# Patient Record
Sex: FEMALE | Race: WHITE | NOT HISPANIC OR LATINO | Employment: OTHER | URBAN - METROPOLITAN AREA
[De-identification: names, ages, dates, MRNs, and addresses within clinical notes are randomized per-mention and may not be internally consistent; named-entity substitution may affect disease eponyms.]

---

## 2017-03-22 ENCOUNTER — HOSPITAL ENCOUNTER (OUTPATIENT)
Dept: RADIOLOGY | Facility: MEDICAL CENTER | Age: 67
Discharge: HOME | End: 2017-03-22
Payer: COMMERCIAL

## 2017-03-22 ENCOUNTER — ALLSCRIPTS OFFICE VISIT (OUTPATIENT)
Dept: OTHER | Facility: OTHER | Age: 67
End: 2017-03-22

## 2017-03-22 DIAGNOSIS — R07.81 PLEURODYNIA: ICD-10-CM

## 2017-03-22 PROCEDURE — 71101 X-RAY EXAM UNILAT RIBS/CHEST: CPT

## 2017-07-18 ENCOUNTER — GENERIC CONVERSION - ENCOUNTER (OUTPATIENT)
Dept: OTHER | Facility: OTHER | Age: 67
End: 2017-07-18

## 2017-08-17 ENCOUNTER — ALLSCRIPTS OFFICE VISIT (OUTPATIENT)
Dept: OTHER | Facility: OTHER | Age: 67
End: 2017-08-17

## 2018-01-13 VITALS
SYSTOLIC BLOOD PRESSURE: 112 MMHG | BODY MASS INDEX: 25.91 KG/M2 | WEIGHT: 160.5 LBS | DIASTOLIC BLOOD PRESSURE: 76 MMHG | HEART RATE: 76 BPM | RESPIRATION RATE: 18 BRPM

## 2018-01-14 VITALS
DIASTOLIC BLOOD PRESSURE: 62 MMHG | BODY MASS INDEX: 25.08 KG/M2 | RESPIRATION RATE: 16 BRPM | HEART RATE: 78 BPM | WEIGHT: 155.38 LBS | SYSTOLIC BLOOD PRESSURE: 112 MMHG

## 2018-01-14 NOTE — PROGRESS NOTES
Assessment   1  Breast cancer screening (V76 10) (Z12 39)  2  History of Osteoporosis screening (V82 81) (Z13 820)  3  History of Need for Zostavax administration (V04 89) (Z23)  4  DMII (diabetes mellitus, type 2) (250 00) (E11 9)  5  History of Encounter for long-term current use of medication (V58 69) (Z79 899)  6  History of Thyroid disorder screen (V77 0) (Z13 29)  7  History of Influenza vaccination administered at current visit (V04 81) (Z23)    Plan  Breast cancer screening    · * MAMMO SCREENING BILATERAL W CAD; Status:Active - Retrospective By Protocol  Authorization; Requested for:20Oct2016;   Colon cancer screening    · 1 - Ashutosh YOUNG, Russel Aviles (Gastroenterology) Physician Referral  Consult  Status: Active -  Retrospective By Protocol Authorization  Requested for: 68QGK0494  () Care Summary provided  : Yes   · COLONOSCOPY; Status:Active - Retrospective By Protocol Authorization; Requested  for:20Oct2016;   DMII (diabetes mellitus, type 2)    · (1) CBC/PLT/DIFF; Status:Active - Retrospective Authorization; Requested  for:20Oct2016;    · (1) COMPREHENSIVE METABOLIC PANEL; Status:Active - Retrospective Authorization; Requested for:20Oct2016;    · (1) LIPID PANEL, FASTING; Status:Active - Retrospective Authorization; Requested  for:20Oct2016;    · *VB - Foot Exam; Status:Complete;   Done: 71MYH0622 11:22PM  DMII (diabetes mellitus, type 2), PMH: Encounter for long-term current use of medication    · (1) VITAMIN D 25-HYDROXY; Status:Active - Retrospective Authorization; Requested  for:20Oct2016;   DMII (diabetes mellitus, type 2), PMH: Thyroid disorder screen    · (1) TSH; Status:Active - Retrospective Authorization; Requested for:20Oct2016;    Health Maintenance    · *VB-Depression Screening; Status:Complete;   Done: 86HIM0285 11:22PM  PMH: Influenza vaccination administered at current visit    · Administered: Fluzone Quadrivalent Intramuscular Suspension  PMH: Need for Zostavax administration    · Start: Zostavax 96656 UNT/0 65ML Subcutaneous Solution Reconstituted (Zostavax  12866 UNT/0 65ML Subcutaneous Solution Reconstituted); adm  one dose as  directed  PMH: Osteoporosis screening    · * DXA BONE DENSITY SPINE HIP AND PELVIS; Status:Active - Retrospective By  Protocol Authorization; Requested for:20Oct2016;   SocHx: Cigarette smoker    · We recommend you quit smoking  Time spent counseling today was greater than 3  minutes ; Status:Complete;   Done: 96NIJ4896 11:23PM    Flu shot  Zostavax  Check BW, mammogram, Dexa scan  Colonoscopy referral   Needs Gyn exam  RV for review of above  Discussion/Summary    1  Diabetes-per endo  Foot exam today  2  Cigarette smoker-discussed cessation  3  HM-discussed diet, exercise  4  HM-needs mammogram, Dexa scan colonoscopy  Updated immunizations  History of Present Illness  HM, Adult Female: The patient is being seen for a health maintenance evaluation  General Health: The patient's health since the last visit is described as good  She does not have regular dental visits  (Only has 4 lower teeth ; has an upper denture)   She complains of vision problems  Vision care includes an eye examination within the last year and Dr Moon Weiss  She denies hearing loss  Lifestyle:  She consumes a diverse and healthy diet (Not much fruits and vegetables  Eats whole grains  Can't do much dairy  Drinks a lot tea  )  She is on a low salt diet  She does not have any weight concerns  She does not exercise regularly  She uses tobacco  (Smoking less since she retired  , Smoking 1 ppd  Has quit for a year and a half  )   She denies alcohol use  Screening: Additional History:  No recent mammogram or Dexa scan  Colonoscopy about 10 years ago Dr Olvin Torres  HPI: Ana Smith has been doing well  She had her cataract surgery and is happy with the results  Review of Systems    Over the past 2 weeks, how often have you been bothered by the following problems?    1 ) Little interest or pleasure in doing things? Not at all    2 ) Feeling down, depressed or hopeless? Not at all  Active Problems   1  Breast cancer screening (V76 10) (Z12 39)  2  Cataract (366 9) (H26 9)  3  Cigarette smoker (305 1) (F17 210)  4  Colon cancer screening (V76 51) (Z12 11)  5  DMII (diabetes mellitus, type 2) (250 00) (E11 9)  6  Fx olecran proc ulna-closed (813 01) (S52 023A)  7  Fx radius neck-closed (813 06) (S52 133A)  8  Impairment of balance (781 2) (R26 89)  9  Multiple falls (V15 88) (R29 6)  10  Screening for malignant neoplasm of cervix (V76 2) (Z12 4)    Past Medical History    · History of High cholesterol (272 0) (E78 00)   · History of diabetes mellitus (V12 29) (Z86 39)   · History of migraine (V12 49) (Z86 69)    Surgical History    · History of Gallbladder Surgery    Family History  Family History    · Family history of Arthritis (V17 7)   · Family history of Pancreatic Cancer Susceptibility   · Family history of Scoliosis    Social History    · Denied: History of Being A Social Drinker   · Cigarette smoker (305 1) (F17 210)   · Current Every Day Smoker (305 1)   · High school graduate   ·    · Two children    Current Meds  1  Wilver Bradley [employer]; Therapy: 50HRB7010 to (Evaluate:27Qjm9517) Recorded  2  Crestor 20 MG Oral Tablet; Therapy: (Recorded:07Apr2014) to Recorded  3  GlipiZIDE 5 MG Oral Tablet Recorded  4  MetFORMIN HCl - 1000 MG Oral Tablet; Therapy: (Recorded:07Apr2014) to Recorded    Allergies   1  Codeine Sulfate TABS    Vitals   Recorded: 99PHI9790 34:36BM   Systolic 829   Diastolic 80   Heart Rate 80   Respiration 18   Weight 160 lb 4 00 oz     Physical Exam    Socks and shoes removed, the Right Foot: the foot was normal, no swelling, no erythema  The toes on the right were normal    The sensory exam showed normal vibratory sensation at the level of the toes and normal position sense at the level of the toes    normal vibratory sensation at the level of the toes on the right  Normal tactile sensation with monofilament testing throughout the right foot  Socks and shoes removed, Left Foot: the foot was normal, no swelling, no erythema  The toes on the left were normal    The sensory exam showed normal vibratory sensation at the level of the toes and normal position sense at the level of the toes   normal vibratory sensation at the level of the toes on the left  Normal tactile sensation with monofilament testing throughout the left foot  Constitutional   General appearance: No acute distress, well appearing and well nourished  Head and Face   Head and face: Normal     Eyes   Conjunctiva and lids: No swelling, erythema or discharge  Pupils and irises: Equal, round, reactive to light  Ears, Nose, Mouth, and Throat   Otoscopic examination: Tympanic membranes translucent with normal light reflex  Canals patent without erythema  Lips, teeth, and gums: Abnormal   4 l;ower incisors present  Oropharynx: Normal with no erythema, edema, exudate or lesions  Neck   Neck: Supple, symmetric, trachea midline, no masses  Thyroid: Normal, no thyromegaly  Pulmonary   Respiratory effort: No increased work of breathing or signs of respiratory distress  Auscultation of lungs: Clear to auscultation  Cardiovascular   Auscultation of heart: Normal rate and rhythm, normal S1 and S2, no murmurs  Carotid pulses: 2+ bilaterally  Abdominal aorta: Normal     Femoral pulses: 2+ bilaterally  Pedal pulses: 2+ bilaterally  Peripheral vascular exam: Normal     Examination of extremities for edema and/or varicosities: Normal     Abdomen   Abdomen: Non-tender, no masses  Liver and spleen: No hepatomegaly or splenomegaly  Lymphatic   Palpation of lymph nodes in neck: No lymphadenopathy  Palpation of lymph nodes in groin: No lymphadenopathy      Musculoskeletal   Gait and station: Normal     Psychiatric   Judgment and insight: Normal  Mood and affect: Normal        Future Appointments    Date/Time Provider Specialty Site   04/20/2017 01:00 PM CARMELA Patterson   [address]     Signatures   Electronically signed by : CARMELA Kearns ; Oct 20 2016 11:49PM EST                       (Author)

## 2018-01-15 NOTE — RESULT NOTES
Verified Results  (1) TISSUE EXAM [id number] 09:14AM Rajinder Olivia     Test Name Result Flag Reference   LAB AP CASE REPORT (Report)     Surgical Pathology Report             Case: [id number]                   Authorizing Provider: Coleta Gaucher, MD  Collected:      12/14/2016 0914        Ordering Location:   [address] [place] [address] Received:      12/14/2016 150                    Operating Room                                 Pathologist:      Siobhan Gonzalez MD                              Specimen:  Polyp, Colorectal, Cold snare cecal polyp   LAB AP FINAL DIAGNOSIS (Report)     A  Cecal polyp:  - Tubular adenoma (adenomatous polyp) x 1   - Serrated polyp x 1, favor sessile serrated adenoma  - No high grade dysplasia and no evidence of malignancy  Interpretation performed at [employer] [facility], [facility]  Electronically signed by Siobhan Gonzalez MD on 12/19/2016 at 5:03 PM   LAB AP SURGICAL ADDITIONAL INFORMATION (Report)     These tests were developed and their performance characteristics   determined by [facility] or [address] [place] [address]  They may not be cleared or approved by the [place]  Food and   Drug Administration  The FDA has determined that such clearance or   approval is not necessary  These tests are used for clinical purposes  They should not be regarded as investigational or for research  This   laboratory has been approved by [place] [id number], designated as a high-complexity   laboratory and is qualified to perform these tests  LAB AP GROSS DESCRIPTION (Report)     A  The specimen is received in formalin, labeled with the patient's name   and hospital number, and is designated cecal polyp  The specimen   consists of several, rubbery, tan-brown tissue fragments measuring 1 0 x   0 8 x 0 3 cm in aggregate dimension  Entirely submitted  One cassette    Note: The estimated total formalin fixation time based upon information   provided by the submitting clinician and the standard processing schedule   is 28 75 hours  SRS

## 2018-02-21 ENCOUNTER — OFFICE VISIT (OUTPATIENT)
Dept: FAMILY MEDICINE CLINIC | Facility: MEDICAL CENTER | Age: 68
End: 2018-02-21
Payer: COMMERCIAL

## 2018-02-21 VITALS
DIASTOLIC BLOOD PRESSURE: 70 MMHG | BODY MASS INDEX: 25.41 KG/M2 | HEART RATE: 56 BPM | WEIGHT: 157.4 LBS | SYSTOLIC BLOOD PRESSURE: 116 MMHG | RESPIRATION RATE: 16 BRPM

## 2018-02-21 DIAGNOSIS — Z79.4 TYPE 2 DIABETES MELLITUS WITHOUT COMPLICATION, WITH LONG-TERM CURRENT USE OF INSULIN (HCC): ICD-10-CM

## 2018-02-21 DIAGNOSIS — F17.210 CIGARETTE SMOKER: Primary | ICD-10-CM

## 2018-02-21 DIAGNOSIS — Z23 FLU VACCINE NEED: ICD-10-CM

## 2018-02-21 DIAGNOSIS — E11.9 TYPE 2 DIABETES MELLITUS WITHOUT COMPLICATION, WITH LONG-TERM CURRENT USE OF INSULIN (HCC): ICD-10-CM

## 2018-02-21 DIAGNOSIS — E78.5 DYSLIPIDEMIA: ICD-10-CM

## 2018-02-21 PROCEDURE — 90686 IIV4 VACC NO PRSV 0.5 ML IM: CPT

## 2018-02-21 PROCEDURE — 99213 OFFICE O/P EST LOW 20 MIN: CPT | Performed by: FAMILY MEDICINE

## 2018-02-21 PROCEDURE — 90471 IMMUNIZATION ADMIN: CPT

## 2018-02-21 RX ORDER — OMEGA-3-ACID ETHYL ESTERS 1 G/1
2 CAPSULE, LIQUID FILLED ORAL 2 TIMES DAILY
COMMUNITY
End: 2021-06-03 | Stop reason: ALTCHOICE

## 2018-02-21 RX ORDER — VARENICLINE TARTRATE 25 MG
KIT ORAL
Qty: 53 TABLET | Refills: 0 | Status: SHIPPED | OUTPATIENT
Start: 2018-02-21 | End: 2018-08-10 | Stop reason: ALTCHOICE

## 2018-02-21 NOTE — PROGRESS NOTES
Michele Jensen has been seeing Endocrinology for follow-up of her dyslipidemia and her insulin-dependent diabetes  She has been seeing Blanca Oabndo at [facility]  Using Toujeo 24 units daily  Has been well controlled with last A1c 6 4  In addition her hypertriglyceridemia is much improved with gemfibrozil and atorvastatin  She also attributes this to better control of her diabetes  However she needs to find a new endocrinologist as her insurance does not cover her  She is ready to quit smoking  She used a nicotine patch in the past   Requests prescription for Chantix  O: /70   Pulse 56   Resp 16   Wt 71 4 kg (157 lb 6 4 oz)   BMI 25 41 kg/m²   Neck no adenopathy thyromegaly bruits  Chest clear but with reduced breath sounds  Cardiac regular rate without murmur    Assessment  1  Hypertension-controlled with losartan  2  Insulin-dependent diabetes-will give referral to new Endocrinologist  3  Cigarette smoker-discussed cessation  Request for prescription for Chantix  Side effects and used reviewed  4   Health maintenance-flu shot today  Will get Pneumovax in 6 months  Plan :  As above  Recheck 6 months

## 2018-04-24 ENCOUNTER — TELEPHONE (OUTPATIENT)
Dept: FAMILY MEDICINE CLINIC | Facility: MEDICAL CENTER | Age: 68
End: 2018-04-24

## 2018-04-24 NOTE — TELEPHONE ENCOUNTER
Triaged- ever since quitting smoking sugars are uncontrolled  Night reading at 300    Fasting at 177 in the morning   appt with KAMRON Estes not until July  Please advise  Is in [address] , up'd it to 30 untits  Didn't seem to help

## 2018-04-24 NOTE — TELEPHONE ENCOUNTER
Pt was taking chantix to quit smoking  For past 2 weeks bs has been over 300 and her feet hurt so bad at night she cannot sleep  She was seeing [facility] for her sugar, but they no longer accept her insurance  She called another endo and they cannot see her until July  Pt said she cannot wait this long

## 2018-04-27 ENCOUNTER — OFFICE VISIT (OUTPATIENT)
Dept: ENDOCRINOLOGY | Facility: CLINIC | Age: 68
End: 2018-04-27
Payer: COMMERCIAL

## 2018-04-27 VITALS
DIASTOLIC BLOOD PRESSURE: 60 MMHG | BODY MASS INDEX: 26.6 KG/M2 | SYSTOLIC BLOOD PRESSURE: 128 MMHG | HEART RATE: 62 BPM | WEIGHT: 164.8 LBS

## 2018-04-27 DIAGNOSIS — IMO0002 UNCONTROLLED TYPE 2 DIABETES WITH NEUROPATHY: ICD-10-CM

## 2018-04-27 DIAGNOSIS — E11.65 TYPE 2 DIABETES MELLITUS WITH HYPERGLYCEMIA, WITHOUT LONG-TERM CURRENT USE OF INSULIN (HCC): Primary | ICD-10-CM

## 2018-04-27 DIAGNOSIS — I10 ESSENTIAL HYPERTENSION: ICD-10-CM

## 2018-04-27 DIAGNOSIS — E78.5 HYPERLIPIDEMIA, UNSPECIFIED HYPERLIPIDEMIA TYPE: ICD-10-CM

## 2018-04-27 DIAGNOSIS — E78.1 HYPERTRIGLYCERIDEMIA: ICD-10-CM

## 2018-04-27 PROCEDURE — 99205 OFFICE O/P NEW HI 60 MIN: CPT | Performed by: INTERNAL MEDICINE

## 2018-04-27 RX ORDER — GEMFIBROZIL 600 MG/1
600 TABLET, FILM COATED ORAL
Qty: 60 TABLET | Refills: 3 | Status: SHIPPED | OUTPATIENT
Start: 2018-04-27 | End: 2018-07-26 | Stop reason: SDUPTHER

## 2018-04-27 RX ORDER — DULOXETIN HYDROCHLORIDE 20 MG/1
20 CAPSULE, DELAYED RELEASE ORAL DAILY
Qty: 30 CAPSULE | Refills: 5 | Status: SHIPPED | OUTPATIENT
Start: 2018-04-27 | End: 2018-08-10 | Stop reason: ALTCHOICE

## 2018-04-27 RX ORDER — LOSARTAN POTASSIUM 50 MG/1
1 TABLET ORAL DAILY
COMMUNITY
End: 2018-08-10 | Stop reason: SDUPTHER

## 2018-04-27 RX ORDER — NATEGLINIDE 60 MG/1
60 TABLET ORAL
Qty: 30 TABLET | Refills: 4 | Status: SHIPPED | OUTPATIENT
Start: 2018-04-27 | End: 2018-08-10 | Stop reason: CLARIF

## 2018-04-27 RX ORDER — ASPIRIN 81 MG/1
81 TABLET ORAL DAILY
COMMUNITY

## 2018-04-27 RX ORDER — ATORVASTATIN CALCIUM 40 MG/1
40 TABLET, FILM COATED ORAL DAILY
Qty: 30 TABLET | Refills: 3 | Status: SHIPPED | OUTPATIENT
Start: 2018-04-27 | End: 2018-07-13 | Stop reason: SDUPTHER

## 2018-04-27 NOTE — PATIENT INSTRUCTIONS
Hypoglycemia instructions   [facility]  9/51/1668  [id number]    Low Blood Sugar    Steps to treat low blood sugar  1  Test blood sugar if you have symptoms of low blood sugar:   Low Blood Sugar Symptoms:  o Sweaty  o Dizzy  o Rapid heartbeat  o Shaky    o Bad mood  o Hungry      2  Treat blood sugar less than 70 with 15 grams of fast-acting carbohydrate:   Examples of 15 grams Fast-Acting Carbohydrate:  o 4 oz juice  o 4 oz regular soda  o 3-4 glucose tablets (chew)  o 3-4 hard candies (chew)              3    Wait 15 minutes and test your blood sugar again           4   If blood sugar is less than 100, repeat steps 2-3       5  When your blood sugar is 100 or more, eat a snack if it will be longer than one hour until your next meal  The snack should be 15 grams of carbohydrate and a protein:   Examples of snacks:  o ½ sandwich  o 6 crackers with cheese  o Piece of fruit with cheese or peanut butter  o 6 crackers with peanut butter

## 2018-04-27 NOTE — PROGRESS NOTES
New Patient Progress Note      No chief complaint on file  Referring Provider  Al Pacheco Md  [facility] 74  [address], [address]     History of Present Illness:   Previous medical records reviewed from [place] [id number] , history was obtained from patient as well as from medical records    Oliver Pickard is a 74 y o  female with a history of type 2  diabetes with long term use of insulin since 14 yrs   Diabetes course has been stable  Reports complications of Neuropathy  Denies recent illness or hospitalizations  Denies recent severe hypoglycemic or severe hyperglycemic episodes  Denies any issues with her current regimen  home glucose monitoring: are performed regularly,  Twice a day    Home blood glucose readings:   Before breakfast:  152-264  Mg per dL  Bedtime:  220 -350 mg/dl , usually elevated after dinner  Patient stated she quit smoking 2 weeks ago and which has increased her blood sugars    Current regimen: Toujeo 30 units in AM , Metformin  mg , 2 tablets twice a day   compliant most of the timedenies any side effects from medications  Injects in: abdomen  Rotates sites: Yes  Hypoglycemic episodes: No rare  H/o of hypoglycemia causing hospitalization or Intervention such as glucagon injection  or ambulance call :  No  Hypoglycemia symptoms: hunger, jitteriness and sweating  Treatment of hypoglycemia:      Medic alert tag: recommended: Yes    Diet: 2-3  meals per day, 1-2  snacks per day  Timing of meals is predictable   Yes  diabetic diet compliance:  compliant most of the time  Activity: Daily activity is predictable Yes The patient maintains a regular, healthy exercise program       Opthamology: sees Ophthalmology; no retinopathy, no macular edema  Podiatry:  Does not see  Jacques vaccine: yes     Has hypertension:  Losartan 50 mg/hydrochlorothiazide 12 5 mg daily compliant most of the time  Has hyperlipidemia: followed by PCP; she is currently taking Lopid 600 mg twice a day Lipitor 80 mg daily, fish oil 2 g twice a day- tolerating well, no myalgias  compliant most of the time   She has history of hypertriglyceridemia, was noncompliant for 1 year, triglyceride went up to 3000, however improved again from the last blood work which was done in December 2017, it was 180  She is religiously taking her medication currently  denies any side effects from medications  Thyroid disorders:  No history of thyroid problem  History of pancreatitis:  Positive history of pancreatitis,  15 years ago secondary to severe hypertriglyceridemia,  Up to 9000  Lab Results   Component Value Date    HGBA1C 9 2 (H) 09/05/2014    HGBA1C 9 2 (H) 09/05/2014    HGBA1C 9 2 (H) 09/05/2014    HGBA1C 9 2 (H) 09/05/2014    HGBA1C 9 2 (H) 09/05/2014    HGBA1C 9 2 (H) 09/05/2014    Reviewed blood work results from December 2017  BUN 16 glucose 54 creatinine 0 65 sodium 139 potassium 4 3 calcium 9 2 albumin 4 2 AST 12 ALT 20 anion gap 9 GFR 93 hemoglobin 14 7 hematocrit 43 0 hemoglobin A1c 6 5%, lipid profile cholesterol 123 mg/dL, triglyceride 180 mg/dL, HDL 28 mg/dL, non HDL 95 mg/dL microalbumin creatinine ratio 43, magnesium 2 1 TSH 4 1 vitamin-D 33 free T4 1 06      Component      Latest Ref Rng & Units 9/5/2014             Cholesterol      125 - 200 mg/dL    HDL      > OR = 46 mg/dL    Triglycerides      <150 mg/dL    LDL CHOLESTEROL      <130 mg/dL (calc)    Chol/HDL Ratio      < OR = 5 0 (calc)    NON-HDL-CHOL (CHOL-HDL)      mg/dL (calc)    POC Glucose      65 - 140 mg/dl    Vit D, 25-Hydroxy      30 - 100 ng/mL    Hemoglobin A1C      <5 7 % of total Hgb 9 2 (H)   TSH 3RD GENERATON      0 40 - 4 50 mIU/L      Component      Latest Ref Rng & Units 10/25/2016             Cholesterol      125 - 200 mg/dL 492 (H)   HDL      > OR = 46 mg/dL 25 (L)   Triglycerides      <150 mg/dL 3177 (H)   LDL CHOLESTEROL      <130 mg/dL (calc) LDL cholesterol not calculated   Triglyceride levelsgreater than 400 mg/dL invalidate calculated LDL results  Desirable range <100 mg/dL for patients with CHD ordiabetes and <70 mg/dL for diabetic patients withknown heart disease  Chol/HDL Ratio      < OR = 5 0 (calc) 19 7 (H)   NON-HDL-CHOL (CHOL-HDL)      mg/dL (calc) 467 (H)   POC Glucose      65 - 140 mg/dl    Vit D, 25-Hydroxy      30 - 100 ng/mL 7 (L)   Hemoglobin A1C      <5 7 % of total Hgb    TSH 3RD GENERATON      0 40 - 4 50 mIU/L 4 47       Patient Active Problem List   Diagnosis    DMII (diabetes mellitus, type 2) (HCC)    Osteoporosis    Vitamin D deficiency    Stress incontinence of urine      Past Medical History:   Diagnosis Date    Cataract     Diabetes mellitus ([address] )     Hyperlipemia     Migraines     Multiple falls       Past Surgical History:   Procedure Laterality Date    CHOLECYSTECTOMY      GALLBLADDER SURGERY      [place] COLONOSCOPY FLX DX W/COLLJ SPEC WHEN PFRMD N/A 12/14/2016    Procedure: COLONOSCOPY;  Surgeon: Brenda Fernandez MD;  Location: [facility];   Service: Gastroenterology      Family History   Problem Relation Age of Onset    Arthritis Family     Pancreatic cancer Family     Scoliosis Family     Diabetes type II Mother     Osteoporosis Mother     Thyroid disease unspecified Mother     Hyperlipidemia Sister     Hypertension Sister     Diabetes type II Maternal Aunt     Diabetes type II Maternal Uncle     Diabetes type II Maternal Grandfather      Social History   Substance Use Topics    Smoking status: Former Smoker     Types: Cigarettes    Smokeless tobacco: Never Used    Alcohol use No     Allergies   Allergen Reactions    Codeine GI Intolerance, Nausea Only and Vomiting         Current Outpatient Prescriptions:     aspirin (ECOTRIN LOW STRENGTH) 81 mg EC tablet, Take 81 mg by mouth daily, Disp: , Rfl:     atorvastatin (LIPITOR) 80 mg tablet, Take 80 mg by mouth daily, Disp: , Rfl:     Cholecalciferol (D3-1000) 1000 units capsule, Take 1,000 Units by mouth daily, Disp: , Rfl:     gemfibrozil (LOPID) 600 mg tablet, Take 600 mg by mouth 2 (two) times a day before meals, Disp: , Rfl:     glucose blood (ONE TOUCH ULTRA TEST) test strip, 1 each by Other route 2 (two) times a day Use as instructed, Disp: , Rfl:     Insulin Glargine (TOUJEO SOLOSTAR) injection pen 300 units/mL, Inject 25 units in AM, Disp: 3 pen, Rfl: 4    losartan (COZAAR) 50 mg tablet, Take 1 tablet by mouth daily, Disp: , Rfl:     metFORMIN (GLUCOPHAGE) 1000 MG tablet, Take 1,000 mg by mouth 2 (two) times a day with meals, Disp: , Rfl:     omega-3-acid ethyl esters (LOVAZA) 1 g capsule, Take 2 g by mouth 2 (two) times a day, Disp: , Rfl:     Zoster Vaccine Live (ZOSTAVAX) 06140 UNT/0 65ML SUSR, Inject under the skin, Disp: , Rfl:     DULoxetine (CYMBALTA) 20 mg capsule, Take 1 capsule (20 mg total) by mouth daily, Disp: 30 capsule, Rfl: 5    glucose blood (CLEVER CHEK AUTO-CODE TEST) test strip, by In Vitro route, Disp: , Rfl:     insulin aspart (NovoLOG) 100 Units/mL SOPN, Inject 10 units at dinner, Disp: 5 pen, Rfl: 4    losartan 50 mg TABS 50 mg, hydrochlorothiazide 12 5 mg TABS 12 5 mg, Take by mouth daily, Disp: , Rfl:     nateglinide (STARLIX) 60 mg tablet, Take 1 tablet (60 mg total) by mouth daily before breakfast, Disp: 30 tablet, Rfl: 4    varenicline (CHANTIX STARTING MONTH WILLIAM) 0 5 MG X 11 & 1 MG X 42 tablet, Take one 0 5mg tablet by mouth once daily for 3 days, then increase to one 0 5mg tablet twice daily for 3 days, then increase to one 1mg tablet twice daily  , Disp: 53 tablet, Rfl: 0  Review of Systems   Constitutional: Positive for fatigue  Negative for activity change, appetite change, fever and unexpected weight change  HENT: Negative  Eyes: Negative for photophobia and visual disturbance  Respiratory: Negative for cough, chest tightness and shortness of breath  Cardiovascular: Negative for chest pain, palpitations and leg swelling     Gastrointestinal: Negative for abdominal distention, abdominal pain, diarrhea and nausea  Endocrine: Negative for polydipsia, polyphagia and polyuria  Genitourinary: Negative  Musculoskeletal: Negative  Neurological: Positive for numbness  Negative for dizziness, tremors and weakness  Hematological: Negative  Psychiatric/Behavioral: Negative  Physical Exam:  Body mass index is 26 6 kg/m²  /60   Pulse 62   Wt 74 8 kg (164 lb 12 8 oz)   BMI 26 60 kg/m²    Wt Readings from Last 3 Encounters:   04/27/18 74 8 kg (164 lb 12 8 oz)   02/21/18 71 4 kg (157 lb 6 4 oz)   08/17/17 70 5 kg (155 lb 6 1 oz)       Physical Exam   Constitutional: She is oriented to person, place, and time  She appears well-developed and well-nourished  No distress  HENT:   Head: Normocephalic and atraumatic  Eyes: Conjunctivae and EOM are normal  Right eye exhibits no discharge  Left eye exhibits no discharge  Neck: Normal range of motion  Neck supple  No thyromegaly present  Cardiovascular: Normal rate, regular rhythm and normal heart sounds  No murmur heard  Pulmonary/Chest: Effort normal and breath sounds normal  No respiratory distress  She has no wheezes  Abdominal: Soft  Bowel sounds are normal  She exhibits no distension  There is no tenderness  Musculoskeletal: Normal range of motion  She exhibits no edema, tenderness or deformity  Neurological: She is alert and oriented to person, place, and time  She has normal reflexes  No cranial nerve deficit  Skin: Skin is warm and dry  She is not diaphoretic  Psychiatric: She has a normal mood and affect  Her behavior is normal    Vitals reviewed      Diabetic Foot Exam    Labs:   No components found for: HA1C  No results found for: GLU    Lab Results   Component Value Date    CREATININE 0 70 10/25/2016    BUN 10 10/25/2016     (L) 10/25/2016    K 4 0 10/25/2016     10/25/2016    CO2 20 10/25/2016     No results found for: EGFR  No components found for: [facility]    Lab Results Component Value Date    CHOL 492 (H) 10/25/2016    HDL 25 (L) 10/25/2016    TRIG 3177 (H) 10/25/2016       Lab Results   Component Value Date    ALT 20 10/25/2016    AST 17 10/25/2016    ALKPHOS 100 10/25/2016    BILITOT 0 4 10/25/2016       No results found for: TSH, FREET4, TSI    Impression:  1  Type 2 diabetes mellitus with hyperglycemia, without long-term current use of insulin ([place] [address] )    2  Hyperlipidemia, unspecified hyperlipidemia type    3  Hypertriglyceridemia    4  Essential hypertension    5  Uncontrolled type 2 diabetes with neuropathy ([place] [address] )           Plan:    Diagnoses and all orders for this visit:    Type 2 diabetes mellitus with hyperglycemia, without long-term current use of insulin ([facility])  A1c 6 5% from December 2017  Blood sugars elevated after dinner in 300 range  Will discontinue glipizide 10 mg  Start NovoLog 10 units with dinner, to improve blood sugars after dinner  Start Starlix 60 mg with breakfast  Will reduce glargine 25 units in the morning to prevent hypoglycemia   Check blood sugars 3 times daily send log in 1 week for further adjustment  Discussed that as she had pancreatitis, will repeat C-peptide to determine her pancreatic reserve  Discussed importance of controlling diabetes to prevent risk of recurrent pancreatitis due to hypertriglyceridemia importance of glucose control and low carb, low sugar low-fat diet emphasized       -     insulin aspart (NovoLOG) 100 Units/mL SOPN; Inject 10 units at dinner  -     Insulin Glargine (TOUJEO SOLOSTAR) injection pen 300 units/mL; Inject 25 units in AM  -     nateglinide (STARLIX) 60 mg tablet; Take 1 tablet (60 mg total) by mouth daily before breakfast  -     HEMOGLOBIN A1C W/ EAG ESTIMATION; Future  -     Comprehensive metabolic panel; Future  -     Microalbumin / creatinine urine ratio; Future  -     C-peptide; Future  -     Ambulatory referral to medical nutrition therapy for diabetes;  Future    Hyperlipidemia, unspecified hyperlipidemia type  Most recent LDL and triglyceride almost at goal  Continue Lipitor 40 mg daily at bedtime  Continue gemfibrozil 600 mg twice a day  Continue fish oil 2 g twice a day  Consult about low-fat diet  -     Lipid panel; Future    Hypertriglyceridemia  Continue gemfibrozil  -     Lipid panel; Future    Essential hypertension  BP Readings from Last 3 Encounters:   04/27/18 128/60   02/21/18 116/70   08/17/17 112/62     BP at goal  Continue current management      Discussed with the patient and all questioned fully answered  She will call me if any problems arise  Patient and her family was counseled regarding her diagnostic results, risk and benefit of treatment options, instruction for managing their conditions  Importance of compliance with their treatment, reducing risk factors for the conditions and my impression that on the conditions    Total time of encounter was 60  min and more than 50 min was spent counseling      Rcio Dill MD

## 2018-04-27 NOTE — LETTER
April 27, 2018     Jenny Schroeder MD  [address] [facility] [address] [facility],[address]    Patient: Juanita Tubbs   YOB: 1950   Date of Visit: 4/27/2018       Dear Dr Hector Jewell: Thank you for referring David Horton to me for evaluation  Below are the relevant portions of my assessment and plan of care  If you have questions, please do not hesitate to call me  I look forward to following [facility] along with you           Sincerely,        Rico Dill MD        CC: No Recipients

## 2018-07-13 DIAGNOSIS — E78.5 HYPERLIPIDEMIA, UNSPECIFIED HYPERLIPIDEMIA TYPE: ICD-10-CM

## 2018-07-13 RX ORDER — ATORVASTATIN CALCIUM 40 MG/1
40 TABLET, FILM COATED ORAL DAILY
Qty: 90 TABLET | Refills: 1 | Status: SHIPPED | OUTPATIENT
Start: 2018-07-13 | End: 2018-08-10 | Stop reason: SDUPTHER

## 2018-07-26 DIAGNOSIS — E78.1 HYPERTRIGLYCERIDEMIA: ICD-10-CM

## 2018-07-26 DIAGNOSIS — E78.5 HYPERLIPIDEMIA, UNSPECIFIED HYPERLIPIDEMIA TYPE: ICD-10-CM

## 2018-07-26 RX ORDER — ATORVASTATIN CALCIUM 40 MG/1
40 TABLET, FILM COATED ORAL DAILY
Qty: 30 TABLET | Refills: 3 | Status: SHIPPED | OUTPATIENT
Start: 2018-07-26 | End: 2018-09-20 | Stop reason: SDUPTHER

## 2018-07-26 RX ORDER — GEMFIBROZIL 600 MG/1
600 TABLET, FILM COATED ORAL
Qty: 60 TABLET | Refills: 3 | Status: SHIPPED | OUTPATIENT
Start: 2018-07-26 | End: 2019-01-13 | Stop reason: SDUPTHER

## 2018-08-10 ENCOUNTER — ANESTHESIA EVENT (INPATIENT)
Dept: PERIOP | Facility: HOSPITAL | Age: 68
DRG: 481 | End: 2018-08-10
Payer: COMMERCIAL

## 2018-08-10 ENCOUNTER — APPOINTMENT (INPATIENT)
Dept: RADIOLOGY | Facility: HOSPITAL | Age: 68
DRG: 481 | End: 2018-08-10
Payer: COMMERCIAL

## 2018-08-10 ENCOUNTER — ANESTHESIA (INPATIENT)
Dept: PERIOP | Facility: HOSPITAL | Age: 68
DRG: 481 | End: 2018-08-10
Payer: COMMERCIAL

## 2018-08-10 ENCOUNTER — HOSPITAL ENCOUNTER (INPATIENT)
Facility: HOSPITAL | Age: 68
LOS: 4 days | Discharge: NURSING, REHAB OR LONG-TERM CARE | DRG: 481 | End: 2018-08-14
Attending: EMERGENCY MEDICINE | Admitting: INTERNAL MEDICINE
Payer: COMMERCIAL

## 2018-08-10 ENCOUNTER — APPOINTMENT (EMERGENCY)
Dept: RADIOLOGY | Facility: HOSPITAL | Age: 68
DRG: 481 | End: 2018-08-10
Payer: COMMERCIAL

## 2018-08-10 DIAGNOSIS — N39.0 UTI (URINARY TRACT INFECTION): ICD-10-CM

## 2018-08-10 DIAGNOSIS — S72.002A CLOSED FRACTURE OF LEFT HIP, INITIAL ENCOUNTER (HCC): Primary | ICD-10-CM

## 2018-08-10 DIAGNOSIS — E11.9 DIABETES MELLITUS (HCC): ICD-10-CM

## 2018-08-10 PROBLEM — N30.00 ACUTE CYSTITIS WITHOUT HEMATURIA: Status: ACTIVE | Noted: 2018-08-10

## 2018-08-10 LAB
ABO GROUP BLD: NORMAL
ALBUMIN SERPL BCP-MCNC: 4.3 G/DL (ref 3.5–5)
ALP SERPL-CCNC: 71 U/L (ref 46–116)
ALT SERPL W P-5'-P-CCNC: 63 U/L (ref 12–78)
ANION GAP SERPL CALCULATED.3IONS-SCNC: 16 MMOL/L (ref 4–13)
ANION GAP SERPL CALCULATED.3IONS-SCNC: 17 MMOL/L (ref 4–13)
APTT PPP: 26 SECONDS (ref 24–36)
AST SERPL W P-5'-P-CCNC: 45 U/L (ref 5–45)
ATRIAL RATE: 75 BPM
BACTERIA UR QL AUTO: ABNORMAL /HPF
BASOPHILS # BLD AUTO: 0.1 THOUSANDS/ΜL (ref 0–0.1)
BASOPHILS NFR BLD AUTO: 1 % (ref 0–1)
BILIRUB SERPL-MCNC: 0.5 MG/DL (ref 0.2–1)
BILIRUB UR QL STRIP: NEGATIVE
BLD GP AB SCN SERPL QL: NEGATIVE
BUN SERPL-MCNC: 18 MG/DL (ref 5–25)
BUN SERPL-MCNC: 18 MG/DL (ref 5–25)
CALCIUM SERPL-MCNC: 8.9 MG/DL (ref 8.3–10.1)
CALCIUM SERPL-MCNC: 9.5 MG/DL (ref 8.3–10.1)
CHLORIDE SERPL-SCNC: 101 MMOL/L (ref 100–108)
CHLORIDE SERPL-SCNC: 104 MMOL/L (ref 100–108)
CLARITY UR: ABNORMAL
CO2 SERPL-SCNC: 19 MMOL/L (ref 21–32)
CO2 SERPL-SCNC: 20 MMOL/L (ref 21–32)
COLOR UR: YELLOW
CREAT SERPL-MCNC: 0.76 MG/DL (ref 0.6–1.3)
CREAT SERPL-MCNC: 0.78 MG/DL (ref 0.6–1.3)
EOSINOPHIL # BLD AUTO: 0.28 THOUSAND/ΜL (ref 0–0.61)
EOSINOPHIL NFR BLD AUTO: 2 % (ref 0–6)
ERYTHROCYTE [DISTWIDTH] IN BLOOD BY AUTOMATED COUNT: 14.1 % (ref 11.6–15.1)
ERYTHROCYTE [DISTWIDTH] IN BLOOD BY AUTOMATED COUNT: 14.1 % (ref 11.6–15.1)
EST. AVERAGE GLUCOSE BLD GHB EST-MCNC: 154 MG/DL
GFR SERPL CREATININE-BSD FRML MDRD: 79 ML/MIN/1.73SQ M
GFR SERPL CREATININE-BSD FRML MDRD: 82 ML/MIN/1.73SQ M
GLUCOSE SERPL-MCNC: 117 MG/DL (ref 65–140)
GLUCOSE SERPL-MCNC: 120 MG/DL (ref 65–140)
GLUCOSE SERPL-MCNC: 127 MG/DL (ref 65–140)
GLUCOSE SERPL-MCNC: 149 MG/DL (ref 65–140)
GLUCOSE SERPL-MCNC: 155 MG/DL (ref 65–140)
GLUCOSE SERPL-MCNC: 170 MG/DL (ref 65–140)
GLUCOSE SERPL-MCNC: 192 MG/DL (ref 65–140)
GLUCOSE SERPL-MCNC: 196 MG/DL (ref 65–140)
GLUCOSE UR STRIP-MCNC: ABNORMAL MG/DL
HBA1C MFR BLD: 7 % (ref 4.2–6.3)
HCT VFR BLD AUTO: 38.3 % (ref 34.8–46.1)
HCT VFR BLD AUTO: 40.2 % (ref 34.8–46.1)
HGB BLD-MCNC: 13.1 G/DL (ref 11.5–15.4)
HGB BLD-MCNC: 13.9 G/DL (ref 11.5–15.4)
HGB UR QL STRIP.AUTO: NEGATIVE
IMM GRANULOCYTES # BLD AUTO: 0.07 THOUSAND/UL (ref 0–0.2)
IMM GRANULOCYTES NFR BLD AUTO: 1 % (ref 0–2)
INR PPP: 1.03 (ref 0.86–1.17)
KETONES UR STRIP-MCNC: NEGATIVE MG/DL
LEUKOCYTE ESTERASE UR QL STRIP: ABNORMAL
LYMPHOCYTES # BLD AUTO: 3.65 THOUSANDS/ΜL (ref 0.6–4.47)
LYMPHOCYTES NFR BLD AUTO: 31 % (ref 14–44)
MCH RBC QN AUTO: 31.3 PG (ref 26.8–34.3)
MCH RBC QN AUTO: 31.8 PG (ref 26.8–34.3)
MCHC RBC AUTO-ENTMCNC: 34.2 G/DL (ref 31.4–37.4)
MCHC RBC AUTO-ENTMCNC: 34.6 G/DL (ref 31.4–37.4)
MCV RBC AUTO: 92 FL (ref 82–98)
MCV RBC AUTO: 92 FL (ref 82–98)
MONOCYTES # BLD AUTO: 0.77 THOUSAND/ΜL (ref 0.17–1.22)
MONOCYTES NFR BLD AUTO: 6 % (ref 4–12)
NEUTROPHILS # BLD AUTO: 7.1 THOUSANDS/ΜL (ref 1.85–7.62)
NEUTS SEG NFR BLD AUTO: 59 % (ref 43–75)
NITRITE UR QL STRIP: POSITIVE
NON-SQ EPI CELLS URNS QL MICRO: ABNORMAL /HPF
NRBC BLD AUTO-RTO: 0 /100 WBCS
P AXIS: 48 DEGREES
PH UR STRIP.AUTO: 5.5 [PH] (ref 4.5–8)
PLATELET # BLD AUTO: 318 THOUSANDS/UL (ref 149–390)
PLATELET # BLD AUTO: 323 THOUSANDS/UL (ref 149–390)
PMV BLD AUTO: 10.3 FL (ref 8.9–12.7)
PMV BLD AUTO: 9.9 FL (ref 8.9–12.7)
POTASSIUM SERPL-SCNC: 3.4 MMOL/L (ref 3.5–5.3)
POTASSIUM SERPL-SCNC: 4.4 MMOL/L (ref 3.5–5.3)
PR INTERVAL: 192 MS
PROT SERPL-MCNC: 7.3 G/DL (ref 6.4–8.2)
PROT UR STRIP-MCNC: NEGATIVE MG/DL
PROTHROMBIN TIME: 13.2 SECONDS (ref 11.8–14.2)
QRS AXIS: -33 DEGREES
QRSD INTERVAL: 100 MS
QT INTERVAL: 362 MS
QTC INTERVAL: 404 MS
RBC # BLD AUTO: 4.18 MILLION/UL (ref 3.81–5.12)
RBC # BLD AUTO: 4.37 MILLION/UL (ref 3.81–5.12)
RBC #/AREA URNS AUTO: ABNORMAL /HPF
RH BLD: POSITIVE
SODIUM SERPL-SCNC: 138 MMOL/L (ref 136–145)
SODIUM SERPL-SCNC: 139 MMOL/L (ref 136–145)
SP GR UR STRIP.AUTO: 1.01 (ref 1–1.03)
SPECIMEN EXPIRATION DATE: NORMAL
T WAVE AXIS: 92 DEGREES
UROBILINOGEN UR QL STRIP.AUTO: 0.2 E.U./DL
VENTRICULAR RATE: 75 BPM
WBC # BLD AUTO: 11.97 THOUSAND/UL (ref 4.31–10.16)
WBC # BLD AUTO: 14.87 THOUSAND/UL (ref 4.31–10.16)
WBC #/AREA URNS AUTO: ABNORMAL /HPF

## 2018-08-10 PROCEDURE — 96374 THER/PROPH/DIAG INJ IV PUSH: CPT

## 2018-08-10 PROCEDURE — 73502 X-RAY EXAM HIP UNI 2-3 VIEWS: CPT

## 2018-08-10 PROCEDURE — 96375 TX/PRO/DX INJ NEW DRUG ADDON: CPT

## 2018-08-10 PROCEDURE — 80048 BASIC METABOLIC PNL TOTAL CA: CPT | Performed by: PHYSICIAN ASSISTANT

## 2018-08-10 PROCEDURE — 99222 1ST HOSP IP/OBS MODERATE 55: CPT | Performed by: PHYSICIAN ASSISTANT

## 2018-08-10 PROCEDURE — 82948 REAGENT STRIP/BLOOD GLUCOSE: CPT

## 2018-08-10 PROCEDURE — 93005 ELECTROCARDIOGRAM TRACING: CPT

## 2018-08-10 PROCEDURE — 86850 RBC ANTIBODY SCREEN: CPT | Performed by: EMERGENCY MEDICINE

## 2018-08-10 PROCEDURE — 0QS706Z REPOSITION LEFT UPPER FEMUR WITH INTRAMEDULLARY INTERNAL FIXATION DEVICE, OPEN APPROACH: ICD-10-PCS | Performed by: ORTHOPAEDIC SURGERY

## 2018-08-10 PROCEDURE — 96361 HYDRATE IV INFUSION ADD-ON: CPT

## 2018-08-10 PROCEDURE — 87086 URINE CULTURE/COLONY COUNT: CPT

## 2018-08-10 PROCEDURE — C1713 ANCHOR/SCREW BN/BN,TIS/BN: HCPCS | Performed by: ORTHOPAEDIC SURGERY

## 2018-08-10 PROCEDURE — 71045 X-RAY EXAM CHEST 1 VIEW: CPT

## 2018-08-10 PROCEDURE — 86901 BLOOD TYPING SEROLOGIC RH(D): CPT | Performed by: EMERGENCY MEDICINE

## 2018-08-10 PROCEDURE — 85730 THROMBOPLASTIN TIME PARTIAL: CPT | Performed by: EMERGENCY MEDICINE

## 2018-08-10 PROCEDURE — 85027 COMPLETE CBC AUTOMATED: CPT | Performed by: PHYSICIAN ASSISTANT

## 2018-08-10 PROCEDURE — 85025 COMPLETE CBC W/AUTO DIFF WBC: CPT | Performed by: EMERGENCY MEDICINE

## 2018-08-10 PROCEDURE — 86900 BLOOD TYPING SEROLOGIC ABO: CPT | Performed by: EMERGENCY MEDICINE

## 2018-08-10 PROCEDURE — 99285 EMERGENCY DEPT VISIT HI MDM: CPT

## 2018-08-10 PROCEDURE — 81001 URINALYSIS AUTO W/SCOPE: CPT

## 2018-08-10 PROCEDURE — 80053 COMPREHEN METABOLIC PANEL: CPT | Performed by: EMERGENCY MEDICINE

## 2018-08-10 PROCEDURE — 87186 SC STD MICRODIL/AGAR DIL: CPT

## 2018-08-10 PROCEDURE — 83036 HEMOGLOBIN GLYCOSYLATED A1C: CPT | Performed by: PHYSICIAN ASSISTANT

## 2018-08-10 PROCEDURE — 99223 1ST HOSP IP/OBS HIGH 75: CPT | Performed by: INTERNAL MEDICINE

## 2018-08-10 PROCEDURE — 85610 PROTHROMBIN TIME: CPT | Performed by: EMERGENCY MEDICINE

## 2018-08-10 PROCEDURE — 36415 COLL VENOUS BLD VENIPUNCTURE: CPT | Performed by: EMERGENCY MEDICINE

## 2018-08-10 PROCEDURE — 27245 TREAT THIGH FRACTURE: CPT | Performed by: ORTHOPAEDIC SURGERY

## 2018-08-10 PROCEDURE — 87077 CULTURE AEROBIC IDENTIFY: CPT

## 2018-08-10 PROCEDURE — 93010 ELECTROCARDIOGRAM REPORT: CPT | Performed by: INTERNAL MEDICINE

## 2018-08-10 DEVICE — 11.0MM TI TROCH FIXATION NAIL SCREW/105MM - STERILE: Type: IMPLANTABLE DEVICE | Site: LEG | Status: FUNCTIONAL

## 2018-08-10 DEVICE — 11MM/130 DEG TI CANN TROCH FIXATION NAIL 400MM/LEFT-STER: Type: IMPLANTABLE DEVICE | Site: LEG | Status: FUNCTIONAL

## 2018-08-10 RX ORDER — ASPIRIN 81 MG/1
81 TABLET ORAL DAILY
Status: DISCONTINUED | OUTPATIENT
Start: 2018-08-10 | End: 2018-08-14 | Stop reason: HOSPADM

## 2018-08-10 RX ORDER — GABAPENTIN 600 MG/1
600 TABLET ORAL 3 TIMES DAILY
Refills: 3 | COMMUNITY
Start: 2018-07-11 | End: 2022-06-24 | Stop reason: SDUPTHER

## 2018-08-10 RX ORDER — MAGNESIUM HYDROXIDE 1200 MG/15ML
LIQUID ORAL AS NEEDED
Status: DISCONTINUED | OUTPATIENT
Start: 2018-08-10 | End: 2018-08-10 | Stop reason: HOSPADM

## 2018-08-10 RX ORDER — OXYCODONE HYDROCHLORIDE 10 MG/1
10 TABLET ORAL EVERY 4 HOURS PRN
Status: DISCONTINUED | OUTPATIENT
Start: 2018-08-10 | End: 2018-08-14 | Stop reason: HOSPADM

## 2018-08-10 RX ORDER — CEFAZOLIN SODIUM 1 G/3ML
INJECTION, POWDER, FOR SOLUTION INTRAMUSCULAR; INTRAVENOUS AS NEEDED
Status: DISCONTINUED | OUTPATIENT
Start: 2018-08-10 | End: 2018-08-10 | Stop reason: SURG

## 2018-08-10 RX ORDER — ACETAMINOPHEN 325 MG/1
650 TABLET ORAL EVERY 6 HOURS PRN
Status: DISCONTINUED | OUTPATIENT
Start: 2018-08-10 | End: 2018-08-14 | Stop reason: HOSPADM

## 2018-08-10 RX ORDER — GEMFIBROZIL 600 MG/1
600 TABLET, FILM COATED ORAL
Status: DISCONTINUED | OUTPATIENT
Start: 2018-08-10 | End: 2018-08-14 | Stop reason: HOSPADM

## 2018-08-10 RX ORDER — ONDANSETRON 2 MG/ML
4 INJECTION INTRAMUSCULAR; INTRAVENOUS ONCE AS NEEDED
Status: DISCONTINUED | OUTPATIENT
Start: 2018-08-10 | End: 2018-08-10 | Stop reason: HOSPADM

## 2018-08-10 RX ORDER — ONDANSETRON 2 MG/ML
4 INJECTION INTRAMUSCULAR; INTRAVENOUS EVERY 4 HOURS PRN
Status: DISCONTINUED | OUTPATIENT
Start: 2018-08-10 | End: 2018-08-10 | Stop reason: HOSPADM

## 2018-08-10 RX ORDER — OXYCODONE HYDROCHLORIDE 5 MG/1
5 TABLET ORAL EVERY 4 HOURS PRN
Status: DISCONTINUED | OUTPATIENT
Start: 2018-08-10 | End: 2018-08-14 | Stop reason: HOSPADM

## 2018-08-10 RX ORDER — SODIUM CHLORIDE 9 MG/ML
75 INJECTION, SOLUTION INTRAVENOUS CONTINUOUS
Status: DISCONTINUED | OUTPATIENT
Start: 2018-08-10 | End: 2018-08-12

## 2018-08-10 RX ORDER — ONDANSETRON 2 MG/ML
4 INJECTION INTRAMUSCULAR; INTRAVENOUS ONCE
Status: COMPLETED | OUTPATIENT
Start: 2018-08-10 | End: 2018-08-10

## 2018-08-10 RX ORDER — DIPHENHYDRAMINE HYDROCHLORIDE 50 MG/ML
12.5 INJECTION INTRAMUSCULAR; INTRAVENOUS ONCE AS NEEDED
Status: DISCONTINUED | OUTPATIENT
Start: 2018-08-10 | End: 2018-08-10 | Stop reason: HOSPADM

## 2018-08-10 RX ORDER — ROPIVACAINE HYDROCHLORIDE 2 MG/ML
INJECTION, SOLUTION EPIDURAL; INFILTRATION; PERINEURAL AS NEEDED
Status: DISCONTINUED | OUTPATIENT
Start: 2018-08-10 | End: 2018-08-10 | Stop reason: SURG

## 2018-08-10 RX ORDER — LABETALOL HYDROCHLORIDE 5 MG/ML
5 INJECTION, SOLUTION INTRAVENOUS
Status: DISCONTINUED | OUTPATIENT
Start: 2018-08-10 | End: 2018-08-10 | Stop reason: HOSPADM

## 2018-08-10 RX ORDER — MEPERIDINE HYDROCHLORIDE 25 MG/ML
12.5 INJECTION INTRAMUSCULAR; INTRAVENOUS; SUBCUTANEOUS
Status: DISCONTINUED | OUTPATIENT
Start: 2018-08-10 | End: 2018-08-10 | Stop reason: HOSPADM

## 2018-08-10 RX ORDER — MELATONIN
1000 DAILY
Status: DISCONTINUED | OUTPATIENT
Start: 2018-08-10 | End: 2018-08-14 | Stop reason: HOSPADM

## 2018-08-10 RX ORDER — GLIPIZIDE 10 MG/1
10 TABLET, FILM COATED, EXTENDED RELEASE ORAL 2 TIMES DAILY
Refills: 0 | COMMUNITY
Start: 2018-06-22 | End: 2022-06-16 | Stop reason: SDUPTHER

## 2018-08-10 RX ORDER — ONDANSETRON 2 MG/ML
4 INJECTION INTRAMUSCULAR; INTRAVENOUS EVERY 6 HOURS PRN
Status: DISCONTINUED | OUTPATIENT
Start: 2018-08-10 | End: 2018-08-14 | Stop reason: HOSPADM

## 2018-08-10 RX ORDER — EPHEDRINE SULFATE 50 MG/ML
INJECTION, SOLUTION INTRAVENOUS AS NEEDED
Status: DISCONTINUED | OUTPATIENT
Start: 2018-08-10 | End: 2018-08-10 | Stop reason: SURG

## 2018-08-10 RX ORDER — ROPIVACAINE HYDROCHLORIDE 5 MG/ML
INJECTION, SOLUTION EPIDURAL; INFILTRATION; PERINEURAL AS NEEDED
Status: DISCONTINUED | OUTPATIENT
Start: 2018-08-10 | End: 2018-08-10 | Stop reason: SURG

## 2018-08-10 RX ORDER — INSULIN GLARGINE 100 [IU]/ML
25 INJECTION, SOLUTION SUBCUTANEOUS
Status: DISCONTINUED | OUTPATIENT
Start: 2018-08-10 | End: 2018-08-12

## 2018-08-10 RX ORDER — ATORVASTATIN CALCIUM 40 MG/1
40 TABLET, FILM COATED ORAL
Status: DISCONTINUED | OUTPATIENT
Start: 2018-08-10 | End: 2018-08-14 | Stop reason: HOSPADM

## 2018-08-10 RX ORDER — FENTANYL CITRATE 50 UG/ML
INJECTION, SOLUTION INTRAMUSCULAR; INTRAVENOUS AS NEEDED
Status: DISCONTINUED | OUTPATIENT
Start: 2018-08-10 | End: 2018-08-10 | Stop reason: SURG

## 2018-08-10 RX ORDER — FENTANYL CITRATE/PF 50 MCG/ML
25 SYRINGE (ML) INJECTION
Status: DISCONTINUED | OUTPATIENT
Start: 2018-08-10 | End: 2018-08-10 | Stop reason: HOSPADM

## 2018-08-10 RX ORDER — SODIUM CHLORIDE 9 MG/ML
125 INJECTION, SOLUTION INTRAVENOUS CONTINUOUS
Status: DISCONTINUED | OUTPATIENT
Start: 2018-08-10 | End: 2018-08-10

## 2018-08-10 RX ORDER — ONDANSETRON 2 MG/ML
INJECTION INTRAMUSCULAR; INTRAVENOUS AS NEEDED
Status: DISCONTINUED | OUTPATIENT
Start: 2018-08-10 | End: 2018-08-10 | Stop reason: SURG

## 2018-08-10 RX ORDER — PROPOFOL 10 MG/ML
INJECTION, EMULSION INTRAVENOUS AS NEEDED
Status: DISCONTINUED | OUTPATIENT
Start: 2018-08-10 | End: 2018-08-10 | Stop reason: SURG

## 2018-08-10 RX ORDER — ALBUTEROL SULFATE 2.5 MG/3ML
2.5 SOLUTION RESPIRATORY (INHALATION) ONCE AS NEEDED
Status: DISCONTINUED | OUTPATIENT
Start: 2018-08-10 | End: 2018-08-10 | Stop reason: HOSPADM

## 2018-08-10 RX ORDER — MORPHINE SULFATE 2 MG/ML
1 INJECTION, SOLUTION INTRAMUSCULAR; INTRAVENOUS EVERY 4 HOURS PRN
Status: DISCONTINUED | OUTPATIENT
Start: 2018-08-10 | End: 2018-08-14 | Stop reason: HOSPADM

## 2018-08-10 RX ORDER — EMPAGLIFLOZIN, METFORMIN HYDROCHLORIDE 12.5; 1 MG/1; MG/1
1 TABLET, EXTENDED RELEASE ORAL 2 TIMES DAILY
Refills: 0 | COMMUNITY
Start: 2018-06-14 | End: 2019-08-12 | Stop reason: CLARIF

## 2018-08-10 RX ORDER — MEPERIDINE HYDROCHLORIDE 25 MG/ML
12.5 INJECTION INTRAMUSCULAR; INTRAVENOUS; SUBCUTANEOUS AS NEEDED
Status: DISCONTINUED | OUTPATIENT
Start: 2018-08-10 | End: 2018-08-10 | Stop reason: HOSPADM

## 2018-08-10 RX ORDER — ESMOLOL HYDROCHLORIDE 10 MG/ML
INJECTION INTRAVENOUS AS NEEDED
Status: DISCONTINUED | OUTPATIENT
Start: 2018-08-10 | End: 2018-08-10 | Stop reason: SURG

## 2018-08-10 RX ORDER — GABAPENTIN 300 MG/1
600 CAPSULE ORAL
Status: DISCONTINUED | OUTPATIENT
Start: 2018-08-10 | End: 2018-08-14 | Stop reason: HOSPADM

## 2018-08-10 RX ADMIN — SODIUM CHLORIDE 125 ML/HR: 0.9 INJECTION, SOLUTION INTRAVENOUS at 03:31

## 2018-08-10 RX ADMIN — SODIUM CHLORIDE 75 ML/HR: 0.9 INJECTION, SOLUTION INTRAVENOUS at 16:47

## 2018-08-10 RX ADMIN — INSULIN GLARGINE 25 UNITS: 100 INJECTION, SOLUTION SUBCUTANEOUS at 21:20

## 2018-08-10 RX ADMIN — FENTANYL CITRATE 25 MCG: 50 INJECTION INTRAMUSCULAR; INTRAVENOUS at 18:34

## 2018-08-10 RX ADMIN — SODIUM CHLORIDE: 0.9 INJECTION, SOLUTION INTRAVENOUS at 17:51

## 2018-08-10 RX ADMIN — SODIUM CHLORIDE 75 ML/HR: 0.9 INJECTION, SOLUTION INTRAVENOUS at 20:03

## 2018-08-10 RX ADMIN — LIDOCAINE HYDROCHLORIDE 60 MG: 20 INJECTION, SOLUTION INTRAVENOUS at 18:10

## 2018-08-10 RX ADMIN — OXYCODONE HYDROCHLORIDE 10 MG: 10 TABLET ORAL at 15:42

## 2018-08-10 RX ADMIN — GABAPENTIN 600 MG: 300 CAPSULE ORAL at 21:19

## 2018-08-10 RX ADMIN — ROPIVACAINE HYDROCHLORIDE 30 ML: 5 INJECTION, SOLUTION EPIDURAL; INFILTRATION; PERINEURAL at 17:24

## 2018-08-10 RX ADMIN — SODIUM CHLORIDE: 0.9 INJECTION, SOLUTION INTRAVENOUS at 19:03

## 2018-08-10 RX ADMIN — SODIUM CHLORIDE 75 ML/HR: 0.9 INJECTION, SOLUTION INTRAVENOUS at 05:12

## 2018-08-10 RX ADMIN — ESMOLOL HYDROCHLORIDE 30 MG: 10 INJECTION, SOLUTION INTRAVENOUS at 18:36

## 2018-08-10 RX ADMIN — HYDROMORPHONE HYDROCHLORIDE 0.5 MG: 1 INJECTION, SOLUTION INTRAMUSCULAR; INTRAVENOUS; SUBCUTANEOUS at 02:29

## 2018-08-10 RX ADMIN — PROPOFOL 200 MG: 10 INJECTION, EMULSION INTRAVENOUS at 18:10

## 2018-08-10 RX ADMIN — MORPHINE SULFATE 1 MG: 2 INJECTION, SOLUTION INTRAMUSCULAR; INTRAVENOUS at 20:06

## 2018-08-10 RX ADMIN — CEFAZOLIN SODIUM 2000 MG: 1 INJECTION, POWDER, FOR SOLUTION INTRAMUSCULAR; INTRAVENOUS at 18:10

## 2018-08-10 RX ADMIN — FENTANYL CITRATE 25 MCG: 50 INJECTION INTRAMUSCULAR; INTRAVENOUS at 18:36

## 2018-08-10 RX ADMIN — EPHEDRINE SULFATE 20 MG: 50 INJECTION, SOLUTION INTRAMUSCULAR; INTRAVENOUS; SUBCUTANEOUS at 18:24

## 2018-08-10 RX ADMIN — ONDANSETRON 4 MG: 2 INJECTION INTRAMUSCULAR; INTRAVENOUS at 18:17

## 2018-08-10 RX ADMIN — GEMFIBROZIL 600 MG: 600 TABLET ORAL at 15:53

## 2018-08-10 RX ADMIN — ONDANSETRON 4 MG: 2 INJECTION, SOLUTION INTRAMUSCULAR; INTRAVENOUS at 02:26

## 2018-08-10 RX ADMIN — SODIUM CHLORIDE 500 ML: 0.9 INJECTION, SOLUTION INTRAVENOUS at 02:32

## 2018-08-10 RX ADMIN — EPHEDRINE SULFATE 15 MG: 50 INJECTION, SOLUTION INTRAMUSCULAR; INTRAVENOUS; SUBCUTANEOUS at 18:21

## 2018-08-10 RX ADMIN — EPHEDRINE SULFATE 15 MG: 50 INJECTION, SOLUTION INTRAMUSCULAR; INTRAVENOUS; SUBCUTANEOUS at 18:17

## 2018-08-10 RX ADMIN — LOSARTAN POTASSIUM: 50 TABLET, FILM COATED ORAL at 08:23

## 2018-08-10 RX ADMIN — CHOLECALCIFEROL TAB 25 MCG (1000 UNIT) 1000 UNITS: 25 TAB at 08:22

## 2018-08-10 RX ADMIN — ATORVASTATIN CALCIUM 40 MG: 40 TABLET, FILM COATED ORAL at 15:53

## 2018-08-10 RX ADMIN — CEFTRIAXONE 1000 MG: 1 INJECTION, POWDER, FOR SOLUTION INTRAMUSCULAR; INTRAVENOUS at 04:01

## 2018-08-10 RX ADMIN — CEFAZOLIN SODIUM 2000 MG: 2 SOLUTION INTRAVENOUS at 21:19

## 2018-08-10 RX ADMIN — FENTANYL CITRATE 25 MCG: 50 INJECTION INTRAMUSCULAR; INTRAVENOUS at 19:26

## 2018-08-10 RX ADMIN — GEMFIBROZIL 600 MG: 600 TABLET ORAL at 06:20

## 2018-08-10 RX ADMIN — OXYCODONE HYDROCHLORIDE 5 MG: 5 TABLET ORAL at 09:01

## 2018-08-10 RX ADMIN — FENTANYL CITRATE 25 MCG: 50 INJECTION INTRAMUSCULAR; INTRAVENOUS at 19:29

## 2018-08-10 RX ADMIN — MORPHINE SULFATE 1 MG: 2 INJECTION, SOLUTION INTRAMUSCULAR; INTRAVENOUS at 04:52

## 2018-08-10 RX ADMIN — ROPIVACAINE HYDROCHLORIDE 20 ML: 2 INJECTION, SOLUTION EPIDURAL; INFILTRATION at 17:24

## 2018-08-10 NOTE — OCCUPATIONAL THERAPY NOTE
Occupational Therapy         Patient Name: Kaleigh Orona  ETPML'L Date: 5/57/0970      OT orders received and chart review completed  Pt is pending orthopedics consult for left hip fracture  Will await orthopdedics and complete eval as appropriate      Ele Green OTR/L

## 2018-08-10 NOTE — OCCUPATIONAL THERAPY NOTE
Occupational Therapy         Patient Name: Paramjit Toussaint  QZFOD'K Date: 2/72/4158  Chart review completed  Plan for OR for left hip fracture for IM nail fixation  Please re consult following surgery as appropriate and schedule allows      JUDIT Lance/L

## 2018-08-10 NOTE — SOCIAL WORK
LOS 0  Patient is not a bundle or a readmission  CM spoke with patient at bedside  Patient lives in [place] in an apartment with her   There are 16 ZOIE home  Patient does not use any assistive devices  Patient is independent with ADLs  Patient denies history of alcohol/drug abuse and mental illness history  Patient uses [facility] in [place]  Patient has prescription insurance and PACE  Patient states she can afford her medications  Patient does not have an advance directive/living will  Declines information at this time  Patient's emergency contact is her  Joselito Wardgriselda  Joselito Wardping is at bedside with patient  Patient is retired and currently drives  Patient's  can transport patient when medically stable for discharge  CM discussed with patient and  re:DCP  Patient is scheduled for the OR today  PT/OT will work with patient  Patient stated that she would like Hilary Quiñonez if she needs to go to inpatient rehab  CM contact information given to patient at bedside  CM will continue to f/u  CM name and role reviewed  Discharge Checklist reviewed and CM will continue to monitor for progress toward discharge goals in nursing and provider rounds  CM reviewed discharge planning process including the following: identifying caregivers at home, preference for d/c planning needs, [facility] Meds to Bed program, availability of treatment team to discuss questions or concerns patient and/or family may have regarding diagnosis, plan of care, old or new medications and discharge planning   CM will continue to follow for care coordination and update assessment as necessary

## 2018-08-10 NOTE — CONSULTS
Orthopedics   [facility] 79 y o  female MRN: [id number]  Unit/Bed#: -01      Chief Complaint:   Left hip injury    HPI:   79 y  o female seen in consultation by orthopedics requested by Tiffany Beltran PA-C evaluation of her left hip  Patient states she fell onto her left hip while walking up concrete steps last evening  She states she fell directly onto her left hip  She denies hit her head or LOC  She noted severe pain in the left hip with inability to bear weight on the left lower extremity  She reported to the emergency department where x-rays revealed a hip fracture  Currently her pain is well controlled at rest   Movement exacerbates her pain  She does have a history of neuropathy but denies any new numbness or tingling  No fevers or chills  At baseline she did not utilize an assistive device to ambulate  Review Of Systems:   · Skin: See HPI  · Neuro: See HPI  · Musculoskeletal: See HPI  · 14 point review of systems negative except as stated above     Past Medical History:   Past Medical History:   Diagnosis Date    Acute cystitis without hematuria 8/10/2018    Cataract     Closed left hip fracture [facility] [address] ) 8/10/2018    Diabetes mellitus ([facility] )     Hyperlipemia     Migraines     Multiple falls        Past Surgical History:   Past Surgical History:   Procedure Laterality Date    CHOLECYSTECTOMY      GALLBLADDER SURGERY      [place] COLONOSCOPY FLX DX W/COLLJ SPEC WHEN PFRMD N/A 12/14/2016    Procedure: COLONOSCOPY;  Surgeon: Chela Elizabeth MD;  Location: [facility];   Service: Gastroenterology       Family History:  Family history reviewed and non-contributory  Family History   Problem Relation Age of Onset    Arthritis Family     Pancreatic cancer Family     Scoliosis Family     Diabetes type II Mother     Osteoporosis Mother     Thyroid disease unspecified Mother     Hyperlipidemia Sister     Hypertension Sister     Diabetes type II Maternal Aunt     Diabetes type II Maternal Uncle     Diabetes type II Maternal Grandfather        Social History:  Social History     Social History    Marital status: /Civil Union     Spouse name: N/A    Number of children: 2    Years of education: 12     Social History Main Topics    Smoking status: Former Smoker     Types: Cigarettes    Smokeless tobacco: Never Used    Alcohol use No    Drug use: No    Sexual activity: Not Asked     Other Topics Concern    None     Social History Narrative    high school graduate           Allergies:    Allergies   Allergen Reactions    Codeine GI Intolerance, Nausea Only and Vomiting           Labs:    0  Lab Value Date/Time   HCT 38 3 08/10/2018 0528   HCT 40 2 08/10/2018 0223   HCT 43 0 10/25/2016 1053   HGB 13 1 08/10/2018 0528   HGB 13 9 08/10/2018 0223   HGB 15 3 10/25/2016 1053   INR 1 03 08/10/2018 0223   WBC 14 87 (H) 08/10/2018 0528   WBC 11 97 (H) 08/10/2018 0223   WBC 9 9 10/25/2016 1053       Meds:    Current Facility-Administered Medications:     acetaminophen (TYLENOL) tablet 650 mg, 650 mg, Oral, Q6H PRN, Janet Cruz PA-C    aspirin (ECOTRIN LOW STRENGTH) EC tablet 81 mg, 81 mg, Oral, Daily, Kesha Sheppard PA-C    atorvastatin (LIPITOR) tablet 40 mg, 40 mg, Oral, Daily With Dinner, Janet Cruz PA-C    [START ON 8/11/2018] cefTRIAXone (ROCEPHIN) 1,000 mg in dextrose 5 % 50 mL IVPB, 1,000 mg, Intravenous, Q24H, Kesha Sheppard PA-C    cholecalciferol (VITAMIN D3) tablet 1,000 Units, 1,000 Units, Oral, Daily, Janet Cruz PA-C    enoxaparin (LOVENOX) subcutaneous injection 40 mg, 40 mg, Subcutaneous, Daily, Kesha Sheppard PA-C    gabapentin (NEURONTIN) capsule 600 mg, 600 mg, Oral, HS, Kesha Sheppard PA-C    gemfibrozil (LOPID) tablet 600 mg, 600 mg, Oral, BID AC, Kesha Sheppard PA-C, 600 mg at 08/10/18 0620    insulin glargine (LANTUS) subcutaneous injection 25 Units 0 25 mL, 25 Units, Subcutaneous, HS, Kesha L CHAR Sheppard    insulin lispro (HumaLOG) 100 units/mL subcutaneous injection 1-6 Units, 1-6 Units, Subcutaneous, TID AC **AND** Fingerstick Glucose (POCT), , , TID AC, Nic Gonzales PA-C    losartan potassium-hydrochlorothiazide (HYZAAR 50/12  5) combination, , Oral, Daily, Nic Gonzales PA-C    morphine injection 1 mg, 1 mg, Intravenous, Q4H PRN, Nic Gonzales PA-C, 1 mg at 08/10/18 0452    ondansetron (ZOFRAN) injection 4 mg, 4 mg, Intravenous, Q6H PRN, Nic Gonzales PA-C    oxyCODONE (ROXICODONE) immediate release tablet 10 mg, 10 mg, Oral, Q4H PRN, Nic Gonzales PA-C    oxyCODONE (ROXICODONE) IR tablet 5 mg, 5 mg, Oral, Q4H PRN, Nic Gonzales PA-C    sodium chloride 0 9 % infusion, 75 mL/hr, Intravenous, Continuous, Nic Gonzales PA-C, Last Rate: 75 mL/hr at 08/10/18 0512, 75 mL/hr at 08/10/18 0512    Physical Exam:   /60 (BP Location: Left arm)   Pulse 70   Temp 98 4 °F (36 9 °C) (Oral)   Resp 18   Ht 5' 6" (1 676 m)   Wt 74 9 kg (165 lb 2 oz)   SpO2 96%   BMI 26 65 kg/m²   Gen: Alert and oriented to person, place, time  HEENT: EOMI, eyes clear, moist mucus membranes, hearing intact  Respiratory: Bilateral chest rise  No audible wheezing found  Cardiovascular: Regular Rate and Rhythm  Abdomen: soft nontender/nondistended  Musculoskeletal: Left Hip:  · Skin intact  · Left lower extremity shortened and externally rotated  · Tenderness to palpation greater trochanter  · Positive logroll test   · Motor and sensation intact left lower extremity  · 2+ DP pulse  Radiology:   I personally reviewed the films  X-rays left hip reveals intertrochanteric fracture     _*_*_*_*_*_*_*_*_*_*_*_*_*_*_*_*_*_*_*_*_*_*_*_*_*_*_*_*_*_*_*_*_*_*_*_*_*_*_*_*_*    Assessment:  79 y  o female with left hip intertrochanteric fracture after mechanical fall    Plan:   · Pain Control  · NWB LLE/Bedrest  · NPO  · Type and screen completed     · Plan for OR today for IM nail fixation      Graciella Crown, PA-C

## 2018-08-10 NOTE — ANESTHESIA PROCEDURE NOTES
Peripheral Block    Patient location during procedure: holding area  Start time: 8/10/2018 5:20 PM  Reason for block: at surgeon's request and post-op pain management  Staffing  Anesthesiologist: Wade Mccall  Performed: anesthesiologist   Preanesthetic Checklist  Completed: patient identified, site marked, surgical consent, pre-op evaluation, timeout performed, IV checked, risks and benefits discussed and monitors and equipment checked  Peripheral Block  Patient position: supine  Prep: ChloraPrep  Patient monitoring: continuous pulse ox, frequent blood pressure checks, cardiac monitor and heart rate  Anesthesia block type: fascia iliaca    Laterality: left  Injection technique: single-shot  Procedures: ultrasound guided  Ultrasound permanent image saved  Needle  Needle type: Stimuplex   Needle gauge: 22 G  Needle length: 10 cm  Needle localization: ultrasound guidance  Needle insertion depth: 6 cm  Assessment  Injection assessment: incremental injection, local visualized surrounding nerve on ultrasound, negative aspiration for heme and no paresthesia on injection  Paresthesia pain: none  Heart rate change: no  Slow fractionated injection: yes  Post-procedure:  site cleaned  patient tolerated the procedure well with no immediate complications

## 2018-08-10 NOTE — ASSESSMENT & PLAN NOTE
Lab Results   Component Value Date    HGBA1C 9 2 (H) 09/05/2014    HGBA1C 9 2 (H) 09/05/2014    HGBA1C 9 2 (H) 09/05/2014    HGBA1C 9 2 (H) 09/05/2014    HGBA1C 9 2 (H) 09/05/2014    HGBA1C 9 2 (H) 09/05/2014       No results for input(s): POCGLU in the last 72 hours  Blood Sugar Average: Last 72 hrs:  Glucose 196 on arrival   Hold orals  SSI Coverage  Check A1C

## 2018-08-10 NOTE — OP NOTE
OPERATIVE REPORT    PATIENT NAME: Katlyn Mendosa   :  70  MRN: [id number]  Pt Location: AN OR ROOM 01    SURGERY DATE: 8/10/2018    SURGEON(S) and ROLE:  Primary: Maurisio Sharpe MD    NOTE:  No qualified resident or physician assistant was available for the case  PREOPERATIVE DIAGNOSES:  Left Intertrochanteric Hip Fracture    POSTOPERATIVE DIAGNOSES:  Same as Preoperatvie Diagnoses    PROCEDURES:  Intramedullary Fixation of Left Intertrochanteric Hip Fracture      ANESTHESIA TYPE:  General endotracheal    ANESTHESIA STAFF:   Anesthesiologist: Mau King MD  CRNA: Luis Spring CRNA    ESTIMATED BLOOD LOSS:  100 mL    PERIOPERATIVE ANTIBIOTICS:  cefazolin, 2 grams    IMPLANTS:  Synthes TFN (400x11 mm), 105 mm CM screw)      Implant Name Type Inv  Item Serial No   Lot No  LRB No  Used Action   NAIL RAFY TI TROCH FIX 11MM X 400MM X 130 DEG LFT [facility] [id number]  NAIL RAFY TI TROCH FIX 11MM X 400MM X 130 DEG LFT [facility] [id number] Left 1 Implanted   NAIL SCREW TI TROCH FIX 11 0 X 105MM [facility] [id number]   NAIL SCREW TI TROCH FIX 11 0 X 105MM [facility] [id number] Left 1 Implanted       SPECIMENS:  * No specimens in log *    DRAINS:  None      OPERATIVE INDICATIONS:  The patient is a 79 y o  female with left hip pain due to an intertrochanteric fracture  Surgical treatment was indicated due to instability, displacement and liklihood of prolonged or permanent functional impairment with non-surgical treatment  After a thorough discussion of the potential risks, benefits, and alternative treatments, the patient agreed to proceed with surgery  The patient understands that the risks of surgery include, but are not limited to: nonunion, malunion, loss of fixation, infection, neurovascular injury, wound healing complications, venous thromboembolism, persistent pain, stiffness, instability, recurrence of symptoms, potential need for additional surgeries, and loss of limb or life  Oral and written consent for surgery was obtained from the patient preoperatively  PROCEDURE AND TECHNIQUE:  On the day of surgery, the patient was identified in the preoperative holding area  The operative site was marked by the surgeon  The patient was taken into the operating room  A time-out was conducted to confirm the patient's identity, the operative site, and the proposed procedure  The patient was anesthetized, and perioperative antibiotics were administered  The patient was positioned supine on the fracture table  All bony prominences were padded  The operative site was prepped and draped using standard sterile technique  Fracture reduction was achieved with gentle traction and rotation  An incision was made 5 cm proximal to the greater trochanter  The gluteus fascia was split with curved graham scissors  Using fluoroscopic guidance, a guide pin was positioned on the tip of the greater trochanter and advanced into the proximal femur  The intramedullary canal was opened, and a ball-tip guide wire was advanced to the distal femoral physeal scar  A depth gauge was used to select the nail length  The femur was reamed to a diameter of 12 5 mm  A 400 x 11 mm TFN was passed over the wire and seated within the intramedullary canal under fluoroscopic guidance  A second incision was made just distal to the greater trochanter, and the cephalomedullary guide was advanced onto the lateral femur  A cephalomedullary guide pin was advanced to the subchondral bone of the femoral head  Fluoroscopy confirmed that the wire was positioned centrally within the femoral neck on AP and lateral views  A depth gauge was used to select the cephalomedullary screw length  A cephalomedullary reamer was passed over the wire to the measured depth  A 105 mm cephalomedullary screw was placed by hand, and appropriated position was confirmed with fluoroscopy  The set screw was tightened      No distal interlock screw was placed  Final fluoroscopic images confirmed appropriate fracture alignment and hardware position  The wounds were irrigated with sterile saline  Incisions were closed with 2-0 vicryl and staples  Sterile dressings were applied  The drapes were removed  The patient was transferred to the hospital bed, awakened from anesthesia, and taken to the PACU in stable condition        COMPLICATIONS:  None    PATIENT DISPOSITION:  PACU       SIGNATURE:  Yadira Moses MD  DATE:  August 10, 2018  TIME:  7:17 PM

## 2018-08-10 NOTE — H&P
H&P- Juanita Tubbs [birth year], 79 y o  female MRN: [id number]    Unit/Bed#: ED 09 Encounter: [id number]    Primary Care Provider: Jenny Schroeder MD   Date and time admitted to hospital: 8/10/2018  1:42 AM    * Closed left hip fracture [facility])   Assessment & Plan    · Patient fell down cement stairs onto left hip  · XR- left hip fracture  · Bed rest   · Pain management  · NPO  · PT/OT  · Consult ortho  Acute cystitis without hematuria   Assessment & Plan    · UA- Positive nitrite  20-30 WBC  No RBC  Innumerable bacteria  · Afebrile  Mild leukocytosis of 11 97   · Rocephin  · Tylenol PRN fever  · Urine culture pending  [facility]   Assessment & Plan    Lab Results   Component Value Date    HGBA1C 9 2 (H) 09/05/2014    HGBA1C 9 2 (H) 09/05/2014    HGBA1C 9 2 (H) 09/05/2014    HGBA1C 9 2 (H) 09/05/2014    HGBA1C 9 2 (H) 09/05/2014    HGBA1C 9 2 (H) 09/05/2014       No results for input(s): POCGLU in the last 72 hours  Blood Sugar Average: Last 72 hrs:  Glucose 196 on arrival   Hold orals  SSI Coverage  Check A1C  Hyperlipemia   Assessment & Plan    · Continue home medications  VTE Prophylaxis: Enoxaparin (Lovenox)  / sequential compression device   Code Status: Full Code  POLST: POLST form is not discussed and not completed at this time  Discussion with family: Discussed with patient at bedside  Anticipated Length of Stay:  Patient will be admitted on an Inpatient basis with an anticipated length of stay of  Greater than 2 midnights  Justification for Hospital Stay: Hip fracture, UTI  Total Time for Visit, including Counseling / Coordination of Care: 45 minutes  Greater than 50% of this total time spent on direct patient counseling and coordination of care  Chief Complaint:   Fall, hip pain      History of Present Illness:    Juanita Tubbs is a 79 y o  female, PMHx of DM, HLD, who presents with left hip pain after falling tonight  Patient reports that she is going up the concrete steps at her house  She states that she went to reach for the railing and that she was high on the steps that she actually was  She states that her hand missed a railing and she fell onto her left side  She reports having left lateral hip pain, currently 2/10 in severity  Denies lower extremity numbness/tingling  Denies head trauma, loss of consciousness  Denies fever, chills, diaphoresis, cough, shortness of breath, chest pain, abdominal pain, nausea, vomiting, change in bowel movements, urinary symptoms, dizziness, headache  Review of Systems:    Review of Systems   Constitutional: Negative for chills, diaphoresis and fever  Respiratory: Negative for cough and shortness of breath  Cardiovascular: Negative for chest pain and palpitations  Gastrointestinal: Negative for abdominal pain, constipation, diarrhea, nausea and vomiting  Genitourinary: Negative for dysuria, frequency, hematuria and urgency  Musculoskeletal: Positive for arthralgias  Neurological: Negative for dizziness, light-headedness and headaches  All other systems reviewed and are negative  Past Medical and Surgical History:     Past Medical History:   Diagnosis Date    Acute cystitis without hematuria 8/10/2018    Cataract     [facility] ) 8/10/2018    Diabetes mellitus ([facility] )     Hyperlipemia     Migraines     Multiple falls        Past Surgical History:   Procedure Laterality Date    CHOLECYSTECTOMY      GALLBLADDER SURGERY      SD COLONOSCOPY FLX DX W/COLLJ SPEC WHEN PFRMD N/A 12/14/2016    Procedure: COLONOSCOPY;  Surgeon: Brenda Fernandez MD;  Location: [facility]; Service: Gastroenterology       Meds/Allergies:    Prior to Admission medications    Medication Sig Start Date End Date Taking?  Authorizing Provider   aspirin (ECOTRIN LOW STRENGTH) 81 mg EC tablet Take 81 mg by mouth daily    Historical Provider, MD   atorvastatin (LIPITOR) 40 mg tablet TAKE 1 TABLET (40 MG TOTAL) BY MOUTH DAILY TAKE AT BEDTIME 7/26/18   Octaviorel Desmond, MD   Cholecalciferol (D3-1000) 1000 units capsule Take 2,000 Units by mouth daily      Historical Provider, MD   gabapentin (NEURONTIN) 600 MG tablet Take 600 mg by mouth daily at bedtime 7/11/18   Historical Provider, MD   gemfibrozil (LOPID) 600 mg tablet TAKE 1 TABLET (600 MG TOTAL) BY MOUTH 2 (TWO) TIMES A DAY BEFORE MEALS 7/26/18   Octaviorel Desmond, MD   glipiZIDE (GLUCOTROL XL) 10 mg 24 hr tablet Take 10 mg by mouth 2 (two) times a day 6/22/18   Historical Provider, MD   glucose blood (CLEVER CHEK AUTO-CODE TEST) test strip by In Vitro route 3/12/14   Historical Provider, MD   glucose blood (ONE TOUCH ULTRA TEST) test strip 1 each by Other route 2 (two) times a day Use as instructed    Historical Provider, MD   Insulin Glargine (TOUJEO SOLOSTAR) injection pen 300 units/mL Inject 25 units in AM  Patient taking differently: Inject 25 Units under the skin daily at bedtime   4/27/18   [facility] Row, MD   losartan 50 mg TABS 50 mg, hydrochlorothiazide 12 5 mg TABS 12 5 mg Take by mouth daily    Historical Provider, MD   omega-3-acid ethyl esters (LOVAZA) 1 g capsule Take 2 g by mouth 2 (two) times a day    Historical Provider, MD   SYNJARDY XR 12 5-1000 MG TB24 Take 1 tablet by mouth 2 (two) times a day 6/14/18   Historical Provider, MD   Zoster Vaccine Live (ZOSTAVAX) 56073 UNT/0 65ML SUSR Inject under the skin 10/20/16   Historical Provider, MD   atorvastatin (LIPITOR) 40 mg tablet Take 1 tablet (40 mg total) by mouth daily for 90 days Take at bedtime 7/13/18 8/10/18  [facility] Desmond, MD   DULoxetine (CYMBALTA) 20 mg capsule Take 1 capsule (20 mg total) by mouth daily 4/27/18 8/10/18  [facility] Desmond, MD   insulin aspart (NovoLOG) 100 Units/mL SOPN Inject 10 units at dinner 4/27/18 8/10/18  [facility] Row, MD   losartan (COZAAR) 50 mg tablet Take 1 tablet by mouth daily  8/10/18 Historical Provider, MD   metFORMIN (GLUCOPHAGE) 1000 MG tablet Take 1,000 mg by mouth 2 (two) times a day with meals  8/10/18  Historical Provider, MD   nateglinide (STARLIX) 60 mg tablet Take 1 tablet (60 mg total) by mouth daily before breakfast 4/27/18 8/10/18  Sandra Patricia MD   rosuvastatin (CRESTOR) 20 MG tablet Take 20 mg by mouth  12/12/16  Historical Provider, MD   varenicline (CHANTIX STARTING MONTH PAK) 0 5 MG X 11 & 1 MG X 42 tablet Take one 0 5mg tablet by mouth once daily for 3 days, then increase to one 0 5mg tablet twice daily for 3 days, then increase to one 1mg tablet twice daily  2/21/18 8/10/18  Caroline Bacon MD     I have reviewed home medications with patient personally  Allergies: Allergies   Allergen Reactions    Codeine GI Intolerance, Nausea Only and Vomiting       Social History:     Marital Status: /Civil Union   Occupation: Unknown  Patient Pre-hospital Living Situation: Home  Patient Pre-hospital Level of Mobility: Independent  Patient Pre-hospital Diet Restrictions: None  Substance Use History:   History   Alcohol Use No     History   Smoking Status    Former Smoker    Types: Cigarettes   Smokeless Tobacco    Never Used     History   Drug Use No       Family History:    non-contributory    Physical Exam:     Vitals:   Blood Pressure: 124/57 (08/10/18 0430)  Pulse: 78 (08/10/18 0430)  Temperature: 98 2 °F (36 8 °C) (08/10/18 0144)  Temp Source: Oral (08/10/18 0144)  Respirations: 18 (08/10/18 0430)  Weight - Scale: 77 kg (169 lb 12 1 oz) (08/10/18 0145)  SpO2: 93 % (08/10/18 0430)    Physical Exam   Constitutional: She is oriented to person, place, and time  She appears well-developed and well-nourished  She is cooperative  She does not appear ill  No distress  HENT:   Head: Normocephalic and atraumatic  Eyes: Conjunctivae, EOM and lids are normal  Pupils are equal, round, and reactive to light     Cardiovascular: Normal rate, regular rhythm, normal heart sounds and normal pulses  No murmur heard  Pulmonary/Chest: Effort normal and breath sounds normal  She has no wheezes  She has no rhonchi  She has no rales  Abdominal: Soft  Normal appearance and bowel sounds are normal  There is no tenderness  Musculoskeletal:        Left hip: She exhibits decreased range of motion and tenderness  Neurological: She is alert and oriented to person, place, and time  She has normal strength  She is not disoriented  No sensory deficit  Left LE neurovascularly intact  Skin: Skin is warm, dry and intact  She is not diaphoretic  Psychiatric: She has a normal mood and affect  Her speech is normal and behavior is normal  Cognition and memory are normal    Vitals reviewed  Additional Data:     Lab Results: I have personally reviewed pertinent reports  Results from last 7 days  Lab Units 08/10/18  0223   WBC Thousand/uL 11 97*   HEMOGLOBIN g/dL 13 9   HEMATOCRIT % 40 2   PLATELETS Thousands/uL 318   NEUTROS PCT % 59   LYMPHS PCT % 31   MONOS PCT % 6   EOS PCT % 2       Results from last 7 days  Lab Units 08/10/18  0223   SODIUM mmol/L 138   POTASSIUM mmol/L 4 4   CHLORIDE mmol/L 101   CO2 mmol/L 20*   BUN mg/dL 18   CREATININE mg/dL 0 76   CALCIUM mg/dL 9 5   TOTAL PROTEIN g/dL 7 3   BILIRUBIN TOTAL mg/dL 0 50   ALK PHOS U/L 71   ALT U/L 63   AST U/L 45   GLUCOSE RANDOM mg/dL 196*       Results from last 7 days  Lab Units 08/10/18  0223   INR  1 03               Imaging: I have personally reviewed pertinent reports  XR hip/pelv 2-3 vws left   ED Interpretation by Tyree Osborne MD (08/10 8477)   Femoral neck vs  Intertrochanteric left hip fx and no dislocation read by me  XR chest 1 view portable   ED Interpretation by Tyree Osborne MD (08/10 8330)   No acute disease read by me  EKG, Pathology, and Other Studies Reviewed on Admission:   · EKG: NSR, rate 75  Incomplete RBBB  Non-specific T wave  changes   Q waves in V1-V2  Allscripts / Epic Records Reviewed: Yes     ** Please Note: This note has been constructed using a voice recognition system   **

## 2018-08-10 NOTE — ANESTHESIA PREPROCEDURE EVALUATION
Review of Systems/Medical History  Patient summary reviewed        Cardiovascular  Negative cardio ROS Hyperlipidemia,    Pulmonary  Negative pulmonary ROS        GI/Hepatic  Negative GI/hepatic ROS          Negative  ROS        Endo/Other  Negative endo/other ROS Diabetes well controlled ,      GYN  Negative gynecology ROS          Hematology  Negative hematology ROS      Musculoskeletal  Negative musculoskeletal ROS        Neurology  Negative neurology ROS      Psychology   Negative psychology ROS              Physical Exam    Airway    Mallampati score: II  TM Distance: >3 FB  Neck ROM: full     Dental       Cardiovascular  Comment: Negative ROS,     Pulmonary      Other Findings        Anesthesia Plan  ASA Score- 2     Anesthesia Type- general with ASA Monitors  Additional Monitors:   Airway Plan: LMA  Comment: Patient seen and examined, history reviewed  Patient to be done under general anesthesia with LMA and routine monitors  Fascia iliaca block fpr post op pain control  Risks discussed with the patient, consent obtained        Plan Factors-    Induction- intravenous  Postoperative Plan-     Informed Consent- Anesthetic plan and risks discussed with patient  I personally reviewed this patient with the CRNA  Discussed and agreed on the Anesthesia Plan with the CRNA  Magalis Choe

## 2018-08-10 NOTE — ANESTHESIA POSTPROCEDURE EVALUATION
Post-Op Assessment Note      CV Status:  Stable    Mental Status:  Alert and awake    Hydration Status:  Euvolemic    PONV Controlled:  Controlled    Airway Patency:  Patent    Post Op Vitals Reviewed: Yes          Staff: CRNA           BP   136/62   Temp   97 6   Pulse  96   Resp 18   SpO2   97%

## 2018-08-10 NOTE — ASSESSMENT & PLAN NOTE
· UA- Positive nitrite  20-30 WBC  No RBC  Innumerable bacteria  · Afebrile  Mild leukocytosis of 11 97   · Rocephin  · Tylenol PRN fever  · Urine culture pending

## 2018-08-10 NOTE — ASSESSMENT & PLAN NOTE
· Patient fell down cement stairs onto left hip  · XR- left hip fracture  · Bed rest   · Pain management  · NPO  · PT/OT  · Consult ortho

## 2018-08-10 NOTE — CASE MANAGEMENT
Initial Clinical Review    Admission: Date/Time/Statement: 8/10/18 @ 0402     Orders Placed This Encounter   Procedures    Inpatient Admission (expected length of stay for this patient is greater than two midnights)     Standing Status:   Standing     Number of Occurrences:   1     Order Specific Question:   Admitting Physician     Answer:   Francis Belcher [[id number]]     Order Specific Question:   Level of Care     Answer:   Med Surg [[id number]]     Order Specific Question:   Estimated length of stay     Answer:   More than 2 Midnights     Order Specific Question:   Certification     Answer:   I certify that inpatient services are medically necessary for this patient for a duration of greater than two midnights  See H&P and MD Progress Notes for additional information about the patient's course of treatment  ED: Date/Time/Mode of Arrival:   ED Arrival Information     Expected Arrival Acuity Means of Arrival Escorted By Service Admission Type    - 8/10/2018 01:26 Urgent Wheelchair Self General Medicine Urgent    Arrival Complaint    Fall, hip pain          Chief Complaint:   Chief Complaint   Patient presents with    Fall     Pt presents to ED for evaluation and treatment of left hip pain s/p fall 1 5 hrd ago  Pt states she fell off 2 steps on to the ground  No head injury  No LOC       History of Illness:  79 y o  female, PMHx of DM, HLD, who presents with left hip pain after falling tonight  Patient reports that she is going up the concrete steps at her house  She states that she went to reach for the railing and that she was high on the steps that she actually was  She states that her hand missed a railing and she fell onto her left side    She reports having left lateral hip pain, currently 2/10 in severity    ED Vital Signs:   ED Triage Vitals [08/10/18 0144]   Temperature Pulse Respirations Blood Pressure SpO2   98 2 °F (36 8 °C) 78 18 149/69 96 %      Temp Source Heart Rate Source Patient Position - Orthostatic VS BP Location FiO2 (%)   Oral Monitor Lying Left arm --      Pain Score       3        Wt Readings from Last 1 Encounters:   08/10/18 74 9 kg (165 lb 2 oz)       Vital Signs (abnormal): maximum /20    Abnormal Labs/Diagnostic Test Results:   CO2-20  Anion gap 17  Glucose 196  Wbc 11 97  UA moderate leukocytes  + nitrite  1/2% glucose  Xray left hip - Acute, nondisplaced left femoral neck fracture near the junction of the basicervical and intertrochanteric region    0528-  K 3 4  Co2-19  Anion gap 16  Glucose 192  Wbc 14 87    ED Treatment:   Medication Administration from 08/10/2018 0126 to 08/10/2018 0440       Date/Time Order Dose Route Action Comments     08/10/2018 0332 sodium chloride 0 9 % bolus 500 mL 0 mL Intravenous Stopped      08/10/2018 0232 sodium chloride 0 9 % bolus 500 mL 500 mL Intravenous New Bag      08/10/2018 0331 sodium chloride 0 9 % infusion 125 mL/hr Intravenous New Bag      08/10/2018 0226 ondansetron (ZOFRAN) injection 4 mg 4 mg Intravenous Given      08/10/2018 0229 HYDROmorphone (DILAUDID) injection 0 5 mg 0 5 mg Intravenous Given      08/10/2018 0431 ceftriaxone (ROCEPHIN) 1 g/50 mL in dextrose IVPB 0 mg Intravenous Stopped      08/10/2018 0401 ceftriaxone (ROCEPHIN) 1 g/50 mL in dextrose IVPB 1,000 mg Intravenous New Bag           Past Medical/Surgical History:    Active Ambulatory Problems     Diagnosis Date Noted    DMII [facility]diabetes mellitus[facility] type 2) ([facility] ) 04/07/2014    Osteoporosis 12/22/2016    Vitamin D deficiency 11/01/2016    Stress incontinence of urine 12/22/2016    [facility] )      Resolved Ambulatory Problems     Diagnosis Date Noted    No Resolved Ambulatory Problems     Past Medical History:   Diagnosis Date    Acute cystitis without hematuria 8/10/2018    Cataract     Closed [facility] fracture ([facility] ) 8/10/2018    Diabetes mellitus (HCC)     Hyperlipemia     Migraines     Multiple falls        Admitting Diagnosis: Diabetes mellitus ([place] [address] ) [E11 9]  UTI (urinary tract infection) [N39 0]  Hip pain [M25 559]  Closed fracture of left hip, initial encounter ([place] [address] ) [S72 002A]    Age/Sex: 79 y o  female    Assessment/Plan:   Closed left hip fracture ([place] [address] )   Assessment & Plan     · Patient fell down cement stairs onto left hip  · XR- left hip fracture  · Bed rest   · Pain management  · NPO  · PT/OT  · Consult ortho           Acute cystitis without hematuria   Assessment & Plan     · UA- Positive nitrite  20-30 WBC  No RBC  Innumerable bacteria  · Afebrile  Mild leukocytosis of 11 97   · Rocephin  · Tylenol PRN fever  · Urine culture pending           DMII (diabetes mellitus, type 2) ([facility])   Assessment & Plan             Lab Results   Component Value Date     HGBA1C 9 2 (H) 09/05/2014     HGBA1C 9 2 (H) 09/05/2014     HGBA1C 9 2 (H) 09/05/2014     HGBA1C 9 2 (H) 09/05/2014     HGBA1C 9 2 (H) 09/05/2014     HGBA1C 9 2 (H) 09/05/2014         No results for input(s): POCGLU in the last 72 hours      Blood Sugar Average: Last 72 hrs:  Glucose 196 on arrival   Hold orals  SSI Coverage  Check A1C           Hyperlipemia   Assessment & Plan     · Continue home medications             Admission Orders:  8/10/2018  0403   Scheduled Meds:   Current Facility-Administered Medications:  acetaminophen 650 mg Oral Q6H PRN    aspirin 81 mg Oral Daily    atorvastatin 40 mg Oral Daily With Dinner    [START ON 8/11/2018] cefTRIAXone 1,000 mg Intravenous Q24H    cholecalciferol 1,000 Units Oral Daily    enoxaparin 40 mg Subcutaneous Daily    gabapentin 600 mg Oral HS    gemfibrozil 600 mg Oral BID AC    insulin glargine 25 Units Subcutaneous HS    insulin lispro 1-6 Units Subcutaneous TID AC    losartan potassium-hydrochlorothiazide (HYZAAR 50/12  5) combination  Oral Daily    morphine injection 1 mg Intravenous Q4H PRN    ondansetron 4 mg Intravenous Q6H PRN    oxyCODONE 10 mg Oral Q4H PRN    oxyCODONE 5 mg Oral Q4H PRN    sodium chloride 75 mL/hr Intravenous Continuous Last Rate: 75 mL/hr (08/10/18 0800)     Continuous Infusions:   sodium chloride 75 mL/hr Last Rate: 75 mL/hr (08/10/18 0800)     PRN Meds:     morphine injection  1 mg iv - used x 1      oxyCODONE  5 mg - used x 1  OTHER ORDERS: fingerstick glucose qid    scds  Non weight bearing   NPO  Consult orthopedics  Per orthopedics - 79 y  o female with left hip intertrochanteric fracture after mechanical fall     Plan:   · Pain Control  · NWB LLE/Bedrest  · NPO  · Type and screen completed  Plan for OR today for IM nail fixation  Thank you,  Wilbur Aqq  [facility] Utilization Review Department  Phone: [phone]; Fax [phone]  ATTENTION: The Network Utilization Review Department is now centralized for our 9 Facilities  Make a note that we have a new phone and fax numbers for our Department  Please call with any questions or concerns to [phone] and carefully follow the prompts so that you are directed to the right person  All voicemails are confidential  Fax any determinations, approvals, denials, and requests for initial or continue stay review clinical to [phone]  Due to HIGH CALL volume, it would be easier if you could please send faxed requests to expedite your requests and in part, help us provide discharge notifications faster

## 2018-08-10 NOTE — PHYSICAL THERAPY NOTE
PHYSICAL THERAPY NOTE  Patient Name: Herbert Villar  UITJN'M Date: 4/22/7882  Chart reviewed for 79 y o  female with left hip intertrochanteric fracture after mechanical fall  Ortho consulted with Plan for OR today for IM nail fixation  Will need reorders for PT with appropriate WB status   Bar Lorenzo PT

## 2018-08-10 NOTE — ED PROVIDER NOTES
History  Chief Complaint   Patient presents with    Fall     Pt presents to ED for evaluation and treatment of left hip pain s/p fall 1 5 hrd ago  Pt states she fell off 2 steps on to the ground  No head injury  No LOC     Patient is a 79year old female who missed a step outside and fell down 2 cement steps and landed on her left hip tonight after coming home from [employer] game  (+) pain  Unable to ambulate after fall  No other pain or injury  No head injury or LOC  Was last seen in this ED on 4/6/14 for wrist and elbow fx  [facility] website checked on this patient and no Rx found  Occasional dysuria  History provided by:  Patient and spouse   used: No        Prior to Admission Medications   Prescriptions Last Dose Informant Patient Reported? Taking?    Cholecalciferol (D3-1000) 1000 units capsule  Self Yes No   Sig: Take 2,000 Units by mouth daily     Insulin Glargine (TOUJEO SOLOSTAR) injection pen 300 units/mL   No No   Sig: Inject 25 units in AM   Patient taking differently: Inject 25 Units under the skin daily at bedtime     SYNJARDY XR 12 5-1000 MG TB24   Yes No   Sig: Take 1 tablet by mouth 2 (two) times a day   Zoster Vaccine Live (ZOSTAVAX) 86659 UNT/0 65ML SUSR  Self Yes No   Sig: Inject under the skin   aspirin (ECOTRIN LOW STRENGTH) 81 mg EC tablet  Self Yes No   Sig: Take 81 mg by mouth daily   atorvastatin (LIPITOR) 40 mg tablet   No No   Sig: TAKE 1 TABLET (40 MG TOTAL) BY MOUTH DAILY TAKE AT BEDTIME   gabapentin (NEURONTIN) 600 MG tablet   Yes No   Sig: Take 600 mg by mouth daily at bedtime   gemfibrozil (LOPID) 600 mg tablet   No No   Sig: TAKE 1 TABLET (600 MG TOTAL) BY MOUTH 2 (TWO) TIMES A DAY BEFORE MEALS   glipiZIDE (GLUCOTROL XL) 10 mg 24 hr tablet   Yes No   Sig: Take 10 mg by mouth 2 (two) times a day   glucose blood (CLEVER CHEK AUTO-CODE TEST) test strip  Self Yes No   Sig: by In Vitro route   glucose blood (ONE TOUCH ULTRA TEST) test strip   Yes No Si each by Other route 2 (two) times a day Use as instructed   losartan 50 mg TABS 50 mg, hydrochlorothiazide 12 5 mg TABS 12 5 mg  Self Yes No   Sig: Take by mouth daily   omega-3-acid ethyl esters (LOVAZA) 1 g capsule  Self Yes No   Sig: Take 2 g by mouth 2 (two) times a day      Facility-Administered Medications: None       Past Medical History:   Diagnosis Date    Acute cystitis without hematuria 8/10/2018    Cataract     Closed [facility] hip fracture ([facility] ) 8/10/2018    Diabetes mellitus ([facility] )     Hyperlipemia     Migraines     Multiple falls        Past Surgical History:   Procedure Laterality Date    CHOLECYSTECTOMY      GALLBLADDER SURGERY      [place] COLONOSCOPY FLX DX W/COLLJ SPEC WHEN PFRMD N/A 2016    Procedure: COLONOSCOPY;  Surgeon: Brenda Fernandez MD;  Location: [facility]; Service: Gastroenterology       Family History   Problem Relation Age of Onset    Arthritis Family     Pancreatic cancer Family     Scoliosis Family     Diabetes type II Mother     Osteoporosis Mother     Thyroid disease unspecified Mother     Hyperlipidemia Sister     Hypertension Sister     Diabetes type II Maternal Aunt     Diabetes type II Maternal Uncle     Diabetes type II Maternal Grandfather      I have reviewed and agree with the history as documented  Social History   Substance Use Topics    Smoking status: Former Smoker     Types: Cigarettes    Smokeless tobacco: Never Used    Alcohol use No        Review of Systems   Genitourinary: Positive for dysuria (occasional)  Musculoskeletal: Positive for arthralgias  All other systems reviewed and are negative  Physical Exam  Physical Exam   Constitutional: She is oriented to person, place, and time  She appears well-developed and well-nourished  She appears distressed (moderate)  HENT:   Head: Normocephalic and atraumatic  Moist mucous membranes  Eyes: No scleral icterus  Neck: Normal range of motion  Neck supple  Cardiovascular: Normal rate, regular rhythm, normal heart sounds and intact distal pulses  No murmur heard  Pulmonary/Chest: Effort normal and breath sounds normal  No stridor  No respiratory distress  Abdominal: Soft  Bowel sounds are normal  There is no tenderness  Musculoskeletal: She exhibits tenderness (left hip) and deformity (left lower leg shortnening and external rotation)  She exhibits no edema  Neurological: She is alert and oriented to person, place, and time  Skin: Skin is warm and dry  No rash noted  Psychiatric: She has a normal mood and affect  Nursing note and vitals reviewed  Vital Signs  ED Triage Vitals [08/10/18 0144]   Temperature Pulse Respirations Blood Pressure SpO2   98 2 °F (36 8 °C) 78 18 149/69 96 %      Temp Source Heart Rate Source Patient Position - Orthostatic VS BP Location FiO2 (%)   Oral Monitor Lying Left arm --      Pain Score       3           Vitals:    08/10/18 0144 08/10/18 0330   BP: 149/69 132/56   Pulse: 78 74   Patient Position - Orthostatic VS: Lying        Visual Acuity      ED Medications  Medications   sodium chloride 0 9 % infusion (125 mL/hr Intravenous New Bag 8/10/18 0331)   ceftriaxone (ROCEPHIN) 1 g/50 mL in dextrose IVPB (1,000 mg Intravenous New Bag 8/10/18 0401)   sodium chloride 0 9 % bolus 500 mL (0 mL Intravenous Stopped 8/10/18 0332)   ondansetron (ZOFRAN) injection 4 mg (4 mg Intravenous Given 8/10/18 0226)   HYDROmorphone (DILAUDID) injection 0 5 mg (0 5 mg Intravenous Given 8/10/18 0229)       Diagnostic Studies  Results Reviewed     Procedure Component Value Units Date/Time    Urine Microscopic [[id number]]  (Abnormal) Collected:  08/10/18 0349    Lab Status:  Final result Specimen:  Urine Updated:  08/10/18 0355     RBC, UA None Seen /hpf      WBC, UA 20-30 (A) /hpf      Epithelial Cells None Seen /hpf      Bacteria, UA Innumerable (A) /hpf     Urine culture [[id number]] Collected:  08/10/18 0349    Lab Status:   In process Specimen:  Urine Updated:  08/10/18 0354    ED Urine Macroscopic [[id number]]  (Abnormal) Collected:  08/10/18 0349    Lab Status:  Final result Specimen:  Urine Updated:  08/10/18 0340     Color, UA Yellow     Clarity, UA Cloudy     pH, UA 5 5     Leukocytes, UA Moderate (A)     Nitrite, UA Positive (A)     Protein, UA Negative mg/dl      Glucose,  (1/2%) (A) mg/dl      Ketones, UA Negative mg/dl      Urobilinogen, UA 0 2 E U /dl      Bilirubin, UA Negative     Blood, UA Negative     Specific Gravity, UA 1 010    Narrative:       CLINITEK RESULT    Comprehensive metabolic panel [[id number]]  (Abnormal) Collected:  08/10/18 0223    Lab Status:  Final result Specimen:  Blood from Arm, Right Updated:  08/10/18 0314     Sodium 138 mmol/L      Potassium 4 4 mmol/L      Chloride 101 mmol/L      CO2 20 (L) mmol/L      Anion Gap 17 (H) mmol/L      BUN 18 mg/dL      Creatinine 0 76 mg/dL      Glucose 196 (H) mg/dL      Calcium 9 5 mg/dL      AST 45 U/L      ALT 63 U/L      Alkaline Phosphatase 71 U/L      Total Protein 7 3 g/dL      Albumin 4 3 g/dL      Total Bilirubin 0 50 mg/dL      eGFR 82 ml/min/1 73sq m     Narrative:         National Kidney Disease Education Program recommendations are as follows:  GFR calculation is accurate only with a steady state creatinine  Chronic Kidney disease less than 60 ml/min/1 73 sq  meters  Kidney failure less than 15 ml/min/1 73 sq  meters      Protime-INR [[id number]]  (Normal) Collected:  08/10/18 0223    Lab Status:  Final result Specimen:  Blood from Arm, Right Updated:  08/10/18 0243     Protime 13 2 seconds      INR 1 03    APTT [[id number]]  (Normal) Collected:  08/10/18 0223    Lab Status:  Final result Specimen:  Blood from Arm, Right Updated:  08/10/18 0243     PTT 26 seconds     CBC and differential [[id number]]  (Abnormal) Collected:  08/10/18 0223    Lab Status:  Final result Specimen:  Blood from Arm, Right Updated:  08/10/18 0230     WBC 11 97 (H) Thousand/uL      RBC 4 37 Million/uL      Hemoglobin 13 9 g/dL      Hematocrit 40 2 %      MCV 92 fL      MCH 31 8 pg      MCHC 34 6 g/dL      RDW 14 1 %      MPV 10 3 fL      Platelets 439 Thousands/uL      nRBC 0 /100 WBCs      Neutrophils Relative 59 %      Immat GRANS % 1 %      Lymphocytes Relative 31 %      Monocytes Relative 6 %      Eosinophils Relative 2 %      Basophils Relative 1 %      Neutrophils Absolute 7 10 Thousands/µL      Immature Grans Absolute 0 07 Thousand/uL      Lymphocytes Absolute 3 65 Thousands/µL      Monocytes Absolute 0 77 Thousand/µL      Eosinophils Absolute 0 28 Thousand/µL      Basophils Absolute 0 10 Thousands/µL                  XR hip/pelv 2-3 vws left   ED Interpretation by Valeri Chiu MD (08/10 9825)   Femoral neck vs  Intertrochanteric left hip fx and no dislocation read by me  XR chest 1 view portable   ED Interpretation by Valeri Chiu MD (08/10 2117)   No acute disease read by me  Procedures  ECG 12 Lead Documentation  Date/Time: 8/10/2018 2:24 AM  Performed by: Tremaine Garrido  Authorized by: Tremaine Garrido     Indications / Diagnosis:  Fall, probable hip fx  ECG reviewed by me, the ED Provider: yes    Patient location:  ED  Previous ECG:     Previous ECG:  Unavailable  Rate:     ECG rate:  75    ECG rate assessment: normal    Rhythm:     Rhythm: sinus rhythm    Ectopy:     Ectopy: none    QRS:     QRS axis:  Left  Conduction:     Conduction: abnormal      Abnormal conduction: incomplete RBBB    ST segments:     ST segments:  Normal  T waves:     T waves: non-specific    Q waves:     Q waves:  V1 and V2           Phone Contacts  ED Phone Contact    ED Course  ED Course as of Aug 10 0403   Fri Aug 10, 2018   [place], x-ray d/w patient and  at patient's request  Pain improved                                   MDM  Number of Diagnoses or Management Options  Closed fracture of left hip, initial encounter [facility]):   Diagnosis management comments: DDx including but not limited to: doubt intracranial injury, cervical injury, intrathoracic injury, intraabdominal injury; extremity injury--hip or pelvic fracture, dislocation, sprain, strain, contusion, UTI  Amount and/or Complexity of Data Reviewed  Clinical lab tests: ordered and reviewed  Tests in the radiology section of CPT®: ordered and reviewed  Decide to obtain previous medical records or to obtain history from someone other than the patient: yes  Review and summarize past medical records: yes  Independent visualization of images, tracings, or specimens: yes      CritCare Time    Disposition  Final diagnoses:   Closed fracture of left hip, initial encounter [facility])   UTI (urinary tract infection)   Diabetes mellitus ([facility] )     Time reflects when diagnosis was documented in both MDM as applicable and the Disposition within this note     Time User Action Codes Description Comment    8/10/2018  3:11 AM Kritsal Hirsch Add [S72 002A] Closed fracture of left hip, initial encounter ([facility] )     8/10/2018  4:02 AM Kristal Hirsch Add [N39 0] UTI (urinary tract infection)     8/10/2018  4:02 AM Kristal Hirsch Add [E11 9] Diabetes mellitus [facility])       ED Disposition     ED Disposition Condition Comment    Admit  Case was discussed with TEJ Bowie and the patient's admission status was agreed to be Admission Status: inpatient status to the service of Dr Anuel Doss   Follow-up Information    None         Patient's Medications   Discharge Prescriptions    No medications on file     No discharge procedures on file      ED Provider  Electronically Signed by           Roel Cortez MD  08/10/18 9650       Roel Cortez MD  08/10/18 1816

## 2018-08-11 PROBLEM — I10 ESSENTIAL HYPERTENSION: Status: ACTIVE | Noted: 2018-08-11

## 2018-08-11 LAB
GLUCOSE SERPL-MCNC: 208 MG/DL (ref 65–140)
GLUCOSE SERPL-MCNC: 242 MG/DL (ref 65–140)
GLUCOSE SERPL-MCNC: 259 MG/DL (ref 65–140)
GLUCOSE SERPL-MCNC: 282 MG/DL (ref 65–140)
HCT VFR BLD AUTO: 32.1 % (ref 34.8–46.1)
HGB BLD-MCNC: 10.8 G/DL (ref 11.5–15.4)

## 2018-08-11 PROCEDURE — 97166 OT EVAL MOD COMPLEX 45 MIN: CPT

## 2018-08-11 PROCEDURE — 99232 SBSQ HOSP IP/OBS MODERATE 35: CPT | Performed by: PHYSICIAN ASSISTANT

## 2018-08-11 PROCEDURE — 99024 POSTOP FOLLOW-UP VISIT: CPT | Performed by: PHYSICIAN ASSISTANT

## 2018-08-11 PROCEDURE — 85014 HEMATOCRIT: CPT | Performed by: ORTHOPAEDIC SURGERY

## 2018-08-11 PROCEDURE — G8979 MOBILITY GOAL STATUS: HCPCS | Performed by: PHYSICAL THERAPIST

## 2018-08-11 PROCEDURE — G8988 SELF CARE GOAL STATUS: HCPCS

## 2018-08-11 PROCEDURE — G8987 SELF CARE CURRENT STATUS: HCPCS

## 2018-08-11 PROCEDURE — 82948 REAGENT STRIP/BLOOD GLUCOSE: CPT

## 2018-08-11 PROCEDURE — G8978 MOBILITY CURRENT STATUS: HCPCS | Performed by: PHYSICAL THERAPIST

## 2018-08-11 PROCEDURE — 97163 PT EVAL HIGH COMPLEX 45 MIN: CPT | Performed by: PHYSICAL THERAPIST

## 2018-08-11 PROCEDURE — 85018 HEMOGLOBIN: CPT | Performed by: ORTHOPAEDIC SURGERY

## 2018-08-11 RX ADMIN — CEFTRIAXONE 1000 MG: 1 INJECTION, POWDER, FOR SOLUTION INTRAMUSCULAR; INTRAVENOUS at 21:25

## 2018-08-11 RX ADMIN — ASPIRIN 81 MG: 81 TABLET, COATED ORAL at 08:54

## 2018-08-11 RX ADMIN — GEMFIBROZIL 600 MG: 600 TABLET ORAL at 07:33

## 2018-08-11 RX ADMIN — CEFAZOLIN SODIUM 2000 MG: 2 SOLUTION INTRAVENOUS at 04:44

## 2018-08-11 RX ADMIN — CHOLECALCIFEROL TAB 25 MCG (1000 UNIT) 1000 UNITS: 25 TAB at 08:54

## 2018-08-11 RX ADMIN — INSULIN GLARGINE 25 UNITS: 100 INJECTION, SOLUTION SUBCUTANEOUS at 23:03

## 2018-08-11 RX ADMIN — ENOXAPARIN SODIUM 40 MG: 100 INJECTION SUBCUTANEOUS at 08:54

## 2018-08-11 RX ADMIN — ATORVASTATIN CALCIUM 40 MG: 40 TABLET, FILM COATED ORAL at 16:24

## 2018-08-11 RX ADMIN — LOSARTAN POTASSIUM: 50 TABLET, FILM COATED ORAL at 08:53

## 2018-08-11 RX ADMIN — SODIUM CHLORIDE 75 ML/HR: 0.9 INJECTION, SOLUTION INTRAVENOUS at 10:48

## 2018-08-11 RX ADMIN — OXYCODONE HYDROCHLORIDE 10 MG: 10 TABLET ORAL at 07:32

## 2018-08-11 RX ADMIN — OXYCODONE HYDROCHLORIDE 5 MG: 5 TABLET ORAL at 21:25

## 2018-08-11 RX ADMIN — INSULIN LISPRO 2 UNITS: 100 INJECTION, SOLUTION INTRAVENOUS; SUBCUTANEOUS at 07:33

## 2018-08-11 RX ADMIN — OXYCODONE HYDROCHLORIDE 10 MG: 10 TABLET ORAL at 13:38

## 2018-08-11 RX ADMIN — GABAPENTIN 600 MG: 300 CAPSULE ORAL at 21:25

## 2018-08-11 RX ADMIN — INSULIN LISPRO 3 UNITS: 100 INJECTION, SOLUTION INTRAVENOUS; SUBCUTANEOUS at 16:24

## 2018-08-11 RX ADMIN — GEMFIBROZIL 600 MG: 600 TABLET ORAL at 16:24

## 2018-08-11 RX ADMIN — INSULIN LISPRO 3 UNITS: 100 INJECTION, SOLUTION INTRAVENOUS; SUBCUTANEOUS at 11:51

## 2018-08-11 NOTE — ASSESSMENT & PLAN NOTE
· Patient fell down cement stairs onto left hip  · Postop day 1 status post left hip TFN for IT fracture  · Appreciate ortho input  · PT/OT, weight-bearing as tolerated, pain control  · Continue to monitor hemoglobin  · Patient will need 4 weeks of Lovenox for DVT prophylaxis -pending on patient's dispo will need to check pricing of Lovenox    · Continue IV fluids and Brooks per Ortho

## 2018-08-11 NOTE — PROGRESS NOTES
[place] 1 79 y o  female MRN: [id number]  Unit/Bed#: -01      Subjective:  Pt seen this am  States left hip pain is well controlled  No numbness/tingling   No fever/chills    Labs:    0  Lab Value Date/Time   HCT 32 1 (L) 08/11/2018 0446   HCT 38 3 08/10/2018 0528   HCT 40 2 08/10/2018 0223   HCT 43 0 10/25/2016 1053   HGB 10 8 (L) 08/11/2018 0446   HGB 13 1 08/10/2018 0528   HGB 13 9 08/10/2018 0223   HGB 15 3 10/25/2016 1053   INR 1 03 08/10/2018 0223   WBC 14 87 (H) 08/10/2018 0528   WBC 11 97 (H) 08/10/2018 0223   WBC 9 9 10/25/2016 1053       Meds:    Current Facility-Administered Medications:     acetaminophen (TYLENOL) tablet 650 mg, 650 mg, Oral, Q6H PRN, Baldemar Fisher PA-C    aspirin (ECOTRIN LOW STRENGTH) EC tablet 81 mg, 81 mg, Oral, Daily, Kesha Sheppard PA-C    atorvastatin (LIPITOR) tablet 40 mg, 40 mg, Oral, Daily With Dinner, Baldemar Fisher PA-C, 40 mg at 08/10/18 1553    ceFAZolin (ANCEF) IVPB (premix) 2,000 mg, 2,000 mg, Intravenous, Q8H, Gregorio England MD, Last Rate: 100 mL/hr at 08/11/18 0444, 2,000 mg at 08/11/18 0444    cholecalciferol (VITAMIN D3) tablet 1,000 Units, 1,000 Units, Oral, Daily, Baldemar Fisher PA-C, 1,000 Units at 08/10/18 3982    enoxaparin (LOVENOX) subcutaneous injection 40 mg, 40 mg, Subcutaneous, Daily, Kesha Sheppard PA-C    gabapentin (NEURONTIN) capsule 600 mg, 600 mg, Oral, HS, Baldemar Fisher PA-C, 600 mg at 08/10/18 2119    gemfibrozil (LOPID) tablet 600 mg, 600 mg, Oral, BID AC, Kesha Sheppard PA-C, 600 mg at 08/11/18 6543    insulin glargine (LANTUS) subcutaneous injection 25 Units 0 25 mL, 25 Units, Subcutaneous, HS, Baldemar Fisher PA-C, 25 Units at 08/10/18 2120    insulin lispro (HumaLOG) 100 units/mL subcutaneous injection 1-6 Units, 1-6 Units, Subcutaneous, TID AC, 2 Units at 08/11/18 0720 **AND** Fingerstick Glucose (POCT), , , TID AC, Baldemar Fisher PA-C    losartan potassium-hydrochlorothiazide (HYZAAR 50/12  5) combination, , Oral, Daily, Mu Ariasr, PA-C    morphine injection 1 mg, 1 mg, Intravenous, Q4H PRN, Maryannette Ayse, PA-C, 1 mg at 08/10/18 2006    ondansetron [facility]) injection 4 mg, 4 mg, Intravenous, Q6H PRN, Mu Ayse, PA-C    oxyCODONE (ROXICODONE) immediate release tablet 10 mg, 10 mg, Oral, Q4H PRN, Maryannette Ayse, PA-C, 10 mg at 08/11/18 0732    oxyCODONE (ROXICODONE) IR tablet 5 mg, 5 mg, Oral, Q4H PRN, Mu Ayse, PA-C, 5 mg at 08/10/18 0901    sodium chloride 0 9 % infusion, 75 mL/hr, Intravenous, Continuous, Mu Ayse, PA-C, Last Rate: 75 mL/hr at 08/10/18 2003, 75 mL/hr at 08/10/18 2003    Physical exam:  Vitals:    08/11/18 0700   BP: 108/50   Pulse: 78   Resp: 18   Temp: 98 6 °F (37 °C)   SpO2: 96%     Left Hip:  · Dressings C/D/I  · Thigh compartments soft  · Motor/Sensation intact LLE  · Palpable DP pulse    _*_*_*_*_*_*_*_*_*_*_*_*_*_*_*_*_*_*_*_*_*_*_*_*_*_*_*_*_*_*_*_*_*_*_*_*_*_*_*_*_*    Assessment: 79 y  o female POD 1 s/p Left Hip TFN for IT fracture    Plan:  Pain Control  WBAT LLE  PT/OT  DVT proph with Lovenox for 4 weeks post op  Hgb 10 8 this am  ABLA, continue to monitor clinically  Ortho stable    Nydia Mcdonald PA-C

## 2018-08-11 NOTE — PHYSICAL THERAPY NOTE
PHYSICAL THERAPY Evaluation NOTE   08/11/18 1007   Note Type   Note type Eval only   Pain Assessment   Pain Assessment 0-10   Pain Score No Pain  (at rest- reported pain with movement-no # provided)   Pain Type Acute pain;Surgical pain   Pain Location Hip   Pain Orientation Left   Clinical Progression Gradually improving   Patient's Stated Pain Goal No pain   Hospital Pain Intervention(s) Cold applied   Home Living   Type of Home Apartment   Home Layout One level;Stairs to enter with rails  (B/L rails - 15 ZOIE, no rails first 4 steps)   Bathroom Shower/Tub Walk-in shower   Bathroom Toilet Standard   Home Equipment (none stated)   Prior Function   Level of [place] Independent with ADLs and functional mobility   Lives With Spouse   Receives Help From Family   ADL Assistance Independent   IADLs Independent   Falls in the last 6 months 1 to 4  (current fall)   Vocational Retired   Restrictions/Precautions   Wells Jonh Bearing Precautions Per Order Yes   LLE Wells Eola Bearing Per Order WBAT   Other Precautions Chair Alarm; Bed Alarm;Multiple lines; Fall Risk;Pain   General   Additional Pertinent History per H&P: Víctor Jones is a 79 y o  female, PMHx of DM, HLD, who presents with left hip pain after falling tonight  Patient reports that she is going up the concrete steps at her house  She states that she went to reach for the railing and that she was high on the steps that she actually was  She states that her hand missed a railing and she fell onto her left side  She reports having left lateral hip pain, currently 2/10 in severity  Denies lower extremity numbness/tingling  Denies head trauma, loss of consciousness  Denies fever, chills, diaphoresis, cough, shortness of breath, chest pain, abdominal pain, nausea, vomiting, change in bowel movements, urinary symptoms, dizziness, headache     Cognition   Overall Cognitive Status WFL   Attention Within functional limits   Orientation Level Oriented X4   Memory Within functional limits   Following Commands Follows multistep commands with increased time or repetition   RUE Assessment   RUE Assessment WFL   LUE Assessment   LUE Assessment WFL   RLE Assessment   RLE Assessment WFL   LLE Assessment   LLE Assessment X  (Hip flexion AROM 75% limited, MMT 2+/5  Knee MMT 4/5)   Coordination   Sensation [facility]   Light Touch   RLE Light Touch Grossly intact   LLE Light Touch Impaired   LLE Light Touch Comments decreased anterior thigh   Bed Mobility   Supine to Sit 3  Moderate assistance   Additional items Assist x 1;HOB elevated; Bedrails; Increased time required;Verbal cues;LE management   Sit to Supine Unable to assess  (left seated in chair)   Transfers   Sit to Stand 3  Moderate assistance   Additional items Assist x 1; Armrests; Bedrails;Verbal cues   Stand to Sit 4  Minimal assistance   Additional items Assist x 1; Armrests; Increased time required   Stand pivot 3  Moderate assistance   Additional items Assist x 1  (line managment)   Ambulation/Elevation   Gait pattern Forward Flexion;Decreased foot clearance;Decreased R stance; Excessively slow; Step to   Gait Assistance 3  Moderate assist   Additional items Assist x 1   Assistive Device Rolling walker   Distance 3' to chair   Stair Management Assistance Not tested   Balance   Static Sitting Fair   Static Standing Fair  (with AD)   Ambulatory Fair -  (with AD)   Endurance Deficit   Endurance Deficit Yes   Endurance Deficit Description limited activity tolerance   Activity Tolerance   Activity Tolerance Patient tolerated treatment well;Patient limited by fatigue;Patient limited by pain   Medical Staff Made Aware Spoke with Ludy BRAN and Jesica 3 with GABE Roy   Assessment   Prognosis Good   Problem List Decreased strength;Decreased range of motion;Decreased endurance; Impaired balance;Decreased mobility;Orthopedic restrictions;Pain Assessment Glendy Polo is a 79 y o  male who was admitted to [address] [place] [address] on 8/10/18 , 2/2 left hip pain s/p fall  Imaging revealed femoral neck fx  PMH includes but is not limited to DM, falls, migraines and HLD as well as h/o ETO abuse  Prior to admission, patient lived with her  in a 1 story home with 15 ZOIE  First 4 steps have no rails (where she fell) and the remaining have B/L HR  She reports independence with all mobility and ALDs  Upon evaluation, she demonstrates impairments in LE strength, ROM, balance, endurance along with orthopedic restrictions of WBAT LLE  She tolerated tx well overall, needed mod A to get OOB but stood steadily with good support  Initially very hesitant and limited ROM when doing standing march in place  Progressed with confidence and ROM with increased reps  She then mobilized to chair, steady, Mod A needed  Left seated in chair, belongings in reach  Shown exercises to perform every 30-45 min (ankle pumps, LAQ and hip flexion in sitting) They can continue to benefit from IP PT at this time in order to address the impairments discussed in order to improve her overall pain, mobility and activity tolerance  Treatment to focus on LE strength, WB through LLE, standing tolerance, independence with bed mobility and transfers as well as improve safe amb and stair negotiation  Recommend acute IP rehab with RW for AD  Clinical complexity is unpredictable 2/2 fx, PMH and limited mobility s/p fx  Barriers to Discharge Inaccessible home environment;Decreased caregiver support   Goals   Patient Goals Get back to normal   STG Expiration Date 08/18/18   Short Term Goal #1 Patient will improve bed mobility to Mod I with bedrail use  Patient will perform sit to stand with Mod I and bed/arm rest  Patient will amb 25 feet with RW using step to pattern and CGA  Patient will asc 6" step using HRx1 - leading with R leg   Patient will tolerate standing x 3-5 min for improved amb tolerance  Plan   Treatment/Interventions Functional transfer training;LE strengthening/ROM; Elevations; Therapeutic exercise; Endurance training;Bed mobility;Gait training;Spoke to nursing;Spoke to case management;OT   PT Frequency 5x/wk  (1x weekend)   Recommendation   Recommendation Post acute IP rehab   Equipment Recommended Walker   PT - OK to Discharge Yes   Additional Comments to rehab   Modified Prospect Scale   Modified Prospect Scale 4   Barthel Index   Feeding 10   Bathing 0   Grooming Score 5   Dressing Score 5   Bladder Score 0   Bowels Score 10   Toilet Use Score 5   Transfers (Bed/Chair) Score 5   Mobility (Level Surface) Score 0   Stairs Score 0   Barthel Index Score 40     Ernie Silva, PT            Patient Name: Elisa Butt  EDHST'P Date: 6/02/3521

## 2018-08-11 NOTE — PLAN OF CARE
Problem: PHYSICAL THERAPY ADULT  Goal: Performs mobility at highest level of function for planned discharge setting  See evaluation for individualized goals  Treatment/Interventions: Functional transfer training, LE strengthening/ROM, Elevations, Therapeutic exercise, Endurance training, Bed mobility, Gait training, Spoke to nursing, Spoke to case management, OT  Equipment Recommended: Linard Bernheim       See flowsheet documentation for full assessment, interventions and recommendations  Prognosis: Good  Problem List: Decreased strength, Decreased range of motion, Decreased endurance, Impaired balance, Decreased mobility, Orthopedic restrictions, Pain  Assessment: Colton Guillory is a 79 y o  male who was admitted to [address] [place] [address] on 8/10/18 , 2/2 left hip pain s/p fall  Imaging revealed femoral neck fx  PMH includes but is not limited to DM, falls, migraines and HLD as well as h/o ETO abuse  Prior to admission, patient lived with her  in a 1 story home with 15 ZOIE  First 4 steps have no rails (where she fell) and the remaining have B/L HR  She reports independence with all mobility and ALDs  Upon evaluation, she demonstrates impairments in LE strength, ROM, balance, endurance along with orthopedic restrictions of WBAT LLE  She tolerated tx well overall, needed mod A to get OOB but stood steadily with good support  Initially very hesitant and limited ROM when doing standing march in place  Progressed with confidence and ROM with increased reps  She then mobilized to chair, steady, Mod A needed  Left seated in chair, belongings in reach  Shown exercises to perform every 30-45 min (ankle pumps, LAQ and hip flexion in sitting) They can continue to benefit from IP PT at this time in order to address the impairments discussed in order to improve her overall pain, mobility and activity tolerance  Treatment to focus on LE strength, WB through LLE, standing tolerance, independence with bed mobility and transfers as well as improve safe amb and stair negotiation  Recommend acute IP rehab with RW for AD  Clinical complexity is unpredictable 2/2 fx, PMH and limited mobility s/p fx  Barriers to Discharge: Inaccessible home environment, Decreased caregiver support     Recommendation: Post acute IP rehab     PT - OK to Discharge: Yes    See flowsheet documentation for full assessment

## 2018-08-11 NOTE — ASSESSMENT & PLAN NOTE
· UA- Positive nitrite  20-30 WBC  No RBC  Innumerable bacteria  Reports dysuria  History of urinary incontinence    · Afebrile, mild leukocytosis  · Continue IV Rocephin  · Urine culture so far growing gram-negative zane enteric like, follow-up C&S

## 2018-08-11 NOTE — SOCIAL WORK
CM met with Pt and family at bedside to discuss recommendation of rehab  CM discussed acute rehab and subacute rehab  Pt and family would like referrals made to 1  [facility] and 2  [facility]  CM sent referrals  CM dept will follow Pt until discharge  Pt will need insurance auth for rehab

## 2018-08-11 NOTE — PLAN OF CARE
Problem: OCCUPATIONAL THERAPY ADULT  Goal: Performs self-care activities at highest level of function for planned discharge setting  See evaluation for individualized goals  Treatment Interventions: ADL retraining, Functional transfer training, Endurance training, Patient/family training, Equipment evaluation/education, Activityengagement  Equipment Recommended: Bedside commode, Other (comment) (Shower Chair)       See flowsheet documentation for full assessment, interventions and recommendations  Limitation: Decreased ADL status, Decreased endurance, Decreased sensation, Decreased self-care trans ((L LE sensation) )     Assessment: Pt is a(n) 68 yof admitted [facility] on 08/10/18 for Cl L hip fx  HPI: Pt was experienced a fall going up stair to enter her home (missed railing as she reached)  Pt is post op day 1 Intramedullary Fixation of Left Intertrochanteric Hip Fx  Pt currently has WBAT WBS  Pt presents w/ the following PMH impacting occupational performance: Acute cystitis w/o hematuria, Cataract, DM, HLD, Migraines  Pt reports living w/ her  in a 1 level apt w/ 4+12 ZOIE (1st 4 steps w/o railing)  PTA pt uses no for functional mobility  Pt reports I w I/ADLs at baseline  Upon evaluation, pt was A&O x 4, able to discuss recent events, mechanism of injury, home setup, work hx, and carry conversation through eval  Pt was also able to consistently follow directions + time  Pt required mod A x 1 to complete bed mobility, benefiting from physical A and VC for trunk stability and LE management  Pt was able to don R sock at EOB w/ CS but needed max A to don L sock  During functional transfers, pt needed min - mod A to stand pivot from bed > chair  Min A x 1 to complete stand to sit transfer was accompanied by VC + time and armrests  All other transfers required mod A x 1 + time and VC for RW sequencing   Pt presents w/ decreased static sitting balance, dynamic sitting balance, standing tolerance, static standing balance, endurance, L LE sensation/ROM/strength, and activity tolerance; impacting I w/ functional mobility/transfers, safe functional transfers, LB dressing, toileting, bathing, house management tasks, and bed mobility  Upon d/c home, at this time, pt will benefit from a shower chair and bedside commode  While in acute care pt would benefit from OT services to address deficit areas  From an OT perspective, pt would benefit from post acute rehab when medically stable for discharge from acute care to return to [facility] and be able to apply for part time work  Will continue to follow       OT Discharge Recommendation:  (Post Acute Rehab)  OT - OK to Discharge:  (When medically stable)

## 2018-08-11 NOTE — ASSESSMENT & PLAN NOTE
Lab Results   Component Value Date    HGBA1C 7 0 (H) 08/10/2018     Recent Labs      08/10/18   1702  08/10/18   1914  08/10/18   2113  08/11/18   0704   POCGLU  117  120  155*  208*     Blood Sugar Average: Last 72 hrs:  (P) 149 [id number] 196 on arrival   · Patient recently started on SGLT 2 inhibitor which may be contributing to her polyuria and acute cystitis  · Hold oral hypoglycemics  · Continue Lantus, sliding scale insulin, diabetic diet

## 2018-08-11 NOTE — PLAN OF CARE
Problem: DISCHARGE PLANNING - CARE MANAGEMENT  Goal: Discharge to post-acute care or home with appropriate resources  INTERVENTIONS:  - Conduct assessment to determine patient/family and health care team treatment goals, and need for post-acute services based on payer coverage, community resources, and patient preferences, and barriers to discharge  - Address psychosocial, clinical, and financial barriers to discharge as identified in assessment in conjunction with the patient/family and health care team  - Arrange appropriate level of post-acute services according to patients   needs and preference and payer coverage in collaboration with the physician and health care team  - Communicate with and update the patient/family, physician, and health care team regarding progress on the discharge plan  - Arrange appropriate transportation to post-acute venues   Outcome: Progressing  CM met with Pt and family at bedside to discuss recommendation of rehab  CM discussed acute rehab and subacute rehab  Pt and family would like referrals made to 1  [facility] and 2  [facility]  CM sent referrals  CM dept will follow Pt until discharge  Pt will need insurance auth for rehab

## 2018-08-11 NOTE — OCCUPATIONAL THERAPY NOTE
[address] Evaluation     Patient Name: Constance Velasquez  BDRZP'M Date: 8/56/[birth year]  Problem List  Patient Active Problem List   Diagnosis    DMII (diabetes mellitus, type 2) ([place] [address] )    Osteoporosis    Vitamin D deficiency    Stress incontinence of urine    Hyperlipemia    Diabetes mellitus ([facility] )    Closed left hip fracture ([facility] )    Acute cystitis without hematuria    Closed fracture of left hip ([facility] )    Essential hypertension     Past Medical History  Past Medical History:   Diagnosis Date    Acute cystitis without hematuria 8/10/2018    Cataract     Closed left hip fracture ([facility] ) 8/10/2018    Diabetes mellitus ([place] [address] )     Hyperlipemia     Migraines     Multiple falls      Past Surgical History  Past Surgical History:   Procedure Laterality Date    CHOLECYSTECTOMY      GALLBLADDER SURGERY      [facility] COLONOSCOPY FLX DX W/COLLJ SPEC WHEN PFRMD N/A 12/14/2016    Procedure: COLONOSCOPY;  Surgeon: Kori Gil MD;  [facility]; Service: Gastroenterology    TONSILLECTOMY           08/11/18 1006   Note Type   Note type Eval only   Restrictions/Precautions   Weight Bearing Precautions Per Order Yes   LLE Weight Bearing Per Order WBAT   Other Precautions Chair Alarm;WBS;Multiple lines;O2;Fall Risk  (O2 removed during session but was monitored)   Pain Assessment   Pain Assessment No/denies pain   Pain Score No Pain  (None at rest)   Home Living   Type of [facility] One level;Performs ADLs on one level;Stairs to enter with rails  (4 + 12 ZOIE first 4 w/o railing)   Bathroom Shower/Tub Walk-in shower   Bathroom Toilet Standard   Bathroom Accessibility Accessible   Additional Comments Pt lives in a 1 level apt , no issues in the home   Has no DME/AD in the home   Prior Function   Level of [place] Independent with ADLs and functional mobility   Lives With Spouse   Receives Help From Family   ADL Assistance Independent   IADLs Independent   Falls in the last 6 months 1 to 4  (No other falls from current injury)   Vocational Retired   Comments Pt I w/ I/ADLs at baseline; received no A caring to needs  Lifestyle   Autonomy Pt enjoyed working and reports to be bored after a couple years of [employer]   Reciprocal Relationships Spouse (present at [facility]) able and willing to A   Service to Others Work at a [employer] Pt was looking to get a part-time job to pass the time  Pt reported to use her phone all day  Pt reported to have a trip to [facility] planned  Psychosocial   Psychosocial (WDL) WDL   Length of Time/Family Visitation (Spouse present at [facility])   Subjective   Subjective "0 right now, I have this Oxy going"  (When referring to pain level)   ADL   Where Assessed Edge of bed   Eating Assistance 6  Modified independent   Eating Deficit Setup  (Unable to 2' surgery but reported to eat donuts at midnight)   UB Dressing Assistance 6  Modified independent   UB Dressing Deficit (Demonstrated ROM/MMT to complete seated at EOB)   LB Dressing Assistance 5  Supervision/Setup   LB Dressing Deficit Don/doff L sock  (Pt able to don R sock w/ S at EOB )   Functional Assistance 5  Supervision/Setup   Functional Deficit Setup; Increased time to complete  (Pt demonstrate ROM/MMT & FMC to complete UB ADLs w/ S seated)   Additional Comments Pt required max A to don L sock at EOB  Pt has limited L LE ROM  Bed Mobility   Supine to Sit 3  Moderate assistance   Additional items Assist x 1; Increased time required;Verbal cues;LE management; Bedrails;HOB elevated  (Pt benefited from max VC for LE management )   Additional Comments Pt presented in bed upon eval  Pt needed physical A to complete supine > sit at EOB  Transfers   Sit to Stand 3  Moderate assistance   Additional items Assist x 1; Increased time required;Verbal cues  (From EOB)   Stand to Sit 4  Minimal assistance  (At recliner chair)   Additional items Assist x 1;Verbal cues; Increased time required;Armrests   Stand pivot 3  Moderate assistance   Additional items Assist x 1; Increased time required;Verbal cues  (Stand by X 2 for line management (Cath + IV))   Additional Comments Pt completed transfer from bed to recliner chair w/ RW  VC needed for sequencing of RW  Functional Mobility   Additional Comments Only transfers competed  Pt seated in recliner chair w/ PT and spouse present  Balance   Static Sitting Fair -  (Progressed to fair +)   Dynamic Sitting Fair -   Static Standing Fair   Activity Tolerance   Activity Tolerance Patient limited by pain   Medical Staff Made Aware PT YINA Juarez   Nurse Made Aware GABE CAIN Assessment   RUE Assessment [facility]   RUE Strength   RUE Overall Strength Within Functional Limits - able to perform ADL tasks with strength   LUE Assessment   LUE Assessment WFL   LUE Strength   LUE Overall Strength Within Functional Limits - able to perform ADL tasks with strength   Hand Function   Gross Motor Coordination Functional   Fine Motor Coordination Functional   Sensation   Light Touch No apparent deficits   Proprioception   Proprioception No apparent deficits   Vision-Basic Assessment   Visual History Cataracts   Vision - Complex Assessment   Acuity Able to read normal print without difficulty  (Read business card)   Perception   Inattention/Neglect Appears intact   Motor Planning Appears intact   Perseveration Not present   Cognition   Overall Cognitive Status [facility]   Arousal/Participation Alert; Responsive; Cooperative   Attention Within functional limits   Orientation Level Oriented X4   Memory Within functional limits   Following Commands Follows one step commands with increased time or repetition   Comments Pt identified bed full name and wristband  Pt able to discuss hobbies and interests, mechanism of injury, house setup, and carry conversation consistently  Pt also able to talk about work experience has well as recall from short and long term memory   Pt appropraite during evaluation  Assessment   Limitation Decreased ADL status; Decreased endurance;Decreased sensation;Decreased self-care trans  ((L LE sensation) )   Assessment Pt is a(n) 79 yof [facility] on [facility]10[facility]18 for Cl L hip fx  HPI: Pt was experienced a fall going up stair to enter her home (missed railing as she reached)  Pt is post op day 1 Intramedullary Fixation of Left Intertrochanteric Hip Fx  Pt currently has WBAT WBS  Pt presents w/ the following PMH impacting occupational performance: Acute cystitis w/o hematuria, Cataract, DM, HLD, Migraines  Pt reports living w/ her  in a 1 level apt w/ 4+12 ZOIE (1st 4 steps w/o railing)  PTA pt uses no for functional mobility  Pt reports I w I/ADLs at baseline  Upon evaluation, pt was A&O x 4, able to discuss recent events, mechanism of injury, home setup, work hx, and carry conversation through eval  Pt was also able to consistently follow directions + time  Pt required mod A x 1 to complete bed mobility, benefiting from physical A and VC for trunk stability and LE management  Pt was able to don R sock at EOB w/ CS but needed max A to don L sock  During functional transfers, pt needed min - mod A to stand pivot from bed > chair  Min A x 1 to complete stand to sit transfer was accompanied by VC + time and armrests  All other transfers required mod A x 1 + time and VC for RW sequencing  Pt presents w/ decreased static sitting balance, dynamic sitting balance, standing tolerance, static standing balance, endurance, L LE sensation/ROM/strength, and activity tolerance; impacting I w/ functional mobility/transfers, safe functional transfers, LB dressing, toileting, bathing, house management tasks, and bed mobility  Upon d/c home, at this time, pt will benefit from a shower chair and bedside commode  While in acute care pt would benefit from OT services to address deficit areas   From an OT perspective, pt would benefit from post acute rehab when medically stable for discharge from acute care to return to [facility] and be able to apply for part time work  Will continue to follow  Goals   Patient Goals To recover to apply for part-time job   Plan   Treatment Interventions ADL retraining;Functional transfer training; Endurance training;Patient/family training;Equipment evaluation/education; Activityengagement   Goal Expiration Date 08/18/18   OT Frequency 3-5x/wk   Recommendation   OT Discharge Recommendation (Post Acute Rehab)   Equipment Recommended Bedside commode; Other (comment)  (Shower Chair)   OT - OK to Discharge (When medically stable)   Barthel Index   Feeding 10   Bathing 0   Grooming Score 5   Dressing Score 5   Bladder Score 0   Bowels Score 10   Toilet Use Score 5   Transfers (Bed/Chair) Score 5   Mobility (Level Surface) Score 0   Stairs Score 0   Barthel Index Score 40   Modified Gooding Scale   Modified Vilma Scale 4       Pt goals to be met within 7 days:    1  Pt will complete bed mobility supine <> sit w/ mod I to engage in bed level ADLs and increase activity engagement/tolerance  2  Pt will display increased functional static standing tolerance to 8 minutes w/ mod I during grooming and least restrictive device in order to max level of (I) upon d/c   3  Pt will demonstrate G functional static standing balance w/ mod I and least restrictive device during LB dressing in order to ensure safety in the home upon d/c  4  Pt will be able to complete functional transfers <> bed and chair w/ mod I, least restrictive device and min VC in order to max I in the home environment and optimize safety  5  Pt will complete functional transfer <> toilet w/ mod I and least restrictive device  in order to return to PLOF during toileting  6  Pt will consistently negotiate w/ in room using least restrictive device w mod I and min VC to max I w/ ADL performance and be able to carryout potential job tasks    7  Pt will consistently demonstrate proper hand placement during functional transfers w/ mo VC to max safety in and around home upon d/c   8  Pt will demonstrate good attention and participation in continued evaluation to assess for DME needs to assist in safe discharge planning      BRIAN Mckeon

## 2018-08-11 NOTE — PROGRESS NOTES
Progress Note - Abdulaziz Smith [birth year], 79 y o  female MRN: [id number]  Unit/Bed#: -01 Encounter: [id number] DOS: 8/11/18  Primary Care Provider: Monster Siegel MD   Date and time admitted to hospital: 8/10/2018  1:42 AM    * Closed left hip fracture [facility])   Assessment & Plan    · Patient fell down cement stairs onto left hip  · Postop day 1 status post left hip TFN for IT fracture  · Appreciate ortho input  · PT/OT, weight-bearing as tolerated, pain control  · Continue to monitor hemoglobin  · Patient will need 4 weeks of Lovenox for DVT prophylaxis -pending on patient's dispo will need to check pricing of Lovenox  · Continue IV fluids and Brooks per Ortho        Acute cystitis without hematuria   Assessment & Plan    · UA- Positive nitrite  20-30 WBC  No RBC  Innumerable bacteria  Reports dysuria  History of urinary incontinence  · Afebrile, mild leukocytosis  · Continue IV Rocephin  · Urine culture so far growing gram-negative zane enteric like, follow-up C&S        DMII (diabetes mellitus, type 2) [facility])   Assessment & Plan    Lab Results   Component Value Date    HGBA1C 7 0 (H) 08/10/2018     Recent Labs      08/10/18   1702  08/10/18   1914  08/10/18   2113  08/11/18   0704   POCGLU  117  120  155*  208*     Blood Sugar Average: Last 72 hrs:  (P) 149 [id number] 196 on arrival   · Patient recently started on SGLT 2 inhibitor which may be contributing to her polyuria and acute cystitis  · Hold oral hypoglycemics  · Continue Lantus, sliding scale insulin, diabetic diet        Essential hypertension   Assessment & Plan    · Continue HCTZ/losartan        Hyperlipemia   Assessment & Plan    · Continue Lipitor, Lopid, fish oil          VTE Pharmacologic Prophylaxis:   Pharmacologic: Enoxaparin (Lovenox)  Mechanical VTE Prophylaxis in Place: Yes    Patient Centered Rounds: I have performed bedside rounds with nursing staff today      Discussions with Specialists or Other Care Team Provider: nursing    Education and Discussions with Family / Patient: patient    Time Spent for Care: 30 minutes  More than 50% of total time spent on counseling and coordination of care as described above  Current Length of Stay: 1 day(s)    Current Patient Status: Inpatient   Certification Statement: The patient will continue to require additional inpatient hospital stay due to PT OT, safe discharge planning post operatively     Discharge Plan / Estimated Discharge Date: pending PT OT today - safe d/c plan  Patient has 15 steps to get in her house and is agreeable to rehab if recommended     Code Status: Level 1 - Full Code    Subjective:   States pain starting this morning but just got oxy  No numbness or tingling  Hopeful to work with PT today and get out of bed  Objective:     Vitals:   Temp (24hrs), Av 8 °F (36 6 °C), Min:97 3 °F (36 3 °C), Max:98 6 °F (37 °C)    HR:  [63-93] 78  Resp:  [14-19] 18  BP: (108-136)/(50-80) 108/50  SpO2:  [91 %-99 %] 96 %  Body mass index is 26 63 kg/m²  Input and Output Summary (last 24 hours): Intake/Output Summary (Last 24 hours) at 18 0919  Last data filed at 18 9652   Gross per 24 hour   Intake          1658 75 ml   Output             3750 ml   Net         -2091 25 ml     Physical Exam:     Physical Exam   Constitutional: She is oriented to person, place, and time  She appears well-developed and well-nourished  Eyes: EOM are normal  No scleral icterus  Neck: Normal range of motion  Neck supple  Cardiovascular: Normal rate, regular rhythm and normal heart sounds  No murmur heard  Pulmonary/Chest: Effort normal and breath sounds normal    Abdominal: Soft  Bowel sounds are normal  She exhibits no distension  There is no tenderness  Genitourinary:   Genitourinary Comments: Brooks patent clear yellow urine    Musculoskeletal: Normal range of motion  She exhibits no edema     Left hip incision/dressing c/d/i    Neurological: She is alert and oriented to person, place, and time  Skin: Skin is warm and dry  Psychiatric: She has a normal mood and affect  Additional Data:     Labs:      Results from last 7 days  Lab Units 08/11/18  0446 08/10/18  0528 08/10/18  0223   WBC Thousand/uL  --  14 87* 11 97*   HEMOGLOBIN g/dL 10 8* 13 1 13 9   HEMATOCRIT % 32 1* 38 3 40 2   PLATELETS Thousands/uL  --  323 318   NEUTROS PCT %  --   --  59   LYMPHS PCT %  --   --  31   MONOS PCT %  --   --  6   EOS PCT %  --   --  2       Results from last 7 days  Lab Units 08/10/18  0528 08/10/18  0223   SODIUM mmol/L 139 138   POTASSIUM mmol/L 3 4* 4 4   CHLORIDE mmol/L 104 101   CO2 mmol/L 19* 20*   BUN mg/dL 18 18   CREATININE mg/dL 0 78 0 76   CALCIUM mg/dL 8 9 9 5   TOTAL PROTEIN g/dL  --  7 3   BILIRUBIN TOTAL mg/dL  --  0 50   ALK PHOS U/L  --  71   ALT U/L  --  63   AST U/L  --  45   GLUCOSE RANDOM mg/dL 192* 196*       Results from last 7 days  Lab Units 08/10/18  0223   INR  1 03       * I Have Reviewed All Lab Data Listed Above  * Additional Pertinent Lab Tests Reviewed:  Jagruti 66 Admission Reviewed    Imaging:    Imaging Reports Reviewed Today Include: all  Imaging Personally Reviewed by Myself Includes:  none    Recent Cultures (last 7 days):       Results from last 7 days  Lab Units 08/10/18  0349   URINE CULTURE  >100,000 cfu/ml Gram Negative Jos Enteric Like*       Last 24 Hours Medication List:     Current Facility-Administered Medications:  acetaminophen 650 mg Oral Q6H PRN Ben Gutierrez PA-C    aspirin 81 mg Oral Daily Ben Gutierrez PA-C    atorvastatin 40 mg Oral Daily With Dinner Ben Gutierrez PA-C    cefTRIAXone 1,000 mg Intravenous Q24H Maine Sneed PA-C    cholecalciferol 1,000 Units Oral Daily Ben Gutierrez PA-C    enoxaparin 40 mg Subcutaneous Daily Ben Gutierrez PA-C    gabapentin 600 mg Oral HS Ben Gutierrez PA-C    gemfibrozil 600 mg Oral BID AC Ben Gutierrez PA-C insulin glargine 25 Units Subcutaneous HS Laureano Shen PA-C    insulin lispro 1-6 Units Subcutaneous TID AC Kesha Sheppard PA-C    losartan potassium-hydrochlorothiazide (HYZAAR 50/12  5) combination  Oral Daily Laureano Shen PA-C    morphine injection 1 mg Intravenous Q4H PRN Laureano Shen PA-C    ondansetron 4 mg Intravenous Q6H PRN Laureano Shen PA-C    oxyCODONE 10 mg Oral Q4H PRN Laureano Shen PA-C    oxyCODONE 5 mg Oral Q4H PRN Laureano Shen PA-C    sodium chloride 75 mL/hr Intravenous Continuous Laureano Shen PA-C Last Rate: 75 mL/hr (08/10/18 2003)        Today, Patient Was Seen By: Sissy Ruby PA-C    ** Please Note: This note has been constructed using a voice recognition system   **

## 2018-08-12 LAB
BACTERIA UR CULT: ABNORMAL
GLUCOSE SERPL-MCNC: 192 MG/DL (ref 65–140)
GLUCOSE SERPL-MCNC: 251 MG/DL (ref 65–140)
GLUCOSE SERPL-MCNC: 261 MG/DL (ref 65–140)
GLUCOSE SERPL-MCNC: 273 MG/DL (ref 65–140)

## 2018-08-12 PROCEDURE — 97112 NEUROMUSCULAR REEDUCATION: CPT | Performed by: PHYSICAL THERAPIST

## 2018-08-12 PROCEDURE — 82948 REAGENT STRIP/BLOOD GLUCOSE: CPT

## 2018-08-12 PROCEDURE — 97116 GAIT TRAINING THERAPY: CPT | Performed by: PHYSICAL THERAPIST

## 2018-08-12 PROCEDURE — 99024 POSTOP FOLLOW-UP VISIT: CPT | Performed by: PHYSICIAN ASSISTANT

## 2018-08-12 PROCEDURE — 99232 SBSQ HOSP IP/OBS MODERATE 35: CPT | Performed by: PHYSICIAN ASSISTANT

## 2018-08-12 PROCEDURE — G8978 MOBILITY CURRENT STATUS: HCPCS | Performed by: PHYSICAL THERAPIST

## 2018-08-12 PROCEDURE — 97535 SELF CARE MNGMENT TRAINING: CPT

## 2018-08-12 RX ORDER — INSULIN GLARGINE 100 [IU]/ML
28 INJECTION, SOLUTION SUBCUTANEOUS
Status: DISCONTINUED | OUTPATIENT
Start: 2018-08-12 | End: 2018-08-13

## 2018-08-12 RX ADMIN — OXYCODONE HYDROCHLORIDE 5 MG: 5 TABLET ORAL at 11:01

## 2018-08-12 RX ADMIN — ATORVASTATIN CALCIUM 40 MG: 40 TABLET, FILM COATED ORAL at 16:26

## 2018-08-12 RX ADMIN — GEMFIBROZIL 600 MG: 600 TABLET ORAL at 06:38

## 2018-08-12 RX ADMIN — CEFTRIAXONE 1000 MG: 1 INJECTION, POWDER, FOR SOLUTION INTRAMUSCULAR; INTRAVENOUS at 22:14

## 2018-08-12 RX ADMIN — LOSARTAN POTASSIUM: 50 TABLET, FILM COATED ORAL at 09:04

## 2018-08-12 RX ADMIN — INSULIN GLARGINE 28 UNITS: 100 INJECTION, SOLUTION SUBCUTANEOUS at 22:08

## 2018-08-12 RX ADMIN — ENOXAPARIN SODIUM 40 MG: 100 INJECTION SUBCUTANEOUS at 09:04

## 2018-08-12 RX ADMIN — INSULIN LISPRO 3 UNITS: 100 INJECTION, SOLUTION INTRAVENOUS; SUBCUTANEOUS at 11:35

## 2018-08-12 RX ADMIN — SODIUM CHLORIDE 75 ML/HR: 0.9 INJECTION, SOLUTION INTRAVENOUS at 00:56

## 2018-08-12 RX ADMIN — INSULIN LISPRO 3 UNITS: 100 INJECTION, SOLUTION INTRAVENOUS; SUBCUTANEOUS at 16:23

## 2018-08-12 RX ADMIN — INSULIN LISPRO 2 UNITS: 100 INJECTION, SOLUTION INTRAVENOUS; SUBCUTANEOUS at 07:42

## 2018-08-12 RX ADMIN — GEMFIBROZIL 600 MG: 600 TABLET ORAL at 16:24

## 2018-08-12 RX ADMIN — ASPIRIN 81 MG: 81 TABLET, COATED ORAL at 09:04

## 2018-08-12 RX ADMIN — OXYCODONE HYDROCHLORIDE 5 MG: 5 TABLET ORAL at 17:38

## 2018-08-12 RX ADMIN — GABAPENTIN 600 MG: 300 CAPSULE ORAL at 22:08

## 2018-08-12 RX ADMIN — CHOLECALCIFEROL TAB 25 MCG (1000 UNIT) 1000 UNITS: 25 TAB at 09:04

## 2018-08-12 NOTE — OCCUPATIONAL THERAPY NOTE
[address] Progress Note     Patient Name: Beatriz GRAVESHBNIKI Date: [birth year]  Problem List  Patient Active Problem List   Diagnosis    DMII [place]diabetes mellitus, type 2) ([place] [address] )    Osteoporosis    Vitamin D deficiency    Stress incontinence of urine    Hyperlipemia    Diabetes mellitus ([place] [address] )    Closed left hip fracture ([place] [address] )    Acute cystitis without hematuria    Closed fracture of left hip ([place] [address] )    Essential hypertension         08/12/18 0837   Restrictions/Precautions   Weight Bearing Precautions Per Order Yes   LLE Weight Bearing Per Order WBAT   Other Precautions WBS; Bed Alarm; Chair Alarm; Fall Risk;Pain;Multiple lines   General   Response to Previous Treatment Patient with no complaints from previous session   Pain Assessment   Pain Assessment 0-10   Pain Score 1   Pain Location Hip   Pain Orientation Left   Pain Onset Ongoing   Clinical Progression Gradually improving   Effect of Pain on Daily Activities Limits activity tolerance and functional mobility    Patient's Stated Pain Goal No pain   Hospital Pain Intervention(s) Ambulation/increased activity; Emotional support;Repositioned   Response to Interventions Tolerated tx   ADL   Where Assessed Other (Comment)   Equipment Provided (Standing and sitting at sink)   Grooming Assistance 4  Minimal Assistance   Grooming Deficit Setup;Steadying; Increased time to complete  (Pt able to brush teeth while standing at sink)   Grooming Comments Pt able to manipulate small bottles, apply toothpaste and brush teeth  Pt completed standing w/ RW  Pt used one arm to support self on counter intermittently while completing  UB Bathing Assistance 4  Minimal Assistance   UB Bathing Deficit Setup; Increased time to complete; Other (Comment)  (Therapy student washed back)   UB Bathing Comments Pt completed UB bathing while seated in chair at sink  Pt washed entire front of UB  Therapy student washed pt's back     LB Bathing Assistance 4  Minimal Assistance LB Bathing Deficit Setup; Increased time to complete;Left lower leg including foot   LB Bathing Comments Pt able to wash entire LB X L lower leg + foot  UB Dressing Assistance 4  Minimal Assistance   UB Dressing Deficit Setup;Pull around back; Fasteners   UB Dressing Comments Pt donned gown while seated  Pt able to manage sleeve buttons and thread B UE  Therapy student A w/ tie in back and pull around back   [address]  3  Moderate Assistance   Toileting Deficit Setup; Increased time to complete;Clothing management down   Toileting Comments Pt required mod A x 1 for toilet transfer, clothing management, and VC for RW negotiation and sequencing  Stand by x 2 for line management   Functional Standing Tolerance   Time ~10 mins   Activity Transfers, mobility, ADLs   Comments Pt demonstrated fair - balance when standing w/ RW  Bed Mobility   Supine to Sit 3  Moderate assistance   Additional items Assist x 1;HOB elevated;LE management;Verbal cues; Increased time required   Additional Comments Pt presented supine in bed, mod A needed for trunk stability to get upright (Pt able to it unsupported)  Physical A needed for LE management  Transfers   Sit to Stand 3  Moderate assistance   Additional items Assist x 1; Armrests; Increased time required;Verbal cues   Stand to Sit 4  Minimal assistance   Additional items Assist x 1; Increased time required;Armrests   Stand pivot 3  Moderate assistance   Additional items Assist x 1; Increased time required;Verbal cues;Armrests   Additional Comments Mod A x 1 for sit > stand and stand pivot transfer from bed to chair  Pt needed VC for sequencing and hand placement  Pt benefited from + time and completed all transfers w/ RW  Less physical A needed for stand > sit  Functional Mobility   Functional Mobility 3  Moderate assistance   Additional Comments Pt varied from mod to min A  Pt needed significant time to sequence/initiate steps w/ RW   Pt navigated from bed to bathroom to recliner chair  Additional items Rolling walker   Toilet Transfers   Toilet Transfer From Other (Comment)  (Chair in front of sink)   Toilet Transfer Type To   Toilet Transfer to Raised toilet seat with rails   Toilet Transfer Technique Lateral   Toilet Transfers Minimal assistance   Toilet Transfers Comments Pt completed ~5 lateral steps from sink to toilet  Pt benefited from [facility] for sequencing and hand placement  Cognition   Overall Cognitive Status WFL   Arousal/Participation Alert; Responsive; Cooperative   Attention Difficulty dividing attention   Orientation Level Oriented X4   Memory Within functional limits   Following Commands Follows one step commands without difficulty   Comments Pt identified by full name and wristband  Pt was active and coherent through tx session  Pt carried convesation, but demonstrated difficulty to divide attention (walk and talk or talk during ADLs)   Activity Tolerance   Activity Tolerance Patient tolerated treatment well;Patient limited by fatigue   Medical Staff Made Aware OT Kerrie Carlson, RN Manuela   Assessment   Assessment Patient seen for OT tx session day 1  Pt was agreeable to working with OT upon session  Pt reported to have no complaints from previous session, and pain level had been decreasing  Pt stated, "I don't like staying in bed all day " During the tx session, pt completed grooming, UB bathing/dressing, toileting, and functional transfers/mobility  During grooming (oral hygiene) pt was able to complete standing at sink w/ min A for steadying  UB bathing was completed w/ overall min A; pt only needed A to wash back  LB bathing completed w/ min A  A needed for L lower leg and buttock area  UB dressing was completed w/ min A to pull around back, tie around back, and manage IV through sleeve  Toileting was completed w/ mod A for the transfer and for clothing management  Functional mobility required min - mod A + time and standby x 2 for line management   Functional transfers needed mod A x 1 aside from stand to sit needing min A x 1  Tx session was tolerated well and pt was motivated  Pt demonstrated increased standing tolerance, balance, and decreased pain  VC still needed for RW management, hand placement during transfers and sequencing  Recommendation stands for post acute rehab, pt still in need of shower chair and bedside commode to max I for ADL performance  Will continue to follow  Plan   Treatment Interventions ADL retraining;Functional transfer training; Endurance training; Activityengagement;Patient/family training   Goal Expiration Date 08/18/18   Treatment Day 1   OT Frequency 3-5x/wk   Recommendation   OT Discharge Recommendation (Post Acute Rehab)   Equipment Recommended Bedside commode  (Shower chair )   OT - OK to Discharge (When medically stable)   Barthel Index   Feeding 10   Bathing 0   Grooming Score 5   Dressing Score 5   Bladder Score 10   Bowels Score 10   Toilet Use Score 5   Transfers (Bed/Chair) Score 5   Mobility (Level Surface) Score 0   Stairs Score 0   Barthel Index Score 50   Modified Vil[place] Scale   Modified [place] Scale 4     Bhumika Sotelo , OTS

## 2018-08-12 NOTE — ASSESSMENT & PLAN NOTE
Lab Results   Component Value Date    HGBA1C 7 0 (H) 08/10/2018     Recent Labs      08/11/18   1122  08/11/18   1606  08/11/18   2120  08/12/18   0707   POCGLU  242*  259*  282*  192*     Blood Sugar Average: Last 72 hrs:  (P) 183 [id number] 196 on arrival   · Patient recently started on SGLT 2 inhibitor which may be contributing to her polyuria and acute cystitis  · Hold oral hypoglycemics  · Continue Lantus, sliding scale insulin, diabetic diet  · Inc lantus to 28 units   · States she has better control on toujeo at home

## 2018-08-12 NOTE — ASSESSMENT & PLAN NOTE
· Patient fell down cement stairs onto left hip  · Postop day 2 status post left hip TFN for IT fracture  · Appreciate ortho input  · PT/OT, weight-bearing as tolerated, pain control --> plan to d/c to post acute rehab   · Continue to monitor hemoglobin  · Patient will need 4 weeks of Lovenox for DVT prophylaxis (complete on 9/10) -pending on patient's dispo will need to check pricing of Lovenox    · D/c IVF and d/c funk per ortho

## 2018-08-12 NOTE — PROGRESS NOTES
Progress Note - Lito Nina [birth year], 79 y o  female MRN: [id number]  Unit/Bed#: -Jenn Encounter: [id number] DOS: 8/12/18  Primary Care Provider: Constanza Lockhart MD   Date and time admitted to hospital: 8/10/2018  1:42 AM    * Closed left hip fracture [facility])   Assessment & Plan    · Patient fell down cement stairs onto left hip  · Postop day 2 status post left hip TFN for IT fracture  · Appreciate ortho input  · PT/OT, weight-bearing as tolerated, pain control --> plan to d/c to post acute rehab   · Continue to monitor hemoglobin  · Patient will need 4 weeks of Lovenox for DVT prophylaxis (complete on 9/10) -pending on patient's dispo will need to check pricing of Lovenox  · D/c IVF and d/c funk per ortho         Acute cystitis without hematuria   Assessment & Plan    · UA- Positive nitrite  20-30 WBC  No RBC  Innumerable bacteria  Reports dysuria  History of urinary incontinence    · Afebrile, mild leukocytosis  · Continue IV Rocephin  · Urine culture growing klebsiella, follow-up C&S        DMII (diabetes mellitus, type 2) [facility])   Assessment & Plan    Lab Results   Component Value Date    HGBA1C 7 0 (H) 08/10/2018     Recent Labs      08/11/18   1122  08/11/18   1606  08/11/18   2120  08/12/18   0707   POCGLU  242*  259*  282*  192*     Blood Sugar Average: Last 72 hrs:  (P) 183 [id number] 196 on arrival   · Patient recently started on SGLT 2 inhibitor which may be contributing to her polyuria and acute cystitis  · Hold oral hypoglycemics  · Continue Lantus, sliding scale insulin, diabetic diet  · Inc lantus to 28 units   · States she has better control on toujeo at home         Essential hypertension   Assessment & Plan    · Continue HCTZ/losartan        Hyperlipemia   Assessment & Plan    · Continue Lipitor, Lopid, fish oil          VTE Pharmacologic Prophylaxis:   Pharmacologic: Enoxaparin (Lovenox)  Mechanical VTE Prophylaxis in Place: Yes    Patient Centered Rounds: I have performed bedside rounds with nursing staff today  Discussions with Specialists or Other Care Team Provider: nursing, CM     Education and Discussions with Family / Patient: patient,  at bedside     Time Spent for Care: 30 minutes  More than 50% of total time spent on counseling and coordination of care as described above  Current Length of Stay: 2 day(s)    Current Patient Status: Inpatient   Certification Statement: The patient will continue to require additional inpatient hospital stay due to safe discharge planning, pending urine C&S on IV abx     Discharge Plan / Estimated Discharge Date: pending, need rehab, pending     Code Status: Level 1 - Full Code    Subjective:   Pt seen and examined at bedside  Feels well has incisional pain  No numbness or tingling  Objective:     Vitals:   Temp (24hrs), Av 9 °F (37 2 °C), Min:97 7 °F (36 5 °C), Max:99 6 °F (37 6 °C)    HR:  [78-93] 78  Resp:  [17-18] 17  BP: ()/(50-59) 127/58  SpO2:  [90 %-94 %] 92 %  Body mass index is 26 63 kg/m²  Input and Output Summary (last 24 hours): Intake/Output Summary (Last 24 hours) at 18 1039  Last data filed at 18 1021   Gross per 24 hour   Intake          3081 25 ml   Output             2775 ml   Net           306 25 ml     Physical Exam:     Physical Exam   Constitutional: She is oriented to person, place, and time  She appears well-developed and well-nourished  HENT:   Head: Normocephalic and atraumatic  Mouth/Throat: Oropharynx is clear and moist    Eyes: EOM are normal  No scleral icterus  Neck: Normal range of motion  Neck supple  Cardiovascular: Normal rate, regular rhythm and normal heart sounds  No murmur heard  Pulmonary/Chest: Effort normal and breath sounds normal    Abdominal: Soft  Bowel sounds are normal  She exhibits no distension  There is no tenderness  Musculoskeletal: Normal range of motion  She exhibits no edema     Neurological: She is alert and oriented to person, place, and time  Skin: Skin is warm and dry  Incision L hip c/d/i    Psychiatric: She has a normal mood and affect  Additional Data:     Labs:      Results from last 7 days  Lab Units 08/11/18  0446 08/10/18  0528 08/10/18  0223   WBC Thousand/uL  --  14 87* 11 97*   HEMOGLOBIN g/dL 10 8* 13 1 13 9   HEMATOCRIT % 32 1* 38 3 40 2   PLATELETS Thousands/uL  --  323 318   NEUTROS PCT %  --   --  59   LYMPHS PCT %  --   --  31   MONOS PCT %  --   --  6   EOS PCT %  --   --  2       Results from last 7 days  Lab Units 08/10/18  0528 08/10/18  0223   SODIUM mmol/L 139 138   POTASSIUM mmol/L 3 4* 4 4   CHLORIDE mmol/L 104 101   CO2 mmol/L 19* 20*   BUN mg/dL 18 18   CREATININE mg/dL 0 78 0 76   CALCIUM mg/dL 8 9 9 5   TOTAL PROTEIN g/dL  --  7 3   BILIRUBIN TOTAL mg/dL  --  0 50   ALK PHOS U/L  --  71   ALT U/L  --  63   AST U/L  --  45   GLUCOSE RANDOM mg/dL 192* 196*       Results from last 7 days  Lab Units 08/10/18  0223   INR  1 03       * I Have Reviewed All Lab Data Listed Above  * Additional Pertinent Lab Tests Reviewed:  Jagruti 66 Admission Reviewed    Imaging:    Imaging Reports Reviewed Today Include: all  Imaging Personally Reviewed by Myself Includes:  none    Recent Cultures (last 7 days):       Results from last 7 days  Lab Units 08/10/18  0349   URINE CULTURE  >100,000 cfu/ml Klebsiella pneumoniae*       Last 24 Hours Medication List:     Current Facility-Administered Medications:  acetaminophen 650 mg Oral Q6H PRN Nci Gonzales PA-C    aspirin 81 mg Oral Daily Nic Gonzales PA-C    atorvastatin 40 mg Oral Daily With Dinner Nic Gonzales PA-C    cefTRIAXone 1,000 mg Intravenous Q24H Gregg Sneed PA-C Last Rate: 1,000 mg (08/11/18 2125)   cholecalciferol 1,000 Units Oral Daily Nic Gonzales PA-C    enoxaparin 40 mg Subcutaneous Daily Nic Gonzales PA-C    gabapentin 600 mg Oral HS Nic Gonzales PA-C    gemfibrozil 600 mg Oral BID AC Ana Anglin PA-C    insulin glargine 28 Units Subcutaneous HS Gregg Sneed PA-C    insulin lispro 1-6 Units Subcutaneous TID AC Kesha Sheppard PA-C    losartan potassium-hydrochlorothiazide (HYZAAR 50/12  5) combination  Oral Daily Ana Anglin PA-C    morphine injection 1 mg Intravenous Q4H PRN Ana Anglin PA-C    ondansetron 4 mg Intravenous Q6H PRN Ana Anglin PA-C    oxyCODONE 10 mg Oral Q4H PRN Ana Anglin PA-C    oxyCODONE 5 mg Oral Q4H PRN Ana Anglin PA-C         Today, Patient Was Seen By: Mary Wright PA-C    ** Please Note: This note has been constructed using a voice recognition system   **

## 2018-08-12 NOTE — DISCHARGE INSTRUCTIONS
Continue lovenox 40mg SC x 30 days last dose on 9/10/18    Care after your surgery:  · Ice and elevate the surgical site 3-4 times a day for 15 minutes   · Keep the bandages dry at all times  Remove 3 days after surgery and resume showering if the incision is dry  · Apply a clean bandage daily after showering until the staples are removed  · Ambulate with an assistive device until cleared by the physical therapist   · Weight bearing as tolerated on the operative leg  · Continue Lovenox for 4 weeks from the surgery date to prevent blood clots  · Do not drive until cleared by the surgeon  Call the office at [phone] if you have any of the following:  · Excessive redness or drainage at the surgical site  · Fever above 101 5° F   · Pain unrelieved by medication  · Any numbness, tingling, or excessive swelling of the operative extremity      Follow-Up Appointment:  · 10-14 days after surgery with Dr Matt Morales

## 2018-08-12 NOTE — PROGRESS NOTES
Omegaroyce 1 79 y o  female MRN: [id number]  Unit/Bed#: -01      Subjective:   Patient seen and evaluated  States left hip pain is well controlled and notes just soreness about the hip  No numbness or tingling  No fevers or chills      Labs:    0  Lab Value Date/Time   HCT 32 1 (L) 08/11/2018 0446   HCT 38 3 08/10/2018 0528   HCT 40 2 08/10/2018 0223   HCT 43 0 10/25/2016 1053   HGB 10 8 (L) 08/11/2018 0446   HGB 13 1 08/10/2018 0528   HGB 13 9 08/10/2018 0223   HGB 15 3 10/25/2016 1053   INR 1 03 08/10/2018 0223   WBC 14 87 (H) 08/10/2018 0528   WBC 11 97 (H) 08/10/2018 0223   WBC 9 9 10/25/2016 1053       Meds:    Current Facility-Administered Medications:     acetaminophen (TYLENOL) tablet 650 mg, 650 mg, Oral, Q6H PRN, Laureano Shen PA-C    aspirin (ECOTRIN LOW STRENGTH) EC tablet 81 mg, 81 mg, Oral, Daily, Laureano Shen PA-C, 81 mg at 08/11/18 0854    atorvastatin (LIPITOR) tablet 40 mg, 40 mg, Oral, Daily With Dinner, Laureano Shen PA-C, 40 mg at 08/11/18 1624    cefTRIAXone (ROCEPHIN) 1,000 mg in dextrose 5 % 50 mL IVPB, 1,000 mg, Intravenous, Q24H, Gregg Sneed PA-C, Last Rate: 100 mL/hr at 08/11/18 2125, 1,000 mg at 08/11/18 2125    cholecalciferol (VITAMIN D3) tablet 1,000 Units, 1,000 Units, Oral, Daily, Laureano Shen PA-C, 1,000 Units at 08/11/18 0854    enoxaparin (LOVENOX) subcutaneous injection 40 mg, 40 mg, Subcutaneous, Daily, Laureano Shen PA-C, 40 mg at 08/11/18 0854    gabapentin (NEURONTIN) capsule 600 mg, 600 mg, Oral, HS, Laureano Shen PA-C, 600 mg at 08/11/18 2125    gemfibrozil (LOPID) tablet 600 mg, 600 mg, Oral, BID AC, Kesha Sheppard PA-C, 600 mg at 08/12/18 2951    insulin glargine (LANTUS) subcutaneous injection 25 Units 0 25 mL, 25 Units, Subcutaneous, HS, Laureano Shen PA-C, 25 Units at 08/11/18 2303    insulin lispro (HumaLOG) 100 units/mL subcutaneous injection 1-6 Units, 1-6 Units, Subcutaneous, TID AC, 2 Units at 08/12/18 0742 **AND** Fingerstick Glucose (POCT), , , TID AC, Guadarrama Loo, PA-C    losartan potassium-hydrochlorothiazide (HYZAAR 50/12  5) combination, , Oral, Daily, Guadarrama Loo, PA-C    morphine injection 1 mg, 1 mg, Intravenous, Q4H PRN, Guadarrama Loo, PA-C, 1 mg at 08/10/18 2006    ondansetron [facility]) injection 4 mg, 4 mg, Intravenous, Q6H PRN, Guadarrama Loo, PA-C    oxyCODONE (ROXICODONE) immediate release tablet 10 mg, 10 mg, Oral, Q4H PRN, Guadarrama Loo, PA-C, 10 mg at 08/11/18 1338    oxyCODONE (ROXICODONE) IR tablet 5 mg, 5 mg, Oral, Q4H PRN, Guadarrama Loo, PA-C, 5 mg at 08/11/18 2125    sodium chloride 0 9 % infusion, 75 mL/hr, Intravenous, Continuous, Guadarrama Loo, PA-C, Last Rate: 75 mL/hr at 08/12/18 0056, 75 mL/hr at 08/12/18 0056    Physical exam:  Vitals:    08/12/18 0704   BP: 127/58   Pulse: 78   Resp: 17   Temp: 99 6 °F (37 6 °C)   SpO2: 92%      Left hip:  · Dressings C/D/I  · Thigh compartments are soft  · Motor and sensation intact left lower extremity  · 2+ DP pulse    _*_*_*_*_*_*_*_*_*_*_*_*_*_*_*_*_*_*_*_*_*_*_*_*_*_*_*_*_*_*_*_*_*_*_*_*_*_*_*_*_*    Assessment: 79 y  o female POD 2 s/p Left Hip TFN for IT fracture    Plan:  Pain Control  WBAT LLE  PT/OT  DVT proph, Lovenox for 30 days post op  Stable for discharge from orthopedic standpoint    Follow-up with Dr Evelina Morejon 10-14 days postoperatively    Mandi Maza PA-C

## 2018-08-12 NOTE — PLAN OF CARE
Problem: OCCUPATIONAL THERAPY ADULT  Goal: Performs self-care activities at highest level of function for planned discharge setting  See evaluation for individualized goals  Treatment Interventions: ADL retraining, Functional transfer training, Endurance training, Patient/family training, Equipment evaluation/education, Activityengagement  Equipment Recommended: Bedside commode, Other (comment) (Shower Chair)       See flowsheet documentation for full assessment, interventions and recommendations  Outcome: Progressing  Limitation: Decreased ADL status, Decreased endurance, Decreased sensation, Decreased self-care trans ((L LE sensation) )     Assessment: Patient seen for OT tx session day 1  Pt was agreeable to working with OT upon session  Pt reported to have no complaints from previous session, and pain level had been decreasing  Pt stated, "I don't like staying in bed all day " During the tx session, pt completed grooming, UB bathing/dressing, toileting, and functional transfers/mobility  During grooming (oral hygiene) pt was able to complete standing at sink w/ min A for steadying  UB bathing was completed w/ overall min A; pt only needed A to wash back  LB bathing completed w/ min A  A needed for L lower leg and buttock area  UB dressing was completed w/ min A to pull around back, tie around back, and manage IV through sleeve  Toileting was completed w/ mod A for the transfer and for clothing management  Functional mobility required min - mod A + time and standby x 2 for line management  Functional transfers needed mod A x 1 aside from stand to sit needing min A x 1  Tx session was tolerated well and pt was motivated  Pt demonstrated increased standing tolerance, balance, and decreased pain  VC still needed for RW management, hand placement during transfers and sequencing  Recommendation stands for post acute rehab, pt still in need of shower chair and bedside commode to max I for ADL performance   Will continue to follow       OT Discharge Recommendation:  (Post Acute Rehab)  OT - OK to Discharge:  (When medically stable)

## 2018-08-12 NOTE — ASSESSMENT & PLAN NOTE
· UA- Positive nitrite  20-30 WBC  No RBC  Innumerable bacteria  Reports dysuria  History of urinary incontinence    · Afebrile, mild leukocytosis  · Continue IV Rocephin  · Urine culture growing klebsiella, follow-up C&S

## 2018-08-12 NOTE — PLAN OF CARE
Problem: PHYSICAL THERAPY ADULT  Goal: Performs mobility at highest level of function for planned discharge setting  See evaluation for individualized goals  Treatment/Interventions: Functional transfer training, LE strengthening/ROM, Elevations, Therapeutic exercise, Endurance training, Bed mobility, Gait training, Spoke to nursing, Spoke to case management, OT  Equipment Recommended: Harvey Phoenix       See flowsheet documentation for full assessment, interventions and recommendations  Outcome: Progressing  Prognosis: Good  Problem List: Decreased strength, Decreased range of motion, Decreased endurance, Impaired balance, Decreased mobility, Orthopedic restrictions, Pain  Assessment: Patient recieved supine in bed, agreeable to PT  She reported that she had felt well today, not too sure from yesterdays therapy  She feels as though she is improving the last day  She was able to improve her assist with bed mobility as well as increase her walking distance from 3' to 25'  Chair follow provided as patient amb into hallway  She was educated on how to perform weight shifting as well as SAQ to go along with the other home exercises that were given yesterday  Barriers to Discharge: Inaccessible home environment, Decreased caregiver support     Recommendation: Post acute IP rehab     PT - OK to Discharge: Yes    See flowsheet documentation for full assessment

## 2018-08-13 PROBLEM — E87.6 HYPOKALEMIA: Status: ACTIVE | Noted: 2018-08-13

## 2018-08-13 PROBLEM — D64.9 ANEMIA: Status: ACTIVE | Noted: 2018-08-13

## 2018-08-13 LAB
ANION GAP SERPL CALCULATED.3IONS-SCNC: 11 MMOL/L (ref 4–13)
BUN SERPL-MCNC: 14 MG/DL (ref 5–25)
CALCIUM SERPL-MCNC: 8.6 MG/DL (ref 8.3–10.1)
CHLORIDE SERPL-SCNC: 102 MMOL/L (ref 100–108)
CO2 SERPL-SCNC: 22 MMOL/L (ref 21–32)
CREAT SERPL-MCNC: 0.69 MG/DL (ref 0.6–1.3)
ERYTHROCYTE [DISTWIDTH] IN BLOOD BY AUTOMATED COUNT: 13.8 % (ref 11.6–15.1)
ERYTHROCYTE [DISTWIDTH] IN BLOOD BY AUTOMATED COUNT: 13.8 % (ref 11.6–15.1)
GFR SERPL CREATININE-BSD FRML MDRD: 91 ML/MIN/1.73SQ M
GLUCOSE SERPL-MCNC: 178 MG/DL (ref 65–140)
GLUCOSE SERPL-MCNC: 187 MG/DL (ref 65–140)
GLUCOSE SERPL-MCNC: 225 MG/DL (ref 65–140)
GLUCOSE SERPL-MCNC: 240 MG/DL (ref 65–140)
GLUCOSE SERPL-MCNC: 275 MG/DL (ref 65–140)
HCT VFR BLD AUTO: 27.6 % (ref 34.8–46.1)
HCT VFR BLD AUTO: 28.7 % (ref 34.8–46.1)
HGB BLD-MCNC: 9.3 G/DL (ref 11.5–15.4)
HGB BLD-MCNC: 9.6 G/DL (ref 11.5–15.4)
MCH RBC QN AUTO: 30.8 PG (ref 26.8–34.3)
MCH RBC QN AUTO: 31.3 PG (ref 26.8–34.3)
MCHC RBC AUTO-ENTMCNC: 33.4 G/DL (ref 31.4–37.4)
MCHC RBC AUTO-ENTMCNC: 33.7 G/DL (ref 31.4–37.4)
MCV RBC AUTO: 91 FL (ref 82–98)
MCV RBC AUTO: 94 FL (ref 82–98)
PLATELET # BLD AUTO: 245 THOUSANDS/UL (ref 149–390)
PLATELET # BLD AUTO: 253 THOUSANDS/UL (ref 149–390)
PMV BLD AUTO: 10 FL (ref 8.9–12.7)
PMV BLD AUTO: 10 FL (ref 8.9–12.7)
POTASSIUM SERPL-SCNC: 3.1 MMOL/L (ref 3.5–5.3)
RBC # BLD AUTO: 3.02 MILLION/UL (ref 3.81–5.12)
RBC # BLD AUTO: 3.07 MILLION/UL (ref 3.81–5.12)
SODIUM SERPL-SCNC: 135 MMOL/L (ref 136–145)
WBC # BLD AUTO: 7.93 THOUSAND/UL (ref 4.31–10.16)
WBC # BLD AUTO: 8.98 THOUSAND/UL (ref 4.31–10.16)

## 2018-08-13 PROCEDURE — 85027 COMPLETE CBC AUTOMATED: CPT | Performed by: PHYSICIAN ASSISTANT

## 2018-08-13 PROCEDURE — 99024 POSTOP FOLLOW-UP VISIT: CPT | Performed by: PHYSICIAN ASSISTANT

## 2018-08-13 PROCEDURE — 82948 REAGENT STRIP/BLOOD GLUCOSE: CPT

## 2018-08-13 PROCEDURE — 97116 GAIT TRAINING THERAPY: CPT

## 2018-08-13 PROCEDURE — 97110 THERAPEUTIC EXERCISES: CPT

## 2018-08-13 PROCEDURE — 80048 BASIC METABOLIC PNL TOTAL CA: CPT | Performed by: PHYSICIAN ASSISTANT

## 2018-08-13 PROCEDURE — 97530 THERAPEUTIC ACTIVITIES: CPT

## 2018-08-13 PROCEDURE — 99232 SBSQ HOSP IP/OBS MODERATE 35: CPT | Performed by: PHYSICIAN ASSISTANT

## 2018-08-13 RX ORDER — POLYETHYLENE GLYCOL 3350 17 G/17G
17 POWDER, FOR SOLUTION ORAL DAILY
Status: DISCONTINUED | OUTPATIENT
Start: 2018-08-13 | End: 2018-08-14 | Stop reason: HOSPADM

## 2018-08-13 RX ORDER — INSULIN GLARGINE 100 [IU]/ML
30 INJECTION, SOLUTION SUBCUTANEOUS
Status: DISCONTINUED | OUTPATIENT
Start: 2018-08-13 | End: 2018-08-14 | Stop reason: HOSPADM

## 2018-08-13 RX ORDER — POTASSIUM CHLORIDE 20 MEQ/1
40 TABLET, EXTENDED RELEASE ORAL ONCE
Status: COMPLETED | OUTPATIENT
Start: 2018-08-13 | End: 2018-08-13

## 2018-08-13 RX ORDER — DOCUSATE SODIUM 100 MG/1
100 CAPSULE, LIQUID FILLED ORAL 2 TIMES DAILY
Status: DISCONTINUED | OUTPATIENT
Start: 2018-08-13 | End: 2018-08-14 | Stop reason: HOSPADM

## 2018-08-13 RX ADMIN — POLYETHYLENE GLYCOL 3350 17 G: 17 POWDER, FOR SOLUTION ORAL at 14:55

## 2018-08-13 RX ADMIN — INSULIN LISPRO 4 UNITS: 100 INJECTION, SOLUTION INTRAVENOUS; SUBCUTANEOUS at 12:51

## 2018-08-13 RX ADMIN — DOCUSATE SODIUM 100 MG: 100 CAPSULE, LIQUID FILLED ORAL at 19:05

## 2018-08-13 RX ADMIN — CEFTRIAXONE 1000 MG: 1 INJECTION, POWDER, FOR SOLUTION INTRAMUSCULAR; INTRAVENOUS at 21:22

## 2018-08-13 RX ADMIN — GEMFIBROZIL 600 MG: 600 TABLET ORAL at 06:17

## 2018-08-13 RX ADMIN — ENOXAPARIN SODIUM 40 MG: 100 INJECTION SUBCUTANEOUS at 08:01

## 2018-08-13 RX ADMIN — GEMFIBROZIL 600 MG: 600 TABLET ORAL at 19:05

## 2018-08-13 RX ADMIN — POTASSIUM CHLORIDE 40 MEQ: 1500 TABLET, EXTENDED RELEASE ORAL at 14:55

## 2018-08-13 RX ADMIN — LOSARTAN POTASSIUM: 50 TABLET, FILM COATED ORAL at 08:02

## 2018-08-13 RX ADMIN — GABAPENTIN 600 MG: 300 CAPSULE ORAL at 21:21

## 2018-08-13 RX ADMIN — ASPIRIN 81 MG: 81 TABLET, COATED ORAL at 08:01

## 2018-08-13 RX ADMIN — INSULIN LISPRO 1 UNITS: 100 INJECTION, SOLUTION INTRAVENOUS; SUBCUTANEOUS at 07:57

## 2018-08-13 RX ADMIN — CHOLECALCIFEROL TAB 25 MCG (1000 UNIT) 1000 UNITS: 25 TAB at 08:02

## 2018-08-13 RX ADMIN — OXYCODONE HYDROCHLORIDE 10 MG: 10 TABLET ORAL at 09:00

## 2018-08-13 RX ADMIN — OXYCODONE HYDROCHLORIDE 5 MG: 5 TABLET ORAL at 17:50

## 2018-08-13 RX ADMIN — ATORVASTATIN CALCIUM 40 MG: 40 TABLET, FILM COATED ORAL at 19:05

## 2018-08-13 RX ADMIN — INSULIN GLARGINE 30 UNITS: 100 INJECTION, SOLUTION SUBCUTANEOUS at 21:22

## 2018-08-13 RX ADMIN — INSULIN LISPRO 2 UNITS: 100 INJECTION, SOLUTION INTRAVENOUS; SUBCUTANEOUS at 19:06

## 2018-08-13 NOTE — PROGRESS NOTES
Progress Note - [facility] [birth year], 79 y o  female MRN: [id number]    Unit/Bed#: -01 Encounter: [id number]    Primary Care Provider: Jame Terry MD   Date and time admitted to hospital: 8/10/2018  1:42 AM        * Closed left hip fracture [facility])   Assessment & Plan    · Patient fell down cement stairs onto left hip  · POD #3 status post intramedullary fixation  · PT/OT recommending post acute rehab   · Patient will need 4 weeks of Lovenox for DVT prophylaxis (complete on 9/10) per ortho recs  · Follow up with ortho as outpatient        Acute cystitis without hematuria   Assessment & Plan    · UA revealed Positive nitrite  20-30 WBC  No RBC  Innumerable bacteria  Reported dysuria  · Patient was started on IV ceftriaxone  · She is afebrile and without tachycardia  No leukocytosis  · Urine culture growing klebsiella, switch to p o  Keflex based on sensitivity report at discharge        DMII (diabetes mellitus, type 2) [facility])   Assessment & Plan    Lab Results   Component Value Date    HGBA1C 7 0 (H) 08/10/2018     Recent Labs      08/12/18   1617  08/12/18   2105  08/13/18   0732  08/13/18   1053   POCGLU  261*  273*  178*  275*     Blood Sugar Average: Last 72 hrs:  (P) 203 6875Glucose 196 on arrival   · Patient recently started on SGLT 2 inhibitor which may be contributing to her polyuria  · Hold oral hypoglycemics while hospitalized  · Continue Lantus, sliding scale insulin, diabetic diet  Her Lantus was increased yesterday from 25 to 28 units and AM sugar still high at 178 and lunchtime 275   Will increase her Lantus again to 30 units tonight  · Again reiterates that she has better control on Toujeo at home         Anemia   Assessment & Plan    · Post-operative  · Hemoglobin trend:  13 9, 13 1 *surgery* 10 8, 9 6, 9 3  · Continue to monitor  · DVT prophylaxis with Lovenox per Ortho  · Transfuse as needed        Essential hypertension   Assessment & Plan    · /56  · On HCTZ/losartan Hyperlipemia   Assessment & Plan    · Continue Lipitor, Lopid        Hypokalemia   Assessment & Plan    · Replete and monitor          VTE Pharmacologic Prophylaxis:   Pharmacologic: Enoxaparin (Lovenox)  Mechanical VTE Prophylaxis in Place: Yes    Patient Centered Rounds: I have performed bedside rounds with nursing staff today  Discussions with Specialists or Other Care Team Provider: EVIE    Education and Discussions with Family / Patient: patient and her  at bedside    Time Spent for Care: 30 minutes  More than 50% of total time spent on counseling and coordination of care as described above  Current Length of Stay: 3 day(s)    Current Patient Status: Inpatient   Certification Statement: The patient will continue to require additional inpatient hospital stay due to worsening anemia, safe discharge planning    Discharge Plan: To rehab  Per CM, patient was denied acute rehab and would need [id number]  Pending hemoglobin stability  D/c with PO abx  Code Status: Level 1 - Full Code      Subjective:   Ms Rodríguez Moran reports that she is "sleepy" today  She states that she was having bad hip pain this morning and was given 10 mg of her pain medicine instead of 5 because the pain is so severe and afterward she became very drowsy  Currently she denies any pain  She states that she has not had a bowel movement since Thursday  She denies any numbness or tingling in her legs  Objective:     Vitals:   Temp (24hrs), Av 9 °F (37 2 °C), Min:98 3 °F (36 8 °C), Max:99 5 °F (37 5 °C)    HR:  [71-87] 71  Resp:  [18] 18  BP: (112-118)/(54-59) 112/56  SpO2:  [92 %-95 %] 95 %  Body mass index is 26 63 kg/m²  Input and Output Summary (last 24 hours):        Intake/Output Summary (Last 24 hours) at 18 1429  Last data filed at 18 2153   Gross per 24 hour   Intake              180 ml   Output             1200 ml   Net            -1020 ml       Physical Exam:     Physical Exam   Constitutional: She is oriented to person, place, and time  No distress  Patient seen and initially sleeping with her  present at bedside  She awakens easily to voice and is very pleasant and cooperative  Cardiovascular: Normal rate and regular rhythm  Pulmonary/Chest: Effort normal and breath sounds normal  No respiratory distress  She has no wheezes  Abdominal: Soft  Bowel sounds are normal  There is no tenderness  Musculoskeletal: She exhibits no edema  Left hip with dry dressings with surrounding bruising   Neurological: She is alert and oriented to person, place, and time  Skin: Skin is warm  She is not diaphoretic  Left hip ecchymoses   Psychiatric: She has a normal mood and affect  Her behavior is normal    Vitals reviewed  Additional Data:     Labs:      Results from last 7 days  Lab Units 08/13/18  1410  08/10/18  0223   WBC Thousand/uL 7 93  < > 11 97*   HEMOGLOBIN g/dL 9 3*  < > 13 9   HEMATOCRIT % 27 6*  < > 40 2   PLATELETS Thousands/uL 253  < > 318   NEUTROS PCT %  --   --  59   LYMPHS PCT %  --   --  31   MONOS PCT %  --   --  6   EOS PCT %  --   --  2   < > = values in this interval not displayed  Results from last 7 days  Lab Units 08/13/18  0556  08/10/18  0223   SODIUM mmol/L 135*  < > 138   POTASSIUM mmol/L 3 1*  < > 4 4   CHLORIDE mmol/L 102  < > 101   CO2 mmol/L 22  < > 20*   BUN mg/dL 14  < > 18   CREATININE mg/dL 0 69  < > 0 76   CALCIUM mg/dL 8 6  < > 9 5   TOTAL PROTEIN g/dL  --   --  7 3   BILIRUBIN TOTAL mg/dL  --   --  0 50   ALK PHOS U/L  --   --  71   ALT U/L  --   --  63   AST U/L  --   --  45   GLUCOSE RANDOM mg/dL 187*  < > 196*   < > = values in this interval not displayed      Results from last 7 days  Lab Units 08/10/18  0223   INR  1 03       Results from last 7 days  Lab Units 08/13/18  1053 08/13/18  0732 08/12/18  2105 08/12/18  1617 08/12/18  1116 08/12/18  0707 08/11/18  2120 08/11/18  1606 08/11/18  1122 08/11/18  0704 08/10/18  2113 08/10/18  1914   POC GLUCOSE mg/dl 275* 178* 273* 261* 251* 192* 282* 259* 242* 208* 155* 120       Results from last 7 days  Lab Units 08/10/18  0528   HEMOGLOBIN A1C % 7 0*         * I Have Reviewed All Lab Data Listed Above  * Additional Pertinent Lab Tests Reviewed: All Labs Within Last 24 Hours Reviewed    Imaging:    Imaging Reports Reviewed Today Include: left hip XR  Imaging Personally Reviewed by Myself Includes:  None    Recent Cultures (last 7 days):       Results from last 7 days  Lab Units 08/10/18  0349   URINE CULTURE  >100,000 cfu/ml Klebsiella pneumoniae*       Last 24 Hours Medication List:     Current Facility-Administered Medications:  acetaminophen 650 mg Oral Q6H PRN Tyler Zhou PA-C    aspirin 81 mg Oral Daily Tyler Zhou PA-C    atorvastatin 40 mg Oral Daily With Dinner Tyler Zhou PA-C    cefTRIAXone 1,000 mg Intravenous Q24H Julianna Sneed PA-C Last Rate: 1,000 mg (08/12/18 2214)   cholecalciferol 1,000 Units Oral Daily Tyler Zhou PA-C    enoxaparin 40 mg Subcutaneous Daily Tyler Zhou PA-C    gabapentin 600 mg Oral HS Tyler Zhou PA-C    gemfibrozil 600 mg Oral BID AC Tyler Zhou PA-C    insulin glargine 30 Units Subcutaneous HS John Marroquin PA-C    insulin lispro 1-6 Units Subcutaneous TID AC Kesha Sheppard PA-C    losartan potassium-hydrochlorothiazide (HYZAAR 50/12  5) combination  Oral Daily Tyler Zhou PA-C    morphine injection 1 mg Intravenous Q4H PRN Tyler Zhou PA-C    ondansetron 4 mg Intravenous Q6H PRN Tyler Zhou PA-C    oxyCODONE 10 mg Oral Q4H PRN Tyler Zhou PA-C    oxyCODONE 5 mg Oral Q4H PRN Tyler Zhou PA-C    potassium chloride 40 mEq Oral Once John Marroquin PA-C         Today, Patient Was Seen By: John Marroquin PA-C    ** Please Note: Dictation voice to text software may have been used in the creation of this document   **

## 2018-08-13 NOTE — ASSESSMENT & PLAN NOTE
· UA revealed Positive nitrite  20-30 WBC  No RBC  Innumerable bacteria  Reported dysuria  · Patient was started on IV ceftriaxone  · She is afebrile and without tachycardia  No leukocytosis  · Urine culture growing klebsiella, switch to p o   Keflex based on sensitivity report at discharge

## 2018-08-13 NOTE — PHYSICAL THERAPY NOTE
PHYSICAL THERAPY TREATMENT NOTE     08/12/18 1318   Pain Assessment   Pain Assessment 0-10   Pain Score 4   Pain Type Acute pain;Surgical pain   Pain Location Hip   Pain Orientation Left   Clinical Progression Gradually improving   Restrictions/Precautions   Weight Bearing Precautions Per Order Yes   LLE Weight Bearing Per Order WBAT   Other Precautions WBS; Fall Risk;Pain  (ortho precautions)   General   Family/Caregiver Present Yes   Cognition   Overall Cognitive Status WFL   Attention Within functional limits   Orientation Level Oriented X4   Memory Within functional limits   Following Commands Follows multistep commands with increased time or repetition   Bed Mobility   Supine to Sit 4  Minimal assistance   Additional items LE management; Increased time required; Bedrails   Sit to Supine 3  Moderate assistance   Additional items Assist x 1; Increased time required;LE management;Verbal cues   Transfers   Sit to Stand 4  Minimal assistance   Additional items Assist x 1; Armrests   Stand to Sit 3  Moderate assistance   Additional items Assist x 1; Increased time required;Armrests; Bedrails   Stand pivot 4  Minimal assistance   Additional items Assist x 1   Ambulation/Elevation   Gait pattern (Decreased arlette, L stance time and R step length)   Gait Assistance 3  Moderate assist   Additional items Assist x 1   Assistive Device Rolling walker; Other (Comment)  (chair follow)   Distance 25'   Stair Management Assistance Not tested   Balance   Static Sitting Fair +   Dynamic Sitting Fair   Static Standing [facility]  (with AD)   Endurance Deficit   Endurance Deficit Yes   Endurance Deficit Description limited activity tolerance but overall improving   Activity Tolerance   Activity Tolerance Patient tolerated treatment well;Patient limited by fatigue   Nurse Made Aware spoke with [facility]   Assessment   Prognosis Good   Problem List Decreased strength;Decreased range of motion;Decreased endurance; Impaired balance;Decreased mobility;Orthopedic restrictions;Pain   Assessment Patient recieved supine in bed, agreeable to PT  She reported that she had felt well today, not too sure from yesterdays therapy  She feels as though she is improving the last day  She was able to improve her assist with bed mobility as well as increase her walking distance from 3' to 25'  Chair follow provided as patient amb into hallway  She was educated on how to perform weight shifting as well as SAQ to go along with the other home exercises that were given yesterday  Barriers to Discharge Inaccessible home environment;Decreased caregiver support   Goals   Treatment Day 2   Plan   Treatment/Interventions Functional transfer training;LE strengthening/ROM; Elevations; Therapeutic exercise; Endurance training;Bed mobility;Gait training;Spoke to nursing   PT Frequency 5x/wk; Other (Comment)  (1x weekend)   Recommendation   Recommendation Post acute IP rehab   Equipment Recommended Tanner Trent   PT - OK to Discharge Yes   Additional Comments to rehab     Kurtis Arriaga, PT          Patient Name: Abdulaziz Smith  CFPYM'B Date: 9/52/2154

## 2018-08-13 NOTE — ASSESSMENT & PLAN NOTE
Lab Results   Component Value Date    HGBA1C 7 0 (H) 08/10/2018     Recent Labs      08/12/18   1617  08/12/18   2105  08/13/18   0732  08/13/18   1053   POCGLU  261*  273*  178*  275*     Blood Sugar Average: Last 72 hrs:  (P) 203 6875Glucose 196 on arrival   · Patient recently started on SGLT 2 inhibitor which may be contributing to her polyuria  · Hold oral hypoglycemics while hospitalized  · Continue Lantus, sliding scale insulin, diabetic diet  Her Lantus was increased yesterday from 25 to 28 units and AM sugar still high at 178 and lunchtime 275   Will increase her Lantus again to 30 units tonight  · Again reiterates that she has better control on Toujeo at home

## 2018-08-13 NOTE — ASSESSMENT & PLAN NOTE
· Post-operative  · Hemoglobin trend:  13 9, 13 1 *surgery* 10 8, 9 6, 9 3  · Continue to monitor  · DVT prophylaxis with Lovenox per Ortho  · Transfuse as needed

## 2018-08-13 NOTE — PLAN OF CARE
Problem: PHYSICAL THERAPY ADULT  Goal: Performs mobility at highest level of function for planned discharge setting  See evaluation for individualized goals  Treatment/Interventions: Functional transfer training, LE strengthening/ROM, Elevations, Therapeutic exercise, Endurance training, Bed mobility, Gait training, Spoke to nursing, Spoke to case management, OT  Equipment Recommended: Mira Guzman       See flowsheet documentation for full assessment, interventions and recommendations  Outcome: Progressing  Prognosis: Good  Problem List: Decreased strength, Decreased range of motion, Decreased endurance, Impaired balance, Decreased mobility, Orthopedic restrictions, Pain (gait deviations)  Assessment: Pt did progress with her functional mobility this session compared to last  Pt requires less intermittent A for transfers and bed mobility and less assistance for ambulation using walker  Pt is also amb further distances as well as participating in LE therex  However pt still does require A for mobility and is not at her baseline  Recommend additional PT Services to progress pt toward treatment goals  Recommend next session to trial steps, gait training with walker and additional therex  Barriers to Discharge: Inaccessible home environment, Decreased caregiver support     Recommendation: Post acute IP rehab     PT - OK to Discharge: Yes    See flowsheet documentation for full assessment

## 2018-08-13 NOTE — ASSESSMENT & PLAN NOTE
· Patient fell down cement stairs onto left hip  · POD #3 status post intramedullary fixation  · PT/OT recommending post acute rehab   · Patient will need 4 weeks of Lovenox for DVT prophylaxis (complete on 9/10) per ortho recs  · Follow up with ortho as outpatient

## 2018-08-13 NOTE — PROGRESS NOTES
Mickygi 1 79 y o  female MRN: [id number]  Unit/Bed#: -01      Subjective:  Patient seen and evaluated  Denies any significant left hip pain at this time  No numbness or tingling  No fevers or chills      Labs:    0  Lab Value Date/Time   HCT 28 7 (L) 08/13/2018 0556   HCT 32 1 (L) 08/11/2018 0446   HCT 38 3 08/10/2018 0528   HCT 43 0 10/25/2016 1053   HGB 9 6 (L) 08/13/2018 0556   HGB 10 8 (L) 08/11/2018 0446   HGB 13 1 08/10/2018 0528   HGB 15 3 10/25/2016 1053   INR 1 03 08/10/2018 0223   WBC 8 98 08/13/2018 0556   WBC 14 87 (H) 08/10/2018 0528   WBC 11 97 (H) 08/10/2018 0223   WBC 9 9 10/25/2016 1053       Meds:    Current Facility-Administered Medications:     acetaminophen (TYLENOL) tablet 650 mg, 650 mg, Oral, Q6H PRN, Janneth Moreno PA-C    aspirin (ECOTRIN LOW STRENGTH) EC tablet 81 mg, 81 mg, Oral, Daily, Janneth Moreno PA-C, 81 mg at 08/12/18 1932    atorvastatin (LIPITOR) tablet 40 mg, 40 mg, Oral, Daily With Dinner, Janneth Moreno PA-C, 40 mg at 08/12/18 1626    cefTRIAXone (ROCEPHIN) 1,000 mg in dextrose 5 % 50 mL IVPB, 1,000 mg, Intravenous, Q24H, Gregg Sneed PA-C, Last Rate: 100 mL/hr at 08/12/18 2214, 1,000 mg at 08/12/18 2214    cholecalciferol (VITAMIN D3) tablet 1,000 Units, 1,000 Units, Oral, Daily, Janneth Moreno PA-C, 1,000 Units at 08/12/18 0904    enoxaparin (LOVENOX) subcutaneous injection 40 mg, 40 mg, Subcutaneous, Daily, MARTINE HornC, 40 mg at 08/12/18 1690    gabapentin (NEURONTIN) capsule 600 mg, 600 mg, Oral, HS, Janneth Moreno PA-C, 600 mg at 08/12/18 2208    gemfibrozil (LOPID) tablet 600 mg, 600 mg, Oral, BID AC, Kesha Sheppard PA-C, 600 mg at 08/13/18 0617    insulin glargine (LANTUS) subcutaneous injection 28 Units 0 28 mL, 28 Units, Subcutaneous, HS, Gregg Sneed PA-C, 28 Units at 08/12/18 2208    insulin lispro (HumaLOG) 100 units/mL subcutaneous injection 1-6 Units, 1-6 Units, Subcutaneous, TID AC, 3 Units at 08/12/18 1623 **AND** Fingerstick Glucose (POCT), , , TID AC, Lynette Lakshmi, PA-C    losartan potassium-hydrochlorothiazide (HYZAAR 50/12  5) combination, , Oral, Daily, Lynette Phy, PA-C    morphine injection 1 mg, 1 mg, Intravenous, Q4H PRN, Lynette Phy, PA-C, 1 mg at 08/10/18 2006    ondansetron [facility]) injection 4 mg, 4 mg, Intravenous, Q6H PRN, Lynette Lakshmi, PA-C    oxyCODONE (ROXICODONE) immediate release tablet 10 mg, 10 mg, Oral, Q4H PRN, Lynette Phy, PA-C, 10 mg at 08/11/18 1338    oxyCODONE (ROXICODONE) IR tablet 5 mg, 5 mg, Oral, Q4H PRN, Lynette Phy, PA-C, 5 mg at 08/12/18 1738    Physical exam:  Vitals:    08/12/18 2240   BP: 118/54   Pulse: 78   Resp: 18   Temp: 99 5 °F (37 5 °C)   SpO2: 92%       Left hip:  · Dressing C/D/I  · Thigh compartments soft  · Motor and sensation intact left lower extremity  · 2+ DP pulse    _*_*_*_*_*_*_*_*_*_*_*_*_*_*_*_*_*_*_*_*_*_*_*_*_*_*_*_*_*_*_*_*_*_*_*_*_*_*_*_*_*    Assessment: 79 y  o female POD 3 s/p Left Hip TFN for IT fracture    Plan:  · Pain Control  · WBAT LLE  · PT/OT  · DVT proph with Lovenox for 4 weeks post op  · Continue to monitor for ABLA  · Ortho stable for discharge    Follow-up with Dr Tabatha Moy in 10-14 days postoperatively    Jose Luis Henry PA-C

## 2018-08-13 NOTE — PHYSICAL THERAPY NOTE
PHYSICAL THERAPY TREATMENT NOTE  NAME:  Raghavendra Sterling  DATE: [birth year]    Length Of Stay: 3  Performed at least 2 patient identifiers during session: Name and Birthday    TREATMENT:    08/13/18 2148   Pain Assessment   Pain Assessment 0-10   Pain Score 4   Pain Type Acute pain   Pain Location Knee   Pain Orientation Left   Effect of Pain on Daily Activities limits speed and indep of mobility   Patient's Stated Pain Goal No pain   Restrictions/Precautions   Weight Bearing Precautions Per Order Yes   LLE Weight Bearing Per Order WBAT   Other Precautions WBS; Fall Risk;Pain   General   Chart Reviewed Yes   Response to Previous Treatment Patient with no complaints from previous session  Family/Caregiver Present Yes   Cognition   Overall Cognitive Status WFL   Arousal/Participation Alert; Cooperative   Attention Within functional limits   Orientation Level Oriented X4   Memory Within functional limits   Following Commands Follows multistep commands with increased time or repetition   Subjective   Subjective Pt willing to participate despite later session  Pt reports walking with nursing staff earlier today  Bed Mobility   Supine to Sit 4  Minimal assistance   Additional items Assist x 1; Increased time required;LE management; Bedrails;HOB elevated   Sit to Supine 4  Minimal assistance   Additional items Assist x 1; Increased time required;Verbal cues; Bedrails;HOB elevated   Additional Comments Pt requires min A to boost self up in bed hwile in long sitting   Transfers   Sit to Stand 4  Minimal assistance   Additional items Assist x 1; Increased time required;Verbal cues;Armrests  (instruction for hand placement)   Stand to Sit 4  Minimal assistance   Additional items Assist x 1; Increased time required;Verbal cues;Armrests   Ambulation/Elevation   Gait pattern Antalgic; Forward Flexion; Excessively slow; Step to;Short stride   Gait Assistance 4  Minimal assist   Additional items Assist x 1;Verbal cues   Assistive Device Rolling walker   Distance 3'+51' +3'   Stair Management Assistance Not tested   Balance   Static Sitting Good   Static Standing Fair -   Ambulatory Fair -   Endurance Deficit   Endurance Deficit Yes   Endurance Deficit Description limited amb distance, needed rests between mobility sets   Activity Tolerance   Activity Tolerance Patient limited by fatigue;Patient limited by pain   Medical Staff Made Aware spoke to Lisa Scott from case management   Nurse Made Aware spoke to RN who medicated pt between sessions   Exercises   Quad Sets Supine;15 reps; Left;AROM   Heelslides Supine;15 reps;AAROM; Left   Hip Abduction Supine;15 reps;AAROM; Left   Hip Adduction Supine;15 reps;AAROM; Left  (to neutral)   Knee AROM Short Arc Quad Supine;15 reps;AAROM; Left   Ankle Pumps Supine;15 reps;Bilateral;AROM   Heel Cord Stretch Supine;PROM; Left   Assessment   Prognosis Good   Problem List Decreased strength;Decreased range of motion;Decreased endurance; Impaired balance;Decreased mobility;Orthopedic restrictions;Pain  (gait deviations)   Assessment Pt did progress with her functional mobility this session compared to last  Pt requires less intermittent A for transfers and bed mobility and less assistance for ambulation using walker  Pt is also amb further distances as well as participating in LE therex  However pt still does require A for mobility and is not at her baseline  Recommend additional PT Services to progress pt toward treatment goals  Recommend next session to trial steps, gait training with walker and additional therex  Barriers to Discharge Inaccessible home environment;Decreased caregiver support   Goals   Patient Goals to walk better and without a walker   STG Expiration Date 08/18/18   Treatment Day 3   Plan   Treatment/Interventions Functional transfer training;LE strengthening/ROM; Elevations;Cognitive reorientation;Equipment eval/education;Gait training;Bed mobility; Patient/family training; Endurance training; Therapeutic exercise;Spoke to nursing;Spoke to case management   Progress Progressing toward goals   PT Frequency 5x/wk; Weekend  (1xweekend)   Recommendation   Recommendation Post acute IP rehab   Equipment Recommended Kraig Ralph, PT, DPT

## 2018-08-14 VITALS
TEMPERATURE: 98.4 F | BODY MASS INDEX: 26.52 KG/M2 | HEIGHT: 66 IN | DIASTOLIC BLOOD PRESSURE: 55 MMHG | WEIGHT: 165 LBS | HEART RATE: 75 BPM | RESPIRATION RATE: 18 BRPM | SYSTOLIC BLOOD PRESSURE: 112 MMHG | OXYGEN SATURATION: 95 %

## 2018-08-14 LAB
ANION GAP SERPL CALCULATED.3IONS-SCNC: 12 MMOL/L (ref 4–13)
BUN SERPL-MCNC: 18 MG/DL (ref 5–25)
CALCIUM SERPL-MCNC: 8.6 MG/DL (ref 8.3–10.1)
CHLORIDE SERPL-SCNC: 103 MMOL/L (ref 100–108)
CO2 SERPL-SCNC: 24 MMOL/L (ref 21–32)
CREAT SERPL-MCNC: 0.67 MG/DL (ref 0.6–1.3)
ERYTHROCYTE [DISTWIDTH] IN BLOOD BY AUTOMATED COUNT: 13.7 % (ref 11.6–15.1)
GFR SERPL CREATININE-BSD FRML MDRD: 92 ML/MIN/1.73SQ M
GLUCOSE SERPL-MCNC: 196 MG/DL (ref 65–140)
GLUCOSE SERPL-MCNC: 203 MG/DL (ref 65–140)
GLUCOSE SERPL-MCNC: 361 MG/DL (ref 65–140)
HCT VFR BLD AUTO: 27.1 % (ref 34.8–46.1)
HGB BLD-MCNC: 9.4 G/DL (ref 11.5–15.4)
MCH RBC QN AUTO: 32.2 PG (ref 26.8–34.3)
MCHC RBC AUTO-ENTMCNC: 34.7 G/DL (ref 31.4–37.4)
MCV RBC AUTO: 93 FL (ref 82–98)
PLATELET # BLD AUTO: 289 THOUSANDS/UL (ref 149–390)
PMV BLD AUTO: 10.2 FL (ref 8.9–12.7)
POTASSIUM SERPL-SCNC: 3.7 MMOL/L (ref 3.5–5.3)
RBC # BLD AUTO: 2.92 MILLION/UL (ref 3.81–5.12)
SODIUM SERPL-SCNC: 139 MMOL/L (ref 136–145)
WBC # BLD AUTO: 8.02 THOUSAND/UL (ref 4.31–10.16)

## 2018-08-14 PROCEDURE — 85027 COMPLETE CBC AUTOMATED: CPT | Performed by: PHYSICIAN ASSISTANT

## 2018-08-14 PROCEDURE — 99239 HOSP IP/OBS DSCHRG MGMT >30: CPT | Performed by: INTERNAL MEDICINE

## 2018-08-14 PROCEDURE — 82948 REAGENT STRIP/BLOOD GLUCOSE: CPT

## 2018-08-14 PROCEDURE — 80048 BASIC METABOLIC PNL TOTAL CA: CPT | Performed by: PHYSICIAN ASSISTANT

## 2018-08-14 RX ORDER — DOCUSATE SODIUM 100 MG/1
100 CAPSULE, LIQUID FILLED ORAL 2 TIMES DAILY
Qty: 10 CAPSULE | Refills: 0 | Status: SHIPPED | OUTPATIENT
Start: 2018-08-14 | End: 2019-08-12 | Stop reason: ALTCHOICE

## 2018-08-14 RX ORDER — POLYETHYLENE GLYCOL 3350 17 G/17G
17 POWDER, FOR SOLUTION ORAL DAILY
Qty: 14 EACH | Refills: 0 | Status: SHIPPED | OUTPATIENT
Start: 2018-08-15 | End: 2019-08-12 | Stop reason: ALTCHOICE

## 2018-08-14 RX ORDER — OXYCODONE HYDROCHLORIDE 5 MG/1
5 TABLET ORAL EVERY 4 HOURS PRN
Qty: 12 TABLET | Refills: 0 | Status: SHIPPED | OUTPATIENT
Start: 2018-08-14 | End: 2018-08-17

## 2018-08-14 RX ORDER — CEPHALEXIN 500 MG/1
500 CAPSULE ORAL EVERY 6 HOURS SCHEDULED
Qty: 20 CAPSULE | Refills: 0 | Status: SHIPPED | OUTPATIENT
Start: 2018-08-14 | End: 2018-08-19

## 2018-08-14 RX ADMIN — INSULIN LISPRO 2 UNITS: 100 INJECTION, SOLUTION INTRAVENOUS; SUBCUTANEOUS at 07:51

## 2018-08-14 RX ADMIN — INSULIN LISPRO 6 UNITS: 100 INJECTION, SOLUTION INTRAVENOUS; SUBCUTANEOUS at 12:01

## 2018-08-14 RX ADMIN — ASPIRIN 81 MG: 81 TABLET, COATED ORAL at 08:11

## 2018-08-14 RX ADMIN — GEMFIBROZIL 600 MG: 600 TABLET ORAL at 06:34

## 2018-08-14 RX ADMIN — OXYCODONE HYDROCHLORIDE 5 MG: 5 TABLET ORAL at 11:49

## 2018-08-14 RX ADMIN — OXYCODONE HYDROCHLORIDE 5 MG: 5 TABLET ORAL at 07:50

## 2018-08-14 RX ADMIN — CHOLECALCIFEROL TAB 25 MCG (1000 UNIT) 1000 UNITS: 25 TAB at 08:11

## 2018-08-14 RX ADMIN — LOSARTAN POTASSIUM: 50 TABLET, FILM COATED ORAL at 08:11

## 2018-08-14 RX ADMIN — OXYCODONE HYDROCHLORIDE 5 MG: 5 TABLET ORAL at 00:17

## 2018-08-14 RX ADMIN — POLYETHYLENE GLYCOL 3350 17 G: 17 POWDER, FOR SOLUTION ORAL at 08:11

## 2018-08-14 RX ADMIN — ENOXAPARIN SODIUM 40 MG: 100 INJECTION SUBCUTANEOUS at 08:11

## 2018-08-14 RX ADMIN — DOCUSATE SODIUM 100 MG: 100 CAPSULE, LIQUID FILLED ORAL at 08:11

## 2018-08-14 NOTE — PLAN OF CARE
DISCHARGE PLANNING - CARE MANAGEMENT     Discharge to post-acute care or home with appropriate resources Progressing        INFECTION - ADULT     Absence or prevention of progression during hospitalization Progressing     Absence of fever/infection during neutropenic period Progressing        Knowledge Deficit     Patient/family/caregiver demonstrates understanding of disease process, treatment plan, medications, and discharge instructions Progressing        PAIN - ADULT     Verbalizes/displays adequate comfort level or baseline comfort level Progressing        Potential for Falls     Patient will remain free of falls Progressing        Prexisting or High Potential for Compromised Skin Integrity     Skin integrity is maintained or improved Progressing        SAFETY ADULT     Maintain or return to baseline ADL function Progressing     Maintain or return mobility status to optimal level Progressing

## 2018-08-14 NOTE — DISCHARGE SUMMARY
Discharge- Lexie Snyder [birth year], 79 y o  female MRN: [id number]    Unit/Bed#: -01 Encounter: [id number]    Primary Care Provider: Pravin Wang MD   Date and time admitted to hospital: 8/10/2018  1:42 AM        Hypokalemia   Assessment & Plan    Replete and monitor  K is 3 7  Anemia   Assessment & Plan    Post-operative  Hemoglobin trend:  13 9, 13 1 *surgery* 10 8, 9 6, 9 3 - now 9 4  Stable  Essential hypertension   Assessment & Plan    /56  On HCTZ/losartan        Acute cystitis without hematuria   Assessment & Plan    UA revealed Positive nitrite  20-30 WBC  No RBC  Innumerable bacteria  Reported dysuria  Patient was started on IV ceftriaxone  She is afebrile and without tachycardia  No leukocytosis  Discharged home on PO antibiotics  Discussed with [facility] staff- they are aware  Hyperlipemia   Assessment & Plan    Continue Lipitor, Lopid        DMII (diabetes mellitus, type 2) [facility])   Assessment & Plan    Lab Results   Component Value Date    HGBA1C 7 0 (H) 08/10/2018     Recent Labs      08/13/18   1624  08/13/18   2031  08/14/18   0725  08/14/18   1053   POCGLU  225*  240*  196*  361*     Blood Sugar Average: Last 72 hrs:  (P) 245 [id number] 196 on arrival   Restarted home medications  [facility] to [facility] at [facility]           * Closed left hip fracture [facility])   Assessment & Plan    Patient fell down cement stairs onto left hip  POD #3 status post intramedullary fixation  PT/OT recommending post acute rehab   Patient will need 4 weeks of Lovenox for DVT prophylaxis (complete on 9/10) per ortho recs  Follow up with ortho as outpatient              Discharging Physician / Practitioner: Scott Lopes MD  PCP: Pravin Wang MD  Admission Date:   Admission Orders     Ordered        08/10/18 0402  Inpatient Admission (expected length of stay for this patient is greater than two midnights)  Once             Discharge Date: 08/14/18    Resolved Problems  Date Reviewed: 8/14/2018    None          Consultations During Hospital Stay:  · Orthopedics - Dr Naye Vincent  Procedures Performed:     · X-ray hip-   "Fluoroscopic guidance provided for ORIF of an intertrochanteric fracture of the left femur   Please refer to the separate procedure notes for additional details "    Significant Findings / Test Results:     · As above  Incidental Findings:   · None  Test Results Pending at Discharge (will require follow up): · None  Outpatient Tests Requested:  · Should have CBC checked in am      Complications:  None  Reason for Admission:   Left hip pain following fall  Hospital Course:     Abdulaziz Smith is a 79 y o  female patient who originally presented to the hospital on 8/10/2018 due to a mechanical fall and subsequent left hip pain  The patient is found to have a left intertrochanteric hip fracture, and underwent intramedullary fixation under the care of Orthopedic surgery the 10th of August     The patient was also found to have urinary tract infection, and was initially started on cefazolin for this, once urine cultures and sensitivities were back she was changed to p o  Keflex 500 mg b i d  to complete a total of 7 day course  At the time of discharge the patient was pain-free, and happy to be discharged [facility] for rehab  Her  was at bedside during the visit on the day of her discharge, and he will transport her to [facility] himself  Please see above list of diagnoses and related plan for additional information  Condition at Discharge: stable     Discharge Day Visit / Exam:     Subjective:    Patient seen and examined  No new symptoms  No fevers or chills         Vitals: Blood Pressure: 112/55 (08/14/18 0723)  Pulse: 75 (08/14/18 0723)  Temperature: 98 4 °F (36 9 °C) (08/14/18 0723)  Temp Source: Oral (08/14/18 0723)  Respirations: 18 (08/14/18 0723)  Height: 5' 6" (167 6 cm) (08/10/18 1655)  Weight - Scale: 74 8 kg (165 lb) (08/10/18 1655)  SpO2: 95 % (08/14/18 0723)  Exam:   Physical Exam   Constitutional: She is oriented to person, place, and time  No distress  HENT:   Head: Normocephalic  Mouth/Throat: No oropharyngeal exudate  Eyes: Right eye exhibits no discharge  Left eye exhibits no discharge  No scleral icterus  Neck: No JVD present  No tracheal deviation present  No thyromegaly present  Cardiovascular: Normal rate  Exam reveals no gallop and no friction rub  No murmur heard  Pulmonary/Chest: Effort normal  No respiratory distress  She has no wheezes  She has no rales  She exhibits no tenderness  Abdominal: Soft  She exhibits no distension and no mass  There is no tenderness  There is no rebound and no guarding  Musculoskeletal: She exhibits no edema, tenderness or deformity  Lymphadenopathy:     She has no cervical adenopathy  Neurological: She is alert and oriented to person, place, and time  No cranial nerve deficit  Coordination normal    Skin: Skin is warm  No rash noted  She is not diaphoretic  No erythema  No pallor  Psychiatric: She has a normal mood and affect  Discussion with Family: Discussed with patient and her   All their questions were answered  Discharge instructions/Information to patient and family:   See after visit summary for information provided to patient and family  Provisions for Follow-Up Care:  See after visit summary for information related to follow-up care and any pertinent home health orders  Disposition:     Susan [id number] (see below)    For Discharges to [facility] SN[facility]:   · [facility] / [facility] [id number] [facility]    Planned Readmission: none  Discharge Statement:  I spent 45 minutes discharging the patient  This time was spent on the day of discharge  I had direct contact with the patient on the day of discharge   Greater than 50% of the total time was spent examining patient, answering all patient questions, arranging and discussing plan of care with patient as well as directly providing post-discharge instructions  Additional time then spent on discharge activities  Discharge Medications:  See after visit summary for reconciled discharge medications provided to patient and family        ** Please Note: This note has been constructed using a voice recognition system **

## 2018-08-14 NOTE — ASSESSMENT & PLAN NOTE
Patient fell down cement stairs onto left hip  POD #3 status post intramedullary fixation  PT/OT recommending post acute rehab   Patient will need 4 weeks of Lovenox for DVT prophylaxis (complete on 9/10) per ortho recs  Follow up with ortho as outpatient

## 2018-08-14 NOTE — ASSESSMENT & PLAN NOTE
UA revealed Positive nitrite  20-30 WBC  No RBC  Innumerable bacteria  Reported dysuria  Patient was started on IV ceftriaxone  She is afebrile and without tachycardia  No leukocytosis  Discharged home on PO antibiotics  Discussed with [facility] staff- they are aware

## 2018-08-14 NOTE — PROGRESS NOTES
Patient sitting in bed, eating breakfast  Pain medication given for pain in LLE  Call bell within reach  Will continue to monitor

## 2018-08-14 NOTE — PLAN OF CARE
Problem: DISCHARGE PLANNING - CARE MANAGEMENT  Goal: Discharge to post-acute care or home with appropriate resources  INTERVENTIONS:  - Conduct assessment to determine patient/family and health care team treatment goals, and need for post-acute services based on payer coverage, community resources, and patient preferences, and barriers to discharge  - Address psychosocial, clinical, and financial barriers to discharge as identified in assessment in conjunction with the patient/family and health care team  - Arrange appropriate level of post-acute services according to patients   needs and preference and payer coverage in collaboration with the physician and health care team  - Communicate with and update the patient/family, physician, and health care team regarding progress on the discharge plan  - Arrange appropriate transportation to post-acute venues   Outcome: Completed Date Met: 08/14/18  CM spoke with SLIM this morning and explained that patient's insurance denied acute rehab and offered peer 2 peer option  SLIM called for peer 2 peer and informed CM that patient was denied for acute rehab as she does not need daily physician assessments  CM spoke with patient and spouse at bedside to discuss insurance determination in which acute was denied by STR was approved  Patient and spouse stated that they would actually prefer [facility] because of location and they feel ready for discharge  CM relayed information to SLIM who stated that patient is clear for discharge  CM spoke with ANTIONE Gutierrez liaison who stated that there is an available bed and patient can discharge to their facility today  Patient's spouse will transport at 15 pm  RN made aware as well  CM reviewed IMM and provided copy  Patient does not appear to have any other needs at this time  CM Department will continue to assess for needs and will follow through discharge

## 2018-08-14 NOTE — SOCIAL WORK
CM spoke with VENICE this morning and explained that patient's insurance denied acute rehab and offered peer 2 peer option  SLIM called for peer 2 peer and informed CM that patient was denied for acute rehab as she does not need daily physician assessments  CM spoke with patient and spouse at bedside to discuss insurance determination in which acute was denied by STR was approved  Patient and spouse stated that they would actually prefer [facility] because of location and they feel ready for discharge  CM relayed information to SLIM who stated that patient is clear for discharge  CM spoke with Betsy Ramos  liaison who stated that there is an available bed and patient can discharge to their facility today  Patient's spouse will transport at 15 pm  RN made aware as well  CM reviewed IMM and provided copy  Patient does not appear to have any other needs at this time  CM Department will continue to assess for needs and will follow through discharge

## 2018-08-14 NOTE — ASSESSMENT & PLAN NOTE
Lab Results   Component Value Date    HGBA1C 7 0 (H) 08/10/2018     Recent Labs      08/13/18   1624  08/13/18   2031  08/14/18   0725  08/14/18   1053   POCGLU  225*  240*  196*  361*     Blood Sugar Average: Last 72 hrs:  (P) 245 [id number] 196 on arrival   Restarted home medications  Continue to adjust at [facility]

## 2018-08-14 NOTE — PROGRESS NOTES
Called [phone]  Patient is medically cleared for DC  Discussed with CM, need to call for peer to peer  Left message with call back number

## 2018-08-15 NOTE — SOCIAL WORK
LATE ENTRY  8/15/18 AT 9:25 AM    CM received VM from patient's daughter Scott Vaughn ([phone]) yesterday afternoon re: concerns with patient's care  CM called and left voicemail for Ene Orona, Director of Nursing at [facility] and relayed family's concerns and asked Kenna Hale to contact them directly to resolve any concerns moving forward  CM called patient's spouse Cuate Thornton to acknowledge that the VM was received and that it had been relayed to [facility], who will work with family directly  CM left detailed VM for Cuate Thornton

## 2018-08-24 ENCOUNTER — TELEPHONE (OUTPATIENT)
Dept: OBGYN CLINIC | Facility: HOSPITAL | Age: 68
End: 2018-08-24

## 2018-08-24 NOTE — TELEPHONE ENCOUNTER
Caller: Kiet Sonido spouse  Callback# [phone]  Dr Tabatha Moy      Patient is currently at nursing facility for rehab after surgery on left hip  Patient is experiencing hematoma on left hip which is the size of a ping pong ball  Patient is unable to participate in therapy because of it please advise thanks   Patient  Is schedule for po 08/29

## 2018-08-24 NOTE — TELEPHONE ENCOUNTER
The did talk to the patient's  and reassured him that is normal tab swelling, drainage and bruising after surgery  I did reassure the has been the patient should continue with physical therapy  As long as as no signs of infection or drainage issue continue to keep it covered and they can contact us sooner to come in for evaluation if necessary

## 2018-08-27 ENCOUNTER — TELEPHONE (OUTPATIENT)
Dept: FAMILY MEDICINE CLINIC | Facility: MEDICAL CENTER | Age: 68
End: 2018-08-27

## 2018-08-27 NOTE — TELEPHONE ENCOUNTER
[address] is releasing her today after rehab for hospital   Had a fx of the head of her femur  Needs [facility] appt within 2 weeks  Don't know where to put her

## 2018-08-29 ENCOUNTER — OFFICE VISIT (OUTPATIENT)
Dept: OBGYN CLINIC | Facility: OTHER | Age: 68
End: 2018-08-29

## 2018-08-29 VITALS — HEART RATE: 86 BPM | DIASTOLIC BLOOD PRESSURE: 53 MMHG | SYSTOLIC BLOOD PRESSURE: 93 MMHG

## 2018-08-29 DIAGNOSIS — S72.002D CLOSED FRACTURE OF LEFT HIP WITH ROUTINE HEALING, SUBSEQUENT ENCOUNTER: Primary | ICD-10-CM

## 2018-08-29 PROCEDURE — 99024 POSTOP FOLLOW-UP VISIT: CPT | Performed by: ORTHOPAEDIC SURGERY

## 2018-08-29 RX ORDER — OXYCODONE HYDROCHLORIDE 5 MG/1
5 TABLET ORAL EVERY 6 HOURS
Refills: 0 | COMMUNITY
Start: 2018-08-27 | End: 2019-08-12 | Stop reason: ALTCHOICE

## 2018-08-29 NOTE — PROGRESS NOTES
Orthopaedic Surgery - Office Note  Monty Phillips (82 y o  female)   : 1950   MRN: [id number]  Encounter Date: 2018    Chief Complaint   Patient presents with    Left Hip - Post-op, Pain       Assessment / Plan  S/P ORIF left hip femoral neck fracture, progressing very well    · Transition from Home Based Therapy to Ambulatory Therapy   · XR AT NEXT VISIT:  left hip, 3 views   · Referral to outpatient physical therapy provided  · Weight-bearing as tolerated, use of assistive walker prn  · Continue with use of ice and NSAIDs for pain relief, patient instructed to use ice no more than 15-20 minutes at a time  · Return in about 6 weeks (around 10/10/2018) for Re-evaluation  History of Present Illness  Monty Phlilips is a 79 y o  female who presents for postop evaluation of ORIF left hip femoral neck fracture performed 8/10/2018  Patient reports that she is progressing well, however her and her  have some concerns about the appearance of her incision site  In the few days immediately following surgery site was significantly swollen and a contacted the hospital with questions  They were told that such an appearance was not uncommon and although the incision site has improved, she still reports rigid swelling  She denies any local pain, subsequent bruising, numbness, or tingling  She denies fevers/chills, headache, nausea, dizziness, or GI issues  She also denies pain with weight-bearing, but is currently using a walker for balance  Review of Systems  Pertinent items are noted in HPI  All other systems were reviewed and are negative  Physical Exam  BP 93/53   Pulse 86   Cons: Appears well  No apparent distress  Psych: Alert  Oriented x3  Mood and affect normal   Eyes: PERRLA, EOMI  Resp: Normal effort  No audible wheezing or stridor  CV: Palpable pulse  No discernable arrhythmia  No LE edema    Lymph:  No palpable cervical, axillary, or inguinal lymphadenopathy  Skin: Warm  No palpable masses  No visible lesions  Neuro: Normal muscle tone  Normal and symmetric DTR's  Left Hip Exam  Alignment / Posture:  Normal resting hip posture  Inspection:  Incision clean and dry  Mild soft tissue swelling surrounding incision site  Palpation:  Mild lateral hip tenderness  As expected postop  ROM:  Hip Flexion 120°  Hip ER 60°  Hip IR 30°  Strength:  Hip Flexors 4-/5  Hip Abductors 4/5  Hip Adductors 4/5  Stability:  No objective hip instability  Tests:  Not performed  Neurovascular:  Sensation intact in DP/SP/Dhaliwal/Sa/T nerve distributions  2+ DP & PT pulses  Brisk capillary refill in all toes  Gait:  Steady with walker  Studies Reviewed  No studies to review    Procedures  No procedures today  Medical, Surgical, Family, and Social History  The patient's medical history, family history, and social history, were reviewed and updated as appropriate  Past Medical History:   Diagnosis Date    Acute cystitis without hematuria 8/10/2018    Cataract     Closed left hip fracture ([facility] [address] ) 8/10/2018    Diabetes mellitus ([facility] [address] )     Hyperlipemia     Migraines     Multiple falls        Past Surgical History:   Procedure Laterality Date    CHOLECYSTECTOMY      ELBOW SURGERY      GALLBLADDER SURGERY      AK COLONOSCOPY FLX DX W/COLLJ SPEC WHEN PFRMD N/A 12/14/2016    Procedure: COLONOSCOPY;  Surgeon: Krista Foster MD;  [facility];   Service: Gastroenterology    AK OPEN RX FEMUR FX+INTRAMED OLMAN Left 8/10/2018    Procedure: Left Hip IM Long nail;  Surgeon: Delma Ellis MD;  [facility];  Service: Orthopedics    TONSILLECTOMY         Family History   Problem Relation Age of Onset    Arthritis Family     Pancreatic cancer Family     Scoliosis Family     Diabetes type II Mother     Osteoporosis Mother     Thyroid disease unspecified Mother     Hyperlipidemia Sister     Hypertension Sister     Diabetes type II Maternal Aunt     Diabetes type II Maternal Uncle     Diabetes type II Maternal Grandfather        Social History     Occupational History    Not on file       Social History Main Topics    Smoking status: Former Smoker     Types: Cigarettes    Smokeless tobacco: Never Used    Alcohol use No    Drug use: No    Sexual activity: Not on file       Allergies   Allergen Reactions    Codeine GI Intolerance, Nausea Only and Vomiting         Current Outpatient Prescriptions:     aspirin (ECOTRIN LOW STRENGTH) 81 mg EC tablet, Take 81 mg by mouth daily, Disp: , Rfl:     atorvastatin (LIPITOR) 40 mg tablet, TAKE 1 TABLET (40 MG TOTAL) BY MOUTH DAILY TAKE AT BEDTIME, Disp: 30 tablet, Rfl: 3    Cholecalciferol (D3-1000) 1000 units capsule, Take 2,000 Units by mouth daily  , Disp: , Rfl:     gabapentin (NEURONTIN) 600 MG tablet, Take 600 mg by mouth daily at bedtime, Disp: , Rfl: 3    gemfibrozil (LOPID) 600 mg tablet, TAKE 1 TABLET (600 MG TOTAL) BY MOUTH 2 (TWO) TIMES A DAY BEFORE MEALS, Disp: 60 tablet, Rfl: 3    glipiZIDE (GLUCOTROL XL) 10 mg 24 hr tablet, Take 10 mg by mouth 2 (two) times a day, Disp: , Rfl: 0    glucose blood (CLEVER CHEK AUTO-CODE TEST) test strip, by In Vitro route, Disp: , Rfl:     glucose blood (ONE TOUCH ULTRA TEST) test strip, 1 each by Other route 2 (two) times a day Use as instructed, Disp: , Rfl:     Insulin Glargine (TOUJEO SOLOSTAR) injection pen 300 units/mL, Inject 25 units in AM (Patient taking differently: Inject 25 Units under the skin daily at bedtime  ), Disp: 3 pen, Rfl: 4    losartan 50 mg TABS 50 mg, hydrochlorothiazide 12 5 mg TABS 12 5 mg, Take by mouth daily, Disp: , Rfl:     omega-3-acid ethyl esters (LOVAZA) 1 g capsule, Take 2 g by mouth 2 (two) times a day, Disp: , Rfl:     oxyCODONE (ROXICODONE) 5 mg immediate release tablet, Take 5 mg by mouth every 6 (six) hours, Disp: , Rfl: 0    SYNJARDY XR 12 5-1000 MG TB24, Take 1 tablet by mouth 2 (two) times a day, Disp: , Rfl: 0    docusate sodium (COLACE) 100 mg capsule, Take 1 capsule (100 mg total) by mouth 2 (two) times a day (Patient not taking: Reported on 8/29/2018 ), Disp: 10 capsule, Rfl: 0    enoxaparin (LOVENOX) 40 mg/0 4 mL, Inject 0 4 mL (40 mg total) under the skin daily (Patient not taking: Reported on 8/29/2018 ), Disp: 30 Syringe, Rfl: 0    polyethylene glycol (MIRALAX) 17 g packet, Take 17 g by mouth daily (Patient not taking: Reported on 8/29/2018 ), Disp: 14 each, Rfl: 0    Zoster Vaccine Live (ZOSTAVAX) 80731 UNT/0 65ML SUSR, Inject under the skin, Disp: , Rfl:       [email or web address]    I,:   Darren Brown am acting as a scribe while in the presence of the attending physician :        I,:   Reginald Phelps MD personally performed the services described in this documentation    as scribed in my presence :

## 2018-09-04 ENCOUNTER — PATIENT OUTREACH (OUTPATIENT)
Dept: FAMILY MEDICINE CLINIC | Facility: MEDICAL CENTER | Age: 68
End: 2018-09-04

## 2018-09-05 ENCOUNTER — TELEPHONE (OUTPATIENT)
Dept: OBGYN CLINIC | Facility: OTHER | Age: 68
End: 2018-09-05

## 2018-09-05 NOTE — TELEPHONE ENCOUNTER
Caller: Juli Scott  Call back: [phone]  Dr Neal Child from [facility] called to say that she will be doing 2-3 weeks of home health care in patients home  After she will be released to outpatient care for physical therapy

## 2018-09-06 ENCOUNTER — TRANSITIONAL CARE MANAGEMENT (OUTPATIENT)
Dept: FAMILY MEDICINE CLINIC | Facility: MEDICAL CENTER | Age: 68
End: 2018-09-06

## 2018-09-06 ENCOUNTER — OFFICE VISIT (OUTPATIENT)
Dept: FAMILY MEDICINE CLINIC | Facility: MEDICAL CENTER | Age: 68
End: 2018-09-06
Payer: COMMERCIAL

## 2018-09-06 VITALS
HEART RATE: 68 BPM | BODY MASS INDEX: 25.95 KG/M2 | SYSTOLIC BLOOD PRESSURE: 100 MMHG | WEIGHT: 160.8 LBS | DIASTOLIC BLOOD PRESSURE: 60 MMHG | RESPIRATION RATE: 16 BRPM

## 2018-09-06 DIAGNOSIS — I10 ESSENTIAL HYPERTENSION: ICD-10-CM

## 2018-09-06 DIAGNOSIS — H68.001 CATARRH OF RIGHT EUSTACHIAN TUBE: ICD-10-CM

## 2018-09-06 DIAGNOSIS — M80.00XD AGE-RELATED OSTEOPOROSIS WITH CURRENT PATHOLOGICAL FRACTURE WITH ROUTINE HEALING, SUBSEQUENT ENCOUNTER: ICD-10-CM

## 2018-09-06 DIAGNOSIS — S72.002A CLOSED FRACTURE OF LEFT HIP, INITIAL ENCOUNTER (HCC): Primary | ICD-10-CM

## 2018-09-06 PROCEDURE — 99496 TRANSJ CARE MGMT HIGH F2F 7D: CPT | Performed by: FAMILY MEDICINE

## 2018-09-06 NOTE — PROGRESS NOTES
Ormariam Blancoeen is here for TCM  Hospitalized for hip fracture  Had intramedullary fixation left hip   8/10/18  Dr Saint Berthold  Did rehab at [facility]  Discharged 8/27  Started PT at home yesterday  Pain is much better  Takes oxydocone 5 mg at bedtime  Ambulating to bed, toilet, chair  She has had a recent respiratory infection which is improving  However says the right ear feels blocked; occasional discomfort at night  She sees Endocrinology for her diabetes as well as for her osteoporosis  She was then able to tolerate Fosamax  She has had 1 Prolia injection but did not go back for the 2nd  O: /60 (Cuff Size: Large)   Pulse 68   Resp 16   Wt 72 9 kg (160 lb 12 8 oz)   BMI 25 95 kg/m²   ENT-left TM normal   Right TM dull  Pharynx negative  Neck no adenopathy thyromegaly bruits  Chest clear  Cardiac regular rate without murmur  Extremities there is a resolving firm mass over the left greater trochanter  Incision healing well  Assessment  1  Status post left hip fracture  2  Osteoporosis-encouraged follow-up with Endocrinology to resume her Prolia injections  3   URI with eustachian tube dysfunction-advised to use decongestant such as Sudafed  Recheck if no better  Plan  As above    Routine follow-up

## 2018-09-20 DIAGNOSIS — E78.5 HYPERLIPIDEMIA, UNSPECIFIED HYPERLIPIDEMIA TYPE: ICD-10-CM

## 2018-09-20 RX ORDER — ATORVASTATIN CALCIUM 40 MG/1
40 TABLET, FILM COATED ORAL DAILY
Qty: 90 TABLET | Refills: 1 | Status: SHIPPED | OUTPATIENT
Start: 2018-09-20 | End: 2019-08-12 | Stop reason: SDUPTHER

## 2018-09-24 ENCOUNTER — TELEPHONE (OUTPATIENT)
Dept: FAMILY MEDICINE CLINIC | Facility: MEDICAL CENTER | Age: 68
End: 2018-09-24

## 2018-09-24 NOTE — TELEPHONE ENCOUNTER
Since she's been home from the hospital oxycodone  It ended and now she's unable to sleep  Is there something you can give her to help her sleep?

## 2018-09-24 NOTE — TELEPHONE ENCOUNTER
LMOM for patient to call the office  If she is continuing to have hip pain that keeps her awake she needs to call ortho concerning further medication  Has she tired anything OTC for sleep or pain at this point?  Has Dr Nico Gee ever given her a sleep med in the past?

## 2018-09-25 NOTE — TELEPHONE ENCOUNTER
fyi-  S/w patient  Denies pain  It is from trying to come off the Oxi  This happened to her in the past, she did not mention it to the ortho though  She is trying OTC and will continue to that    Knows this is something that will wear off

## 2018-10-10 ENCOUNTER — OFFICE VISIT (OUTPATIENT)
Dept: OBGYN CLINIC | Facility: OTHER | Age: 68
End: 2018-10-10

## 2018-10-10 ENCOUNTER — APPOINTMENT (OUTPATIENT)
Dept: RADIOLOGY | Facility: OTHER | Age: 68
End: 2018-10-10
Payer: COMMERCIAL

## 2018-10-10 VITALS
SYSTOLIC BLOOD PRESSURE: 109 MMHG | DIASTOLIC BLOOD PRESSURE: 63 MMHG | HEART RATE: 62 BPM | BODY MASS INDEX: 25.55 KG/M2 | HEIGHT: 66 IN | WEIGHT: 159 LBS

## 2018-10-10 DIAGNOSIS — S72.002D CLOSED FRACTURE OF LEFT HIP WITH ROUTINE HEALING, SUBSEQUENT ENCOUNTER: Primary | ICD-10-CM

## 2018-10-10 DIAGNOSIS — S72.002D CLOSED FRACTURE OF LEFT HIP WITH ROUTINE HEALING, SUBSEQUENT ENCOUNTER: ICD-10-CM

## 2018-10-10 DIAGNOSIS — S72.002D CLOSED FRACTURE OF HIP, LEFT, WITH ROUTINE HEALING, SUBSEQUENT ENCOUNTER: ICD-10-CM

## 2018-10-10 PROCEDURE — 99024 POSTOP FOLLOW-UP VISIT: CPT | Performed by: ORTHOPAEDIC SURGERY

## 2018-10-10 PROCEDURE — 73552 X-RAY EXAM OF FEMUR 2/>: CPT

## 2018-10-10 PROCEDURE — 72170 X-RAY EXAM OF PELVIS: CPT

## 2018-10-10 NOTE — PROGRESS NOTES
Orthopaedic Surgery - Office Note  Braydon Dunn (38 y o  female)   : 1950   MRN: [id number]  Encounter Date: 10/10/2018    Chief Complaint   Patient presents with    Left Hip - Follow-up       Assessment / Plan  2 months S/P ORIF LEFT hip femoral neck fracture done on 8/10/2018  · Activity as tolerated  · Home exercise program reviewed  · Continue HEP  · Focus on progressing strength  Return in about 2 months (around 12/10/2018) for Recheck of LEFT hip  History of Present Illness  Braydon Dunn is a 79 y o  female who presents today for a post operative visit for her LEFT hip  She is now 2 months S/P ORIF LEFT hip femoral neck fracture performed on 8/10/2018  Patient states that she continues to do well post operatively  She has not been going to outpatient physical therapy due to high co pays but has been compliant with performing a Home Exercise Program as instructed by formal physical therapy  She states that reguarly she ambulates unassisted but with longer distances she uses a SPC  Denies numbness and tingling  No calf tenderness or pain    Review of Systems  Pertinent items are noted in HPI  All other systems were reviewed and are negative  Physical Exam  /63   Pulse 62   Ht 5' 6" (1 676 m)   Wt 72 1 kg (159 lb)   BMI 25 66 kg/m²   Cons: Appears well  No apparent distress  Psych: Alert  Oriented x3  Mood and affect normal   Eyes: PERRLA, EOMI  Resp: Normal effort  No audible wheezing or stridor  CV: Palpable pulse  No discernable arrhythmia  No LE edema  Lymph:  No palpable cervical, axillary, or inguinal lymphadenopathy  Skin: Warm  No palpable masses  No visible lesions  Neuro: Normal muscle tone  Normal and symmetric DTR's  Left Hip Exam  Alignment / Posture:  Normal lumbar alignment  Normal resting hip posture  Inspection:  No swelling  No edema  No erythema  Incision clean and dry  Palpation:  No tenderness  ROM:  Hip Flexion 120  Hip ER 60  Hip IR 40  Strength:  Hip Flexors 4+/5  Hip Abductors 4+/5  Stability:  No objective hip instability  Tests:  No pertinent positive or negative tests  Neurovascular:  Sensation intact in DP/SP/Dhaliwal/Sa/T nerve distributions  2+ DP & PT pulses  Gait:  Steady with cane  Studies Reviewed  I have personally reviewed pertinent films in PACS  XR of left hip - Orthopedic hardware remains in acceptable alignment and position without signs of loosening  Fracture shoes appropriate healing    Procedures  No procedures today  Medical, Surgical, Family, and Social History  The patient's medical history, family history, and social history, were reviewed and updated as appropriate  Past Medical History:   Diagnosis Date    Acute cystitis without hematuria 8/10/2018    Cataract     [facility]osed [facility] fracture [facility] [address] [facility] 8/10/2018    Diabetes mellitus ([facility] [address] [facility]     Hyperlipemia     Migraines     Multiple falls        Past Surgical History:   Procedure Laterality Date    CHOLECYSTECTOMY      ELBOW SURGERY      GALLBLADDER SURGERY      [place] COLONOSCOPY FLX DX W/COLLJ SPEC WHEN PFRMD N/A 12/14/2016    Procedure: COLONOSCOPY;  Surgeon: Srikanth Smith MD;  [facility]; Service: Gastroenterology    MN OPEN RX FEMUR FX+INTRAMED OLMAN Left 8/10/2018    Procedure: Left Hip IM Long nail;  Surgeon: Nabeel Harris MD;  [facility];  Service: Orthopedics    TONSILLECTOMY         Family History   Problem Relation Age of Onset    Arthritis Family     Pancreatic cancer Family     Scoliosis Family     Diabetes type II Mother     Osteoporosis Mother     Thyroid disease unspecified Mother     Hyperlipidemia Sister     Hypertension Sister     Diabetes type II Maternal Aunt     Diabetes type II Maternal Uncle     Diabetes type II Maternal Grandfather        Social History     Occupational History    Not on file       Social History Main Topics    Smoking status: Former Smoker     Types: Cigarettes    Smokeless tobacco: Never Used    Alcohol use No    Drug use: No    Sexual activity: Not on file       Allergies   Allergen Reactions    Codeine GI Intolerance, Nausea Only and Vomiting         Current Outpatient Prescriptions:     aspirin (ECOTRIN LOW STRENGTH) 81 mg EC tablet, Take 81 mg by mouth daily, Disp: , Rfl:     atorvastatin (LIPITOR) 40 mg tablet, Take 1 tablet (40 mg total) by mouth daily Take at bedtime, Disp: 90 tablet, Rfl: 1    Cholecalciferol (D3-1000) 1000 units capsule, Take 2,000 Units by mouth daily  , Disp: , Rfl:     gabapentin (NEURONTIN) 600 MG tablet, Take 600 mg by mouth daily at bedtime, Disp: , Rfl: 3    gemfibrozil (LOPID) 600 mg tablet, TAKE 1 TABLET (600 MG TOTAL) BY MOUTH 2 (TWO) TIMES A DAY BEFORE MEALS, Disp: 60 tablet, Rfl: 3    glipiZIDE (GLUCOTROL XL) 10 mg 24 hr tablet, Take 10 mg by mouth 2 (two) times a day, Disp: , Rfl: 0    glucose blood (CLEVER CHEK AUTO-CODE TEST) test strip, by In Vitro route, Disp: , Rfl:     glucose blood (ONE TOUCH ULTRA TEST) test strip, 1 each by Other route 2 (two) times a day Use as instructed, Disp: , Rfl:     Insulin Glargine (TOUJEO SOLOSTAR) injection pen 300 units/mL, Inject 25 units in AM (Patient taking differently: Inject 25 Units under the skin daily at bedtime  ), Disp: 3 pen, Rfl: 4    losartan 50 mg TABS 50 mg, hydrochlorothiazide 12 5 mg TABS 12 5 mg, Take by mouth daily, Disp: , Rfl:     omega-3-acid ethyl esters (LOVAZA) 1 g capsule, Take 2 g by mouth 2 (two) times a day, Disp: , Rfl:     SYNJARDY XR 12 5-1000 MG TB24, Take 1 tablet by mouth 2 (two) times a day, Disp: , Rfl: 0    Zoster Vaccine Live (ZOSTAVAX) 19861 UNT/0 65ML SUSR, Inject under the skin, Disp: , Rfl:     docusate sodium (COLACE) 100 mg capsule, Take 1 capsule (100 mg total) by mouth 2 (two) times a day (Patient not taking: Reported on 8/29/2018 ), Disp: 10 capsule, Rfl: 0    enoxaparin (LOVENOX) 40 mg/0 4 mL, Inject 0 4 mL (40 mg total) under the skin daily (Patient not taking: Reported on 8/29/2018 ), Disp: 30 Syringe, Rfl: 0    oxyCODONE (ROXICODONE) 5 mg immediate release tablet, Take 5 mg by mouth every 6 (six) hours, Disp: , Rfl: 0    polyethylene glycol (MIRALAX) 17 g packet, Take 17 g by mouth daily (Patient not taking: Reported on 8/29/2018 ), Disp: 14 each, Rfl: 0      Emeka Tejada    Scribe Attestation    I,:   Carrington Bose am acting as a scribe while in the presence of the attending physician :        I,:   Dimitri Quiroz MD personally performed the services described in this documentation    as scribed in my presence :

## 2018-11-19 DIAGNOSIS — E11.65 TYPE 2 DIABETES MELLITUS WITH HYPERGLYCEMIA, WITHOUT LONG-TERM CURRENT USE OF INSULIN (HCC): ICD-10-CM

## 2018-12-12 ENCOUNTER — OFFICE VISIT (OUTPATIENT)
Dept: OBGYN CLINIC | Facility: OTHER | Age: 68
End: 2018-12-12
Payer: COMMERCIAL

## 2018-12-12 ENCOUNTER — APPOINTMENT (OUTPATIENT)
Dept: RADIOLOGY | Facility: OTHER | Age: 68
End: 2018-12-12
Payer: COMMERCIAL

## 2018-12-12 VITALS
WEIGHT: 159 LBS | HEART RATE: 56 BPM | BODY MASS INDEX: 25.55 KG/M2 | SYSTOLIC BLOOD PRESSURE: 108 MMHG | DIASTOLIC BLOOD PRESSURE: 71 MMHG | HEIGHT: 66 IN

## 2018-12-12 DIAGNOSIS — S72.002D CLOSED FRACTURE OF HIP, LEFT, WITH ROUTINE HEALING, SUBSEQUENT ENCOUNTER: ICD-10-CM

## 2018-12-12 DIAGNOSIS — S72.002D CLOSED FRACTURE OF LEFT HIP WITH ROUTINE HEALING, SUBSEQUENT ENCOUNTER: ICD-10-CM

## 2018-12-12 DIAGNOSIS — S72.002D CLOSED FRACTURE OF LEFT HIP WITH ROUTINE HEALING, SUBSEQUENT ENCOUNTER: Primary | ICD-10-CM

## 2018-12-12 PROCEDURE — 72170 X-RAY EXAM OF PELVIS: CPT

## 2018-12-12 PROCEDURE — 73552 X-RAY EXAM OF FEMUR 2/>: CPT

## 2018-12-12 PROCEDURE — 99213 OFFICE O/P EST LOW 20 MIN: CPT | Performed by: STUDENT IN AN ORGANIZED HEALTH CARE EDUCATION/TRAINING PROGRAM

## 2018-12-12 PROCEDURE — 4040F PNEUMOC VAC/ADMIN/RCVD: CPT | Performed by: STUDENT IN AN ORGANIZED HEALTH CARE EDUCATION/TRAINING PROGRAM

## 2018-12-12 NOTE — PROGRESS NOTES
Orthopaedic Surgery - Office Note  Shayna Benjamin (15 y o  female)   : 1950   MRN: [id number]  Encounter Date: 2018    Chief Complaint   Patient presents with    Left Hip - Follow-up       Assessment / Plan  S/p ORIF left hip femoral neck fracture, DOS: 8/10/18    · Continue activity as tolerated, no formal restrictions  · Continue HEP for hip  As needed  · Patient may follow up as needed, advised to call if new problems arise    History of Present Illness  Shayna Benjamin is a 79 y o  female who presents s/p ORIF left hip femoral neck fracture, DOS: 8/10/18  Patient does not complain of pain in her hip  She states she has been walking without the cane and is ambulating without assitance  Denies instability, numbness or tingling  Pt states she has been compliant with HEP and has been focused on strengthening as instructed  States she will be changing her insurance next year and will gain access to a gym which will help further progress her strength  Review of Systems  Pertinent items are noted in HPI  All other systems were reviewed and are negative  Physical Exam  /71   Pulse 56   Ht 5' 6" (1 676 m)   Wt 72 1 kg (159 lb)   BMI 25 66 kg/m²   Cons: Appears well  No apparent distress  Psych: Alert  Oriented x3  Mood and affect normal   Eyes: PERRLA, EOMI  Resp: Normal effort  No audible wheezing or stridor  CV: Palpable pulse  No discernable arrhythmia  No LE edema  Lymph:  No palpable cervical, axillary, or inguinal lymphadenopathy  Skin: Warm  No palpable masses  No visible lesions  Neuro: Normal muscle tone  Normal and symmetric DTR's  Left Hip Exam  Alignment / Posture:  Normal resting hip posture  Inspection:  No swelling  No erythema  No ecchymosis  Palpation:  No tenderness  ROM:  Hip Flexion 120  Hip ER 60  Hip IR 30  Strength:  5/5 hip flexors and abductors  Stability:  No objective hip instability    Tests:  No pertinent positive or negative tests   Neurovascular:  Sensation intact in DP/SP/Dhaliwal/Sa/T nerve distributions  Palpable DP & PT pulses  Gait:  Smooth  Studies Reviewed  I have personally reviewed pertinent films in PACS  XR of left hip- hardware intact in good alignment without signs of loosening  Consolidation of fracture site with good healing    Procedures  No procedures today  Medical, Surgical, Family, and Social History  The patient's medical history, family history, and social history, were reviewed and updated as appropriate  Past Medical History:   Diagnosis Date    Acute cystitis without hematuria 8/10/2018    Cataract     Closed left hip fracture ([facility] [address] ) 8/10/2018    Diabetes mellitus ([facility] [address] [facility]     Hyperlipemia     Migraines     Multiple falls        Past Surgical History:   Procedure Laterality Date    CHOLECYSTECTOMY      ELBOW SURGERY      GALLBLADDER SURGERY      [place] COLONOSCOPY FLX DX W/COLLJ SPEC WHEN PFRMD N/A 12/14/2016    Procedure: COLONOSCOPY;  Surgeon: Manley Babinski, MD;  [facility]; Service: Gastroenterology    DC OPEN RX FEMUR FX+INTRAMED OLMAN Left 8/10/2018    Procedure: Left Hip IM Long nail;  Surgeon: Flaquita Vilchis MD;  [facility];  Service: Orthopedics    TONSILLECTOMY         Family History   Problem Relation Age of Onset    Arthritis Family     Pancreatic cancer Family     Scoliosis Family     Diabetes type II Mother     Osteoporosis Mother     Thyroid disease unspecified Mother     Hyperlipidemia Sister     Hypertension Sister     Diabetes type II Maternal Aunt     Diabetes type II Maternal Uncle     Diabetes type II Maternal Grandfather        Social History     Occupational History    Not on file       Social History Main Topics    Smoking status: Former Smoker     Types: Cigarettes    Smokeless tobacco: Never Used    Alcohol use No    Drug use: No    Sexual activity: Not on file       Allergies   Allergen Reactions    Codeine GI Intolerance, Nausea Only and Vomiting         Current Outpatient Prescriptions:     aspirin (ECOTRIN LOW STRENGTH) 81 mg EC tablet, Take 81 mg by mouth daily, Disp: , Rfl:     atorvastatin (LIPITOR) 40 mg tablet, Take 1 tablet (40 mg total) by mouth daily Take at bedtime, Disp: 90 tablet, Rfl: 1    Cholecalciferol (D3-1000) 1000 units capsule, Take 2,000 Units by mouth daily  , Disp: , Rfl:     docusate sodium (COLACE) 100 mg capsule, Take 1 capsule (100 mg total) by mouth 2 (two) times a day (Patient not taking: Reported on 8/29/2018 ), Disp: 10 capsule, Rfl: 0    enoxaparin (LOVENOX) 40 mg/0 4 mL, Inject 0 4 mL (40 mg total) under the skin daily (Patient not taking: Reported on 8/29/2018 ), Disp: 30 Syringe, Rfl: 0    gabapentin (NEURONTIN) 600 MG tablet, Take 600 mg by mouth daily at bedtime, Disp: , Rfl: 3    gemfibrozil (LOPID) 600 mg tablet, TAKE 1 TABLET (600 MG TOTAL) BY MOUTH 2 (TWO) TIMES A DAY BEFORE MEALS, Disp: 60 tablet, Rfl: 3    glipiZIDE (GLUCOTROL XL) 10 mg 24 hr tablet, Take 10 mg by mouth 2 (two) times a day, Disp: , Rfl: 0    glucose blood (CLEVER CHEK AUTO-CODE TEST) test strip, by In Vitro route, Disp: , Rfl:     glucose blood (ONE TOUCH ULTRA TEST) test strip, 1 each by Other route 2 (two) times a day Use as instructed, Disp: , Rfl:     Insulin Glargine (TOUJEO SOLOSTAR) 300 units/mL CONCETRATED U-300 injection pen, Inject 25 Units under the skin daily, Disp: 5 pen, Rfl: 1    losartan 50 mg TABS 50 mg, hydrochlorothiazide 12 5 mg TABS 12 5 mg, Take by mouth daily, Disp: , Rfl:     omega-3-acid ethyl esters (LOVAZA) 1 g capsule, Take 2 g by mouth 2 (two) times a day, Disp: , Rfl:     oxyCODONE (ROXICODONE) 5 mg immediate release tablet, Take 5 mg by mouth every 6 (six) hours, Disp: , Rfl: 0    polyethylene glycol (MIRALAX) 17 g packet, Take 17 g by mouth daily (Patient not taking: Reported on 8/29/2018 ), Disp: 14 each, Rfl: 0    SYNJARDY XR 12 5-1000 MG TB24, Take 1 tablet by mouth 2 (two) times a day, Disp: , Rfl: 0    Zoster Vaccine Live (ZOSTAVAX) 30522 UNT/0 65ML SUSR, Inject under the skin, Disp: , Rfl:       Iman Ogden DPM    Scribe Attestation    I,:    am acting as a scribe while in the presence of the attending physician :        I,:    personally performed the services described in this documentation    as scribed in my presence :

## 2019-01-13 DIAGNOSIS — E78.5 HYPERLIPIDEMIA, UNSPECIFIED HYPERLIPIDEMIA TYPE: ICD-10-CM

## 2019-01-13 DIAGNOSIS — E78.1 HYPERTRIGLYCERIDEMIA: ICD-10-CM

## 2019-01-13 DIAGNOSIS — E11.65 TYPE 2 DIABETES MELLITUS WITH HYPERGLYCEMIA, WITHOUT LONG-TERM CURRENT USE OF INSULIN (HCC): ICD-10-CM

## 2019-01-14 RX ORDER — GEMFIBROZIL 600 MG/1
600 TABLET, FILM COATED ORAL
Qty: 60 TABLET | Refills: 3 | Status: SHIPPED | OUTPATIENT
Start: 2019-01-14 | End: 2022-01-14 | Stop reason: SDUPTHER

## 2019-01-14 RX ORDER — INSULIN GLARGINE 300 U/ML
INJECTION, SOLUTION SUBCUTANEOUS
Qty: 15 PEN | Refills: 1 | Status: SHIPPED | OUTPATIENT
Start: 2019-01-14 | End: 2022-02-18

## 2019-01-14 RX ORDER — ATORVASTATIN CALCIUM 40 MG/1
40 TABLET, FILM COATED ORAL DAILY
Qty: 30 TABLET | Refills: 3 | Status: SHIPPED | OUTPATIENT
Start: 2019-01-14 | End: 2022-06-16

## 2019-02-18 ENCOUNTER — TELEPHONE (OUTPATIENT)
Dept: FAMILY MEDICINE CLINIC | Facility: MEDICAL CENTER | Age: 69
End: 2019-02-18

## 2019-02-18 NOTE — TELEPHONE ENCOUNTER
Patient called the office to schedule a flu shot  Advised from Jovi Tellez only one dose left to have patient come in today or tomorrow  Patient is unable to come in, decided to go somewhere else

## 2019-04-01 ENCOUNTER — OFFICE VISIT (OUTPATIENT)
Dept: FAMILY MEDICINE CLINIC | Facility: MEDICAL CENTER | Age: 69
End: 2019-04-01
Payer: COMMERCIAL

## 2019-04-01 VITALS
RESPIRATION RATE: 16 BRPM | BODY MASS INDEX: 26.95 KG/M2 | DIASTOLIC BLOOD PRESSURE: 68 MMHG | HEART RATE: 70 BPM | SYSTOLIC BLOOD PRESSURE: 112 MMHG | WEIGHT: 167 LBS

## 2019-04-01 DIAGNOSIS — Z23 IMMUNIZATION DUE: Primary | ICD-10-CM

## 2019-04-01 DIAGNOSIS — Z82.49 FAMILY HISTORY OF ABDOMINAL AORTIC ANEURYSM: ICD-10-CM

## 2019-04-01 DIAGNOSIS — M80.00XD AGE-RELATED OSTEOPOROSIS WITH CURRENT PATHOLOGICAL FRACTURE WITH ROUTINE HEALING, SUBSEQUENT ENCOUNTER: ICD-10-CM

## 2019-04-01 DIAGNOSIS — E11.65 TYPE 2 DIABETES MELLITUS WITH HYPERGLYCEMIA, WITH LONG-TERM CURRENT USE OF INSULIN (HCC): ICD-10-CM

## 2019-04-01 DIAGNOSIS — Z12.39 SCREENING FOR BREAST CANCER: ICD-10-CM

## 2019-04-01 DIAGNOSIS — D64.9 ANEMIA, UNSPECIFIED TYPE: ICD-10-CM

## 2019-04-01 DIAGNOSIS — Z79.4 TYPE 2 DIABETES MELLITUS WITH HYPERGLYCEMIA, WITH LONG-TERM CURRENT USE OF INSULIN (HCC): ICD-10-CM

## 2019-04-01 DIAGNOSIS — Z23 NEED FOR SHINGLES VACCINE: ICD-10-CM

## 2019-04-01 DIAGNOSIS — Z00.00 MEDICARE ANNUAL WELLNESS VISIT, SUBSEQUENT: ICD-10-CM

## 2019-04-01 PROCEDURE — 1036F TOBACCO NON-USER: CPT | Performed by: FAMILY MEDICINE

## 2019-04-01 PROCEDURE — 99213 OFFICE O/P EST LOW 20 MIN: CPT | Performed by: FAMILY MEDICINE

## 2019-04-01 PROCEDURE — G0439 PPPS, SUBSEQ VISIT: HCPCS | Performed by: FAMILY MEDICINE

## 2019-04-01 PROCEDURE — 4040F PNEUMOC VAC/ADMIN/RCVD: CPT | Performed by: FAMILY MEDICINE

## 2019-04-01 PROCEDURE — 90732 PPSV23 VACC 2 YRS+ SUBQ/IM: CPT | Performed by: FAMILY MEDICINE

## 2019-04-01 PROCEDURE — 1170F FXNL STATUS ASSESSED: CPT | Performed by: FAMILY MEDICINE

## 2019-04-01 PROCEDURE — 1125F AMNT PAIN NOTED PAIN PRSNT: CPT | Performed by: FAMILY MEDICINE

## 2019-04-01 PROCEDURE — 1160F RVW MEDS BY RX/DR IN RCRD: CPT | Performed by: FAMILY MEDICINE

## 2019-04-01 PROCEDURE — G0009 ADMIN PNEUMOCOCCAL VACCINE: HCPCS | Performed by: FAMILY MEDICINE

## 2019-04-01 RX ORDER — DULOXETIN HYDROCHLORIDE 20 MG/1
20 CAPSULE, DELAYED RELEASE ORAL DAILY
Refills: 3 | COMMUNITY
Start: 2019-01-13

## 2019-04-01 RX ORDER — DAPAGLIFLOZIN AND METFORMIN HYDROCHLORIDE 5; 1000 MG/1; MG/1
1 TABLET, FILM COATED, EXTENDED RELEASE ORAL 2 TIMES DAILY
Refills: 0 | COMMUNITY
Start: 2019-02-06 | End: 2021-06-03 | Stop reason: ALTCHOICE

## 2019-04-08 ENCOUNTER — TELEPHONE (OUTPATIENT)
Dept: FAMILY MEDICINE CLINIC | Facility: MEDICAL CENTER | Age: 69
End: 2019-04-08

## 2019-04-30 ENCOUNTER — HOSPITAL ENCOUNTER (OUTPATIENT)
Dept: RADIOLOGY | Facility: MEDICAL CENTER | Age: 69
Discharge: HOME | End: 2019-04-30
Payer: COMMERCIAL

## 2019-04-30 VITALS — BODY MASS INDEX: 26.84 KG/M2 | HEIGHT: 66 IN | WEIGHT: 167 LBS

## 2019-04-30 DIAGNOSIS — Z12.39 SCREENING FOR BREAST CANCER: ICD-10-CM

## 2019-04-30 DIAGNOSIS — Z82.49 FAMILY HISTORY OF ABDOMINAL AORTIC ANEURYSM: ICD-10-CM

## 2019-04-30 PROCEDURE — 77067 SCR MAMMO BI INCL CAD: CPT

## 2019-04-30 PROCEDURE — 76706 US ABDL AORTA SCREEN AAA: CPT

## 2019-05-01 LAB
BASOPHILS # BLD AUTO: 67 CELLS/UL (ref 0–200)
BASOPHILS NFR BLD AUTO: 0.8 %
EOSINOPHIL # BLD AUTO: 344 CELLS/UL (ref 15–500)
EOSINOPHIL NFR BLD AUTO: 4.1 %
ERYTHROCYTE [DISTWIDTH] IN BLOOD BY AUTOMATED COUNT: 13.7 % (ref 11–15)
HCT VFR BLD AUTO: 41 % (ref 35–45)
HGB BLD-MCNC: 14.2 G/DL (ref 11.7–15.5)
LYMPHOCYTES # BLD AUTO: 3536 CELLS/UL (ref 850–3900)
LYMPHOCYTES NFR BLD AUTO: 42.1 %
MCH RBC QN AUTO: 31.3 PG (ref 27–33)
MCHC RBC AUTO-ENTMCNC: 34.6 G/DL (ref 32–36)
MCV RBC AUTO: 90.5 FL (ref 80–100)
MONOCYTES # BLD AUTO: 580 CELLS/UL (ref 200–950)
MONOCYTES NFR BLD AUTO: 6.9 %
NEUTROPHILS # BLD AUTO: 3872 CELLS/UL (ref 1500–7800)
NEUTROPHILS NFR BLD AUTO: 46.1 %
PLATELET # BLD AUTO: 360 THOUSAND/UL (ref 140–400)
PMV BLD REES-ECKER: 10.6 FL (ref 7.5–12.5)
RBC # BLD AUTO: 4.53 MILLION/UL (ref 3.8–5.1)
WBC # BLD AUTO: 8.4 THOUSAND/UL (ref 3.8–10.8)

## 2019-05-02 ENCOUNTER — TELEPHONE (OUTPATIENT)
Dept: FAMILY MEDICINE CLINIC | Facility: MEDICAL CENTER | Age: 69
End: 2019-05-02

## 2019-05-20 ENCOUNTER — TELEPHONE (OUTPATIENT)
Dept: FAMILY MEDICINE CLINIC | Facility: MEDICAL CENTER | Age: 69
End: 2019-05-20

## 2019-05-31 DIAGNOSIS — I77.819 AORTIC ECTASIA (HCC): Primary | ICD-10-CM

## 2019-06-24 LAB
LEFT EYE DIABETIC RETINOPATHY: NORMAL
RIGHT EYE DIABETIC RETINOPATHY: NORMAL

## 2019-06-27 ENCOUNTER — TELEPHONE (OUTPATIENT)
Dept: FAMILY MEDICINE CLINIC | Facility: MEDICAL CENTER | Age: 69
End: 2019-06-27

## 2019-07-24 ENCOUNTER — TELEPHONE (OUTPATIENT)
Dept: FAMILY MEDICINE CLINIC | Facility: MEDICAL CENTER | Age: 69
End: 2019-07-24

## 2019-07-24 NOTE — TELEPHONE ENCOUNTER
Pt called needing a referral to [facility] eye for cataract evaluation   [employer] referral completed

## 2019-07-29 ENCOUNTER — TELEPHONE (OUTPATIENT)
Dept: FAMILY MEDICINE CLINIC | Facility: MEDICAL CENTER | Age: 69
End: 2019-07-29

## 2019-07-29 NOTE — TELEPHONE ENCOUNTER
Pt left a vm Friday on referral line  Pt needs a [facility] referral for Dr Monse Simmons on Thursday 8/1/19  I left pt a vm for her to call back and let us know the dx

## 2019-08-12 ENCOUNTER — TELEPHONE (OUTPATIENT)
Dept: FAMILY MEDICINE CLINIC | Facility: MEDICAL CENTER | Age: 69
End: 2019-08-12

## 2019-08-12 ENCOUNTER — OFFICE VISIT (OUTPATIENT)
Dept: FAMILY MEDICINE CLINIC | Facility: MEDICAL CENTER | Age: 69
End: 2019-08-12
Payer: COMMERCIAL

## 2019-08-12 VITALS
HEIGHT: 66 IN | DIASTOLIC BLOOD PRESSURE: 70 MMHG | RESPIRATION RATE: 16 BRPM | SYSTOLIC BLOOD PRESSURE: 110 MMHG | WEIGHT: 170 LBS | HEART RATE: 80 BPM | BODY MASS INDEX: 27.32 KG/M2

## 2019-08-12 DIAGNOSIS — I77.819 AORTIC ECTASIA (HCC): ICD-10-CM

## 2019-08-12 DIAGNOSIS — E11.65 TYPE 2 DIABETES MELLITUS WITH HYPERGLYCEMIA, WITH LONG-TERM CURRENT USE OF INSULIN (HCC): ICD-10-CM

## 2019-08-12 DIAGNOSIS — R94.31 ABNORMAL EKG: ICD-10-CM

## 2019-08-12 DIAGNOSIS — I10 ESSENTIAL HYPERTENSION: ICD-10-CM

## 2019-08-12 DIAGNOSIS — Z79.4 TYPE 2 DIABETES MELLITUS WITH HYPERGLYCEMIA, WITH LONG-TERM CURRENT USE OF INSULIN (HCC): ICD-10-CM

## 2019-08-12 DIAGNOSIS — Z01.818 PRE-OPERATIVE CLEARANCE: Primary | ICD-10-CM

## 2019-08-12 PROCEDURE — 99214 OFFICE O/P EST MOD 30 MIN: CPT | Performed by: FAMILY MEDICINE

## 2019-08-12 PROCEDURE — 93000 ELECTROCARDIOGRAM COMPLETE: CPT | Performed by: FAMILY MEDICINE

## 2019-08-12 RX ORDER — FENOFIBRATE 160 MG/1
160 TABLET ORAL DAILY
Refills: 0 | COMMUNITY
Start: 2019-07-18 | End: 2022-05-23

## 2019-08-12 NOTE — TELEPHONE ENCOUNTER
Forms at  for pre op clearance pending Cardiac clearance for an abnormal EKG    Appointment scheduled with Dr Andie Simons on 8/26/2019 at [address] [place] [address]

## 2019-08-12 NOTE — PROGRESS NOTES
Rodger Villafana is here for preop clearance for right cataract  Dr Debby Sofia 9/9  PMH   Diabetes controlled              HTN             Hyperlipidemia             Osteoporosis             No sleep apnea or problems with with anesthesia or bleeding disorders    PSH    S/P left hip fracture               Cholecystectomy    All         Codeine (nausea)  SH:       Quit smoking 3/18    Alcohol none  FH:        Father AAA; (patient had US) no problems with anesthesia    I have reviewed the patient's medical history in detail and updated the computerized patient record  Review of Systems   Constitutional: Negative for chills and fever  HENT: Negative for congestion  Respiratory: Negative for chest tightness and shortness of breath  Cardiovascular: Negative for chest pain, palpitations and leg swelling  Gastrointestinal: Negative for abdominal pain  Genitourinary: Negative for difficulty urinating  Neurological: Negative for dizziness  EKG Assessment  1  Cataract-scheduled for surgery  EKG does show findings consistent with septal infarct  This is unchanged from EKG done during her hospitalization last year  Should have cardiac clearance ward  in light of her history diabetes  2  Hypertension-controlled  3  Diabetes-controlled  4   Hyperlipidemia     Plan  Medically cleared for cataract surgery pending cardiac clearance

## 2019-08-26 ENCOUNTER — OFFICE VISIT (OUTPATIENT)
Dept: CARDIOLOGY CLINIC | Facility: MEDICAL CENTER | Age: 69
End: 2019-08-26
Payer: COMMERCIAL

## 2019-08-26 VITALS
HEART RATE: 72 BPM | DIASTOLIC BLOOD PRESSURE: 64 MMHG | SYSTOLIC BLOOD PRESSURE: 118 MMHG | OXYGEN SATURATION: 98 % | BODY MASS INDEX: 27.55 KG/M2 | HEIGHT: 66 IN | WEIGHT: 171.4 LBS

## 2019-08-26 DIAGNOSIS — E78.2 MIXED HYPERLIPIDEMIA: ICD-10-CM

## 2019-08-26 DIAGNOSIS — I10 ESSENTIAL HYPERTENSION: Primary | ICD-10-CM

## 2019-08-26 DIAGNOSIS — R94.31 ABNORMAL EKG: ICD-10-CM

## 2019-08-26 DIAGNOSIS — Z01.818 PRE-OPERATIVE CLEARANCE: ICD-10-CM

## 2019-08-26 PROCEDURE — 93000 ELECTROCARDIOGRAM COMPLETE: CPT | Performed by: INTERNAL MEDICINE

## 2019-08-26 PROCEDURE — 3074F SYST BP LT 130 MM HG: CPT | Performed by: INTERNAL MEDICINE

## 2019-08-26 PROCEDURE — 99204 OFFICE O/P NEW MOD 45 MIN: CPT | Performed by: INTERNAL MEDICINE

## 2019-08-26 NOTE — LETTER
Cardiology Pre Operative Clearance      PRE OPERATIVE CARDIAC RISK ASSESSMENT    08/26/19    Edwyna [facility] CARINE  [birth year]  [id number]    Date of Surgery: 9/09/2019     Type of Surgery: Cataract Surgery     Surgeon: Dr Gorman Nissen    No Cardiac Contraindication for Planned Surgical Procedures    Anticoagulation: yes, aspirin 81 mg can be held for up to 1 week prior to surgery, if needed  Physician Comment: No further cardiac work-up recommended     Electronically Signed: Irene Palma MD, [facility]

## 2019-08-26 NOTE — PATIENT INSTRUCTIONS

## 2019-08-26 NOTE — PROGRESS NOTES
Cardiology Consultation     Liliam Stewart  [id number]  [birth year]     [facility] [place]  Tahir Cobbe Krysten [address] [place] [id number]    1  Essential hypertension     2  Pre-operative clearance  Ambulatory referral to Cardiology    POCT ECG   3  Abnormal EKG  Ambulatory referral to Cardiology    POCT ECG   4  Mixed hyperlipidemia       Chief Complaint   Patient [facility] with   [facility]      Patient is here today for cardiac clearance for catatract sugery  Patient has no cardiac complaints at this time  HPI: Patient is here for cardiac evaluation prior to cataracts surgery  Has risk factors of HTN, HLD, DM, but no active cardiac issues at the current time that is known  Patient feels well, without complaints  No reported chest pain, shortness of breath, palpitations, lightheadedness, syncope, LE edema, orthopnea, PND, or significant weight changes  Patient remains active without any increased fatigue out of the ordinary        Patient Active Problem List   Diagnosis    DMII (diabetes mellitus, type 2) ([facility] [address] [facility]    Osteoporosis    Vitamin D deficiency    Stress incontinence of urine    Hyperlipemia    Diabetes mellitus ([facility] [address] [facility]    Closed left hip fracture ([facility] [address] )    Acute cystitis without hematuria    Closed fracture of left hip ([address]    Essential hypertension    Anemia    Hypokalemia    UTI (urinary tract infection)    Pre-operative clearance     Past Medical History:   Diagnosis Date    Acute cystitis without hematuria 8/10/2018    Cataract     Closed left hip fracture ([facility] [address] ) 8/10/2018    Diabetes mellitus ([facility] [address] [facility]     Hyperlipemia     Migraines     Multiple falls      Social History     Socioeconomic History    Marital status: /Civil Union     Spouse name: Not on file    Number of children: 2    Years of education: 15    Highest education level: Not on file   Occupational History    Not on file   Social Needs    Financial resource strain: Not on file    Food insecurity:     Worry: Not on file     Inability: Not on file    Transportation needs:     Medical: Not on file     Non-medical: Not on file   Tobacco Use    Smoking status: Former Smoker     Types: Cigarettes    Smokeless tobacco: Never Used   Substance and Sexual Activity    Alcohol use: No    Drug use: No    Sexual activity: Not on file   Lifestyle    Physical activity:     Days per week: Not on file     Minutes per session: Not on file    Stress: Not on file   Relationships    Social connections:     Talks on phone: Not on file     Gets together: Not on file     Attends Mu-ism service: Not on file     Active member of club or organization: Not on file     Attends meetings of clubs or organizations: Not on file     Relationship status: Not on file    Intimate partner violence:     Fear of current or ex partner: Not on file     Emotionally abused: Not on file     Physically abused: Not on file     Forced sexual activity: Not on file   Other Topics Concern    Not on file   Social History Narrative    high school graduate      Family History   Problem Relation Age of Onset    Arthritis Family     Pancreatic cancer Family     Scoliosis Family     Diabetes type II Mother     Osteoporosis Mother     Thyroid disease unspecified Mother     Hyperlipidemia Sister     Hypertension Sister     Diabetes type II Maternal Aunt     Diabetes type II Maternal Uncle     Diabetes type II Maternal Grandfather     Pancreatic cancer Paternal Grandmother      Past Surgical History:   Procedure Laterality Date    BREAST CYST ASPIRATION Right 1993    CHOLECYSTECTOMY      ELBOW SURGERY      GALLBLADDER SURGERY      PA COLONOSCOPY FLX DX W/COLLJ Ania 1978 PFRMD N/A 12/14/2016    Procedure: COLONOSCOPY;  Surgeon: Naz Hansen MD;  Location: [facility];   Service: Gastroenterology    PA OPEN RX FEMUR FX+INTRAMED OLMAN Left 8/10/2018 Procedure: Left Hip IM Long nail;  Surgeon: Reginald Phelps MD;  [facility];  Service: Orthopedics    TONSILLECTOMY         Current Outpatient Medications:     aspirin (ECOTRIN LOW STRENGTH) 81 mg EC tablet, Take 81 mg by mouth daily, Disp: , Rfl:     atorvastatin (LIPITOR) 40 mg tablet, TAKE 1 TABLET (40 MG TOTAL) BY MOUTH DAILY TAKE AT BEDTIME, Disp: 30 tablet, Rfl: 3    Cholecalciferol (D3-1000) 1000 units capsule, Take 2,000 Units by mouth daily  , Disp: , Rfl:     DULoxetine (CYMBALTA) 20 mg capsule, Take 20 mg by mouth daily, Disp: , Rfl: 3    fenofibrate (TRIGLIDE) 160 MG tablet, Take 160 mg by mouth daily, Disp: , Rfl: 0    gabapentin (NEURONTIN) 600 MG tablet, Take 600 mg by mouth daily at bedtime, Disp: , Rfl: 3    gemfibrozil (LOPID) 600 mg tablet, TAKE 1 TABLET (600 MG TOTAL) BY MOUTH 2 (TWO) TIMES A DAY BEFORE MEALS, Disp: 60 tablet, Rfl: 3    glipiZIDE (GLUCOTROL XL) 10 mg 24 hr tablet, Take 10 mg by mouth 2 (two) times a day, Disp: , Rfl: 0    glucose blood (CLEVER CHEK AUTO-CODE TEST) test strip, by In Vitro route, Disp: , Rfl:     glucose blood (ONE TOUCH ULTRA TEST) test strip, 1 each by Other route 2 (two) times a day Use as instructed, Disp: , Rfl:     losartan 50 mg TABS 50 mg, hydrochlorothiazide 12 5 mg TABS 12 5 mg, Take by mouth daily, Disp: , Rfl:     omega-3-acid ethyl esters (LOVAZA) 1 g capsule, Take 2 g by mouth 2 (two) times a day, Disp: , Rfl:     TOUJEO SOLOSTAR 300 units/mL CONCETRATED U-300 injection pen, INJECT 25 UNITS UNDER THE SKIN ONCE A DAY, Disp: 15 pen, Rfl: 1    XIGDUO XR 5-1000 MG TB24, Take 1 tablet by mouth 2 (two) times a day, Disp: , Rfl: 0  Allergies   Allergen Reactions    Codeine GI Intolerance, Nausea Only and Vomiting     Vitals:    08/26/19 0845   BP: 118/64   BP Location: Left arm   Patient Position: Sitting   Cuff Size: Large   Pulse: 72   SpO2: 98%   Weight: 77 7 kg (171 lb 6 4 oz)   Height: 5' 6" (1 676 m)       Labs:  Orders Only on 06/24/2019   Component Date Value    Right Eye Diabetic Retin* 06/24/2019 None     Left Eye Diabetic Retino* 06/24/2019 None    Orders Only on 04/30/2019   Component Date Value    White Blood Cell Count 04/30/2019 8 4     Red Blood Cell Count 04/30/2019 4 53     Hemoglobin 04/30/2019 14 2     HCT 04/30/2019 41 0     MCV 04/30/2019 90 5     MCH 04/30/2019 31 3     MCHC 04/30/2019 34 6     RDW 04/30/2019 13 7     Platelet Count 52/03/1997 360     SL AMB MPV 04/30/2019 10 6     Neutrophils (Absolute) 04/30/2019 3,872     Lymphocytes (Absolute) 04/30/2019 3,536     Monocytes (Absolute) 04/30/2019 580     Eosinophils (Absolute) 04/30/2019 344     Basophils ABS 04/30/2019 67     Neutrophils 04/30/2019 46 1     Lymphocytes 04/30/2019 42 1     Monocytes 04/30/2019 6 9     Eosinophils 04/30/2019 4 1     Basophils PCT 04/30/2019 0 8      Lab Results   Component Value Date    CHOL 492 (H) 10/25/2016    TRIG 3177 (H) 10/25/2016    HDL 25 (L) 10/25/2016     Imaging: No results found  Review of Systems:  Review of Systems   Constitutional: Negative for activity change, appetite change, chills, diaphoresis, fatigue and unexpected weight change  HENT: Negative for hearing loss, nosebleeds and sore throat  Eyes: Negative for photophobia and visual disturbance  Respiratory: Negative for cough, chest tightness, shortness of breath and wheezing  Cardiovascular: Negative for chest pain, palpitations and leg swelling  Gastrointestinal: Negative for abdominal pain, diarrhea, nausea and vomiting  Endocrine: Negative for polyuria  Genitourinary: Negative for dysuria, frequency and hematuria  Musculoskeletal: Negative for arthralgias, back pain, gait problem and neck pain  Skin: Negative for pallor and rash  Neurological: Negative for dizziness, syncope and headaches  Hematological: Does not bruise/bleed easily  Psychiatric/Behavioral: Negative for behavioral problems and confusion  Physical Exam:  Physical Exam   Constitutional: She is oriented to person, place, and time  She appears well-developed and well-nourished  HENT:   Head: Normocephalic and atraumatic  Nose: Nose normal    Eyes: Pupils are equal, round, and reactive to light  EOM are normal  No scleral icterus  Neck: Normal range of motion  Neck supple  No JVD present  Cardiovascular: Normal rate, regular rhythm and normal heart sounds  Exam reveals no gallop and no friction rub  No murmur heard  Pulmonary/Chest: Effort normal and breath sounds normal  No respiratory distress  She has no wheezes  She has no rales  Abdominal: Soft  Bowel sounds are normal  She exhibits no distension  There is no tenderness  Musculoskeletal: Normal range of motion  She exhibits no edema or deformity  Neurological: She is alert and oriented to person, place, and time  No cranial nerve deficit  Skin: Skin is warm and dry  No rash noted  She is not diaphoretic  Psychiatric: She has a normal mood and affect  Her behavior is normal    Vitals reviewed  Blood pressure 118/64, pulse 72, height 5' 6" (1 676 m), weight 77 7 kg (171 lb 6 4 oz), SpO2 98 %  EKG:  Normal sinus rhythm  Septal infarct, age undetermined  Abnormal ECG    Discussion/Summary:  Pre-op cardiac eval: prior to low risk cataracts surgery  No concerning changes on baseline ECG and no symptoms of active ischemia, CHF, or arrhythmias  BP well controlled  Only with risk factors for CAD  Would proceed with planned cataracts surgery without any further cardiac work-up at the current time  HTN: well controlled, continue current regimen  Advised to be sure to take her BP meds the day of surgery as well  HLD: continued on statin

## 2019-10-23 ENCOUNTER — IMMUNIZATIONS (OUTPATIENT)
Dept: FAMILY MEDICINE CLINIC | Facility: MEDICAL CENTER | Age: 69
End: 2019-10-23
Payer: COMMERCIAL

## 2019-10-23 DIAGNOSIS — Z23 ENCOUNTER FOR IMMUNIZATION: ICD-10-CM

## 2019-10-23 PROCEDURE — G0008 ADMIN INFLUENZA VIRUS VAC: HCPCS

## 2019-10-23 PROCEDURE — 90662 IIV NO PRSV INCREASED AG IM: CPT

## 2020-01-23 ENCOUNTER — HOSPITAL ENCOUNTER (OUTPATIENT)
Dept: RADIOLOGY | Facility: MEDICAL CENTER | Age: 70
Discharge: HOME | End: 2020-01-23
Payer: COMMERCIAL

## 2020-01-23 DIAGNOSIS — I77.819 AORTIC ECTASIA (HCC): ICD-10-CM

## 2020-01-23 PROCEDURE — 76775 US EXAM ABDO BACK WALL LIM: CPT

## 2020-02-07 ENCOUNTER — DOCUMENTATION (OUTPATIENT)
Dept: GASTROENTEROLOGY | Facility: CLINIC | Age: 70
End: 2020-02-07

## 2020-02-07 NOTE — LETTER
2/7/2020    Dear Alon Hoffman,    Review of our records shows you are due for the following:    Colonoscopy    Please call the following office to schedule your appointment:    [facility]    We look forward to hearing from you      Sincerely,    Dr Shilpa Shelton

## 2020-02-24 ENCOUNTER — TELEPHONE (OUTPATIENT)
Dept: FAMILY MEDICINE CLINIC | Facility: MEDICAL CENTER | Age: 70
End: 2020-02-24

## 2020-02-24 DIAGNOSIS — R93.89 ABNORMAL ULTRASOUND: ICD-10-CM

## 2020-02-24 DIAGNOSIS — I77.819 AORTIC ECTASIA (HCC): Primary | ICD-10-CM

## 2020-02-24 NOTE — TELEPHONE ENCOUNTER
----- Message from Horace Barragan MD sent at 2/23/2020  4:41 PM EST -----  Notify there is slight increase in size of the aorta compared to last year; would like her to see vascular surgery

## 2020-04-16 ENCOUNTER — TELEMEDICINE (OUTPATIENT)
Dept: VASCULAR SURGERY | Facility: CLINIC | Age: 70
End: 2020-04-16
Payer: COMMERCIAL

## 2020-04-16 VITALS — HEIGHT: 66 IN | WEIGHT: 177 LBS | BODY MASS INDEX: 28.45 KG/M2

## 2020-04-16 DIAGNOSIS — I71.4 ABDOMINAL AORTIC ANEURYSM (AAA) WITHOUT RUPTURE (HCC): Primary | ICD-10-CM

## 2020-04-16 PROBLEM — I71.40 ABDOMINAL AORTIC ANEURYSM (AAA) WITHOUT RUPTURE: Status: ACTIVE | Noted: 2020-04-16

## 2020-04-16 PROCEDURE — 1160F RVW MEDS BY RX/DR IN RCRD: CPT | Performed by: SURGERY

## 2020-04-16 PROCEDURE — 99212 OFFICE O/P EST SF 10 MIN: CPT | Performed by: SURGERY

## 2020-04-20 ENCOUNTER — TELEMEDICINE (OUTPATIENT)
Dept: FAMILY MEDICINE CLINIC | Facility: MEDICAL CENTER | Age: 70
End: 2020-04-20
Payer: COMMERCIAL

## 2020-04-20 VITALS — BODY MASS INDEX: 27.64 KG/M2 | TEMPERATURE: 97.1 F | WEIGHT: 172 LBS | HEIGHT: 66 IN

## 2020-04-20 DIAGNOSIS — R32 URINARY INCONTINENCE, UNSPECIFIED TYPE: ICD-10-CM

## 2020-04-20 DIAGNOSIS — Z23 NEED FOR SHINGLES VACCINE: Primary | ICD-10-CM

## 2020-04-20 DIAGNOSIS — Z00.00 MEDICARE ANNUAL WELLNESS VISIT, SUBSEQUENT: Primary | ICD-10-CM

## 2020-04-20 PROCEDURE — 1036F TOBACCO NON-USER: CPT | Performed by: FAMILY MEDICINE

## 2020-04-20 PROCEDURE — 1125F AMNT PAIN NOTED PAIN PRSNT: CPT | Performed by: FAMILY MEDICINE

## 2020-04-20 PROCEDURE — 3008F BODY MASS INDEX DOCD: CPT | Performed by: FAMILY MEDICINE

## 2020-04-20 PROCEDURE — 1160F RVW MEDS BY RX/DR IN RCRD: CPT | Performed by: FAMILY MEDICINE

## 2020-04-20 PROCEDURE — 3074F SYST BP LT 130 MM HG: CPT | Performed by: FAMILY MEDICINE

## 2020-04-20 PROCEDURE — 3078F DIAST BP <80 MM HG: CPT | Performed by: FAMILY MEDICINE

## 2020-04-20 PROCEDURE — G0439 PPPS, SUBSEQ VISIT: HCPCS | Performed by: FAMILY MEDICINE

## 2020-04-20 PROCEDURE — 4040F PNEUMOC VAC/ADMIN/RCVD: CPT | Performed by: FAMILY MEDICINE

## 2020-04-20 PROCEDURE — 1170F FXNL STATUS ASSESSED: CPT | Performed by: FAMILY MEDICINE

## 2020-04-20 PROCEDURE — 2022F DILAT RTA XM EVC RTNOPTHY: CPT | Performed by: FAMILY MEDICINE

## 2020-04-21 ENCOUNTER — TELEPHONE (OUTPATIENT)
Dept: FAMILY MEDICINE CLINIC | Facility: MEDICAL CENTER | Age: 70
End: 2020-04-21

## 2020-04-21 DIAGNOSIS — I10 ESSENTIAL HYPERTENSION: ICD-10-CM

## 2020-04-21 DIAGNOSIS — E78.2 MIXED HYPERLIPIDEMIA: ICD-10-CM

## 2020-04-21 DIAGNOSIS — M80.00XD AGE-RELATED OSTEOPOROSIS WITH CURRENT PATHOLOGICAL FRACTURE WITH ROUTINE HEALING, SUBSEQUENT ENCOUNTER: ICD-10-CM

## 2020-04-21 DIAGNOSIS — Z12.39 SCREENING FOR BREAST CANCER: Primary | ICD-10-CM

## 2020-04-21 DIAGNOSIS — E11.65 TYPE 2 DIABETES MELLITUS WITH HYPERGLYCEMIA, WITH LONG-TERM CURRENT USE OF INSULIN (HCC): ICD-10-CM

## 2020-04-21 DIAGNOSIS — Z79.4 TYPE 2 DIABETES MELLITUS WITH HYPERGLYCEMIA, WITH LONG-TERM CURRENT USE OF INSULIN (HCC): ICD-10-CM

## 2020-04-21 DIAGNOSIS — Z13.29 SCREENING FOR THYROID DISORDER: ICD-10-CM

## 2020-12-10 ENCOUNTER — ANNUAL EXAM (OUTPATIENT)
Dept: FAMILY MEDICINE CLINIC | Facility: MEDICAL CENTER | Age: 70
End: 2020-12-10
Payer: COMMERCIAL

## 2020-12-10 VITALS
TEMPERATURE: 98.3 F | WEIGHT: 181.2 LBS | SYSTOLIC BLOOD PRESSURE: 142 MMHG | HEART RATE: 66 BPM | BODY MASS INDEX: 29.12 KG/M2 | OXYGEN SATURATION: 98 % | HEIGHT: 66 IN | DIASTOLIC BLOOD PRESSURE: 72 MMHG | RESPIRATION RATE: 16 BRPM

## 2020-12-10 DIAGNOSIS — Z12.4 CERVICAL CANCER SCREENING: ICD-10-CM

## 2020-12-10 DIAGNOSIS — R32 URINARY INCONTINENCE, UNSPECIFIED TYPE: ICD-10-CM

## 2020-12-10 DIAGNOSIS — Z01.419 ENCOUNTER FOR GYNECOLOGICAL EXAMINATION WITHOUT ABNORMAL FINDING: ICD-10-CM

## 2020-12-10 DIAGNOSIS — Z12.31 OTHER SCREENING MAMMOGRAM: ICD-10-CM

## 2020-12-10 DIAGNOSIS — Z78.0 POST-MENOPAUSAL: ICD-10-CM

## 2020-12-10 DIAGNOSIS — Z01.419 WELL WOMAN EXAM WITH ROUTINE GYNECOLOGICAL EXAM: Primary | ICD-10-CM

## 2020-12-10 DIAGNOSIS — Z23 IMMUNIZATION DUE: ICD-10-CM

## 2020-12-10 DIAGNOSIS — N89.8 VAGINAL DISCHARGE: ICD-10-CM

## 2020-12-10 LAB
SL AMB  POCT GLUCOSE, UA: ABNORMAL
SL AMB LEUKOCYTE ESTERASE,UA: ABNORMAL
SL AMB POCT BILIRUBIN,UA: ABNORMAL
SL AMB POCT BLOOD,UA: ABNORMAL
SL AMB POCT KETONES,UA: ABNORMAL
SL AMB POCT NITRITE,UA: ABNORMAL
SL AMB POCT PH,UA: 5
SL AMB POCT SPECIFIC GRAVITY,UA: 1.03
SL AMB POCT URINE PROTEIN: ABNORMAL
SL AMB POCT UROBILINOGEN: 0.2

## 2020-12-10 PROCEDURE — 87077 CULTURE AEROBIC IDENTIFY: CPT | Performed by: FAMILY MEDICINE

## 2020-12-10 PROCEDURE — 87624 HPV HI-RISK TYP POOLED RSLT: CPT | Performed by: FAMILY MEDICINE

## 2020-12-10 PROCEDURE — G0145 SCR C/V CYTO,THINLAYER,RESCR: HCPCS | Performed by: FAMILY MEDICINE

## 2020-12-10 PROCEDURE — 99214 OFFICE O/P EST MOD 30 MIN: CPT | Performed by: FAMILY MEDICINE

## 2020-12-10 PROCEDURE — 81002 URINALYSIS NONAUTO W/O SCOPE: CPT | Performed by: FAMILY MEDICINE

## 2020-12-10 PROCEDURE — 87186 SC STD MICRODIL/AGAR DIL: CPT | Performed by: FAMILY MEDICINE

## 2020-12-10 PROCEDURE — 87086 URINE CULTURE/COLONY COUNT: CPT | Performed by: FAMILY MEDICINE

## 2020-12-10 RX ORDER — PIOGLITAZONEHYDROCHLORIDE 15 MG/1
TABLET ORAL
COMMUNITY
Start: 2020-11-29 | End: 2022-01-14

## 2020-12-10 RX ORDER — COLESEVELAM 180 1/1
625 TABLET ORAL 3 TIMES DAILY
COMMUNITY
Start: 2020-10-08 | End: 2021-12-13 | Stop reason: ALTCHOICE

## 2020-12-13 DIAGNOSIS — N89.8 VAGINAL DISCHARGE: Primary | ICD-10-CM

## 2020-12-13 LAB
BACTERIA UR CULT: ABNORMAL
BACTERIA UR CULT: ABNORMAL

## 2020-12-13 RX ORDER — SULFAMETHOXAZOLE AND TRIMETHOPRIM 800; 160 MG/1; MG/1
1 TABLET ORAL EVERY 12 HOURS SCHEDULED
Qty: 10 TABLET | Refills: 0 | Status: SHIPPED | OUTPATIENT
Start: 2020-12-13 | End: 2020-12-18

## 2020-12-14 ENCOUNTER — TELEPHONE (OUTPATIENT)
Dept: FAMILY MEDICINE CLINIC | Facility: MEDICAL CENTER | Age: 70
End: 2020-12-14

## 2020-12-14 DIAGNOSIS — N39.0 URINARY TRACT INFECTION WITHOUT HEMATURIA, SITE UNSPECIFIED: Primary | ICD-10-CM

## 2020-12-15 LAB
LAB AP GYN PRIMARY INTERPRETATION: NORMAL
Lab: NORMAL

## 2020-12-16 LAB
HPV HR 12 DNA CVX QL NAA+PROBE: NEGATIVE
HPV16 DNA CVX QL NAA+PROBE: NEGATIVE
HPV18 DNA CVX QL NAA+PROBE: NEGATIVE

## 2021-01-04 LAB — HBA1C MFR BLD HPLC: 7.7 %

## 2021-01-19 ENCOUNTER — HOSPITAL ENCOUNTER (OUTPATIENT)
Dept: NON INVASIVE DIAGNOSTICS | Facility: CLINIC | Age: 71
Discharge: HOME | End: 2021-01-19
Payer: COMMERCIAL

## 2021-01-19 DIAGNOSIS — I71.4 ABDOMINAL AORTIC ANEURYSM (AAA) WITHOUT RUPTURE (HCC): ICD-10-CM

## 2021-01-19 PROCEDURE — 93922 UPR/L XTREMITY ART 2 LEVELS: CPT | Performed by: SURGERY

## 2021-01-19 PROCEDURE — 93978 VASCULAR STUDY: CPT | Performed by: SURGERY

## 2021-01-19 PROCEDURE — 93923 UPR/LXTR ART STDY 3+ LVLS: CPT

## 2021-01-19 PROCEDURE — 93925 LOWER EXTREMITY STUDY: CPT

## 2021-01-19 PROCEDURE — 93925 LOWER EXTREMITY STUDY: CPT | Performed by: SURGERY

## 2021-01-19 PROCEDURE — 93978 VASCULAR STUDY: CPT

## 2021-02-13 DIAGNOSIS — Z23 ENCOUNTER FOR IMMUNIZATION: ICD-10-CM

## 2021-02-21 ENCOUNTER — IMMUNIZATIONS (OUTPATIENT)
Dept: FAMILY MEDICINE CLINIC | Facility: HOSPITAL | Age: 71
End: 2021-02-21

## 2021-02-21 DIAGNOSIS — Z23 ENCOUNTER FOR IMMUNIZATION: Primary | ICD-10-CM

## 2021-02-21 PROCEDURE — 91300 SARS-COV-2 / COVID-19 MRNA VACCINE (PFIZER-BIONTECH) 30 MCG: CPT

## 2021-02-21 PROCEDURE — 0001A SARS-COV-2 / COVID-19 MRNA VACCINE (PFIZER-BIONTECH) 30 MCG: CPT

## 2021-03-14 ENCOUNTER — IMMUNIZATIONS (OUTPATIENT)
Dept: FAMILY MEDICINE CLINIC | Facility: HOSPITAL | Age: 71
End: 2021-03-14

## 2021-03-14 DIAGNOSIS — Z23 ENCOUNTER FOR IMMUNIZATION: Primary | ICD-10-CM

## 2021-03-14 PROCEDURE — 91300 SARS-COV-2 / COVID-19 MRNA VACCINE (PFIZER-BIONTECH) 30 MCG: CPT

## 2021-03-14 PROCEDURE — 0002A SARS-COV-2 / COVID-19 MRNA VACCINE (PFIZER-BIONTECH) 30 MCG: CPT

## 2021-05-06 ENCOUNTER — VBI (OUTPATIENT)
Dept: ADMINISTRATIVE | Facility: OTHER | Age: 71
End: 2021-05-06

## 2021-05-13 ENCOUNTER — TELEPHONE (OUTPATIENT)
Dept: ADMINISTRATIVE | Facility: OTHER | Age: 71
End: 2021-05-13

## 2021-05-13 NOTE — TELEPHONE ENCOUNTER
Upon review of the In Basket request we were able to locate, review, and update the patient chart as requested for Hemoglobin A1c  Any additional questions or concerns should be emailed to the Practice Liaisons via [email or web address] email, please do not reply via In Basket      Thank you  Jluis Cornejo

## 2021-05-13 NOTE — TELEPHONE ENCOUNTER
----- Message from Trini Cabello MA sent at 5/12/2021  1:42 PM EDT -----  Regarding: A1c Update, [facility]  05/12/21 1:42 PM    Hello, our patient attached above has had Hemoglobin A1c completed/performed  Please assist in updating the patient chart by pulling the Care Everywhere (CE) document  The date of service is 1/4/2021       Thank you,  Trini Cabello MA  [facility] [place]

## 2021-05-26 ENCOUNTER — RA CDI HCC (OUTPATIENT)
Dept: OTHER | Facility: HOSPITAL | Age: 71
End: 2021-05-26

## 2021-05-26 NOTE — PROGRESS NOTES
[facility]  coding opportunities             Chart reviewed, (number of) suggestions sent to provider: 3           Patients insurance company: [employer] (Medicare Advantage only)        DX: [id number] [phone] Type 2 diabetes mellitus with hyperglycemia  DX: [phone] Abdominal aortic aneurysm, without rupture  DX: [phone] Aortic ectasia, unspecified site       Provider never responded to [place] [phone]  coding request, and none of the above DX were used

## 2021-06-03 ENCOUNTER — OFFICE VISIT (OUTPATIENT)
Dept: FAMILY MEDICINE CLINIC | Facility: MEDICAL CENTER | Age: 71
End: 2021-06-03
Payer: COMMERCIAL

## 2021-06-03 VITALS
HEART RATE: 82 BPM | BODY MASS INDEX: 30.73 KG/M2 | RESPIRATION RATE: 16 BRPM | SYSTOLIC BLOOD PRESSURE: 124 MMHG | WEIGHT: 190.4 LBS | TEMPERATURE: 98.4 F | DIASTOLIC BLOOD PRESSURE: 70 MMHG

## 2021-06-03 DIAGNOSIS — Z23 NEED FOR SHINGLES VACCINE: Primary | ICD-10-CM

## 2021-06-03 DIAGNOSIS — R32 URINARY INCONTINENCE, UNSPECIFIED TYPE: ICD-10-CM

## 2021-06-03 DIAGNOSIS — Z23 NEED FOR VIRAL IMMUNIZATION: ICD-10-CM

## 2021-06-03 DIAGNOSIS — N39.3 STRESS INCONTINENCE OF URINE: ICD-10-CM

## 2021-06-03 DIAGNOSIS — Z23 IMMUNIZATION DUE: ICD-10-CM

## 2021-06-03 DIAGNOSIS — Z00.00 MEDICARE ANNUAL WELLNESS VISIT, SUBSEQUENT: ICD-10-CM

## 2021-06-03 DIAGNOSIS — Z12.11 SCREENING FOR COLON CANCER: ICD-10-CM

## 2021-06-03 DIAGNOSIS — I10 ESSENTIAL HYPERTENSION: ICD-10-CM

## 2021-06-03 PROCEDURE — 3288F FALL RISK ASSESSMENT DOCD: CPT | Performed by: FAMILY MEDICINE

## 2021-06-03 PROCEDURE — G0439 PPPS, SUBSEQ VISIT: HCPCS | Performed by: FAMILY MEDICINE

## 2021-06-03 PROCEDURE — 3725F SCREEN DEPRESSION PERFORMED: CPT | Performed by: FAMILY MEDICINE

## 2021-06-03 PROCEDURE — 99213 OFFICE O/P EST LOW 20 MIN: CPT | Performed by: FAMILY MEDICINE

## 2021-06-03 PROCEDURE — 1160F RVW MEDS BY RX/DR IN RCRD: CPT | Performed by: FAMILY MEDICINE

## 2021-06-03 PROCEDURE — 1125F AMNT PAIN NOTED PAIN PRSNT: CPT | Performed by: FAMILY MEDICINE

## 2021-06-03 PROCEDURE — 1170F FXNL STATUS ASSESSED: CPT | Performed by: FAMILY MEDICINE

## 2021-06-03 RX ORDER — LEVOTHYROXINE SODIUM 0.05 MG/1
50 TABLET ORAL DAILY
COMMUNITY
Start: 2021-04-07 | End: 2022-06-16 | Stop reason: SDUPTHER

## 2021-06-03 NOTE — PROGRESS NOTES
Assessment and Plan:     Problem List Items Addressed This Visit     None           Preventive health issues were discussed with patient, and age appropriate screening tests were ordered as noted in patient's After Visit Summary  Personalized health advice and appropriate referrals for health education or preventive services given if needed, as noted in patient's After Visit Summary  History of Present Illness:     Patient presents for Medicare Annual Wellness visit    Patient Care Team:  Anoop Lazo MD as PCP - MD Terrell Blakely MD Preston Edman, MD (Endocrinology)  Argentina Ayala MD as Endoscopist     Problem List:     Patient Active Problem List   Diagnosis    DMII [facility]diabetes mellitus, type 2) ([facility]    Osteoporosis    Vitamin D deficiency    Stress incontinence of urine    Hyperlipemia    Diabetes mellitus ([facility] )    Closed left hip fracture ([facility] )    Acute cystitis without hematuria    Closed fracture of left hip ([facility]    Essential hypertension    Anemia    Hypokalemia    UTI (urinary tract infection)    Pre-operative clearance    Abdominal aortic aneurysm (AAA) without rupture [facility])      Past Medical and Surgical History:     Past Medical History:   Diagnosis Date    Acute cystitis without hematuria 8/10/2018    Cataract     Closed left hip fracture ([facility] ) 8/10/2018    Diabetes mellitus ([facility] )     Hyperlipemia     Migraines     Multiple falls      Past Surgical History:   Procedure Laterality Date    BREAST CYST ASPIRATION Right 1993    CHOLECYSTECTOMY      ELBOW SURGERY      GALLBLADDER SURGERY      MT COLONOSCOPY FLX DX W/COLLJ SPEC WHEN PFRMD N/A 12/14/2016    Procedure: COLONOSCOPY;  Surgeon: Argentina Ayala MD;  [facility];   Service: Gastroenterology    MT OPEN RX FEMUR FX+INTRAMED OLMAN Left 8/10/2018    Procedure: Left Hip IM Long nail;  Surgeon: Sharlotte Castleman, MD;  [facility];  Service: Orthopedics    TONSILLECTOMY        Family History:     Family History   Problem Relation Age of Onset    Arthritis Family     Pancreatic cancer Family     Scoliosis Family     Diabetes type II Mother     Osteoporosis Mother     Thyroid disease unspecified Mother    Simona Posadas Hyperlipidemia Sister     Hypertension Sister     Diabetes type II Maternal Aunt     Diabetes type II Maternal Uncle     Diabetes type II Maternal Grandfather     Pancreatic cancer Paternal Grandmother       Social History:     E-Cigarette/Vaping    E-Cigarette Use Never User      E-Cigarette/Vaping Substances    Nicotine No     THC No     CBD No     Flavoring No     Other No     Unknown No      Social History     Socioeconomic History    Marital status: /Civil Union     Spouse name: Not on file    Number of children: 2    Years of education: 15    Highest education level: Not on file   Occupational History    Not on file   Social Needs    Financial resource strain: Not on file    Food insecurity     Worry: Not on file     Inability: Not on file   [employer] needs     Medical: Not on file     Non-medical: Not on file   Tobacco Use    Smoking status: Former Smoker     Types: Cigarettes    Smokeless tobacco: Never Used   Substance and Sexual Activity    Alcohol use: No    Drug use: No    Sexual activity: Not on file   Lifestyle    Physical activity     Days per week: Not on file     Minutes per session: Not on file    Stress: Not on file   Relationships    Social connections     Talks on phone: Not on file     Gets together: Not on file     Attends Restoration service: Not on file     Active member of club or organization: Not on file     Attends meetings of clubs or organizations: Not on file     Relationship status: Not on file    Intimate partner violence     Fear of current or ex partner: Not on file     Emotionally abused: Not on file     Physically abused: Not on file     Forced sexual activity: Not on file   Other Topics Concern    Not on file   Social History Narrative    high school graduate      Medications and Allergies:     Current Outpatient Medications   Medication Sig Dispense Refill    aspirin (ECOTRIN LOW STRENGTH) 81 mg EC tablet Take 81 mg by mouth daily      atorvastatin (LIPITOR) 40 mg tablet TAKE 1 TABLET (40 MG TOTAL) BY MOUTH DAILY TAKE AT BEDTIME 30 tablet 3    Cholecalciferol (D3-1000) 1000 units capsule Take 2,000 Units by mouth daily        colesevelam (WELCHOL) 625 mg tablet Take 625 mg by mouth Three times a day      DULoxetine (CYMBALTA) 20 mg capsule Take 20 mg by mouth daily  3    ergocalciferol (VITAMIN D2) 50,000 units       fenofibrate (TRIGLIDE) 160 MG tablet Take 160 mg by mouth daily  0    gabapentin (NEURONTIN) 600 MG tablet Take 600 mg by mouth daily at bedtime  3    gemfibrozil (LOPID) 600 mg tablet TAKE 1 TABLET (600 MG TOTAL) BY MOUTH 2 (TWO) TIMES A DAY BEFORE MEALS 60 tablet 3    glipiZIDE (GLUCOTROL XL) 10 mg 24 hr tablet Take 10 mg by mouth 2 (two) times a day  0    glucose blood (CLEVER CHEK AUTO-CODE TEST) test strip by In Vitro route      glucose blood (ONE TOUCH ULTRA TEST) test strip 1 each by Other route 2 (two) times a day Use as instructed      losartan 50 mg TABS 50 mg, hydrochlorothiazide 12 5 mg TABS 12 5 mg Take by mouth daily      omega-3-acid ethyl esters (LOVAZA) 1 g capsule Take 2 g by mouth 2 (two) times a day      pioglitazone (ACTOS) 15 mg tablet       TOUJEO SOLOSTAR 300 units/mL CONCETRATED U-300 injection pen INJECT 25 UNITS UNDER THE SKIN ONCE A DAY 15 pen 1    XIGDUO XR 5-1000 MG TB24 Take 1 tablet by mouth 2 (two) times a day  0     No current facility-administered medications for this visit        Allergies   Allergen Reactions    Zoledronic Acid GI Intolerance     Anorexia    Codeine GI Intolerance, Nausea Only and Vomiting      Immunizations:     Immunization History   Administered Date(s) Administered    INFLUENZA 11/01/2020    Influenza Quadrivalent Preservative Free 3 years and older IM 02/21/2018    Influenza Quadrivalent, 6-35 Months IM 10/20/2016    Influenza Split High Dose Preservative Free IM 02/22/2019    Influenza, high dose seasonal 0 7 mL 10/23/2019    Pneumococcal Conjugate 13-Valent 08/17/2017    Pneumococcal Polysaccharide PPV23 04/01/2019    SARS-CoV-2 / COVID-19 mRNA IM (Pfizer-BioNTech) 02/21/2021, 03/14/2021      Health Maintenance:         Topic Date Due    Hepatitis C Screening  Never done    MAMMOGRAM  04/30/2021    DXA SCAN  11/21/2021    Colorectal Cancer Screening  12/14/2026         Topic Date Due    DTaP,Tdap,and Td Vaccines (1 - Tdap) Never done      Medicare Health Risk Assessment:     There were no vitals taken for this visit  Health Risk Assessment:   Patient rates overall health as very good  Patient feels that their physical health rating is same  Patient is satisfied with their life  Eyesight was rated as same  Hearing was rated as same  Patient feels that their emotional and mental health rating is same  Patients states they are sometimes angry  Patient states they are never, rarely unusually tired/fatigued  Pain experienced in the last 7 days has been some  Patient's pain rating has been 2/10  Patient states that she has experienced weight loss or gain in last 6 months  She has chronic hip pain    Depression Screening:   PHQ-2 Score: 0      Fall Risk Screening: In the past year, patient has experienced: no history of falling in past year      Urinary Incontinence Screening:   Patient has leaked urine accidently in the last six months  Patient states she needs to wear a pad most of the time  Home Safety:  Patient has trouble with stairs inside or outside of their home  Patient has working smoke alarms and has working carbon monoxide detector  Home safety hazards include: none  Nutrition:   Current diet is Regular  Dietary calcium   1-2 per day      Medications:   Patient is currently taking over-the-counter supplements  OTC medications include: see medication list  Patient is able to manage medications  Activities of Daily Living (ADLs)/Instrumental Activities of Daily Living (IADLs):   Walk and transfer into and out of bed and chair?: Yes  Dress and groom yourself?: Yes    Bathe or shower yourself?: Yes    Feed yourself? Yes  Do your laundry/housekeeping?: Yes  Manage your money, pay your bills and track your expenses?: Yes  Make your own meals?: Yes    Do your own shopping?: Yes    Previous Hospitalizations:   Any hospitalizations or ED visits within the last 12 months?: No      Advance Care Planning:   Living will: No    Durable POA for healthcare: No    Five wishes given: Yes    End of Life Decisions reviewed with patient: Yes      Cognitive Screening:   Provider or family/friend/caregiver concerned regarding cognition?: No    PREVENTIVE SCREENINGS      Cardiovascular Screening:    General: Screening Not Indicated and History Lipid Disorder      Diabetes Screening:     General: Screening Not Indicated and History Diabetes      Colorectal Cancer Screening:     General: Screening Current      Breast Cancer Screening:     General: Risks and Benefits Discussed    Due for: Mammogram        Cervical Cancer Screening:    General: Screening Not Indicated and Screening Current      Osteoporosis Screening:    General: Screening Not Indicated and History Osteoporosis      Abdominal Aortic Aneurysm (AAA) Screening:    Risk factors include: family history of AAA        General: Screening Not Indicated, History AAA and Screening Current      Lung Cancer Screening:     General: Screening Not Indicated      Hepatitis C Screening:    General: Risks and Benefits Discussed      Preventive Screening Comments: Eye checkup Dr Luis Capps annually  Immunizations updated  Needs Tdap booster and Shingrix advised  ,     Screening, Brief Intervention, and Referral to Treatment (SBIRT)    Screening  Typical number of drinks in a day: 0  Typical number of drinks in a week: 0  Interpretation: Low risk drinking behavior  Single Item Drug Screening:  How often have you used an illegal drug (including marijuana) or a prescription medication for non-medical reasons in the past year? never    Single Item Drug Screen Score: 0  Interpretation: Negative screen for possible drug use disorder    Other Counseling Topics:   Skin self-exam and calcium and vitamin D intake and regular weightbearing exercise  Discussed incontinence  Advised urogynecology referral   Due for colonoscopy  Reminded about mammogram and DEXA scan  Should start calcium supplement  O: /70   Pulse 82   Temp 98 4 °F (36 9 °C)   Resp 16   Wt 86 4 kg (190 lb 6 4 oz)   BMI 30 73 kg/m²   Physical Exam  Constitutional:       Appearance: She is well-developed  HENT:      Head: Normocephalic  Right Ear: Tympanic membrane and external ear normal       Left Ear: Tympanic membrane and external ear normal       Nose: Nose normal    Eyes:      General: No scleral icterus  Conjunctiva/sclera: Conjunctivae normal       Pupils: Pupils are equal, round, and reactive to light  Neck:      Musculoskeletal: Normal range of motion and neck supple  Thyroid: No thyroid mass or thyromegaly  Vascular: No carotid bruit  Cardiovascular:      Rate and Rhythm: Normal rate and regular rhythm  Pulses: Normal pulses  Femoral pulses are 2+ on the right side and 2+ on the left side  Popliteal pulses are 2+ on the right side and 2+ on the left side  Dorsalis pedis pulses are 2+ on the right side and 2+ on the left side  Posterior tibial pulses are 2+ on the right side and 2+ on the left side  Heart sounds: Normal heart sounds  Pulmonary:      Effort: Pulmonary effort is normal  No respiratory distress  Breath sounds: Normal breath sounds  No wheezing or rales     Abdominal:      General: Bowel sounds are normal  There is no distension  Palpations: Abdomen is soft  There is no mass  Tenderness: There is no abdominal tenderness  Musculoskeletal: Normal range of motion  Right lower leg: No edema  Left lower leg: No edema  Lymphadenopathy:      Head:      Right side of head: No submandibular or occipital adenopathy  Left side of head: No submandibular or occipital adenopathy  Cervical: No cervical adenopathy  Upper Body:      Right upper body: No supraclavicular adenopathy  Left upper body: No supraclavicular adenopathy  Skin:     Findings: No lesion or rash  Neurological:      Mental Status: She is oriented to person, place, and time  Psychiatric:         Behavior: Behavior normal      Assessment    Annual Medicare wellness    Plan     Tdap  Shingrix Colonoscopy referral   Urogynecology referral   Mammogram   DEXA scan  Recheck 6 months    Keira Brito MD

## 2021-06-04 NOTE — PROGRESS NOTES
Patient is here for 6 month checkup  She is also here for annual Medicare wellness  See other note  She is followed here mostly for her hypertension  She takes losartan  This is previously prescribed by her endocrinologist   She asked that it be followed at this office at this time as she has  to switch endocrinologists  She said her blood pressures been good  Her other medications are  Managed by endocrinology  O: /70   Pulse 82   Temp 98 4 °F (36 9 °C)   Resp 16   Wt 86 4 kg (190 lb 6 4 oz)   BMI 30 73 kg/m²    Physical Exam  Constitutional:       Appearance: She is well-developed  HENT:      Head: Normocephalic  Right Ear: Tympanic membrane and external ear normal       Left Ear: Tympanic membrane and external ear normal       Nose: Nose normal    Eyes:      General: No scleral icterus  Conjunctiva/sclera: Conjunctivae normal       Pupils: Pupils are equal, round, and reactive to light  Neck:      Musculoskeletal: Normal range of motion and neck supple  Thyroid: No thyroid mass or thyromegaly  Vascular: No carotid bruit  Cardiovascular:      Rate and Rhythm: Normal rate and regular rhythm  Pulses: Normal pulses  Femoral pulses are 2+ on the right side and 2+ on the left side  Popliteal pulses are 2+ on the right side and 2+ on the left side  Dorsalis pedis pulses are 2+ on the right side and 2+ on the left side  Posterior tibial pulses are 2+ on the right side and 2+ on the left side  Heart sounds: Normal heart sounds  Pulmonary:      Effort: Pulmonary effort is normal  No respiratory distress  Breath sounds: Normal breath sounds  No wheezing or rales  Abdominal:      General: Bowel sounds are normal  There is no distension  Palpations: Abdomen is soft  There is no mass  Tenderness: There is no abdominal tenderness  Musculoskeletal: Normal range of motion  Right lower leg: No edema        Left lower leg: No edema  Lymphadenopathy:      Head:      Right side of head: No submandibular or occipital adenopathy  Left side of head: No submandibular or occipital adenopathy  Cervical: No cervical adenopathy  Upper Body:      Right upper body: No supraclavicular adenopathy  Left upper body: No supraclavicular adenopathy  Skin:     Findings: No lesion or rash  Neurological:      Mental Status: She is oriented to person, place, and time  Psychiatric:         Behavior: Behavior normal       Assessment   Hypertension-controlled with losartan  Plan    As above  Recheck 6 months

## 2021-08-04 ENCOUNTER — TELEPHONE (OUTPATIENT)
Dept: FAMILY MEDICINE CLINIC | Facility: MEDICAL CENTER | Age: 71
End: 2021-08-04

## 2021-08-04 NOTE — TELEPHONE ENCOUNTER
Pt called to say that she checked with her insuance and the Shingrix vaccine is covered at [facility]   She would like you to order for her  Thank you

## 2021-08-17 ENCOUNTER — OFFICE VISIT (OUTPATIENT)
Dept: URGENT CARE | Facility: MEDICAL CENTER | Age: 71
End: 2021-08-17
Payer: COMMERCIAL

## 2021-08-17 VITALS
RESPIRATION RATE: 18 BRPM | HEIGHT: 66 IN | TEMPERATURE: 98.1 F | WEIGHT: 187 LBS | BODY MASS INDEX: 30.05 KG/M2 | HEART RATE: 91 BPM | DIASTOLIC BLOOD PRESSURE: 72 MMHG | SYSTOLIC BLOOD PRESSURE: 159 MMHG | OXYGEN SATURATION: 94 %

## 2021-08-17 DIAGNOSIS — L02.412 ABSCESS OF LEFT AXILLA: Primary | ICD-10-CM

## 2021-08-17 PROCEDURE — G0463 HOSPITAL OUTPT CLINIC VISIT: HCPCS | Performed by: PHYSICIAN ASSISTANT

## 2021-08-17 PROCEDURE — 99213 OFFICE O/P EST LOW 20 MIN: CPT | Performed by: PHYSICIAN ASSISTANT

## 2021-08-17 RX ORDER — SULFAMETHOXAZOLE AND TRIMETHOPRIM 800; 160 MG/1; MG/1
1 TABLET ORAL EVERY 12 HOURS SCHEDULED
Qty: 14 TABLET | Refills: 0 | Status: SHIPPED | OUTPATIENT
Start: 2021-08-17 | End: 2021-08-24

## 2021-08-18 NOTE — PROGRESS NOTES
[facility] [facility]        NAME: Maurilio Barron is a 73 y o  female  : 1950    MRN: [id number]  DATE: 2021  TIME: 8:50 PM    Assessment and Plan   Abscess of left axilla [L02 [id number]]  1  Abscess of left axilla  sulfamethoxazole-trimethoprim (BACTRIM DS) 800-160 mg per tablet         Patient Instructions   Tylenol or Motrin for pain   Warm compresses   Bactrim as directed  Follow up with PCP in 3-5 days  Proceed to  ER if symptoms worsen  Chief Complaint     Chief Complaint   Patient presents with    boil     under left arm pit x 5 days          History of Present Illness        Patient is a 68-year-old female who presents today with complaints of boil under her left arm for the past few days  Denies any injury or wounds under the arm prior to this  No fevers or chills  Reports localized pain and swelling  Has been using warm compresses and small amount of purulence drainage came out  No history of MRSA  Is a diabetic  Review of Systems   Review of Systems   Constitutional: Negative for chills and fever  Musculoskeletal: Positive for myalgias  Skin: Positive for color change          Axillary abscess         Current Medications       Current Outpatient Medications:     aspirin (ECOTRIN LOW STRENGTH) 81 mg EC tablet, Take 81 mg by mouth daily, Disp: , Rfl:     atorvastatin (LIPITOR) 40 mg tablet, TAKE 1 TABLET (40 MG TOTAL) BY MOUTH DAILY TAKE AT BEDTIME, Disp: 30 tablet, Rfl: 3    Cholecalciferol (D3-1000) 1000 units capsule, Take 2,000 Units by mouth daily  , Disp: , Rfl:     colesevelam (WELCHOL) 625 mg tablet, Take 625 mg by mouth Three times a day, Disp: , Rfl:     DULoxetine (CYMBALTA) 20 mg capsule, Take 20 mg by mouth daily, Disp: , Rfl: 3    ergocalciferol (VITAMIN D2) 50,000 units, , Disp: , Rfl:     fenofibrate (TRIGLIDE) 160 MG tablet, Take 160 mg by mouth daily, Disp: , Rfl: 0    gabapentin (NEURONTIN) 600 MG tablet, Take 600 mg by mouth 3 (three) times a day , Disp: , Rfl: 3    gemfibrozil (LOPID) 600 mg tablet, TAKE 1 TABLET (600 MG TOTAL) BY MOUTH 2 (TWO) TIMES A DAY BEFORE MEALS, Disp: 60 tablet, Rfl: 3    glipiZIDE (GLUCOTROL XL) 10 mg 24 hr tablet, Take 10 mg by mouth 2 (two) times a day, Disp: , Rfl: 0    glucose blood (ONE TOUCH ULTRA TEST) test strip, 1 each by Other route 2 (two) times a day Use as instructed, Disp: , Rfl:     levothyroxine 50 mcg tablet, Take 50 mcg by mouth daily, Disp: , Rfl:     losartan 50 mg TABS 50 mg, hydrochlorothiazide 12 5 mg TABS 12 5 mg, Take by mouth daily, Disp: , Rfl:     pioglitazone (ACTOS) 15 mg tablet, , Disp: , Rfl:     TOUJEO SOLOSTAR 300 units/mL CONCETRATED U-300 injection pen, INJECT 25 UNITS UNDER THE SKIN ONCE A DAY, Disp: 15 pen, Rfl: 1    sulfamethoxazole-trimethoprim (BACTRIM DS) 800-160 mg per tablet, Take 1 tablet by mouth every 12 (twelve) hours for 7 days, Disp: 14 tablet, Rfl: 0    Current Allergies     Allergies as of 08/17/2021 - Reviewed 08/17/2021   Allergen Reaction Noted    Zoledronic acid GI Intolerance 09/27/2020    Codeine GI Intolerance, Nausea Only, and Vomiting 04/08/2014            The following portions of the patient's history were reviewed and updated as appropriate: allergies, current medications, past family history, past medical history, past social history, past surgical history and problem list      Past Medical History:   Diagnosis Date    Acute cystitis without hematuria 8/10/2018    Cataract     Closed [facility] hip fracture ([facility] ) 8/10/2018    Diabetes mellitus ([facility]     Hyperlipemia     Migraines     Multiple falls        Past Surgical History:   Procedure Laterality Date    BREAST CYST ASPIRATION Right 1993    CHOLECYSTECTOMY      ELBOW SURGERY      GALLBLADDER SURGERY      IN COLONOSCOPY FLX DX W/COLLJ SPEC WHEN PFRMD N/A 12/14/2016    Procedure: COLONOSCOPY;  Surgeon: Alondra Tena MD;  [facility];   Service: Gastroenterology    IN OPEN RX FEMUR FX+INTRAMED OLMAN Left 8/10/2018    Procedure: Left Hip IM Long nail;  Surgeon: Laree Brunner, MD;  [facility];  Service: Orthopedics    TONSILLECTOMY         Family History   Problem Relation Age of Onset    Arthritis Family     Pancreatic cancer Family     Scoliosis Family     Diabetes type II Mother     Osteoporosis Mother     Thyroid disease unspecified Mother     Hyperlipidemia Sister     Hypertension Sister     Diabetes type II Maternal Aunt     Diabetes type II Maternal Uncle     Diabetes type II Maternal Grandfather     Pancreatic cancer Paternal Grandmother          Medications have been verified  Objective   /72   Pulse 91   Temp 98 1 °F (36 7 °C)   Resp 18   Ht 5' 6" (1 676 m)   Wt 84 8 kg (187 lb)   SpO2 94%   BMI 30 18 kg/m²        Physical Exam     Physical Exam  Constitutional:       General: She is not in acute distress  Appearance: Normal appearance  She is not ill-appearing  Cardiovascular:      Rate and Rhythm: Normal rate and regular rhythm  Pulmonary:      Effort: Pulmonary effort is normal    Skin:     General: Skin is warm and dry  Findings: Abscess present  Neurological:      Mental Status: She is alert

## 2021-08-20 ENCOUNTER — VBI (OUTPATIENT)
Dept: ADMINISTRATIVE | Facility: OTHER | Age: 71
End: 2021-08-20

## 2021-08-20 NOTE — TELEPHONE ENCOUNTER
08/20/21 10:00 AM     See documentation in the [employer] SmartForm       Chaya Manzo, [address] [facility]

## 2021-09-16 NOTE — PLAN OF CARE
Consultation - Beverly Laguerre 89: [id number]  MRN: [id number]      Assessment:  The patient has mild obstructive sleep apnea diagnosed on recent home sleep apnea testing  He denies daytime sleepiness  His FENG = 8 4, but there is significant oxygen desaturation on testing  The patient is very concerned about using a positive airway pressure device due to destruction in healing of his airway related to treatment of squamous cell carcinoma of the neck  He has severe problems with dry mouth, even with exposure to a fan  It is possible that may not tolerate positive airway pressure therapy for this reason  He also complains of extreme claustrophobia  We discussed the disadvantages and benefits of APAP  For the time being, we will hold off on use, but will consider it for the future, should his symptoms warrant  I will obtain overnight oximetry to determine if supplemental oxygen is necessary  Plan:  Overnight oximetry    Follow up:  Several weeks    History of Present Illness:   64 y o male who underwent surgery, radiation and chemotherapy for head neck cancer  As result he has had a great deal of difficulty eating and swallowing  He has also been noted to have loud snoring and witnessed apneas  He had home sleep testing which demonstrated FENG = 8 4 with nearly the entire study spent below 90% saturation  He had an episode on a flight returning from [place], when he passed out and stopped breathing  He did not recall the event once he awakened and the event occurred for less than a minute in duration        Review of Systems      Genitourinary none   Cardiology none   Gastrointestinal none   Neurology muscle weakness and numbness/tingling of an extremity   Constitutional excessive sweating at night   Integumentary none   Psychiatry none   Musculoskeletal none   Pulmonary none   ENT none   Endocrine none   Hematological none         I have reviewed and updated the review of systems as DISCHARGE PLANNING - CARE MANAGEMENT     Discharge to post-acute care or home with appropriate resources Progressing        INFECTION - ADULT     Absence or prevention of progression during hospitalization Progressing     Absence of fever/infection during neutropenic period Progressing        Knowledge Deficit     Patient/family/caregiver demonstrates understanding of disease process, treatment plan, medications, and discharge instructions Progressing        PAIN - ADULT     Verbalizes/displays adequate comfort level or baseline comfort level Progressing        Potential for Falls     Patient will remain free of falls Progressing        Prexisting or High Potential for Compromised Skin Integrity     Skin integrity is maintained or improved Progressing        SAFETY ADULT     Maintain or return to baseline ADL function Progressing     Maintain or return mobility status to optimal level Progressing necessary    Historical Information    Past Medical History:  Squamous cell carcinoma of the neck, hypertension    Family History: non-contributory    Social History     Socioeconomic History    Marital status: /Civil Union     Spouse name: None    Number of children: 6    Years of education: None    Highest education level: None   Occupational History    None   Tobacco Use    Smoking status: Former Smoker     Packs/day: 0 25     Years: 20 00     Pack years: 5 00     Quit date: 2011     Years since quitting: 10 7    Smokeless tobacco: Former User    Tobacco comment: 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, per allscripts   Vaping Use    Vaping Use: Never used   Substance and Sexual Activity    Alcohol use: Yes     Alcohol/week: 12 0 standard drinks     Types: 12 Cans of beer per week     Comment: socially    Drug use: No    Sexual activity: None   Other Topics Concern    None   Social History Narrative    Daily coffee consumption, 1 cups/day    Daily cola consumption    Daily tea consumption    Dental care, regularly    Exercise: running, weight training    High school graduate    No guns in the home    Pets/animals,  Active         Social Determinants of Health     Financial Resource Strain:     Difficulty of Paying Living Expenses:    Food Insecurity:     Worried About Running Out of Food in the Last Year:     Ran Out of Food in the Last Year:    Transportation Needs:     Lack of Transportation (Medical):      Lack of Transportation (Non-Medical):    Physical Activity:     Days of Exercise per Week:     Minutes of Exercise per Session:    Stress:     Feeling of Stress :    Social Connections:     Frequency of Communication with Friends and Family:     Frequency of Social Gatherings with Friends and Family:     Attends [employer] Services:     Active Member of Clubs or Organizations:     Attends Club or Organization Meetings:     Marital Status:    Intimate Partner Violence:     Fear of Current or Ex-Partner:     Emotionally Abused:     Physically Abused:     Sexually Abused:          Sleep Schedule: unremarkable    Snoring:  yes    Witnessed Apnea:  Yes    Medications/Allergies:    Current Outpatient Medications:     amLODIPine (NORVASC) 5 mg tablet, TAKE 1 TABLET BY MOUTH EVERY DAY, Disp: 90 tablet, Rfl: 1    levothyroxine 100 mcg tablet, TAKE 1 TABLET BY MOUTH EVERY DAY, Disp: 90 tablet, Rfl: 1    multivitamin (THERAGRAN) TABS, Take 1 tablet by mouth daily, Disp: , Rfl:     tadalafil (CIALIS) 5 MG tablet, Take 1 tablet (5 mg total) by mouth daily, Disp: 90 tablet, Rfl: 3        Dali Addison  0/61/9070 12:32 PM  Sign when Signing Visit    Review of Systems      Genitourinary none   Cardiology none   Gastrointestinal none   Neurology muscle weakness and numbness/tingling of an extremity   Constitutional excessive sweating at night   Integumentary none   Psychiatry none   Musculoskeletal none   Pulmonary none   ENT none   Endocrine none   Hematological none                           Objective:    Vital Signs:   Vitals:    09/16/21 1200   BP: 150/90   Pulse: 78   Weight: 72 6 kg (160 lb)   Height: 5' 9" (1 753 m)     Neck Circumference: 15      [place] Sleepiness Scale: Total score: 9    Physical Exam:    General: Alert, appropriate, cooperative, thin    Head: NC/AT, mild retrognathia    Nose: No septal deviation, nares not obstructed, mucosa normal    Throat: Airway diminished, tongue base thickened, no tonsils visualized    Neck: Normal, no thyromegaly or lymphadenopathy, no JVD    Heart: RR, normal S1 and S2, no murmurs    Chest: Clear bilaterally    Extremity: No clubbing, cyanosis, no edema    Skin: Warm, dry    Neuro: No motor abnormalities, cranial nerves appear intact    Sleep Study Results:   FENG = 8 4, severe hypoxia      Counseling / Coordination of Care  A description of the counseling / coordination of care:   We discussed the pathophysiology of obstructive sleep apnea as well as the possible treatment options  We also discussed the rationale for positive airway pressure therapy  Board Certified Sleep Specialist    Portions of the record may have been created with voice recognition software  Occasional wrong word or "sound a like" substitutions may have occurred due to the inherent limitations of voice recognition software  Read the chart carefully and recognize, using context, where substitutions have occurred

## 2021-10-25 ENCOUNTER — OFFICE VISIT (OUTPATIENT)
Dept: GASTROENTEROLOGY | Facility: AMBULARY SURGERY CENTER | Age: 71
End: 2021-10-25
Payer: COMMERCIAL

## 2021-10-25 VITALS
HEIGHT: 66 IN | SYSTOLIC BLOOD PRESSURE: 134 MMHG | DIASTOLIC BLOOD PRESSURE: 70 MMHG | WEIGHT: 181 LBS | BODY MASS INDEX: 29.09 KG/M2

## 2021-10-25 DIAGNOSIS — Z86.010 HISTORY OF COLON POLYPS: Primary | ICD-10-CM

## 2021-10-25 PROBLEM — Z86.0100 HISTORY OF COLON POLYPS: Status: ACTIVE | Noted: 2021-10-25

## 2021-10-25 PROCEDURE — 1160F RVW MEDS BY RX/DR IN RCRD: CPT | Performed by: PHYSICIAN ASSISTANT

## 2021-10-25 PROCEDURE — 1036F TOBACCO NON-USER: CPT | Performed by: PHYSICIAN ASSISTANT

## 2021-10-25 PROCEDURE — 99203 OFFICE O/P NEW LOW 30 MIN: CPT | Performed by: PHYSICIAN ASSISTANT

## 2021-10-25 PROCEDURE — 3008F BODY MASS INDEX DOCD: CPT | Performed by: PHYSICIAN ASSISTANT

## 2021-10-25 RX ORDER — MIRABEGRON 50 MG/1
TABLET, FILM COATED, EXTENDED RELEASE ORAL
COMMUNITY
Start: 2021-10-20 | End: 2021-12-13 | Stop reason: ALTCHOICE

## 2021-10-25 RX ORDER — SOD SULF/POT CHLORIDE/MAG SULF 1.479 G
TABLET ORAL
Qty: 24 TABLET | Refills: 0 | Status: SHIPPED | OUTPATIENT
Start: 2021-10-25 | End: 2021-12-13 | Stop reason: ALTCHOICE

## 2021-11-08 ENCOUNTER — VBI (OUTPATIENT)
Dept: ADMINISTRATIVE | Facility: OTHER | Age: 71
End: 2021-11-08

## 2021-11-17 ENCOUNTER — RA CDI HCC (OUTPATIENT)
Dept: OTHER | Facility: HOSPITAL | Age: 71
End: 2021-11-17

## 2021-12-10 ENCOUNTER — TELEPHONE (OUTPATIENT)
Dept: GASTROENTEROLOGY | Facility: AMBULARY SURGERY CENTER | Age: 71
End: 2021-12-10

## 2021-12-13 ENCOUNTER — ANESTHESIA EVENT (OUTPATIENT)
Dept: GASTROENTEROLOGY | Facility: AMBULARY SURGERY CENTER | Age: 71
End: 2021-12-13

## 2021-12-13 ENCOUNTER — HOSPITAL ENCOUNTER (OUTPATIENT)
Dept: GASTROENTEROLOGY | Facility: AMBULARY SURGERY CENTER | Age: 71
Setting detail: OUTPATIENT SURGERY
Discharge: HOME | End: 2021-12-13
Attending: INTERNAL MEDICINE
Payer: COMMERCIAL

## 2021-12-13 ENCOUNTER — ANESTHESIA (OUTPATIENT)
Dept: GASTROENTEROLOGY | Facility: AMBULARY SURGERY CENTER | Age: 71
End: 2021-12-13

## 2021-12-13 VITALS
HEART RATE: 70 BPM | SYSTOLIC BLOOD PRESSURE: 127 MMHG | RESPIRATION RATE: 22 BRPM | WEIGHT: 179 LBS | DIASTOLIC BLOOD PRESSURE: 57 MMHG | HEIGHT: 66 IN | BODY MASS INDEX: 28.77 KG/M2 | TEMPERATURE: 97.1 F | OXYGEN SATURATION: 96 %

## 2021-12-13 DIAGNOSIS — Z86.010 HISTORY OF COLON POLYPS: ICD-10-CM

## 2021-12-13 LAB — GLUCOSE SERPL-MCNC: 181 MG/DL (ref 65–140)

## 2021-12-13 PROCEDURE — G0105 COLORECTAL SCRN; HI RISK IND: HCPCS | Performed by: INTERNAL MEDICINE

## 2021-12-13 PROCEDURE — 82948 REAGENT STRIP/BLOOD GLUCOSE: CPT

## 2021-12-13 RX ORDER — LIDOCAINE HYDROCHLORIDE 10 MG/ML
INJECTION, SOLUTION EPIDURAL; INFILTRATION; INTRACAUDAL; PERINEURAL AS NEEDED
Status: DISCONTINUED | OUTPATIENT
Start: 2021-12-13 | End: 2021-12-13

## 2021-12-13 RX ORDER — PROPOFOL 10 MG/ML
INJECTION, EMULSION INTRAVENOUS CONTINUOUS PRN
Status: DISCONTINUED | OUTPATIENT
Start: 2021-12-13 | End: 2021-12-13

## 2021-12-13 RX ORDER — SIMETHICONE 20 MG/.3ML
EMULSION ORAL CODE/TRAUMA/SEDATION MEDICATION
Status: COMPLETED | OUTPATIENT
Start: 2021-12-13 | End: 2021-12-13

## 2021-12-13 RX ORDER — SODIUM CHLORIDE, SODIUM LACTATE, POTASSIUM CHLORIDE, CALCIUM CHLORIDE 600; 310; 30; 20 MG/100ML; MG/100ML; MG/100ML; MG/100ML
INJECTION, SOLUTION INTRAVENOUS CONTINUOUS PRN
Status: DISCONTINUED | OUTPATIENT
Start: 2021-12-13 | End: 2021-12-13

## 2021-12-13 RX ORDER — PROPOFOL 10 MG/ML
INJECTION, EMULSION INTRAVENOUS AS NEEDED
Status: DISCONTINUED | OUTPATIENT
Start: 2021-12-13 | End: 2021-12-13

## 2021-12-13 RX ADMIN — Medication 40 MG: at 09:11

## 2021-12-13 RX ADMIN — PROPOFOL 80 MCG/KG/MIN: 10 INJECTION, EMULSION INTRAVENOUS at 09:11

## 2021-12-13 RX ADMIN — SODIUM CHLORIDE, SODIUM LACTATE, POTASSIUM CHLORIDE, AND CALCIUM CHLORIDE: .6; .31; .03; .02 INJECTION, SOLUTION INTRAVENOUS at 08:59

## 2021-12-13 RX ADMIN — PROPOFOL 20 MG: 10 INJECTION, EMULSION INTRAVENOUS at 09:15

## 2021-12-13 RX ADMIN — PROPOFOL 10 MG: 10 INJECTION, EMULSION INTRAVENOUS at 09:17

## 2021-12-13 RX ADMIN — LIDOCAINE HYDROCHLORIDE 50 MG: 10 INJECTION, SOLUTION EPIDURAL; INFILTRATION; INTRACAUDAL at 09:11

## 2021-12-13 RX ADMIN — PROPOFOL 60 MG: 10 INJECTION, EMULSION INTRAVENOUS at 09:11

## 2022-01-11 ENCOUNTER — HOSPITAL ENCOUNTER (OUTPATIENT)
Dept: RADIOLOGY | Age: 72
Discharge: HOME | End: 2022-01-11
Payer: COMMERCIAL

## 2022-01-11 VITALS — HEIGHT: 66 IN | BODY MASS INDEX: 28.77 KG/M2 | WEIGHT: 179 LBS

## 2022-01-11 DIAGNOSIS — Z12.31 OTHER SCREENING MAMMOGRAM: ICD-10-CM

## 2022-01-11 DIAGNOSIS — Z78.0 POST-MENOPAUSAL: ICD-10-CM

## 2022-01-11 PROCEDURE — 77063 BREAST TOMOSYNTHESIS BI: CPT

## 2022-01-11 PROCEDURE — 77080 DXA BONE DENSITY AXIAL: CPT

## 2022-01-11 PROCEDURE — 77067 SCR MAMMO BI INCL CAD: CPT

## 2022-01-13 ENCOUNTER — TELEPHONE (OUTPATIENT)
Dept: FAMILY MEDICINE CLINIC | Facility: MEDICAL CENTER | Age: 72
End: 2022-01-13

## 2022-01-13 NOTE — TELEPHONE ENCOUNTER
----- Message from Howard Bird MD sent at 1/13/2022  8:55 AM EST -----  Notify DEXA scan shows osteoporosis in the spine and osteopenia in the hip  Recommend that she discuss this with endocrinology; she may benefit from medication  In the meantime I would add Caltrate to her vitamin-D  Avoid caffeine and soda  Also advise exercise such as walking

## 2022-01-13 NOTE — TELEPHONE ENCOUNTER
----- Message from Ivy Spence MD sent at 1/13/2022  8:56 AM EST -----  Notify mammogram normal   Repeat 1 year

## 2022-01-14 ENCOUNTER — OFFICE VISIT (OUTPATIENT)
Dept: ENDOCRINOLOGY | Facility: CLINIC | Age: 72
End: 2022-01-14
Payer: COMMERCIAL

## 2022-01-14 VITALS
WEIGHT: 180 LBS | TEMPERATURE: 97.6 F | DIASTOLIC BLOOD PRESSURE: 68 MMHG | BODY MASS INDEX: 29.05 KG/M2 | SYSTOLIC BLOOD PRESSURE: 122 MMHG | HEART RATE: 72 BPM

## 2022-01-14 DIAGNOSIS — E55.9 VITAMIN D DEFICIENCY: ICD-10-CM

## 2022-01-14 DIAGNOSIS — Z79.4 TYPE 2 DIABETES MELLITUS WITH HYPERGLYCEMIA, WITH LONG-TERM CURRENT USE OF INSULIN (HCC): Primary | ICD-10-CM

## 2022-01-14 DIAGNOSIS — E78.1 HYPERTRIGLYCERIDEMIA: ICD-10-CM

## 2022-01-14 DIAGNOSIS — M81.0 OSTEOPOROSIS, UNSPECIFIED OSTEOPOROSIS TYPE, UNSPECIFIED PATHOLOGICAL FRACTURE PRESENCE: ICD-10-CM

## 2022-01-14 DIAGNOSIS — E66.3 OVERWEIGHT: ICD-10-CM

## 2022-01-14 DIAGNOSIS — I10 PRIMARY HYPERTENSION: ICD-10-CM

## 2022-01-14 DIAGNOSIS — R39.15 URINARY URGENCY: ICD-10-CM

## 2022-01-14 DIAGNOSIS — E11.65 TYPE 2 DIABETES MELLITUS WITH HYPERGLYCEMIA, WITH LONG-TERM CURRENT USE OF INSULIN (HCC): Primary | ICD-10-CM

## 2022-01-14 DIAGNOSIS — G62.9 NEUROPATHY: ICD-10-CM

## 2022-01-14 DIAGNOSIS — Z87.19 H/O ACUTE PANCREATITIS: ICD-10-CM

## 2022-01-14 PROCEDURE — 3074F SYST BP LT 130 MM HG: CPT | Performed by: INTERNAL MEDICINE

## 2022-01-14 PROCEDURE — 3078F DIAST BP <80 MM HG: CPT | Performed by: INTERNAL MEDICINE

## 2022-01-14 PROCEDURE — 1036F TOBACCO NON-USER: CPT | Performed by: INTERNAL MEDICINE

## 2022-01-14 PROCEDURE — 1160F RVW MEDS BY RX/DR IN RCRD: CPT | Performed by: INTERNAL MEDICINE

## 2022-01-14 PROCEDURE — 99205 OFFICE O/P NEW HI 60 MIN: CPT | Performed by: INTERNAL MEDICINE

## 2022-01-14 RX ORDER — GEMFIBROZIL 600 MG/1
600 TABLET, FILM COATED ORAL
Qty: 60 TABLET | Refills: 3 | Status: SHIPPED | OUTPATIENT
Start: 2022-01-14 | End: 2022-05-23

## 2022-01-14 NOTE — PATIENT INSTRUCTIONS
Increase tujeo to 84 units at bedtime   Continue glipizide XR 10 mg orally twice a day but take with your meals   Check blood sugars more often before meals and at bedtime, keep a log   Please have labs done as ordered   Continue rest of current medications     Goals for osteoporosis   Vitamin D  Goal > 30  Calcium intake ~ 6549-7757 mg/day  Weight bearing exercises as tolerated     Follow-up with Ophthalmology  Your being referred for diabetes education   Consistent carb diet, regular exercise as recommended   Follow-up in 4-6 weeks      A Guide to Calcium-Rich Foods  We all know that milk is a great source of calcium, but you may be surprised by all the different foods you can work into your diet to reach your daily recommended amount of calcium   Use the guide below to get ideas of additional calcium-rich foods to add to your weekly shopping list   Produce Serving Size Estimated Calcium*   Sebastián greens, frozen 8 oz 360 mg   Broccoli marily 8 oz 200 mg   Kale, frozen 8 oz 180 mg   Soy Beans, green, boiled 8 oz 175 mg   Bok Shkaeel, cooked, boiled 8 oz 160 mg   Figs, dried 2 figs 65 mg   Broccoli, fresh, cooked 8 oz 60 mg   Oranges 1 whole 55 mg   Seafood Serving Size Estimated Calcium*   Sardines, canned with bones 3 oz 325 mg   Racine, canned with bones 3 oz 180 mg   Shrimp, canned 3 oz 125 mg   Dairy Serving Size Estimated Calcium*   Ricotta, part-skim 4 oz 335 mg   Yogurt, plain, low-fat 6 oz 310 mg   Milk, skim, low-fat, whole 8 oz 300 mg   Yogurt with fruit, low-fat 6 oz 260 mg   Mozzarella, part-skim 1 oz 210 mg   Cheddar 1 oz 205 mg   Yogurt, Greek 6 oz 200 mg   American Cheese 1 oz 195 mg   Feta Cheese 4 oz 140 mg   Cottage Cheese, 2% 4 oz 105 mg   Frozen yogurt, vanilla 8 oz 105 mg   Ice Cream, vanilla 8 oz 85 mg   Parmesan 1 tbsp 55 mg   Fortified Food Serving Size Estimated Calcium*   Amado milk, rice milk or soy milk, fortified 8 oz 300 mg   Orange juice and other fruit juices, fortified 8 oz 300 mg Tofu, prepared with calcium 4 oz 205 mg   Waffle, frozen, fortified 2 pieces 200 mg   Oatmeal, fortified 1 packet 140 mg   English muffin, fortified 1 muffin 100 mg   Cereal, fortified 8 oz 100-1,000 mg   Other Serving Size Estimated Calcium*   Mac & cheese, frozen 1 package 325 mg   Pizza, cheese, frozen 1 serving 115 mg   Pudding, chocolate, prepared with 2% milk 4 oz 160 mg   Beans, baked, canned 4 oz 160 mg   *The calcium content listed for most foods is estimated and can vary due to multiple factors  Check the food label to determine how much calcium is in a particular product

## 2022-01-14 NOTE — PROGRESS NOTES
Keyona Diallomp 73 y o  female MRN: [id number]    Encounter: [id number]  Referring Provider  [facility]  [address]    Assessment/Plan     1  Type 2 diabetes mellitus On long-term insulin therapy with hyperglycemia  2  Overweight   3   neuropathy  Poorly controlled based on recall of blood sugars, reports point of care A1c a few months ago it around 12%, no medication changes were made per patient     Discussed different medications that can be used for glycemic management-oral hypoglycemic agents as well as injectable insulin and non-insulin medications (GLP 1 agonist) along with risks, benefits, side effect profile  cannot use gop 1 agonist due to history of pancreatitis, does not want to use metformin   S GLT 2 inhibitors  Currently gated at this time due to history of frequent UTIs,could worsen  Urinary urgency      can use DPP 4 inhibitors, may need mealtime insulin     Recommend the following at this time  Increase tujeo to 84 units at bedtime   Continue glipizide XR 10 mg orally twice a day but take with your meals   - referred for diabetes education/medical nutrition therapy   - check blood sugars qAC and HS   - Follow-up with Ophthalmology    - Recommended a consistent carbohydrate diet   - weight control and exercise as discussed ( ideal is 30 min, at least 5 times a week)   - home glucose monitoring and goals emphasized, requested patient bring in glucose monitor or log sheets to next visit   - discussed signs and symptoms of hypoglycemia and how to correct them appropriately  - counseled about the long term complications of uncontrolled diabetes, including, Nephropathy, Neuropathy, CVD, Retinopathy and importance  of adherence to diet, treatment plan and life style modifications   - importance of following up with Opthalmology and podiatry   - glycohemoglobin and other lab monitoring discussed, A1c goal value reviewed  - long term diabetic complications discussed    3  Hyperlipidemia, hypertriglyceridemia   4  History of pancreatitis   - continue Current medications, check fasting lipid panel    5  Hypothyroidism-continue levothyroxine at current dose   Check TSH, free T4     6  Osteoporosis  7  Vitamin-D deficiency  -check CMP, vitamin-D, PTH , urinary NTX  Meets criteria for osteoporosis medications, will discuss in details at the next visit once baseline labs have been done   Increase calcium intake in diet   Goals for osteoporosis   Vitamin D  Goal > 30  Calcium intake ~ 6533-8210 mg/day  Weight bearing exercises as tolerated     CC: Diabetes    History of Present Illness     HPI:  Marge Escobar is a 73 y o  female presents for a new visit regarding diabetes management  also has  Hypothyroidism,  hypertension, hyperlipidemia, osteoporosis, vitamin-D deficiency  H/o pancreatitis in 1999 from hypertriglyceridemia      DM history:   Diagnosed > 10 years ago   FH -  multiple family members   No known complications of CAD/ CVA/CKD  Last Eye exam: > 2 years -  No DR    Current regimen:   toujeo 80 units at bedtime - 9 pm   Glipizide XR 10 mg  BID     Patient does not want top be on metformin   Ozempic gave her s/e  - tried for 3 weeks     Statin:  lipitor , fenofibrate, gemfibrozil  Used repatrha in the past - not covered  ACE-I/ARB:  -    Patient was diagnosed with hypothyroidism in  Currently on levothyroxine 50 mcg orally daily  Takes on an empty stomach, compliant     Appetite is good  admits to not following a low carb diet   Trying to snack less and had lost sone weight    Denies numbness or tingling in the hands or feet  On gabapentin    Denies changes in vision  No known retinopathy  No chest pain/ SOB  No diarrhea/ constipation  Has stress incontinence and urgency  Has surgery in November, symotims have improved    freuqent UTIs, thinks she has one  Exercise:  Walking at work     Home glucose monitoring: on recall , fasting   Before breakfast: 210, 200  Symptoms of hypoglycemia : no lows       Osteoporosis   DXA 1/2022: T score -3 1  Right elbow fracture  in 2015   Left hip in 2018 , fell off stairs   Has never been on any osteoporosis medications     Vitamin D3 2000 IU daily + D2 50,000 weekly  - for the last 1 year   No calcium supplements   Diet - 1 serving cheese, no yogurt, milk  1 glass per day       All other systems were reviewed and are negative  Review of Systems      Historical Information   Past Medical History:   Diagnosis Date    Acute cystitis without hematuria 8/10/2018    Aortic aneurysm, abdominal ([facility] [address] )     noted on cat scan from 2/2020    Cataract     Closed left hip fracture ([facility] [address] ) 8/10/2018    Colon polyp     Diabetes mellitus ([facility] [address] )     Hyperlipemia     Migraines     Multiple falls      Past Surgical History:   Procedure Laterality Date    BREAST CYST ASPIRATION Right 1993    CHOLECYSTECTOMY      COLONOSCOPY      ELBOW SURGERY      GALLBLADDER SURGERY      UT COLONOSCOPY FLX DX W/COLLJ SPEC WHEN PFRMD N/A 12/14/2016    Procedure: COLONOSCOPY;  Surgeon: Windy Conde MD;  Location: [facility];   Service: Gastroenterology    UT OPEN RX FEMUR FX+INTRAMED OLMAN Left 8/10/2018    Procedure: Left Hip IM Long nail;  Surgeon: Dung Rothman MD;  [facility];  Service: Orthopedics    TONSILLECTOMY       Social History   Social History     Substance and Sexual Activity   Alcohol Use No     Social History     Substance and Sexual Activity   Drug Use No     Social History     Tobacco Use   Smoking Status Former Smoker    Types: Cigarettes   Smokeless Tobacco Never Used     Family History:   Family History   Problem Relation Age of Onset    Arthritis Family     Pancreatic cancer Family     Scoliosis Family     Diabetes type II Mother     Osteoporosis Mother     Thyroid disease unspecified Mother     Hyperlipidemia Sister     Hypertension Sister     Diabetes type II Maternal Aunt     Diabetes type II Maternal Uncle     Diabetes type II Maternal Grandfather     Pancreatic cancer Paternal Grandmother     No Known Problems Father     No Known Problems Daughter     No Known Problems Maternal Grandmother     No Known Problems Paternal Grandfather     No Known Problems Daughter     No Known Problems Maternal Aunt     No Known Problems Paternal Aunt     No Known Problems Paternal Aunt     No Known Problems Paternal Aunt        Meds/Allergies   Current Outpatient Medications   Medication Sig Dispense Refill    aspirin (ECOTRIN LOW STRENGTH) 81 mg EC tablet Take 81 mg by mouth daily      atorvastatin (LIPITOR) 40 mg tablet TAKE 1 TABLET (40 MG TOTAL) BY MOUTH DAILY TAKE AT BEDTIME 30 tablet 3    DULoxetine (CYMBALTA) 20 mg capsule Take 20 mg by mouth daily    3    ergocalciferol (VITAMIN D2) 50,000 units       fenofibrate (TRIGLIDE) 160 MG tablet Take 160 mg by mouth daily  0    gabapentin (NEURONTIN) 600 MG tablet Take 600 mg by mouth 3 (three) times a day   3    gemfibrozil (LOPID) 600 mg tablet TAKE 1 TABLET (600 MG TOTAL) BY MOUTH 2 (TWO) TIMES A DAY BEFORE MEALS 60 tablet 3    glipiZIDE (GLUCOTROL XL) 10 mg 24 hr tablet Take 10 mg by mouth 2 (two) times a day  0    glucose blood (ONE TOUCH ULTRA TEST) test strip Use 1 each 2 (two) times a day Use as instructed        levothyroxine 50 mcg tablet Take 50 mcg by mouth daily      TOUJEO SOLOSTAR 300 units/mL CONCETRATED U-300 injection pen INJECT 25 UNITS UNDER THE SKIN ONCE A DAY 15 pen 1    Cholecalciferol (D3-1000) 1000 units capsule Take 2,000 Units by mouth daily        losartan 50 mg TABS 50 mg, hydrochlorothiazide 12 5 mg TABS 12 5 mg Take by mouth daily      pioglitazone (ACTOS) 15 mg tablet        No current facility-administered medications for this visit       Allergies   Allergen Reactions    Zoledronic Acid GI Intolerance     Anorexia    Codeine GI Intolerance, Nausea Only and Vomiting       Objective   Vitals: Blood pressure 122/68, pulse 72, temperature 97 6 °F (36 4 °C), weight 81 6 kg (180 lb), not currently breastfeeding  Physical Exam  Constitutional:       Appearance: She is well-developed  HENT:      Head: Normocephalic and atraumatic  Eyes:      Conjunctiva/sclera: Conjunctivae normal       Pupils: Pupils are equal, round, and reactive to light  Neck:      Thyroid: No thyromegaly  Comments: No thyromegaly   Nontender, no nodules appreciated on palpation    Cardiovascular:      Rate and Rhythm: Normal rate and regular rhythm  Heart sounds: Normal heart sounds  No murmur heard  Pulmonary:      Effort: Pulmonary effort is normal       Breath sounds: Normal breath sounds  No wheezing  Abdominal:      General: There is no distension  Palpations: Abdomen is soft  Tenderness: There is no abdominal tenderness  Musculoskeletal:         General: Normal range of motion  Cervical back: Normal range of motion and neck supple  Skin:     General: Skin is warm and dry  Neurological:      Mental Status: She is alert and oriented to person, place, and time  Deep Tendon Reflexes: Reflexes normal       Comments: No tremors on the outstretched arms     Psychiatric:         Behavior: Behavior normal          The history was obtained from the review of the chart, patient  Lab Results:  reviewed            Imaging Studies: I have personally reviewed pertinent reports  Portions of the record may have been created with voice recognition software  Occasional wrong word or "sound a like" substitutions may have occurred due to the inherent limitations of voice recognition software  Read the chart carefully and recognize, using context, where substitutions have occurred

## 2022-01-19 DIAGNOSIS — Z79.4 TYPE 2 DIABETES MELLITUS WITH HYPERGLYCEMIA, WITH LONG-TERM CURRENT USE OF INSULIN (HCC): Primary | ICD-10-CM

## 2022-01-19 DIAGNOSIS — E11.65 TYPE 2 DIABETES MELLITUS WITH HYPERGLYCEMIA, WITH LONG-TERM CURRENT USE OF INSULIN (HCC): Primary | ICD-10-CM

## 2022-01-19 RX ORDER — PEN NEEDLE, DIABETIC 32GX 5/32"
NEEDLE, DISPOSABLE MISCELLANEOUS
Qty: 50 EACH | Refills: 3 | Status: SHIPPED | OUTPATIENT
Start: 2022-01-19

## 2022-01-19 RX ORDER — BLOOD SUGAR DIAGNOSTIC
STRIP MISCELLANEOUS
Qty: 200 EACH | Refills: 3 | Status: SHIPPED | OUTPATIENT
Start: 2022-01-19 | End: 2022-08-01 | Stop reason: SDUPTHER

## 2022-02-15 PROCEDURE — 3062F POS MACROALBUMINURIA REV: CPT | Performed by: INTERNAL MEDICINE

## 2022-02-18 ENCOUNTER — OFFICE VISIT (OUTPATIENT)
Dept: ENDOCRINOLOGY | Facility: CLINIC | Age: 72
End: 2022-02-18
Payer: COMMERCIAL

## 2022-02-18 VITALS
WEIGHT: 181 LBS | DIASTOLIC BLOOD PRESSURE: 78 MMHG | SYSTOLIC BLOOD PRESSURE: 130 MMHG | BODY MASS INDEX: 29.09 KG/M2 | HEIGHT: 66 IN | TEMPERATURE: 97 F | HEART RATE: 76 BPM

## 2022-02-18 DIAGNOSIS — E11.65 TYPE 2 DIABETES MELLITUS WITH HYPERGLYCEMIA, WITH LONG-TERM CURRENT USE OF INSULIN (HCC): Primary | ICD-10-CM

## 2022-02-18 DIAGNOSIS — E78.1 HYPERTRIGLYCERIDEMIA: ICD-10-CM

## 2022-02-18 DIAGNOSIS — G62.9 NEUROPATHY: ICD-10-CM

## 2022-02-18 DIAGNOSIS — Z87.19 H/O ACUTE PANCREATITIS: ICD-10-CM

## 2022-02-18 DIAGNOSIS — M81.0 OSTEOPOROSIS, UNSPECIFIED OSTEOPOROSIS TYPE, UNSPECIFIED PATHOLOGICAL FRACTURE PRESENCE: ICD-10-CM

## 2022-02-18 DIAGNOSIS — E66.3 OVERWEIGHT: ICD-10-CM

## 2022-02-18 DIAGNOSIS — Z79.4 TYPE 2 DIABETES MELLITUS WITH HYPERGLYCEMIA, WITH LONG-TERM CURRENT USE OF INSULIN (HCC): Primary | ICD-10-CM

## 2022-02-18 DIAGNOSIS — E55.9 VITAMIN D DEFICIENCY: ICD-10-CM

## 2022-02-18 PROCEDURE — 3078F DIAST BP <80 MM HG: CPT | Performed by: INTERNAL MEDICINE

## 2022-02-18 PROCEDURE — 1036F TOBACCO NON-USER: CPT | Performed by: INTERNAL MEDICINE

## 2022-02-18 PROCEDURE — 3008F BODY MASS INDEX DOCD: CPT | Performed by: INTERNAL MEDICINE

## 2022-02-18 PROCEDURE — 1160F RVW MEDS BY RX/DR IN RCRD: CPT | Performed by: INTERNAL MEDICINE

## 2022-02-18 PROCEDURE — 99214 OFFICE O/P EST MOD 30 MIN: CPT | Performed by: INTERNAL MEDICINE

## 2022-02-18 PROCEDURE — 3075F SYST BP GE 130 - 139MM HG: CPT | Performed by: INTERNAL MEDICINE

## 2022-02-18 RX ORDER — METRONIDAZOLE 500 MG/1
500 TABLET ORAL 2 TIMES DAILY
COMMUNITY
Start: 2022-02-18 | End: 2022-02-25

## 2022-02-18 RX ORDER — ATORVASTATIN CALCIUM 80 MG/1
TABLET, FILM COATED ORAL
COMMUNITY
Start: 2022-02-06

## 2022-02-18 RX ORDER — SULFAMETHOXAZOLE AND TRIMETHOPRIM 400; 80 MG/1; MG/1
TABLET ORAL
COMMUNITY
Start: 2021-11-22 | End: 2022-07-14 | Stop reason: ALTCHOICE

## 2022-02-18 RX ORDER — FLUCONAZOLE 150 MG/1
TABLET ORAL
COMMUNITY
Start: 2022-02-13

## 2022-02-18 RX ORDER — METRONIDAZOLE 500 MG/1
TABLET ORAL
COMMUNITY
Start: 2022-02-18 | End: 2022-08-01 | Stop reason: ALTCHOICE

## 2022-02-18 RX ORDER — CEPHALEXIN 500 MG/1
CAPSULE ORAL
COMMUNITY
Start: 2022-01-22 | End: 2022-08-01 | Stop reason: ALTCHOICE

## 2022-02-18 RX ORDER — INSULIN GLARGINE 300 U/ML
INJECTION, SOLUTION SUBCUTANEOUS
COMMUNITY
Start: 2022-01-03 | End: 2022-08-01 | Stop reason: SDUPTHER

## 2022-02-18 RX ORDER — FLUCONAZOLE 150 MG/1
TABLET ORAL
COMMUNITY
Start: 2022-02-13 | End: 2022-08-01 | Stop reason: ALTCHOICE

## 2022-02-18 RX ORDER — DULOXETIN HYDROCHLORIDE 60 MG/1
CAPSULE, DELAYED RELEASE ORAL
COMMUNITY
Start: 2022-02-07

## 2022-02-18 RX ORDER — INSULIN GLARGINE 300 U/ML
50 INJECTION, SOLUTION SUBCUTANEOUS
Qty: 60 ML | Refills: 3 | Status: SHIPPED | OUTPATIENT
Start: 2022-02-18 | End: 2022-06-16

## 2022-02-18 RX ORDER — INSULIN ASPART 100 [IU]/ML
15 INJECTION, SOLUTION INTRAVENOUS; SUBCUTANEOUS
Qty: 60 ML | Refills: 3 | Status: SHIPPED | OUTPATIENT
Start: 2022-02-18 | End: 2022-06-16 | Stop reason: SDUPTHER

## 2022-02-18 NOTE — PATIENT INSTRUCTIONS
Decrease Toujeo to 50 units subcutaneously at bedtime   Start novolog 15 units with meals 3 times a day   In addition, please use novoog insulin per the following sliding scale to correct high blood sugars:  BG  151-200: 1 unit  201-250: 2 units  251-300: 3 units  301-350: 4 units  > 350: 5 units  Do not correct bed time highs unless > 200 mg/dl     Check blood sugars more often before meals and at bedtime, keep a log, send for review in 3-4 weeks   Follow-up with diabetes educator   Please inquire from your insurance if freestyle [place] or Dexcom continuous glucose monitor will be covered     Follow-up in 3 months

## 2022-02-18 NOTE — PROGRESS NOTES
Jillian Ferris 73 y o  female MRN: [id number]    Encounter: [id number]      Assessment/Plan     1  Type 2 diabetes mellitus on long term insulin therapy with hyperglycemia  2  0 8   3  Neuropathy  Had labs done a few days ago, results not available for review, requested from [facility]   Poorly controlled based on review of blood sugar log    Recommend the following at this time  Decrease Toujeo to 50 units subcutaneously at bedtime   Start novolog 15 units with meals 3 times a day   start sliding scale insulin   Continue glipizide at current dose   Consistent carb diet, regular exercise recommended   - referred for diabetes education/medical nutrition therapy   - follow-up with Ophthalmology       This patient requires therapeutic CGM  The patient has diabetes and has been using a home glucose monitor and is performing frequent (3 times a day) home glucose monitor testing and is presently being treated with multi-dose insulin therapy (MDI) with 3 or more injections a day or with a continuous subcutaneous insulin infusion (CSII) pump  This patient's insulin treatment regimen requires frequent adjustments on the basis of therapeutic CGM testing results  4  Hyperlipidemia, hypertriglyceridemia  5  History of pancreatitis   - continue current medications     6  Hypothyroidism-continue levothyroxine at current dose  Await labs for review    7  Osteoporosis, vitamin-D-will follow-up in labs    CC: Diabetes    History of Present Illness     HPI:  Jillian Ferris is a 73 y o  female presents for a follow-up visit regarding diabetes management  Also has hypertension, hyperlipidemia, hypothyroidism, osteoporosis, vitamin-D deficiency   History of pancreatitis secondary to hypertriglyceridemia    Last seen in 01/14/2022  Last Eye exam:  Due for exam    Current regimen:    Toujeo 84 units subcutaneously at bedtime   Glipizide XR 10 mg orally twice a day with meals    Recurrent UTIs, currently has a yeast infection Needs to follow up with urology , h/o procedure for stress urinary incontinence   Has been trying to walk more and consume less carbohydrates   Needs to make appointment with CDE    Statin:  Lipitor, fenofibrate, gemfibrozil   ACE-I/ARB: ---    Home glucose monitorin times a day Before breakfast:  257-323 mg/dl   Before lunch: -  Before dinner: --  Bedtime:  373, 415  Symptoms of hypoglycemia :  No lows     Taking Levothyroxine     All other systems were reviewed and are negative  Review of Systems      Historical Information   Past Medical History:   Diagnosis Date    Acute cystitis without hematuria 8/10/2018    Aortic aneurysm, abdominal ([facility] [address] )     noted on cat scan from 2020    Cataract     Closed left hip fracture ([facility] [address] ) 8/10/2018    Colon polyp     Diabetes mellitus ([facility] [address] )     Hyperlipemia     Migraines     Multiple falls      Past Surgical History:   Procedure Laterality Date    BREAST CYST ASPIRATION Right     CHOLECYSTECTOMY      COLONOSCOPY      ELBOW SURGERY      GALLBLADDER SURGERY      [place] COLONOSCOPY FLX DX W/COLLJ SPEC WHEN PFRMD N/A 2016    Procedure: COLONOSCOPY;  Surgeon: Alice Marina MD;  [facility];   Service: Gastroenterology    [place] OPEN RX FEMUR FX+INTRAMED OLMAN Left 8/10/2018    Procedure: Left Hip IM Long nail;  Surgeon: Enrique Suero MD;  [facility];  Service: Orthopedics    TONSILLECTOMY       Social History   Social History     Substance and Sexual Activity   Alcohol Use No     Social History     Substance and Sexual Activity   Drug Use No     Social History     Tobacco Use   Smoking Status Former Smoker    Types: Cigarettes   Smokeless Tobacco Never Used     Family History:   Family History   Problem Relation Age of Onset    Arthritis Family     Pancreatic cancer Family     Scoliosis Family     Diabetes type II Mother     Osteoporosis Mother     Thyroid disease unspecified Mother     Hyperlipidemia Sister    [employer] Hypertension Sister     Diabetes type II Maternal Aunt     Diabetes type II Maternal Uncle     Diabetes type II Maternal Grandfather     Pancreatic cancer Paternal Grandmother     No Known Problems Father     No Known Problems Daughter     No Known Problems Maternal Grandmother     No Known Problems Paternal Grandfather     No Known Problems Daughter     No Known Problems Maternal Aunt     No Known Problems Paternal Aunt     No Known Problems Paternal Aunt     No Known Problems Paternal Aunt        Meds/Allergies   Current Outpatient Medications   Medication Sig Dispense Refill    aspirin (ECOTRIN LOW STRENGTH) 81 mg EC tablet Take 81 mg by mouth daily      atorvastatin (LIPITOR) 80 mg tablet TAKE 1 TABLET BY MOUTH EVERY DAY AT NIGHT      DULoxetine (CYMBALTA) 20 mg capsule Take 20 mg by mouth daily    3    ergocalciferol (VITAMIN D2) 50,000 units       fenofibrate (TRIGLIDE) 160 MG tablet Take 160 mg by mouth daily  0    gabapentin (NEURONTIN) 600 MG tablet Take 600 mg by mouth 3 (three) times a day   3    gemfibrozil (LOPID) 600 mg tablet Take 1 tablet (600 mg total) by mouth 2 (two) times a day before meals 60 tablet 3    glipiZIDE (GLUCOTROL XL) 10 mg 24 hr tablet Take 10 mg by mouth 2 (two) times a day  0    glucose blood (Accu-Chek Mikaela Plus) test strip Use 1 each 2 (two) times a day 200 each 3    Insulin Pen Needle (BD Pen Needle Montse U/F) 32G X 4 MM MISC Use daily withToujeo 50 each 3    levothyroxine 50 mcg tablet Take 50 mcg by mouth daily      Toujeo Max SoloStar 300 units/mL CONCENTRATED U-300 injection pen (2-unit dial)       TOUJEO SOLOSTAR 300 units/mL CONCETRATED U-300 injection pen INJECT 25 UNITS UNDER THE SKIN ONCE A DAY (Patient taking differently: 84 units once a day ) 15 pen 1    atorvastatin (LIPITOR) 40 mg tablet TAKE 1 TABLET (40 MG TOTAL) BY MOUTH DAILY TAKE AT BEDTIME (Patient not taking: Reported on 2/18/2022 ) 30 tablet 3    cephalexin (KEFLEX) 500 mg capsule TAKE 1 CAPSULE BY MOUTH TWICE A DAY FOR 7 DAYS (Patient not taking: Reported on 2/18/2022)      Cholecalciferol (D3-1000) 1000 units capsule Take 2,000 Units by mouth daily        DULoxetine (CYMBALTA) 60 mg delayed release capsule TAKE 1 CAPSULE BY MOUTH NIGHTLY  (Patient not taking: Reported on 2/18/2022)      fluconazole (DIFLUCAN) 150 mg tablet 1 tab now and 1 tab in 72 hours (Patient not taking: Reported on 2/18/2022)      fluconazole (DIFLUCAN) 150 mg tablet TAKE 1 TABLET BY MOUTH NOW AND 1 TABLET IN 72 HOURS (Patient not taking: Reported on 2/18/2022)      losartan 50 mg TABS 50 mg, hydrochlorothiazide 12 5 mg TABS 12 5 mg Take by mouth daily      metroNIDAZOLE (FLAGYL) 500 mg tablet Take 500 mg by mouth 2 (two) times a day (Patient not taking: Reported on 2/18/2022 )      metroNIDAZOLE (FLAGYL) 500 mg tablet  (Patient not taking: Reported on 2/18/2022 )      sulfamethoxazole-trimethoprim (BACTRIM) 400-80 mg per tablet  (Patient not taking: Reported on 2/18/2022 )       No current facility-administered medications for this visit  Allergies   Allergen Reactions    Zoledronic Acid GI Intolerance     Anorexia    Codeine GI Intolerance, Nausea Only and Vomiting       Objective   Vitals: Blood pressure 130/78, pulse 76, temperature (!) 97 °F (36 1 °C), height 5' 6" (1 676 m), weight 82 1 kg (181 lb), not currently breastfeeding  Physical Exam  Constitutional:       Appearance: She is well-developed  HENT:      Head: Normocephalic and atraumatic  Eyes:      Conjunctiva/sclera: Conjunctivae normal       Pupils: Pupils are equal, round, and reactive to light  Neck:      Thyroid: No thyromegaly  Cardiovascular:      Rate and Rhythm: Normal rate and regular rhythm  Heart sounds: Normal heart sounds  No murmur heard  Pulmonary:      Effort: Pulmonary effort is normal       Breath sounds: Normal breath sounds  No wheezing  Abdominal:      General: There is no distension  Palpations: Abdomen is soft  Tenderness: There is no abdominal tenderness  Musculoskeletal:         General: Normal range of motion  Cervical back: Normal range of motion and neck supple  Skin:     General: Skin is warm and dry  Neurological:      Mental Status: She is alert and oriented to person, place, and time  Psychiatric:         Behavior: Behavior normal          The history was obtained from the review of the chart, patient  Lab Results:          Imaging Studies: I have personally reviewed pertinent reports  Portions of the record may have been created with voice recognition software  Occasional wrong word or "sound a like" substitutions may have occurred due to the inherent limitations of voice recognition software  Read the chart carefully and recognize, using context, where substitutions have occurred

## 2022-02-28 ENCOUNTER — TELEPHONE (OUTPATIENT)
Dept: ENDOCRINOLOGY | Facility: CLINIC | Age: 72
End: 2022-02-28

## 2022-02-28 DIAGNOSIS — E78.1 HYPERTRIGLYCERIDEMIA: ICD-10-CM

## 2022-02-28 DIAGNOSIS — M81.0 OSTEOPOROSIS, UNSPECIFIED OSTEOPOROSIS TYPE, UNSPECIFIED PATHOLOGICAL FRACTURE PRESENCE: ICD-10-CM

## 2022-02-28 DIAGNOSIS — I10 PRIMARY HYPERTENSION: ICD-10-CM

## 2022-02-28 DIAGNOSIS — Z79.4 TYPE 2 DIABETES MELLITUS WITH HYPERGLYCEMIA, WITH LONG-TERM CURRENT USE OF INSULIN (HCC): Primary | ICD-10-CM

## 2022-02-28 DIAGNOSIS — E11.65 TYPE 2 DIABETES MELLITUS WITH HYPERGLYCEMIA, WITH LONG-TERM CURRENT USE OF INSULIN (HCC): Primary | ICD-10-CM

## 2022-02-28 NOTE — TELEPHONE ENCOUNTER
----- Message from Leon Sheldon MD sent at 2/24/2022  1:02 PM EST -----  A1c 13 2, not at goalTriglycerides, total cholesterol very high, recommend starting Vascepa 2 g orally twice a day in addition to current medications Urinalysis suggestive of UTI-follow-up with primary care Needs better blood sugar control, repeat A1c, BMP, lipid panel in 3 months

## 2022-02-28 NOTE — TELEPHONE ENCOUNTER
I have been unable to reach this patient by phone  [address] [facility] letter is being sent to home address indicating to call the office

## 2022-05-21 DIAGNOSIS — E78.1 HYPERTRIGLYCERIDEMIA: ICD-10-CM

## 2022-05-23 RX ORDER — GEMFIBROZIL 600 MG/1
TABLET, FILM COATED ORAL
Qty: 180 TABLET | Refills: 1 | Status: SHIPPED | OUTPATIENT
Start: 2022-05-23

## 2022-05-31 ENCOUNTER — HOSPITAL ENCOUNTER (OUTPATIENT)
Dept: NON INVASIVE DIAGNOSTICS | Facility: CLINIC | Age: 72
Discharge: HOME | End: 2022-05-31
Payer: COMMERCIAL

## 2022-05-31 DIAGNOSIS — I71.4 AAA (ABDOMINAL AORTIC ANEURYSM) WITHOUT RUPTURE (HCC): ICD-10-CM

## 2022-05-31 PROCEDURE — 93978 VASCULAR STUDY: CPT

## 2022-06-02 PROCEDURE — 93978 VASCULAR STUDY: CPT | Performed by: SURGERY

## 2022-06-05 NOTE — ASSESSMENT & PLAN NOTE
71yoF, former smoker, with PMHx significant for DM II w/ neuropathy, HTN, AAA without rupture, presents for RFM and results review of non-invasive imaging done on 5/31/22  Diagnostic imaging:   -AOIL 5/31/22: Abdominal aorta is ectatic measuring 2 8 cm (prior 2 8 cm)  Left CHARLEEN is ectatic, measuring 1 8 cm with 50-75% stenosis  Recommendations:  -Asymptomatic, stable abdominal aortic ectasia measuring 2 8 cm  Denies abdominal pain or back pain    -Continue routine yearly surveillance imaging with AOIL duplex  -Continue ASA 81 mg  -Continue high intensity statin therapy for HLD  -Continue adequate BP control, mgmt per PCP  -Continue strict glucose control, mgmt per endocrinology  Most recent A1c 13 2  Not at goal     -Recommend low-fat, low-sodium diet and regular exercise   -Continued smoking cessation  Pt quit 4 years ago  -F/u in 1 year for RFM and results review  -Discussed importance of talking with patients children about screening for AAA given possible genetic history    -Educated on s/sx of abdominal aortic rupture and to call 911 immediately if symptoms occur    -Instructed to contact the office with questions, concerns, or new symptoms

## 2022-06-07 ENCOUNTER — OFFICE VISIT (OUTPATIENT)
Dept: VASCULAR SURGERY | Facility: CLINIC | Age: 72
End: 2022-06-07
Payer: COMMERCIAL

## 2022-06-07 VITALS
SYSTOLIC BLOOD PRESSURE: 140 MMHG | HEIGHT: 66 IN | TEMPERATURE: 97.9 F | WEIGHT: 188.6 LBS | DIASTOLIC BLOOD PRESSURE: 80 MMHG | HEART RATE: 82 BPM | BODY MASS INDEX: 30.31 KG/M2

## 2022-06-07 DIAGNOSIS — I71.4 ABDOMINAL AORTIC ANEURYSM (AAA) WITHOUT RUPTURE (HCC): Primary | ICD-10-CM

## 2022-06-07 DIAGNOSIS — E78.1 HYPERTRIGLYCERIDEMIA: ICD-10-CM

## 2022-06-07 DIAGNOSIS — Z79.4 TYPE 2 DIABETES MELLITUS WITH HYPERGLYCEMIA, WITH LONG-TERM CURRENT USE OF INSULIN (HCC): ICD-10-CM

## 2022-06-07 DIAGNOSIS — E11.65 TYPE 2 DIABETES MELLITUS WITH HYPERGLYCEMIA, WITH LONG-TERM CURRENT USE OF INSULIN (HCC): ICD-10-CM

## 2022-06-07 DIAGNOSIS — I10 PRIMARY HYPERTENSION: ICD-10-CM

## 2022-06-07 PROCEDURE — 99213 OFFICE O/P EST LOW 20 MIN: CPT

## 2022-06-07 PROCEDURE — 3077F SYST BP >= 140 MM HG: CPT

## 2022-06-07 PROCEDURE — 3079F DIAST BP 80-89 MM HG: CPT

## 2022-06-07 NOTE — ASSESSMENT & PLAN NOTE
Lab Results   Component Value Date    HGBA1C 13 2 02/15/2022     -Not at goal    -Recommend strict glucose control to halt progression of disease, mgmt per endocrinology

## 2022-06-07 NOTE — PROGRESS NOTES
Assessment/Plan:    Abdominal aortic aneurysm (AAA) without rupture ([facility] [address] )  71yoF, former smoker, with PMHx significant for DM II w/ neuropathy, HTN, AAA without rupture, presents for RFM and results review of non-invasive imaging done on 5/31/22  Diagnostic imaging:   -AOIL 5/31/22: Abdominal aorta is ectatic measuring 2 8 cm (prior 2 8 cm)  Left CHARLEEN is ectatic, measuring 1 8 cm with 50-75% stenosis  Recommendations:  -Asymptomatic, stable abdominal aortic ectasia measuring 2 8 cm  Denies abdominal pain or back pain    -Continue routine yearly surveillance imaging with AOIL duplex  -Continue ASA 81 mg  -Continue high intensity statin therapy for HLD  -Continue adequate BP control, mgmt per PCP  -Continue strict glucose control, mgmt per endocrinology  Most recent A1c 13 2  Not at goal     -Recommend low-fat, low-sodium diet and regular exercise   -Continued smoking cessation  Pt quit 4 years ago  -F/u in 1 year for RFM and results review  -Discussed importance of talking with patients children about screening for AAA given possible genetic history    -Educated on s/sx of abdominal aortic rupture and to call 911 immediately if symptoms occur    -Instructed to contact the office with questions, concerns, or new symptoms  Primary hypertension  -BP stable in office today   -Recommended patient monitor BP at home for more consistent readings  Pt states they have a BP monitor but do not regularly use it    -Continue adequate BP control and monitoring   -Medical management per PCP       Type 2 diabetes mellitus with hyperglycemia, with long-term current use of insulin (HCC)    Lab Results   Component Value Date    HGBA1C 13 2 02/15/2022     -Not at goal    -Recommend strict glucose control to halt progression of disease, mgmt per endocrinology    Hypertriglyceridemia  -Most recent lipid panel reviewed from 01/2021    -, , HDL 14,    Not at goal    -Encourage low cholesterol, low fat diet and regular exercise  -Continue with high intensity statin therapy  -Medical Management per PCP          Diagnoses and all orders for this visit:    Abdominal aortic aneurysm (AAA) without rupture ([address] )  -     VAS abdominal aorta/iliacs; complete study; Future    Primary hypertension    Type 2 diabetes mellitus with hyperglycemia, with long-term current use of insulin (HCC)    Hypertriglyceridemia          Subjective:      Patient ID: Esther Nicholson is a 73 y o  female  HPI  Pt presents today with her  for RFM and results review of AOIL done on 22 for abdominal aortic ectasia  Pt denies any acute concerns today  She denies abdominal pain or back pain  She denies rest pain, claudication, or wounds  She states she will occasionally get bilateral knee pain  Pt has hx of closed left hip fracture 4 years ago and underwent open reduction and zane placement in 2018  AOIL was reviewed today with patient and   There is evidence of abdominal aortic ectasia measuring 2 8 cm, which has been stable since last study  There was also evidence of left CHARLEEN ectasia and moderate stenosis  We will continue to monitor with yearly AOIL to monitor for progression of disease and size of aorta  I discussed the importance of lifestyle modifications, including diet, exercise, smoking cessation, blood pressure, lipid, and glucose control  Pts most recent A1c was 13 2 and not at goal  Pt reports following with endocrinology in the next 2 weeks  Pt relates her increase in A1c to dietary choices  Pt also noted a paternal history of AAA, stating her father  from an aneurysm "behind the heart"  I discussed that aneurysms can be genetic in nature and passed down  I recommended that she discuss screening for AAA with her children  Pt is a former smoker and quit smoking 4 years ago  Pt was commended for her efforts  She will continue on ASA 81 mg and high intensity statin therapy   Her most recent lipid panel in 2021 was not at goal  She will discuss this with her PCP who manages this and continue to work on dietary modifications  She will follow up in 1 year or sooner if needed  The following portions of the patient's history were reviewed and updated as appropriate: allergies, current medications, past family history, past medical history, past social history, past surgical history and problem list     Review of Systems   Constitutional: Negative  HENT: Negative  Eyes: Negative  Respiratory: Negative  Cardiovascular: Negative  Gastrointestinal: Negative  Endocrine: Negative  Genitourinary: Negative  Musculoskeletal: Negative  Skin: Negative  Allergic/Immunologic: Negative  Neurological: Negative  Hematological: Negative  Psychiatric/Behavioral: Negative  I have personally reviewed and made appropriate changes to the ROS that was input by the medical assistant         Objective:        Vitals:    06/07/22 0857   BP: 140/80   BP Location: Right arm   Patient Position: Sitting   Cuff Size: Standard   Pulse: 82   Temp: 97 9 °F (36 6 °C)   TempSrc: Tympanic   Weight: 85 5 kg (188 lb 9 6 oz)   Height: 5' 6" (1 676 m)       Patient Active Problem List   Diagnosis    Type 2 diabetes mellitus with hyperglycemia, with long-term current use of insulin (HCC)    Osteoporosis    Vitamin D deficiency    Stress incontinence of urine    Hypertriglyceridemia    Diabetes mellitus ([address] )    Closed left hip fracture (HCC)    Acute cystitis without hematuria    Closed fracture of left hip (HCC)    Primary hypertension    Anemia    Hypokalemia    UTI (urinary tract infection)    Pre-operative clearance    Abdominal aortic aneurysm (AAA) without rupture (HCC)    History of colon polyps    Overweight    Urinary urgency    H/O acute pancreatitis    Neuropathy       Past Surgical History:   Procedure Laterality Date    BREAST CYST ASPIRATION Right 1993    CHOLECYSTECTOMY      COLONOSCOPY      ELBOW SURGERY      GALLBLADDER SURGERY      [place] COLONOSCOPY FLX DX W/COLLJ [facility] INPATIENT REHABILITATION WHEN PFRMD N/A 12/14/2016    Procedure: COLONOSCOPY;  Surgeon: Gerhardt Kitten, MD;  [facility];   Service: Gastroenterology    [place] OPEN RX FEMUR FX+INTRAMED OLMAN Left 8/10/2018    Procedure: Left Hip IM Long nail;  Surgeon: Lorraine Caruso MD;  [facility];  Service: Orthopedics    TONSILLECTOMY         Family History   Problem Relation Age of Onset    Arthritis Family     Pancreatic cancer Family     Scoliosis Family     Diabetes type II Mother     Osteoporosis Mother     Thyroid disease unspecified Mother    Nadia Seller Hyperlipidemia Sister     Hypertension Sister     Diabetes type II Maternal Aunt     Diabetes type II Maternal Uncle     Diabetes type II Maternal Grandfather     Pancreatic cancer Paternal Grandmother     No Known Problems Father     No Known Problems Daughter     No Known Problems Maternal Grandmother     No Known Problems Paternal Grandfather     No Known Problems Daughter     No Known Problems Maternal Aunt     No Known Problems Paternal Aunt     No Known Problems Paternal Aunt     No Known Problems Paternal Aunt        Social History     Socioeconomic History    Marital status: /Civil Union     Spouse name: Not on file    Number of children: 2    Years of education: 15    Highest education level: Not on file   Occupational History    Not on file   Tobacco Use    Smoking status: Former Smoker     Types: Cigarettes    Smokeless tobacco: Never Used   Vaping Use    Vaping Use: Never used   Substance and Sexual Activity    Alcohol use: No    Drug use: No    Sexual activity: Not on file   Other Topics Concern    Not on file   Social History Narrative    high school graduate     Social Determinants of Health     Financial Resource Strain: Not on file   Food Insecurity: Not on file   Transportation Needs: Not on file   Physical Activity: Not on file Stress: Not on file   Social Connections: Not on file   Intimate Partner Violence: Not on file   Housing Stability: Not on file       Allergies   Allergen Reactions    Zoledronic Acid GI Intolerance     Anorexia    Codeine GI Intolerance, Nausea Only and Vomiting         Current Outpatient Medications:     aspirin (ECOTRIN LOW STRENGTH) 81 mg EC tablet, Take 81 mg by mouth daily, Disp: , Rfl:     atorvastatin (LIPITOR) 80 mg tablet, TAKE 1 TABLET BY MOUTH EVERY DAY AT NIGHT, Disp: , Rfl:     Cholecalciferol 25 MCG (1000 UT) capsule, Take 2,000 Units by mouth daily  , Disp: , Rfl:     ergocalciferol (VITAMIN D2) 50,000 units, , Disp: , Rfl:     gabapentin (NEURONTIN) 600 MG tablet, Take 600 mg by mouth 3 (three) times a day , Disp: , Rfl: 3    gemfibrozil (LOPID) 600 mg tablet, TAKE 1 TABLET BY MOUTH 2 TIMES A DAY BEFORE MEALS , Disp: 180 tablet, Rfl: 1    glipiZIDE (GLUCOTROL XL) 10 mg 24 hr tablet, Take 10 mg by mouth 2 (two) times a day, Disp: , Rfl: 0    glucose blood (Accu-Chek Mikaela Plus) test strip, Use 1 each 2 (two) times a day, Disp: 200 each, Rfl: 3    insulin aspart (NovoLOG FlexPen) 100 UNIT/ML injection pen, Inject 15 Units under the skin 3 (three) times a day with meals, Disp: 60 mL, Rfl: 3    insulin glargine (Toujeo SoloStar) 300 units/mL CONCENTRATED U-300 injection pen (1-unit dial), Inject 50 Units under the skin daily at bedtime, Disp: 60 mL, Rfl: 3    levothyroxine 50 mcg tablet, Take 50 mcg by mouth daily, Disp: , Rfl:     atorvastatin (LIPITOR) 40 mg tablet, TAKE 1 TABLET (40 MG TOTAL) BY MOUTH DAILY TAKE AT BEDTIME (Patient not taking: Reported on 6/7/2022), Disp: 30 tablet, Rfl: 3    cephalexin (KEFLEX) 500 mg capsule, TAKE 1 CAPSULE BY MOUTH TWICE A DAY FOR 7 DAYS (Patient not taking: No sig reported), Disp: , Rfl:     DULoxetine (CYMBALTA) 20 mg capsule, Take 20 mg by mouth daily   (Patient not taking: Reported on 6/7/2022), Disp: , Rfl: 3    DULoxetine (CYMBALTA) 60 mg delayed release capsule, TAKE 1 CAPSULE BY MOUTH NIGHTLY  (Patient not taking: No sig reported), Disp: , Rfl:     fluconazole (DIFLUCAN) 150 mg tablet, 1 tab now and 1 tab in 72 hours (Patient not taking: No sig reported), Disp: , Rfl:     fluconazole (DIFLUCAN) 150 mg tablet, TAKE 1 TABLET BY MOUTH NOW AND 1 TABLET IN 72 HOURS (Patient not taking: No sig reported), Disp: , Rfl:     Insulin Pen Needle (BD Pen Needle Montse U/F) 32G X 4 MM MISC, Use daily withIonuadolfo (Patient not taking: Reported on 6/7/2022), Disp: 50 each, Rfl: 3    losartan 50 mg TABS 50 mg, hydrochlorothiazide 12 5 mg TABS 12 5 mg, Take by mouth daily, Disp: , Rfl:     metroNIDAZOLE (FLAGYL) 500 mg tablet, , Disp: , Rfl:     sulfamethoxazole-trimethoprim (BACTRIM) 400-80 mg per tablet, , Disp: , Rfl:     Toujeo Max SoloStar 300 units/mL CONCENTRATED U-300 injection pen (2-unit dial), , Disp: , Rfl:     /80 (BP Location: Right arm, Patient Position: Sitting, Cuff Size: Standard)   Pulse 82   Temp 97 9 °F (36 6 °C) (Tympanic)   Ht 5' 6" (1 676 m)   Wt 85 5 kg (188 lb 9 6 oz)   BMI 30 44 kg/m²          Physical Exam  Vitals and nursing note reviewed  Constitutional:       Appearance: Normal appearance  She is obese  HENT:      Head: Normocephalic and atraumatic  Eyes:      Extraocular Movements: Extraocular movements intact  Pupils: Pupils are equal, round, and reactive to light  Neck:      Vascular: No carotid bruit  Cardiovascular:      Rate and Rhythm: Normal rate and regular rhythm  Pulses:           Radial pulses are 2+ on the right side and 2+ on the left side  Femoral pulses are 2+ on the right side and 2+ on the left side  Dorsalis pedis pulses are 2+ on the right side and 2+ on the left side  Posterior tibial pulses are 2+ on the right side and 2+ on the left side        Heart sounds: Normal heart sounds, S1 normal and S2 normal       Comments: No carotid bruit   Pulmonary:      Effort: Pulmonary effort is normal  No respiratory distress  Breath sounds: Normal breath sounds  Abdominal:      General: Bowel sounds are normal  There is no distension  Palpations: Abdomen is soft  Comments: No abdominal bruit or pulsatile masses  Musculoskeletal:         General: No swelling  Normal range of motion  Cervical back: Normal range of motion and neck supple  Right lower leg: No edema  Left lower leg: No edema  Skin:     General: Skin is warm  Capillary Refill: Capillary refill takes less than 2 seconds  Comments: Bilateral lower extremity varicose veins    Neurological:      General: No focal deficit present  Mental Status: She is alert and oriented to person, place, and time     Psychiatric:         Mood and Affect: Mood normal          Behavior: Behavior normal          Nuno Mccullough PA-C  [facility]  [phone]

## 2022-06-07 NOTE — ASSESSMENT & PLAN NOTE
-BP stable in office today   -Recommended patient monitor BP at home for more consistent readings   Pt states they have a BP monitor but do not regularly use it    -Continue adequate BP control and monitoring   -Medical management per PCP

## 2022-06-07 NOTE — PATIENT INSTRUCTIONS
-We will continue to monitor your aorta with yearly ultrasound  Your aorta is slightly larger than what it should be but has been stable since last test    -I would recommend you talk with your children about getting tested for abdominal aortic aneurysm given that your father also had aneurysms  This can be genetic    -Continue your aspirin and cholesterol medication   -Continue to follow up with endocrinology for blood sugar control  Your recent A1c was 13 2  This is not at goal  Please work on dietary modifications to decrease this    -Continue to monitor your blood pressure  This is important to prevent expansion of the aorta    -We will f/u in 1 year or sooner if needed    Nonruptured Abdominal Aortic Aneurysm   AMBULATORY CARE:   An abdominal aortic aneurysm (AAA)  is a bulging or weak area in your abdominal aorta  Over time, the bulge may grow and is at risk for tearing or rupturing  The aorta is a large blood vessel that extends from your heart to your abdomen  The part of the aorta that extends into your abdomen is called your abdominal aorta  Your abdominal aorta brings blood to your stomach, pelvis, and legs  An AAA that ruptures is a life-threatening emergency  Signs and symptoms: An AAA usually does not have signs or symptoms if it has not ruptured  If the AAA starts to leak or ruptures, you may have any of the following:  Sudden pain in your abdomen, groin, back, legs, or buttocks    Nausea and vomiting    A lump or swelling in your abdomen    Stiff abdominal muscles    Numbness or tingling in your legs    Pale, sweaty, or clammy skin    Dizziness, fainting or loss of consciousness    Call your local emergency number (911 in the [place]), or have someone else call if:   You faint or lose consciousness  You cannot be woken  Seek care immediately if:  The following signs or symptoms may mean the AAA is at risk of rupturing:   You have sudden sharp pain in your abdomen, groin, back, legs, or buttocks  You have nausea and vomiting  You feel dizzy  You have stiffness or swelling in your abdomen, or a lump in your abdomen  You have numbness or tingling in your legs  Your skin is pale, sweaty, or clammy  Call your doctor if:   You have questions or concerns about your condition or care  Treatment for an AAA  may not be needed  Your healthcare provider may monitor the size of your AAA with tests, such as an ultrasound  You may be given medicines to prevent the AAA from growing  Examples include medicines to lower your blood pressure or cholesterol level  If your AAA gets bigger, starts to leak, or ruptures, you may need any of the following:  Endovascular repair  is a procedure that uses a graft to repair your AAA  The graft stops blood flow to the aneurysm and protects your abdominal aorta  You may need to have more than 1 endovascular repair  Open repair  is surgery to repair or remove an AAA  Manage a nonruptured AAA:  You can help prevent your AAA from growing or rupturing by doing the following:  Do not smoke  Nicotine and other chemicals in cigarettes and cigars can increase your blood pressure  It can also damage your aorta and increase the size of your AAA  Ask your healthcare provider for information if you currently smoke and need help to quit  E-cigarettes or smokeless tobacco still contain nicotine  Talk to your healthcare provider before you use these products  Exercise as directed  Exercise can help control your blood pressure and cholesterol level  Ask your healthcare provider how much exercise you need each day and which exercises are best for you  Follow the meal plan recommended by your healthcare provider  Talk to your dietitian about a heart-healthy or low-sodium eating plan  Meal plans will help you lower your cholesterol and blood pressure  They will also help you reach a healthy weight  Do not lift anything heavier than 10 pounds  Heavy lifting can increase pressure in your abdominal aorta  This can increase your risk for a ruptured AAA  Follow up with your doctor as directed: You will need regular tests and follow-up visits to monitor the size of your AAA  Keep all appointments  Write down your questions so you remember to ask them during your visits  © [employer] 2022 Information is for End User's use only and may not be sold, redistributed or otherwise used for commercial purposes  All illustrations and images included in CareNotes® are the copyrighted property of [employer]  or [facility] Fabby Scott   The above information is an  only  It is not intended as medical advice for individual conditions or treatments  Talk to your doctor, nurse or pharmacist before following any medical regimen to see if it is safe and effective for you

## 2022-06-07 NOTE — ASSESSMENT & PLAN NOTE
-Most recent lipid panel reviewed from 01/2021    -, , HDL 14,    Not at goal    -Encourage low cholesterol, low fat diet and regular exercise  -Continue with high intensity statin therapy  -Medical Management per PCP

## 2022-06-09 LAB — HBA1C MFR BLD HPLC: 9.6 %

## 2022-06-09 PROCEDURE — 3046F HEMOGLOBIN A1C LEVEL >9.0%: CPT | Performed by: INTERNAL MEDICINE

## 2022-06-16 ENCOUNTER — OFFICE VISIT (OUTPATIENT)
Dept: ENDOCRINOLOGY | Facility: CLINIC | Age: 72
End: 2022-06-16
Payer: COMMERCIAL

## 2022-06-16 VITALS
HEART RATE: 86 BPM | DIASTOLIC BLOOD PRESSURE: 88 MMHG | WEIGHT: 187.3 LBS | BODY MASS INDEX: 30.1 KG/M2 | SYSTOLIC BLOOD PRESSURE: 136 MMHG | HEIGHT: 66 IN

## 2022-06-16 DIAGNOSIS — E55.9 VITAMIN D DEFICIENCY: ICD-10-CM

## 2022-06-16 DIAGNOSIS — E11.65 TYPE 2 DIABETES MELLITUS WITH HYPERGLYCEMIA, WITH LONG-TERM CURRENT USE OF INSULIN (HCC): Primary | ICD-10-CM

## 2022-06-16 DIAGNOSIS — G62.9 NEUROPATHY: ICD-10-CM

## 2022-06-16 DIAGNOSIS — Z79.4 TYPE 2 DIABETES MELLITUS WITH HYPERGLYCEMIA, WITH LONG-TERM CURRENT USE OF INSULIN (HCC): Primary | ICD-10-CM

## 2022-06-16 DIAGNOSIS — Z87.19 H/O ACUTE PANCREATITIS: ICD-10-CM

## 2022-06-16 DIAGNOSIS — E03.9 ACQUIRED HYPOTHYROIDISM: ICD-10-CM

## 2022-06-16 DIAGNOSIS — I10 PRIMARY HYPERTENSION: ICD-10-CM

## 2022-06-16 DIAGNOSIS — E78.1 HYPERTRIGLYCERIDEMIA: ICD-10-CM

## 2022-06-16 DIAGNOSIS — E66.9 CLASS 1 OBESITY WITH SERIOUS COMORBIDITY AND BODY MASS INDEX (BMI) OF 30.0 TO 30.9 IN ADULT, UNSPECIFIED OBESITY TYPE: ICD-10-CM

## 2022-06-16 PROBLEM — E66.811 CLASS 1 OBESITY WITH SERIOUS COMORBIDITY AND BODY MASS INDEX (BMI) OF 30.0 TO 30.9 IN ADULT: Status: ACTIVE | Noted: 2022-06-16

## 2022-06-16 PROCEDURE — 1036F TOBACCO NON-USER: CPT | Performed by: INTERNAL MEDICINE

## 2022-06-16 PROCEDURE — 3008F BODY MASS INDEX DOCD: CPT | Performed by: INTERNAL MEDICINE

## 2022-06-16 PROCEDURE — 99215 OFFICE O/P EST HI 40 MIN: CPT | Performed by: INTERNAL MEDICINE

## 2022-06-16 PROCEDURE — 1160F RVW MEDS BY RX/DR IN RCRD: CPT | Performed by: INTERNAL MEDICINE

## 2022-06-16 RX ORDER — GLIPIZIDE 10 MG/1
20 TABLET, FILM COATED, EXTENDED RELEASE ORAL DAILY
Qty: 180 TABLET | Refills: 3 | Status: SHIPPED | OUTPATIENT
Start: 2022-06-16

## 2022-06-16 RX ORDER — LEVOTHYROXINE SODIUM 0.05 MG/1
50 TABLET ORAL DAILY
Qty: 90 TABLET | Refills: 3 | Status: SHIPPED | OUTPATIENT
Start: 2022-06-16

## 2022-06-16 RX ORDER — INSULIN GLARGINE 300 U/ML
58 INJECTION, SOLUTION SUBCUTANEOUS
Qty: 60 ML | Refills: 3 | Status: SHIPPED | OUTPATIENT
Start: 2022-06-16

## 2022-06-16 RX ORDER — BLOOD-GLUCOSE METER
EACH MISCELLANEOUS
Qty: 1 KIT | Refills: 0 | Status: SHIPPED | OUTPATIENT
Start: 2022-06-16 | End: 2022-06-20 | Stop reason: CLARIF

## 2022-06-16 RX ORDER — INSULIN ASPART 100 [IU]/ML
20 INJECTION, SOLUTION INTRAVENOUS; SUBCUTANEOUS
Qty: 60 ML | Refills: 3 | Status: SHIPPED | OUTPATIENT
Start: 2022-06-16

## 2022-06-16 RX ORDER — ICOSAPENT ETHYL 1000 MG/1
1 CAPSULE ORAL 2 TIMES DAILY
Qty: 180 CAPSULE | Refills: 3 | Status: SHIPPED | OUTPATIENT
Start: 2022-06-16

## 2022-06-16 NOTE — PATIENT INSTRUCTIONS
Increase toujeo to 58 units at bedtime   continue novolog to 20 units threee times a day     In addition, please use humalog insulin per the following sliding scale to correct high blood sugars:  BG  151-200: 1 unit  201-250: 2 units  251-300: 3 units  301-350: 4 units  > 350: 5 units  Do not correct bed time highs unless > 200 mg/dl     Follow up with Ophthalmology and nutritionist     Start vascepa 1 gm twice a day

## 2022-06-17 DIAGNOSIS — E11.65 TYPE 2 DIABETES MELLITUS WITH HYPERGLYCEMIA, WITH LONG-TERM CURRENT USE OF INSULIN (HCC): ICD-10-CM

## 2022-06-17 DIAGNOSIS — Z79.4 TYPE 2 DIABETES MELLITUS WITH HYPERGLYCEMIA, WITH LONG-TERM CURRENT USE OF INSULIN (HCC): Primary | ICD-10-CM

## 2022-06-17 DIAGNOSIS — Z79.4 TYPE 2 DIABETES MELLITUS WITH HYPERGLYCEMIA, WITH LONG-TERM CURRENT USE OF INSULIN (HCC): ICD-10-CM

## 2022-06-17 DIAGNOSIS — E11.65 TYPE 2 DIABETES MELLITUS WITH HYPERGLYCEMIA, WITH LONG-TERM CURRENT USE OF INSULIN (HCC): Primary | ICD-10-CM

## 2022-06-17 RX ORDER — BLOOD-GLUCOSE METER
KIT MISCELLANEOUS
Qty: 1 KIT | Refills: 0 | Status: SHIPPED | OUTPATIENT
Start: 2022-06-17 | End: 2022-06-20 | Stop reason: CLARIF

## 2022-06-17 RX ORDER — BLOOD-GLUCOSE METER
KIT MISCELLANEOUS
Qty: 400 STRIP | Refills: 3 | Status: SHIPPED | OUTPATIENT
Start: 2022-06-17 | End: 2022-08-01 | Stop reason: SDUPTHER

## 2022-06-17 RX ORDER — LANCETS 28 GAUGE
EACH MISCELLANEOUS
Qty: 400 EACH | Refills: 3 | Status: SHIPPED | OUTPATIENT
Start: 2022-06-17 | End: 2022-08-01 | Stop reason: SDUPTHER

## 2022-06-20 DIAGNOSIS — E11.65 TYPE 2 DIABETES MELLITUS WITH HYPERGLYCEMIA, WITH LONG-TERM CURRENT USE OF INSULIN (HCC): Primary | ICD-10-CM

## 2022-06-20 DIAGNOSIS — Z79.4 TYPE 2 DIABETES MELLITUS WITH HYPERGLYCEMIA, WITH LONG-TERM CURRENT USE OF INSULIN (HCC): Primary | ICD-10-CM

## 2022-06-20 RX ORDER — BLOOD-GLUCOSE METER
KIT MISCELLANEOUS
Qty: 400 STRIP | Refills: 3 | OUTPATIENT
Start: 2022-06-20

## 2022-06-20 RX ORDER — LANCETS 28 GAUGE
EACH MISCELLANEOUS
Qty: 400 EACH | Refills: 3 | OUTPATIENT
Start: 2022-06-20

## 2022-06-20 RX ORDER — BLOOD SUGAR DIAGNOSTIC
STRIP MISCELLANEOUS
Qty: 100 EACH | Refills: 3 | Status: SHIPPED | OUTPATIENT
Start: 2022-06-20

## 2022-06-20 RX ORDER — BLOOD-GLUCOSE METER
EACH MISCELLANEOUS
Qty: 1 KIT | Refills: 0 | Status: SHIPPED | OUTPATIENT
Start: 2022-06-20

## 2022-06-20 RX ORDER — LANCETS
EACH MISCELLANEOUS
Qty: 100 EACH | Refills: 3 | Status: SHIPPED | OUTPATIENT
Start: 2022-06-20

## 2022-06-20 RX ORDER — BLOOD-GLUCOSE METER
KIT MISCELLANEOUS
Qty: 1 KIT | Refills: 0 | OUTPATIENT
Start: 2022-06-20

## 2022-06-23 DIAGNOSIS — M79.2 NEUROPATHIC PAIN: Primary | ICD-10-CM

## 2022-06-24 RX ORDER — GABAPENTIN 600 MG/1
600 TABLET ORAL 3 TIMES DAILY
Qty: 270 TABLET | Refills: 0 | Status: SHIPPED | OUTPATIENT
Start: 2022-06-24 | End: 2022-09-22

## 2022-06-24 NOTE — TELEPHONE ENCOUNTER
Pt is requesting a refill on Gabapentin  Patient has not been seen in over a year, last saw Dr Elo Floyd  Needs to set up an apt with new PCP  Once apt is made please, send request to that provider to address refill      Thank you

## 2022-07-14 ENCOUNTER — ANNUAL EXAM (OUTPATIENT)
Dept: FAMILY MEDICINE CLINIC | Facility: MEDICAL CENTER | Age: 72
End: 2022-07-14
Payer: COMMERCIAL

## 2022-07-14 VITALS
HEART RATE: 72 BPM | DIASTOLIC BLOOD PRESSURE: 82 MMHG | BODY MASS INDEX: 29.57 KG/M2 | SYSTOLIC BLOOD PRESSURE: 136 MMHG | HEIGHT: 66 IN | WEIGHT: 184 LBS | OXYGEN SATURATION: 96 %

## 2022-07-14 DIAGNOSIS — Z12.31 ENCOUNTER FOR SCREENING MAMMOGRAM FOR MALIGNANT NEOPLASM OF BREAST: ICD-10-CM

## 2022-07-14 DIAGNOSIS — Z01.419 WELL WOMAN EXAM WITH ROUTINE GYNECOLOGICAL EXAM: Primary | ICD-10-CM

## 2022-07-14 DIAGNOSIS — N39.46 URINARY INCONTINENCE, MIXED: ICD-10-CM

## 2022-07-14 DIAGNOSIS — R32 URINARY INCONTINENCE, UNSPECIFIED TYPE: ICD-10-CM

## 2022-07-14 DIAGNOSIS — N89.8 VAGINAL DISCHARGE: ICD-10-CM

## 2022-07-14 DIAGNOSIS — N30.00 ACUTE CYSTITIS WITHOUT HEMATURIA: ICD-10-CM

## 2022-07-14 DIAGNOSIS — R82.90 ABNORMAL URINALYSIS: ICD-10-CM

## 2022-07-14 LAB
SL AMB  POCT GLUCOSE, UA: ABNORMAL
SL AMB LEUKOCYTE ESTERASE,UA: ABNORMAL
SL AMB POCT BILIRUBIN,UA: ABNORMAL
SL AMB POCT BLOOD,UA: ABNORMAL
SL AMB POCT KETONES,UA: ABNORMAL
SL AMB POCT NITRITE,UA: ABNORMAL
SL AMB POCT PH,UA: 6
SL AMB POCT SPECIFIC GRAVITY,UA: 1.01
SL AMB POCT URINE PROTEIN: ABNORMAL
SL AMB POCT UROBILINOGEN: ABNORMAL

## 2022-07-14 PROCEDURE — 1101F PT FALLS ASSESS-DOCD LE1/YR: CPT | Performed by: STUDENT IN AN ORGANIZED HEALTH CARE EDUCATION/TRAINING PROGRAM

## 2022-07-14 PROCEDURE — 87510 GARDNER VAG DNA DIR PROBE: CPT | Performed by: STUDENT IN AN ORGANIZED HEALTH CARE EDUCATION/TRAINING PROGRAM

## 2022-07-14 PROCEDURE — 99214 OFFICE O/P EST MOD 30 MIN: CPT | Performed by: STUDENT IN AN ORGANIZED HEALTH CARE EDUCATION/TRAINING PROGRAM

## 2022-07-14 PROCEDURE — 87660 TRICHOMONAS VAGIN DIR PROBE: CPT | Performed by: STUDENT IN AN ORGANIZED HEALTH CARE EDUCATION/TRAINING PROGRAM

## 2022-07-14 PROCEDURE — 0503F POSTPARTUM CARE VISIT: CPT | Performed by: STUDENT IN AN ORGANIZED HEALTH CARE EDUCATION/TRAINING PROGRAM

## 2022-07-14 PROCEDURE — 87077 CULTURE AEROBIC IDENTIFY: CPT | Performed by: STUDENT IN AN ORGANIZED HEALTH CARE EDUCATION/TRAINING PROGRAM

## 2022-07-14 PROCEDURE — 3075F SYST BP GE 130 - 139MM HG: CPT | Performed by: STUDENT IN AN ORGANIZED HEALTH CARE EDUCATION/TRAINING PROGRAM

## 2022-07-14 PROCEDURE — 81002 URINALYSIS NONAUTO W/O SCOPE: CPT | Performed by: STUDENT IN AN ORGANIZED HEALTH CARE EDUCATION/TRAINING PROGRAM

## 2022-07-14 PROCEDURE — 3288F FALL RISK ASSESSMENT DOCD: CPT | Performed by: STUDENT IN AN ORGANIZED HEALTH CARE EDUCATION/TRAINING PROGRAM

## 2022-07-14 PROCEDURE — 1160F RVW MEDS BY RX/DR IN RCRD: CPT | Performed by: STUDENT IN AN ORGANIZED HEALTH CARE EDUCATION/TRAINING PROGRAM

## 2022-07-14 PROCEDURE — 3079F DIAST BP 80-89 MM HG: CPT | Performed by: STUDENT IN AN ORGANIZED HEALTH CARE EDUCATION/TRAINING PROGRAM

## 2022-07-14 PROCEDURE — 87186 SC STD MICRODIL/AGAR DIL: CPT | Performed by: STUDENT IN AN ORGANIZED HEALTH CARE EDUCATION/TRAINING PROGRAM

## 2022-07-14 PROCEDURE — 3061F NEG MICROALBUMINURIA REV: CPT | Performed by: STUDENT IN AN ORGANIZED HEALTH CARE EDUCATION/TRAINING PROGRAM

## 2022-07-14 PROCEDURE — 87086 URINE CULTURE/COLONY COUNT: CPT | Performed by: STUDENT IN AN ORGANIZED HEALTH CARE EDUCATION/TRAINING PROGRAM

## 2022-07-14 PROCEDURE — 87480 CANDIDA DNA DIR PROBE: CPT | Performed by: STUDENT IN AN ORGANIZED HEALTH CARE EDUCATION/TRAINING PROGRAM

## 2022-07-14 PROCEDURE — 3725F SCREEN DEPRESSION PERFORMED: CPT | Performed by: STUDENT IN AN ORGANIZED HEALTH CARE EDUCATION/TRAINING PROGRAM

## 2022-07-14 RX ORDER — SACCHAROMYCES BOULARDII 250 MG
250 CAPSULE ORAL 2 TIMES DAILY
Qty: 10 CAPSULE | Refills: 0 | Status: SHIPPED | OUTPATIENT
Start: 2022-07-14 | End: 2022-07-19

## 2022-07-14 RX ORDER — SULFAMETHOXAZOLE AND TRIMETHOPRIM 800; 160 MG/1; MG/1
1 TABLET ORAL 2 TIMES DAILY
Qty: 6 TABLET | Refills: 0 | Status: SHIPPED | OUTPATIENT
Start: 2022-07-14 | End: 2022-07-17

## 2022-07-14 NOTE — PROGRESS NOTES
[address] [facility] - Clinic Note  [facility]     Art Alan MRN: [id number] : 1950 Age: 81 y o  Assessment/Plan     1  Well woman exam with routine gynecological exam    - patient presents for well-woman exam  - script for mammogram to complete in January given to patient today  - return in 1 year for next well-woman exam  - patient return to office to further discuss urinary incontinence/management    2  Encounter for screening mammogram for malignant neoplasm of breast    - Mammo screening bilateral w 3d & cad; Future    3  Urinary incontinence, unspecified type    - POCT urine dip    4  Abnormal urinalysis    - Urine culture; Future  - Urine culture    5  Acute cystitis without hematuria    Component      Latest Ref Rng & Units 2022   Leukocytes, UA       1+   Nitrite, UA       trace   SL AMB POCT UROBILINOGEN       neg   POCT URINE PROTEIN       neg   pH, UA       6 0   Blood, UA       neg   SL AMB SPECIFIC GRAVITY-URINE       1 010   Ketones, UA       neg   BILIRUBIN,UA       neg   Glucose, UA       neg     - will treat suspected urinary tract infection, follow-up urine culture, patient will return to office to further discuss urinary incontinence after completes treatment for UTI  - sulfamethoxazole-trimethoprim (BACTRIM DS) 800-160 mg per tablet; Take 1 tablet by mouth 2 (two) times a day for 3 days  Dispense: 6 tablet; Refill: 0  - saccharomyces boulardii (FLORASTOR) 250 mg capsule; Take 1 capsule (250 mg total) by mouth 2 (two) times a day for 5 days  Dispense: 10 capsule; Refill: 0    6  Vaginal discharge    - VAGINOSIS DNA PROBE (AFFIRM)    7  Urinary incontinence, mixed    - patient will return to office after completing antibiotic course  - will discuss treatment options such as [id number]  acknowledged understanding of treatment plan, all questions answered      Subjective     Patient presents for annual exam  The patient has no complaints today  The patient is not currently sexually active  GYN screening history: last pap: was normal 12/10/20 and last mammogram: was normal 1/11/2022  The patient is not taking hormone replacement therapy  Patient denies post-menopausal vaginal bleeding  The patient wears seatbelts: yes  The patient participates in regular exercise: yes- walks  The patient reports that there is not domestic violence in her life  Patient does endorse urinary incontinence, patient states she was seen by Urogynecology in the past   She reports history of 2 vaginal deliveries  She describes sudden urge to urinate and has to run to the bathroom  Currently wearing adult diapers  The following portions of the patient's history were reviewed and updated as appropriate: allergies, current medications, past family history, past medical history, past social history, past surgical history and problem list      Past Medical History:   Diagnosis Date    Acute cystitis without hematuria 08/10/2018    Aortic aneurysm, abdominal ([facility] [address] )     noted on cat scan from 2/2020    Cataract     Closed left hip fracture ([facility] [address] ) 08/10/2018    Colon polyp     Diabetes mellitus ([facility] [address] )     Hyperlipemia     Migraines     Multiple falls     Osteoporosis 02/22/2019    Shingles 7/2016    Urinary tract infection 5/2022       Allergies   Allergen Reactions    Zoledronic Acid GI Intolerance     Anorexia    Codeine GI Intolerance, Nausea Only and Vomiting       Past Surgical History:   Procedure Laterality Date    BREAST CYST ASPIRATION Right 1993    CHOLECYSTECTOMY      COLONOSCOPY      ELBOW SURGERY      FRACTURE SURGERY  ,4/2018    GALLBLADDER SURGERY      HIP SURGERY  08/10/2018    NE COLONOSCOPY FLX DX W/COLLJ SPEC WHEN PFRMD N/A 12/14/2016    Procedure: COLONOSCOPY;  Surgeon: Alpesh Wilcox MD;  Location: [facility];   Service: Gastroenterology    NE OPEN RX FEMUR FX+INTRAMED OLMAN Left 08/10/2018    Procedure: Left Hip IM Long nail;  Surgeon: Alethea Stweard MD;  [facility];  Service: Orthopedics    TONSILLECTOMY         Family History   Problem Relation Age of Onset    Arthritis Family     Pancreatic cancer Family     Scoliosis Family     Diabetes type II Mother     Osteoporosis Mother     Thyroid disease unspecified Mother     Diabetes Mother    [place] Hyperlipidemia Sister     Hypertension Sister     Diabetes Sister     Diabetes type II Maternal Aunt     Diabetes type II Maternal Uncle     Diabetes type II Maternal Grandfather     Pancreatic cancer Paternal Grandmother     Cancer Paternal Grandmother     No Known Problems Father     No Known Problems Daughter     No Known Problems Maternal Grandmother     No Known Problems Paternal Grandfather     No Known Problems Daughter     No Known Problems Maternal Aunt     No Known Problems Paternal Aunt     No Known Problems Paternal Aunt     No Known Problems Paternal Aunt        Social History     Socioeconomic History    Marital status: /Civil Union     Spouse name: None    Number of children: 2    Years of education: 15    Highest education level: None   Occupational History    None   Tobacco Use    Smoking status: Heavy Tobacco Smoker     Packs/day: 2 00     Years: 30 00     Pack years: 60 00     Types: Cigarettes     Last attempt to quit: 3/23/2018     Years since quittin 3    Smokeless tobacco: Never Used   Vaping Use    Vaping Use: Never used   Substance and Sexual Activity    Alcohol use: No    Drug use: No    Sexual activity: None   Other Topics Concern    None   Social History Narrative    high school graduate     Social Determinants of Health     Financial Resource Strain: Not on file   Food Insecurity: Not on file   Transportation Needs: Not on file   Physical Activity: Not on file   Stress: Not on file   Social Connections: Not on file   Intimate Partner Violence: Not on file   Housing Stability: Not on file       Current Outpatient Medications   Medication Sig Dispense Refill    Accu-Chek FastClix Lancets MISC Use to check blood sugar 4 times a day 100 each 3    aspirin (ECOTRIN LOW STRENGTH) 81 mg EC tablet Take 81 mg by mouth daily      atorvastatin (LIPITOR) 80 mg tablet TAKE 1 TABLET BY MOUTH EVERY DAY AT NIGHT      Blood Glucose Monitoring Suppl (Accu-Chek Guide Me) w/Device KIT Use to check blood sugar 4 times a day 1 kit 0    Cholecalciferol 25 MCG (1000 UT) capsule Take 2,000 Units by mouth daily        DULoxetine (CYMBALTA) 60 mg delayed release capsule TAKE 1 CAPSULE BY MOUTH NIGHTLY        ergocalciferol (VITAMIN D2) 50,000 units       gabapentin (NEURONTIN) 600 MG tablet Take 1 tablet (600 mg total) by mouth 3 (three) times a day 270 tablet 0    gemfibrozil (LOPID) 600 mg tablet TAKE 1 TABLET BY MOUTH 2 TIMES A DAY BEFORE MEALS  180 tablet 1    glipiZIDE (GLUCOTROL XL) 10 mg 24 hr tablet Take 2 tablets (20 mg total) by mouth daily 180 tablet 3    glucose blood (Accu-Chek Guide) test strip Use to check blood sugar 4 times a day 100 each 3    glucose blood (FREESTYLE LITE) test strip Use to test blood sugar 4 times a day 400 strip 3    Icosapent Ethyl (Vascepa) 1 g CAPS Take 1 capsule (1 g total) by mouth 2 (two) times a day 180 capsule 3    insulin aspart (NovoLOG FlexPen) 100 UNIT/ML injection pen Inject 20 Units under the skin 3 (three) times a day with meals 60 mL 3    insulin glargine (Toujeo SoloStar) 300 units/mL CONCENTRATED U-300 injection pen (1-unit dial) Inject 58 Units under the skin daily at bedtime 60 mL 3    Insulin Pen Needle (BD Pen Needle Montse U/F) 32G X 4 MM MISC Use daily withToujeo 50 each 3    Lancets (freestyle) lancets Use to test blood sugar 4 times a day 400 each 3    levothyroxine 50 mcg tablet Take 1 tablet (50 mcg total) by mouth daily 90 tablet 3    cephalexin (KEFLEX) 500 mg capsule TAKE 1 CAPSULE BY MOUTH TWICE A DAY FOR 7 DAYS      DULoxetine (CYMBALTA) 20 mg capsule Take 20 mg by mouth daily  3    fluconazole (DIFLUCAN) 150 mg tablet 1 tab now and 1 tab in 72 hours      fluconazole (DIFLUCAN) 150 mg tablet TAKE 1 TABLET BY MOUTH NOW AND 1 TABLET IN 72 HOURS      glucose blood (Accu-Chek Mikaela Plus) test strip Use 1 each 2 (two) times a day 200 each 3    losartan 50 mg TABS 50 mg, hydrochlorothiazide 12 5 mg TABS 12 5 mg Take by mouth daily      metroNIDAZOLE (FLAGYL) 500 mg tablet       sulfamethoxazole-trimethoprim (BACTRIM) 400-80 mg per tablet       Toujeo Max SoloStar 300 units/mL CONCENTRATED U-300 injection pen (2-unit dial)        No current facility-administered medications for this visit  Review of Systems   Genitourinary: Positive for urgency  Negative for difficulty urinating, frequency, hematuria, pelvic pain, vaginal bleeding, vaginal discharge and vaginal pain  Musculoskeletal:        No breast pain, no nipple discharge, no breast lumps        And as noted in HPI    Objective      /82 (BP Location: Left arm, Patient Position: Sitting, Cuff Size: Large)   Pulse 72   Ht 5' 6" (1 676 m)   Wt 83 5 kg (184 lb)   SpO2 96%   BMI 29 70 kg/m²     Physical Exam  Vitals reviewed  Exam conducted with a chaperone present Jose J Fair)  Constitutional:       General: She is not in acute distress  HENT:      Head: Normocephalic and atraumatic  Eyes:      Conjunctiva/sclera: Conjunctivae normal    Pulmonary:      Effort: Pulmonary effort is normal    Chest:   Breasts:      Right: No swelling, bleeding, mass, nipple discharge, skin change, tenderness or axillary adenopathy  Left: No swelling, bleeding, mass, nipple discharge, skin change, tenderness or axillary adenopathy  Abdominal:      Palpations: Abdomen is soft  Tenderness: There is no abdominal tenderness  Genitourinary:     Exam position: Supine  Pubic Area: No rash  Labia:         Right: No rash, tenderness, lesion or injury           Left: No rash, tenderness, lesion or injury  Vagina: Vaginal discharge (white) present  Cervix: No cervical motion tenderness, lesion or cervical bleeding  Uterus: Not tender  Adnexa:         Right: No mass, tenderness or fullness  Left: No mass, tenderness or fullness  Rectum: Normal    Lymphadenopathy:      Upper Body:      Right upper body: No axillary adenopathy  Left upper body: No axillary adenopathy  Neurological:      Mental Status: She is alert and oriented to person, place, and time  Psychiatric:         Mood and Affect: Mood normal          Behavior: Behavior normal          Thought Content: Thought content normal          Judgment: Judgment normal              Some portions of this record may have been generated with voice recognition software  There may be translation, syntax, or grammatical errors  Occasional wrong word or "sound-a-like" substitutions may have occurred due to the inherent limitations of the voice recognition software  Read the chart carefully and recognize, using context, where substations may have occurred  If you have any questions, please contact the dictating provider for clarification or correction, as needed

## 2022-07-16 LAB
BACTERIA UR CULT: ABNORMAL
CANDIDA RRNA VAG QL PROBE: NEGATIVE
G VAGINALIS RRNA GENITAL QL PROBE: NEGATIVE
T VAGINALIS RRNA GENITAL QL PROBE: NEGATIVE

## 2022-08-01 ENCOUNTER — OFFICE VISIT (OUTPATIENT)
Dept: FAMILY MEDICINE CLINIC | Facility: MEDICAL CENTER | Age: 72
End: 2022-08-01
Payer: COMMERCIAL

## 2022-08-01 VITALS
HEART RATE: 68 BPM | SYSTOLIC BLOOD PRESSURE: 124 MMHG | WEIGHT: 183 LBS | BODY MASS INDEX: 29.41 KG/M2 | DIASTOLIC BLOOD PRESSURE: 76 MMHG | OXYGEN SATURATION: 96 % | HEIGHT: 66 IN

## 2022-08-01 DIAGNOSIS — N39.46 MIXED STRESS AND URGE URINARY INCONTINENCE: Primary | ICD-10-CM

## 2022-08-01 DIAGNOSIS — Z11.59 NEED FOR HEPATITIS C SCREENING TEST: ICD-10-CM

## 2022-08-01 DIAGNOSIS — E11.65 TYPE 2 DIABETES MELLITUS WITH HYPERGLYCEMIA, WITH LONG-TERM CURRENT USE OF INSULIN (HCC): ICD-10-CM

## 2022-08-01 DIAGNOSIS — Z79.4 TYPE 2 DIABETES MELLITUS WITH HYPERGLYCEMIA, WITH LONG-TERM CURRENT USE OF INSULIN (HCC): ICD-10-CM

## 2022-08-01 DIAGNOSIS — Z12.2 SCREENING FOR LUNG CANCER: ICD-10-CM

## 2022-08-01 PROCEDURE — 99214 OFFICE O/P EST MOD 30 MIN: CPT | Performed by: STUDENT IN AN ORGANIZED HEALTH CARE EDUCATION/TRAINING PROGRAM

## 2022-08-01 RX ORDER — TOLTERODINE TARTRATE 2 MG/1
2 TABLET, EXTENDED RELEASE ORAL 2 TIMES DAILY
Qty: 30 TABLET | Refills: 0 | Status: SHIPPED | OUTPATIENT
Start: 2022-08-01 | End: 2022-08-12

## 2022-08-01 NOTE — PROGRESS NOTES
[address] Inf - Clinic Note  Abdoul Castellanos [place], 24     Sariah Galvan MRN: [id number] : 1950 Age: 81 y o  Assessment/Plan     1  Mixed stress and urge urinary incontinence    - patient to start medication per orders  - discussed potential side effects extensively with patient  - tolterodine (DETROL) 2 mg tablet; Take 1 tablet (2 mg total) by mouth 2 (two) times a day  Dispense: 30 tablet; Refill: 0  - follow-up in 1 month for medication follow-up and sooner as needed    2  Type 2 diabetes mellitus with hyperglycemia, with long-term current use of insulin ([facility] )    - managed by Endocrinology  - patient will complete diabetic eye exam at her optometry office  - diabetic foot exam completed today in office    3  Need for hepatitis C screening test    - Hepatitis C Antibody ([facility]); Future    4  Screening for lung cancer    - [place] lung screening program; Future    5  BMI 29 0-29 9,adult    BMI Counseling: Body mass index is 29 54 kg/m²  The BMI is above normal  Nutrition recommendations include encouraging healthy choices of fruits and vegetables  Exercise recommendations include strength training exercises  Rationale for BMI follow-up plan is due to patient being overweight or obese  Sariah Galvan acknowledged understanding of treatment plan, all questions answered  Subjective      Sariah Galvan is a 81 y o  female presents to discuss urinary incontinence and follow up  Does have urinary incontinence episodes daily she states  Works at [employer]  Patient states that she does use measures such as holding breath for a few seconds to hold bladder so does not have accident prior to getting to restroom  She did complete antibiotic recently      The following portions of the patient's history were reviewed and updated as appropriate: allergies, current medications, past family history, past medical history, past social history, past surgical history and problem list     Past Medical History:   Diagnosis Date    Acute cystitis without hematuria 08/10/2018    Aortic aneurysm, abdominal ([facility] )     noted on cat scan from 2/2020    Cataract     Closed left hip fracture ([facility] ) 08/10/2018    Colon polyp     Diabetes mellitus ([facility] )     Hyperlipemia     Migraines     Multiple falls     Osteoporosis 02/22/2019    Shingles 7/2016    Urinary tract infection 5/2022       Allergies   Allergen Reactions    Zoledronic Acid GI Intolerance     Anorexia    Codeine GI Intolerance, Nausea Only and Vomiting       Past Surgical History:   Procedure Laterality Date    BREAST CYST ASPIRATION Right 1993    CHOLECYSTECTOMY      COLONOSCOPY      ELBOW SURGERY      FRACTURE SURGERY  ,4/2018    GALLBLADDER SURGERY      HIP SURGERY  08/10/2018    [place] COLONOSCOPY FLX DX W/COLLJ SPEC WHEN PFRMD N/A 12/14/2016    Procedure: COLONOSCOPY;  Surgeon: Morris Peters MD;  [facility];   Service: Gastroenterology    [place] OPEN RX FEMUR FX+INTRAMED OLMAN Left 08/10/2018    Procedure: Left Hip IM Long nail;  Surgeon: Jimenez Piña MD;  [facility];  Service: Orthopedics    TONSILLECTOMY         Family History   Problem Relation Age of Onset    Arthritis Family     Pancreatic cancer Family     Scoliosis Family     Diabetes type II Mother     Osteoporosis Mother     Thyroid disease unspecified Mother     Diabetes Mother     Hyperlipidemia Sister     Hypertension Sister     Diabetes Sister     Diabetes type II Maternal Aunt     Diabetes type II Maternal Uncle     Diabetes type II Maternal Grandfather     Pancreatic cancer Paternal Grandmother     Cancer Paternal Grandmother     No Known Problems Father     No Known Problems Daughter     No Known Problems Maternal Grandmother     No Known Problems Paternal Grandfather     No Known Problems Daughter     No Known Problems Maternal Aunt     No Known Problems Paternal Aunt     No Known Problems Paternal Aunt     No Known Problems Paternal Aunt        Social History     Socioeconomic History    Marital status: /Civil Union     Spouse name: None    Number of children: 2    Years of education: 15    Highest education level: None   Occupational History    None   Tobacco Use    Smoking status: Heavy Tobacco Smoker     Packs/day: 2 00     Years: 30 00     Pack years: 60 00     Types: Cigarettes     Last attempt to quit: 3/23/2018     Years since quittin 3    Smokeless tobacco: Never Used   Vaping Use    Vaping Use: Never used   Substance and Sexual Activity    Alcohol use: No    Drug use: No    Sexual activity: None   Other Topics Concern    None   Social History Narrative    high school graduate     Social Determinants of Health     Financial Resource Strain: Not on file   Food Insecurity: Not on file   Transportation Needs: Not on file   Physical Activity: Not on file   Stress: Not on file   Social Connections: Not on file   Intimate Partner Violence: Not on file   Housing Stability: Not on file       Current Outpatient Medications   Medication Sig Dispense Refill    Accu-Chek FastClix Lancets MISC Use to check blood sugar 4 times a day 100 each 3    aspirin (ECOTRIN LOW STRENGTH) 81 mg EC tablet Take 81 mg by mouth daily      atorvastatin (LIPITOR) 80 mg tablet TAKE 1 TABLET BY MOUTH EVERY DAY AT NIGHT      Blood Glucose Monitoring Suppl (Accu-Chek Guide Me) w/Device KIT Use to check blood sugar 4 times a day 1 kit 0    Cholecalciferol 25 MCG (1000 UT) capsule Take 2,000 Units by mouth daily        DULoxetine (CYMBALTA) 60 mg delayed release capsule TAKE 1 CAPSULE BY MOUTH NIGHTLY        ergocalciferol (VITAMIN D2) 50,000 units       gabapentin (NEURONTIN) 600 MG tablet Take 1 tablet (600 mg total) by mouth 3 (three) times a day 270 tablet 0    gemfibrozil (LOPID) 600 mg tablet TAKE 1 TABLET BY MOUTH 2 TIMES A DAY BEFORE MEALS  180 tablet 1    glipiZIDE (GLUCOTROL XL) 10 mg 24 hr tablet Take 2 tablets (20 mg total) by mouth daily 180 tablet 3    glucose blood (Accu-Chek Guide) test strip Use to check blood sugar 4 times a day 100 each 3    Icosapent Ethyl (Vascepa) 1 g CAPS Take 1 capsule (1 g total) by mouth 2 (two) times a day 180 capsule 3    insulin aspart (NovoLOG FlexPen) 100 UNIT/ML injection pen Inject 20 Units under the skin 3 (three) times a day with meals 60 mL 3    insulin glargine (Toujeo SoloStar) 300 units/mL CONCENTRATED U-300 injection pen (1-unit dial) Inject 58 Units under the skin daily at bedtime 60 mL 3    Insulin Pen Needle (BD Pen Needle Montse U/F) 32G X 4 MM MISC Use daily withToujeo 50 each 3    levothyroxine 50 mcg tablet Take 1 tablet (50 mcg total) by mouth daily 90 tablet 3    tolterodine (DETROL) 2 mg tablet Take 1 tablet (2 mg total) by mouth 2 (two) times a day 30 tablet 0    DULoxetine (CYMBALTA) 20 mg capsule Take 20 mg by mouth daily  3    fluconazole (DIFLUCAN) 150 mg tablet 1 tab now and 1 tab in 72 hours       No current facility-administered medications for this visit  Review of Systems     As noted in HPI    Objective      /76 (BP Location: Left arm, Patient Position: Sitting, Cuff Size: Large)   Pulse 68   Ht 5' 6" (1 676 m)   Wt 83 kg (183 lb)   SpO2 96%   BMI 29 54 kg/m²     Physical Exam  Vitals reviewed  Constitutional:       Appearance: Normal appearance  HENT:      Head: Normocephalic and atraumatic  Eyes:      Conjunctiva/sclera: Conjunctivae normal    Cardiovascular:      Pulses: no weak pulses          Dorsalis pedis pulses are 2+ on the right side and 2+ on the left side  Posterior tibial pulses are 2+ on the right side and 2+ on the left side  Pulmonary:      Effort: Pulmonary effort is normal    Feet:      Right foot:      Skin integrity: No ulcer, skin breakdown, erythema, warmth, callus or dry skin        Left foot:      Skin integrity: No ulcer, skin breakdown, erythema, warmth, callus or dry skin    Neurological:      Mental Status: She is alert and oriented to person, place, and time  Psychiatric:         Mood and Affect: Mood normal          Behavior: Behavior normal          Thought Content: Thought content normal          Judgment: Judgment normal          Diabetic Foot Exam    Patient's shoes and socks removed  Right Foot/Ankle   Right Foot Inspection  Skin Exam: skin normal and skin intact  No dry skin, no warmth, no callus, no erythema, no maceration, no abnormal color, no pre-ulcer, no ulcer and no callus  Toe Exam: ROM and strength within normal limits  Sensory   Monofilament testing: intact    Vascular  Capillary refills: < 3 seconds  The right DP pulse is 2+  The right PT pulse is 2+  Left Foot/Ankle  Left Foot Inspection  Skin Exam: skin normal and skin intact  No dry skin, no warmth, no erythema, no maceration, normal color, no pre-ulcer, no ulcer and no callus  Toe Exam: ROM and strength within normal limits  Sensory   Monofilament testing: intact    Vascular  Capillary refills: < 3 seconds  The left DP pulse is 2+  The left PT pulse is 2+  Assign Risk Category  No deformity present  No loss of protective sensation  No weak pulses  Risk: 0      Some portions of this record may have been generated with voice recognition software  There may be translation, syntax, or grammatical errors  Occasional wrong word or "sound-a-like" substitutions may have occurred due to the inherent limitations of the voice recognition software  Read the chart carefully and recognize, using context, where substations may have occurred  If you have any questions, please contact the dictating provider for clarification or correction, as needed

## 2022-08-12 DIAGNOSIS — N39.46 MIXED STRESS AND URGE URINARY INCONTINENCE: ICD-10-CM

## 2022-08-12 RX ORDER — TOLTERODINE TARTRATE 2 MG/1
TABLET, EXTENDED RELEASE ORAL
Qty: 180 TABLET | Refills: 1 | Status: SHIPPED | OUTPATIENT
Start: 2022-08-12

## 2022-08-19 LAB — EXT SARS-COV-2: POSITIVE

## 2022-08-21 LAB — HBA1C MFR BLD HPLC: 10.6 %

## 2022-08-21 PROCEDURE — 3046F HEMOGLOBIN A1C LEVEL >9.0%: CPT | Performed by: STUDENT IN AN ORGANIZED HEALTH CARE EDUCATION/TRAINING PROGRAM

## 2022-08-30 ENCOUNTER — TELEPHONE (OUTPATIENT)
Dept: FAMILY MEDICINE CLINIC | Facility: MEDICAL CENTER | Age: 72
End: 2022-08-30

## 2022-08-30 ENCOUNTER — TELEPHONE (OUTPATIENT)
Dept: ENDOCRINOLOGY | Facility: CLINIC | Age: 72
End: 2022-08-30

## 2022-08-30 NOTE — TELEPHONE ENCOUNTER
Please thank the patient for keeping us updated   Wishing her a speedy recovery   If patient notices any persistent high/low blood sugars since she has been discharged, she should send her blood sugar log for review

## 2022-08-30 NOTE — TELEPHONE ENCOUNTER
Pt called to schedule PRABHU appt  She was discharged on 8/23/22  Scheduled appt with Dr Nikki Dave for tomorrow at 9:30      Routed to Clinical - please do PRABHU call; her number is [phone]

## 2022-08-30 NOTE — TELEPHONE ENCOUNTER
Spoke with pt  She was in [facility] for bilateral pneumonia  She will call them and ask them to fax her records here for her appointment tomorrow    Changed visit to hospital follow-up

## 2022-08-30 NOTE — TELEPHONE ENCOUNTER
This does not constitute a PRABHU for we were not notified of an admission and a T/C call not made within 48 hours   Need Hospital records for her appt tomorrow

## 2022-08-31 ENCOUNTER — OFFICE VISIT (OUTPATIENT)
Dept: FAMILY MEDICINE CLINIC | Facility: MEDICAL CENTER | Age: 72
End: 2022-08-31
Payer: COMMERCIAL

## 2022-08-31 VITALS
HEART RATE: 87 BPM | DIASTOLIC BLOOD PRESSURE: 74 MMHG | TEMPERATURE: 97.5 F | HEIGHT: 66 IN | BODY MASS INDEX: 27.97 KG/M2 | WEIGHT: 174 LBS | OXYGEN SATURATION: 94 % | SYSTOLIC BLOOD PRESSURE: 124 MMHG

## 2022-08-31 DIAGNOSIS — R79.89 LOW SERUM SODIUM: ICD-10-CM

## 2022-08-31 DIAGNOSIS — Z87.01 HISTORY OF PNEUMONIA: ICD-10-CM

## 2022-08-31 DIAGNOSIS — Z09 HOSPITAL DISCHARGE FOLLOW-UP: Primary | ICD-10-CM

## 2022-08-31 PROCEDURE — 99214 OFFICE O/P EST MOD 30 MIN: CPT | Performed by: STUDENT IN AN ORGANIZED HEALTH CARE EDUCATION/TRAINING PROGRAM

## 2022-08-31 PROCEDURE — 3074F SYST BP LT 130 MM HG: CPT | Performed by: STUDENT IN AN ORGANIZED HEALTH CARE EDUCATION/TRAINING PROGRAM

## 2022-08-31 PROCEDURE — 1160F RVW MEDS BY RX/DR IN RCRD: CPT | Performed by: STUDENT IN AN ORGANIZED HEALTH CARE EDUCATION/TRAINING PROGRAM

## 2022-08-31 PROCEDURE — 3078F DIAST BP <80 MM HG: CPT | Performed by: STUDENT IN AN ORGANIZED HEALTH CARE EDUCATION/TRAINING PROGRAM

## 2022-08-31 NOTE — TELEPHONE ENCOUNTER
Spoke with patient and asked her to please send log in  for  review with any persistent high/low blood sugars    She understood

## 2022-08-31 NOTE — PROGRESS NOTES
[address] Inf - Clinic Note  [facility]     Sohail Wise MRN: [id number] : 1950 Age: 81 y o  Assessment/Plan     1  Hospital discharge follow-up    - patient presents for hospital follow-up after discharge from [facility] for bilateral pneumonia  - patient recovered well, complete antibiotic course, no acute complaints today    2  History of pneumonia    - lab work from recent hospital stay reviewed  - will follow-up repeat testing per orders  - follow-up repeat chest x-ray in 6 weeks from last imaging   - CBC and differential; Future  - XR chest pa & lateral; Future  - PCV 20 vaccine unavailable today in office, patient will return for nurse visit for PCV 20 vaccine, counseled about vaccine today    3  Low serum sodium    - Basic metabolic panel; Future      Sohail Wise acknowledged understanding of treatment plan, all questions answered  Subjective      Sohail Wise is a 81 y o  female who presents for hospital follow-up for bilateral pneumonia  Patient presents with  who also contributes to history  Patient was treated, discharged with course of antibiotic  She completed antibiotic course yesterday  Patient has also been using albuterol inhaler  Today, patient with no shortness of breath  No fever, no chills       The following portions of the patient's history were reviewed and updated as appropriate: allergies, current medications, past family history, past medical history, past social history, past surgical history and problem list      Past Medical History:   Diagnosis Date    Acute cystitis without hematuria 08/10/2018    Aortic aneurysm, abdominal ([address] )     noted on cat scan from 2020    Cataract     Closed left hip fracture ([address] ) 08/10/2018    Colon polyp     Diabetes mellitus ([address] )     Hyperlipemia     Migraines     Multiple falls     Osteoporosis 2019    Shingles 2016    Urinary tract infection 2022 Allergies   Allergen Reactions    Zoledronic Acid GI Intolerance     Anorexia    Codeine GI Intolerance, Nausea Only and Vomiting       Past Surgical History:   Procedure Laterality Date    BREAST CYST ASPIRATION Right 1993    CHOLECYSTECTOMY      COLONOSCOPY      ELBOW SURGERY      FRACTURE SURGERY  ,4/2018    GALLBLADDER SURGERY      HIP SURGERY  08/10/2018    [place] COLONOSCOPY FLX DX W/COLLJ SPEC WHEN PFRMD N/A 12/14/2016    Procedure: COLONOSCOPY;  Surgeon: Srikanth Smith MD;  [facility];   Service: Gastroenterology    [place] OPEN RX FEMUR FX+INTRAMED OLMAN Left 08/10/2018    Procedure: Left Hip IM Long nail;  Surgeon: Nabeel Harris MD;  [facility];  Service: Orthopedics    TONSILLECTOMY         Family History   Problem Relation Age of Onset    Arthritis Family     Pancreatic cancer Family     Scoliosis Family     Diabetes type II Mother     Osteoporosis Mother     Thyroid disease unspecified Mother     Diabetes Mother     Hyperlipidemia Sister     Hypertension Sister     Diabetes Sister     Diabetes type II Maternal Aunt     Diabetes type II Maternal Uncle     Diabetes type II Maternal Grandfather     Pancreatic cancer Paternal Grandmother     Cancer Paternal Grandmother     No Known Problems Father     No Known Problems Daughter     No Known Problems Maternal Grandmother     No Known Problems Paternal Grandfather     No Known Problems Daughter     No Known Problems Maternal Aunt     No Known Problems Paternal Aunt     No Known Problems Paternal Aunt     No Known Problems Paternal Aunt        Social History     Socioeconomic History    Marital status: /Civil Union     Spouse name: None    Number of children: 2    Years of education: 15    Highest education level: None   Occupational History    None   Tobacco Use    Smoking status: Heavy Tobacco Smoker     Packs/day: 2 00     Years: 30 00     Pack years: 60 00     Types: Cigarettes     Last attempt to quit: 3/23/2018     Years since quittin 4    Smokeless tobacco: Never Used   Vaping Use    Vaping Use: Never used   Substance and Sexual Activity    Alcohol use: No    Drug use: No    Sexual activity: None   Other Topics Concern    None   Social History Narrative    high school graduate     Social Determinants of Health     Financial Resource Strain: Not on file   Food Insecurity: Not on file   Transportation Needs: Not on file   Physical Activity: Not on file   Stress: Not on file   Social Connections: Not on file   Intimate Partner Violence: Not on file   Housing Stability: Not on file       Current Outpatient Medications   Medication Sig Dispense Refill    Accu-Chek FastClix Lancets MISC Use to check blood sugar 4 times a day 100 each 3    aspirin (ECOTRIN LOW STRENGTH) 81 mg EC tablet Take 81 mg by mouth daily      atorvastatin (LIPITOR) 80 mg tablet TAKE 1 TABLET BY MOUTH EVERY DAY AT NIGHT      Blood Glucose Monitoring Suppl (Accu-Chek Guide Me) w/Device KIT Use to check blood sugar 4 times a day 1 kit 0    Cholecalciferol 25 MCG (1000 UT) capsule Take 2,000 Units by mouth daily        DULoxetine (CYMBALTA) 60 mg delayed release capsule TAKE 1 CAPSULE BY MOUTH NIGHTLY        ergocalciferol (VITAMIN D2) 50,000 units       gabapentin (NEURONTIN) 600 MG tablet Take 1 tablet (600 mg total) by mouth 3 (three) times a day 270 tablet 0    gemfibrozil (LOPID) 600 mg tablet TAKE 1 TABLET BY MOUTH 2 TIMES A DAY BEFORE MEALS  180 tablet 1    glipiZIDE (GLUCOTROL XL) 10 mg 24 hr tablet Take 2 tablets (20 mg total) by mouth daily 180 tablet 3    glucose blood (Accu-Chek Guide) test strip Use to check blood sugar 4 times a day 100 each 3    Icosapent Ethyl (Vascepa) 1 g CAPS Take 1 capsule (1 g total) by mouth 2 (two) times a day 180 capsule 3    insulin aspart (NovoLOG FlexPen) 100 UNIT/ML injection pen Inject 20 Units under the skin 3 (three) times a day with meals 60 mL 3    insulin glargine (Toujeo SoloStar) 300 units/mL CONCENTRATED U-300 injection pen (1-unit dial) Inject 58 Units under the skin daily at bedtime 60 mL 3    Insulin Pen Needle (BD Pen Needle Montse U/F) 32G X 4 MM MISC Use daily withToujeo 50 each 3    levothyroxine 50 mcg tablet Take 1 tablet (50 mcg total) by mouth daily 90 tablet 3    tolterodine (DETROL) 2 mg tablet TAKE 1 TABLET BY MOUTH 2 TIMES A DAY  180 tablet 1    DULoxetine (CYMBALTA) 20 mg capsule Take 20 mg by mouth daily  3    fluconazole (DIFLUCAN) 150 mg tablet 1 tab now and 1 tab in 72 hours       No current facility-administered medications for this visit  Review of Systems     As noted in HPI    Objective      /74 (BP Location: Left arm, Patient Position: Sitting, Cuff Size: Adult)   Pulse 87   Temp 97 5 °F (36 4 °C)   Ht 5' 6" (1 676 m)   Wt 78 9 kg (174 lb)   SpO2 94%   BMI 28 08 kg/m²     Physical Exam  Vitals reviewed  Constitutional:       General: She is not in acute distress  Appearance: Normal appearance  She is not ill-appearing or toxic-appearing  HENT:      Head: Normocephalic and atraumatic  Mouth/Throat:      Mouth: Mucous membranes are moist       Pharynx: Oropharynx is clear  Eyes:      Extraocular Movements: Extraocular movements intact  Conjunctiva/sclera: Conjunctivae normal       Pupils: Pupils are equal, round, and reactive to light  Cardiovascular:      Rate and Rhythm: Normal rate and regular rhythm  Pulses: Normal pulses  Heart sounds: Normal heart sounds  Pulmonary:      Effort: Pulmonary effort is normal  No respiratory distress  Breath sounds: Normal breath sounds  No wheezing or rales  Skin:     General: Skin is warm and dry  Neurological:      Mental Status: She is alert and oriented to person, place, and time  Psychiatric:         Mood and Affect: Mood normal          Behavior: Behavior normal          Thought Content:  Thought content normal          Judgment: Judgment normal              Some portions of this record may have been generated with voice recognition software  There may be translation, syntax, or grammatical errors  Occasional wrong word or "sound-a-like" substitutions may have occurred due to the inherent limitations of the voice recognition software  Read the chart carefully and recognize, using context, where substations may have occurred  If you have any questions, please contact the dictating provider for clarification or correction, as needed

## 2022-09-02 ENCOUNTER — CLINICAL SUPPORT (OUTPATIENT)
Dept: FAMILY MEDICINE CLINIC | Facility: MEDICAL CENTER | Age: 72
End: 2022-09-02
Payer: COMMERCIAL

## 2022-09-02 DIAGNOSIS — Z23 IMMUNIZATION DUE: Primary | ICD-10-CM

## 2022-09-02 PROCEDURE — G0009 ADMIN PNEUMOCOCCAL VACCINE: HCPCS

## 2022-09-02 PROCEDURE — 90677 PCV20 VACCINE IM: CPT

## 2022-09-03 DIAGNOSIS — M79.2 NEUROPATHIC PAIN: ICD-10-CM

## 2022-09-03 DIAGNOSIS — E78.1 HYPERTRIGLYCERIDEMIA: ICD-10-CM

## 2022-09-06 RX ORDER — GEMFIBROZIL 600 MG/1
TABLET, FILM COATED ORAL
Qty: 180 TABLET | Refills: 1 | Status: SHIPPED | OUTPATIENT
Start: 2022-09-06

## 2022-09-06 RX ORDER — GABAPENTIN 600 MG/1
TABLET ORAL
Qty: 270 TABLET | Refills: 0 | Status: SHIPPED | OUTPATIENT
Start: 2022-09-06

## 2022-09-14 LAB
BASOPHILS # BLD AUTO: 108 CELLS/UL (ref 0–200)
BASOPHILS NFR BLD AUTO: 1.2 %
BUN SERPL-MCNC: 23 MG/DL (ref 7–25)
BUN/CREAT SERPL: ABNORMAL (CALC) (ref 6–22)
CALCIUM SERPL-MCNC: 9.8 MG/DL (ref 8.6–10.4)
CHLORIDE SERPL-SCNC: 101 MMOL/L (ref 98–110)
CO2 SERPL-SCNC: 23 MMOL/L (ref 20–32)
CREAT SERPL-MCNC: 0.61 MG/DL (ref 0.6–1)
EOSINOPHIL # BLD AUTO: 594 CELLS/UL (ref 15–500)
EOSINOPHIL NFR BLD AUTO: 6.6 %
ERYTHROCYTE [DISTWIDTH] IN BLOOD BY AUTOMATED COUNT: 13.1 % (ref 11–15)
GFR/BSA.PRED SERPLBLD CYS-BASED-ARV: 96 ML/MIN/1.73M2
GLUCOSE SERPL-MCNC: 218 MG/DL (ref 65–99)
HCT VFR BLD AUTO: 41.1 % (ref 35–45)
HGB BLD-MCNC: 14 G/DL (ref 11.7–15.5)
LYMPHOCYTES # BLD AUTO: 3474 CELLS/UL (ref 850–3900)
LYMPHOCYTES NFR BLD AUTO: 38.6 %
MCH RBC QN AUTO: 31 PG (ref 27–33)
MCHC RBC AUTO-ENTMCNC: 34.1 G/DL (ref 32–36)
MCV RBC AUTO: 91.1 FL (ref 80–100)
MONOCYTES # BLD AUTO: 513 CELLS/UL (ref 200–950)
MONOCYTES NFR BLD AUTO: 5.7 %
NEUTROPHILS # BLD AUTO: 4311 CELLS/UL (ref 1500–7800)
NEUTROPHILS NFR BLD AUTO: 47.9 %
PLATELET # BLD AUTO: 300 THOUSAND/UL (ref 140–400)
PMV BLD REES-ECKER: 10.2 FL (ref 7.5–12.5)
POTASSIUM SERPL-SCNC: 4.3 MMOL/L (ref 3.5–5.3)
RBC # BLD AUTO: 4.51 MILLION/UL (ref 3.8–5.1)
SODIUM SERPL-SCNC: 136 MMOL/L (ref 135–146)
WBC # BLD AUTO: 9 THOUSAND/UL (ref 3.8–10.8)

## 2022-09-23 ENCOUNTER — APPOINTMENT (OUTPATIENT)
Dept: RADIOLOGY | Facility: MEDICAL CENTER | Age: 72
End: 2022-09-23
Payer: COMMERCIAL

## 2022-09-23 DIAGNOSIS — Z87.01 HISTORY OF PNEUMONIA: ICD-10-CM

## 2022-09-23 PROCEDURE — 71046 X-RAY EXAM CHEST 2 VIEWS: CPT

## 2022-09-24 DIAGNOSIS — Z79.4 TYPE 2 DIABETES MELLITUS WITH HYPERGLYCEMIA, WITH LONG-TERM CURRENT USE OF INSULIN (HCC): ICD-10-CM

## 2022-09-24 DIAGNOSIS — E11.65 TYPE 2 DIABETES MELLITUS WITH HYPERGLYCEMIA, WITH LONG-TERM CURRENT USE OF INSULIN (HCC): ICD-10-CM

## 2022-09-26 RX ORDER — DIPHENHYDRAMINE HCL 25 MG
TABLET ORAL
Qty: 1 KIT | Refills: 0 | Status: SHIPPED | OUTPATIENT
Start: 2022-09-26

## 2022-09-26 RX ORDER — CALCIUM CITRATE/VITAMIN D3 200MG-6.25
TABLET ORAL
Qty: 100 STRIP | Refills: 3 | Status: SHIPPED | OUTPATIENT
Start: 2022-09-26 | End: 2022-10-10

## 2022-09-26 RX ORDER — GLUCOSAM/CHON-MSM1/C/MANG/BOSW 500-416.6
TABLET ORAL
Qty: 100 EACH | Refills: 3 | Status: SHIPPED | OUTPATIENT
Start: 2022-09-26

## 2022-09-28 ENCOUNTER — OFFICE VISIT (OUTPATIENT)
Dept: FAMILY MEDICINE CLINIC | Facility: MEDICAL CENTER | Age: 72
End: 2022-09-28
Payer: COMMERCIAL

## 2022-09-28 VITALS
HEIGHT: 66 IN | OXYGEN SATURATION: 94 % | SYSTOLIC BLOOD PRESSURE: 130 MMHG | DIASTOLIC BLOOD PRESSURE: 74 MMHG | BODY MASS INDEX: 28.8 KG/M2 | WEIGHT: 179.2 LBS | HEART RATE: 74 BPM

## 2022-09-28 DIAGNOSIS — Z71.2 ENCOUNTER TO DISCUSS TEST RESULTS: ICD-10-CM

## 2022-09-28 DIAGNOSIS — Z09 FOLLOW-UP EXAM: Primary | ICD-10-CM

## 2022-09-28 DIAGNOSIS — M21.242 FLEXION DEFORMITY OF FINGER JOINT OF LEFT HAND: ICD-10-CM

## 2022-09-28 DIAGNOSIS — Z23 IMMUNIZATION DUE: ICD-10-CM

## 2022-09-28 PROCEDURE — 99214 OFFICE O/P EST MOD 30 MIN: CPT | Performed by: STUDENT IN AN ORGANIZED HEALTH CARE EDUCATION/TRAINING PROGRAM

## 2022-09-28 PROCEDURE — 1160F RVW MEDS BY RX/DR IN RCRD: CPT | Performed by: STUDENT IN AN ORGANIZED HEALTH CARE EDUCATION/TRAINING PROGRAM

## 2022-09-28 PROCEDURE — G0008 ADMIN INFLUENZA VIRUS VAC: HCPCS

## 2022-09-28 PROCEDURE — 90662 IIV NO PRSV INCREASED AG IM: CPT

## 2022-09-28 PROCEDURE — 3078F DIAST BP <80 MM HG: CPT | Performed by: STUDENT IN AN ORGANIZED HEALTH CARE EDUCATION/TRAINING PROGRAM

## 2022-09-28 PROCEDURE — 3075F SYST BP GE 130 - 139MM HG: CPT | Performed by: STUDENT IN AN ORGANIZED HEALTH CARE EDUCATION/TRAINING PROGRAM

## 2022-09-28 NOTE — PROGRESS NOTES
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    - Ambulatory Referral to Sports Medicine; Future    3  Encounter to discuss test results    - I have reviewed pertinent labs:  CBC:   Lab Results   Component Value Date    WBC 9 0 2022    RBC 4 51 2022    HGB 14 0 2022    HCT 41 1 2022    MCV 91 1 2022     2022    MCH 31 0 2022    MCHC 34 1 2022    RDW 13 1 2022    MPV 10 2 2018    NEUTROABS 4,311 2022     CMP:   Lab Results   Component Value Date    SODIUM 136 2022    K 4 3 2022     2022    CO2 23 2022    AGAP 12 2018    BUN 23 2022    CREATININE 0 61 2022    GLUC 218 (H) 2022    CALCIUM 9 8 2022    AST 45 08/10/2018    ALT 63 08/10/2018    ALKPHOS 71 08/10/2018    TP 7 3 08/10/2018    ALB 4 3 08/10/2018    TBILI 0 50 08/10/2018    EGFR 96 2022 CXR:  FINDINGS:     Cardiomediastinal silhouette appears unremarkable      Nothing to suggest pneumonia  Question trace right effusion  No pneumothorax      Osseous structures appear within normal limits for patient age      IMPRESSION:     Question trace right effusion  Von Hastings acknowledged understanding of treatment plan, all questions answered  Subjective      Von Hastings is a 81 y o  female who presents today for recheck  Patient was previously hospitalized for bilateral pneumonia    Patient feeling well today, no wheezing, no shortness of breath, no fever, no chills  The following portions of the patient's history were reviewed and updated as appropriate: allergies, current medications, past family history, past medical history, past social history, past surgical history and problem list      Past Medical History:   Diagnosis Date    Acute cystitis without hematuria 08/10/2018    Aortic aneurysm, abdominal ([facility] )     noted on cat scan from 2/2020    Cataract     Closed left hip fracture ([facility] [facility] ) 08/10/2018    Colon polyp     Diabetes mellitus ([facility] )     Hyperlipemia     Migraines     Multiple falls     Osteoporosis 02/22/2019    Shingles 7/2016    Urinary tract infection 5/2022       Allergies   Allergen Reactions    Zoledronic Acid GI Intolerance     Anorexia    Codeine GI Intolerance, Nausea Only and Vomiting       Past Surgical History:   Procedure Laterality Date    BREAST CYST ASPIRATION Right 1993    CHOLECYSTECTOMY      COLONOSCOPY      ELBOW SURGERY      FRACTURE SURGERY  ,4/2018    GALLBLADDER SURGERY      HIP SURGERY  08/10/2018    CT COLONOSCOPY FLX DX W/COLLJ SPEC WHEN PFRMD N/A 12/14/2016    Procedure: COLONOSCOPY;  Surgeon: Sandra Morley MD;  [facility]: [facility];   Service: Gastroenterology    CT OPEN RX FEMUR FX+INTRAMED OLMAN Left 08/10/2018    Procedure: Left Hip IM Long nail;  Surgeon: Hafsa Benavidez MD;  [facility];  Service: Orthopedics    TONSILLECTOMY         Family History   Problem Relation Age of Onset    Arthritis Family     Pancreatic cancer Family     Scoliosis Family     Diabetes type II Mother     Osteoporosis Mother     Thyroid disease unspecified Mother     Diabetes Mother     Hyperlipidemia Sister     Hypertension Sister     Diabetes Sister     Diabetes type II Maternal Aunt     Diabetes type II Maternal Uncle     Diabetes type II Maternal Grandfather     Pancreatic cancer Paternal Grandmother     Cancer Paternal Grandmother     No Known Problems Father     No Known Problems Daughter     No Known Problems Maternal Grandmother     No Known Problems Paternal Grandfather     No Known Problems Daughter     No Known Problems Maternal Aunt     No Known Problems Paternal Aunt     No Known Problems Paternal Aunt     No Known Problems Paternal Aunt        Social History     Socioeconomic History    Marital status: /Civil Union     Spouse name: None    Number of children: 2    Years of education: 15    Highest education level: None   Occupational History    None   Tobacco Use    Smoking status: Heavy Tobacco Smoker     Packs/day: 2 00     Years: 30 00     Pack years: 60 00     Types: Cigarettes     Last attempt to quit: 3/23/2018     Years since quittin 5    Smokeless tobacco: Never Used   Vaping Use    Vaping Use: Never used   Substance and Sexual Activity    Alcohol use: No    Drug use: No    Sexual activity: None   Other Topics Concern    None   Social History Narrative    high school graduate     Social Determinants of Health     Financial Resource Strain: Not on file   Food Insecurity: Not on file   Transportation Needs: Not on file   Physical Activity: Not on file   Stress: Not on file   Social Connections: Not on file   Intimate Partner Violence: Not on file   Housing Stability: Not on file       Current Outpatient Medications   Medication Sig Dispense Refill    Accu-Chek FastClix Lancets MISC Use to check blood sugar 4 times a day 100 each 3    aspirin (ECOTRIN LOW STRENGTH) 81 mg EC tablet Take 81 mg by mouth daily      atorvastatin (LIPITOR) 80 mg tablet TAKE 1 TABLET BY MOUTH EVERY DAY AT NIGHT      Blood Glucose Monitoring Suppl (Accu-Chek Guide Me) w/Device KIT Use to check blood sugar 4 times a day 1 kit 0    Blood Glucose Monitoring Suppl (True Metrix Air Glucose Meter) w/Device KIT Use to test blood sugar 4 times a day 1 kit 0    DULoxetine (CYMBALTA) 60 mg delayed release capsule TAKE 1 CAPSULE BY MOUTH NIGHTLY        gabapentin (NEURONTIN) 600 MG tablet TAKE 1 TABLET BY MOUTH THREE TIMES A  tablet 0    gemfibrozil (LOPID) 600 mg tablet TAKE 1 TABLET BY MOUTH 2 TIMES A DAY BEFORE MEALS  180 tablet 1    glucose blood (True Metrix Blood Glucose Test) test strip Use to test blood sugar 4 times a day 100 strip 3    insulin aspart (NovoLOG FlexPen) 100 UNIT/ML injection pen Inject 20 Units under the skin 3 (three) times a day with meals 60 mL 3    insulin glargine (Toujeo SoloStar) 300 units/mL CONCENTRATED U-300 injection pen (1-unit dial) Inject 58 Units under the skin daily at bedtime 60 mL 3    Insulin Pen Needle (BD Pen Needle Montse U/F) 32G X 4 MM MISC Use daily withToujeo 50 each 3    levothyroxine 50 mcg tablet Take 1 tablet (50 mcg total) by mouth daily 90 tablet 3    tolterodine (DETROL) 2 mg tablet TAKE 1 TABLET BY MOUTH 2 TIMES A DAY  180 tablet 1    TRUEplus Lancets 33G MISC Use to test blood sugar 4 times a day 100 each 3    Cholecalciferol 25 MCG (1000 UT) capsule Take 2,000 Units by mouth daily        DULoxetine (CYMBALTA) 20 mg capsule Take 20 mg by mouth daily  3    ergocalciferol (VITAMIN D2) 50,000 units       fluconazole (DIFLUCAN) 150 mg tablet 1 tab now and 1 tab in 72 hours      glipiZIDE (GLUCOTROL XL) 10 mg 24 hr tablet Take 2 tablets (20 mg total) by mouth daily 180 tablet 3    Icosapent Ethyl (Vascepa) 1 g CAPS Take 1 capsule (1 g total) by mouth 2 (two) times a day 180 capsule 3     No current facility-administered medications for this visit  Review of Systems     And as noted in HPI    Objective      /74 (BP Location: Left arm, Patient Position: Sitting, Cuff Size: Adult)   Pulse 74   Ht 5' 6" (1 676 m)   Wt 81 3 kg (179 lb 3 2 oz)   SpO2 94%   BMI 28 92 kg/m²     Physical Exam  Vitals reviewed  Constitutional:       General: She is not in acute distress  Appearance: Normal appearance  She is not ill-appearing  HENT:      Head: Normocephalic and atraumatic        Nose: Nose normal       Mouth/Throat:      Mouth: Mucous membranes are moist       Pharynx: Oropharynx is clear  Eyes:      Conjunctiva/sclera: Conjunctivae normal       Pupils: Pupils are equal, round, and reactive to light  Cardiovascular:      Rate and Rhythm: Normal rate and regular rhythm  Pulses: Normal pulses  Heart sounds: Normal heart sounds  Pulmonary:      Effort: Pulmonary effort is normal  No respiratory distress  Breath sounds: Normal breath sounds  No wheezing or rales  Musculoskeletal:      Left hand: No swelling  Decreased range of motion (4th digit left hand limited flexion )  Normal pulse  Skin:     General: Skin is warm and dry  Neurological:      Mental Status: She is alert and oriented to person, place, and time  Psychiatric:         Mood and Affect: Mood normal          Behavior: Behavior normal          Thought Content: Thought content normal          Judgment: Judgment normal              Some portions of this record may have been generated with voice recognition software  There may be translation, syntax, or grammatical errors  Occasional wrong word or "sound-a-like" substitutions may have occurred due to the inherent limitations of the voice recognition software  Read the chart carefully and recognize, using context, where substations may have occurred  If you have any questions, please contact the dictating provider for clarification or correction, as needed

## 2022-10-10 DIAGNOSIS — Z79.4 TYPE 2 DIABETES MELLITUS WITH HYPERGLYCEMIA, WITH LONG-TERM CURRENT USE OF INSULIN (HCC): ICD-10-CM

## 2022-10-10 DIAGNOSIS — E11.65 TYPE 2 DIABETES MELLITUS WITH HYPERGLYCEMIA, WITH LONG-TERM CURRENT USE OF INSULIN (HCC): ICD-10-CM

## 2022-10-10 RX ORDER — BLOOD SUGAR DIAGNOSTIC
STRIP MISCELLANEOUS
Qty: 100 STRIP | Refills: 3 | Status: SHIPPED | OUTPATIENT
Start: 2022-10-10

## 2022-10-18 ENCOUNTER — OFFICE VISIT (OUTPATIENT)
Dept: OBGYN CLINIC | Facility: MEDICAL CENTER | Age: 72
End: 2022-10-18
Payer: COMMERCIAL

## 2022-10-18 VITALS
HEART RATE: 67 BPM | HEIGHT: 66 IN | WEIGHT: 178 LBS | BODY MASS INDEX: 28.61 KG/M2 | DIASTOLIC BLOOD PRESSURE: 81 MMHG | SYSTOLIC BLOOD PRESSURE: 157 MMHG

## 2022-10-18 DIAGNOSIS — M65.4 DE QUERVAIN'S TENOSYNOVITIS, LEFT: Primary | ICD-10-CM

## 2022-10-18 DIAGNOSIS — M72.0 DUPUYTREN'S CONTRACTURE OF LEFT HAND: ICD-10-CM

## 2022-10-18 PROCEDURE — 99203 OFFICE O/P NEW LOW 30 MIN: CPT | Performed by: EMERGENCY MEDICINE

## 2022-10-18 NOTE — PROGRESS NOTES
Assessment/Plan:    Diagnoses and all orders for this visit:    De Quervain's tenosynovitis, left  -     Ambulatory Referral to Orthopedic Surgery; Future  -     Cock Up Wrist Splint    Dupuytren's contracture of left hand  -     Ambulatory Referral to Sports Medicine  -     Ambulatory Referral to Orthopedic Surgery; Future  -     Cock Up Wrist Splint    You may use Advil (ibuprofen) 600mg every 6 hours or at least twice per day OR Aleve (naproxen) 250-500mg every 12 hours as needed for pain and inflammation  You may also take Tylenol 500mg every 4-6 hours as needed OR max 1,000mg per dose up to 3 times per day for a total of 3,000mg per day  Check with your primary care physician to see if these medications are safe to take and to make sure they do not interfere with your other medications and medical issues  Wrist bracing    Return if symptoms worsen or fail to improve  Chief Complaint:     Chief Complaint   Patient presents with   • Left Hand - Pain     Left ring finger and left thumb had brace for it  Subjective:   Patient ID: Wisam Shannon is a 81 y o  female  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 and difficulty with flexing her ring finger  She also notes pain of the thumb which is worse with pinching and lifting  Review of Systems    The following portions of the patient's chart were reviewed and updated as appropriate:      Allergie  Allergies   Allergen Reactions   • Zoledronic Acid GI Intolerance     Anorexia   • Codeine GI Intolerance, Nausea Only and Vomiting   s   Allergen Reactions   • Zoledronic Acid GI Intolerance     Anorexia   • Codeine GI Intolerance, Nausea Only and Vomiting      Diagnosis Date   • Acute cystitis without hematuria 08/10/2018   • Aortic aneurysm, abdominal     noted on cat scan from 2/2020   • Cataract    • Closed left hip fracture ([facility] ) 08/10/2018   • Colon polyp    • Diabetes mellitus [facility] )    • Hyperlipemia    • Migraines    • Multiple falls    • Osteoporosis 2019   • Shingles 2016   • Urinary tract infection 2022       Past Surgical History:   Procedure Laterality Date   • BREAST CYST ASPIRATION Right    • CHOLECYSTECTOMY     • COLONOSCOPY     • ELBOW SURGERY     • FRACTURE SURGERY  ,2018   • GALLBLADDER SURGERY     • HIP SURGERY  08/10/2018   • [place] COLONOSCOPY FLX DX W/COLLJ SPEC WHEN PFRMD N/A 2016    Procedure: COLONOSCOPY;  Surgeon: Tyler Smith MD;  [facility];   Service: Gastroenterology   • [place] OPEN RX FEMUR FX+INTRAMED OLMAN Left 08/10/2018    Procedure: Left Hip IM Long nail;  Surgeon: Eldon Lamar MD;  [facility];  Service: Orthopedics   • TONSILLECTOMY         Social History     Socioeconomic History   • Marital status: /Civil Union     Spouse name: Not on file   • Number of children: 2   • Years of education: 12   • Highest education level: Not on file   Occupational History   • Not on file   Tobacco Use   • Smoking status: Heavy Tobacco Smoker     Packs/day: 2 00     Years: 30 00     Pack years: 60 00     Types: Cigarettes     Last attempt to quit: 3/23/2018     Years since quittin 5   • Smokeless tobacco: Never Used   Vaping Use   • Vaping Use: Never used   Substance and Sexual Activity   • Alcohol use: No   • Drug use: No   • Sexual activity: Not on file   Other Topics Concern   • Not on file   Social History Narrative    high school graduate     Social Determinants of Health     Financial Resource Strain: Not on file   Food Insecurity: Not on file   Transportation Needs: Not on file   Physical Activity: Not on file   Stress: Not on file   Social Connections: Not on file   Intimate Partner Violence: Not on file   Housing Stability: Not on file       Family History   Problem Relation Age of Onset   • Arthritis Family    • Pancreatic cancer Family    • Scoliosis Family    • Diabetes type II Mother    • Osteoporosis Mother    • Thyroid disease unspecified Mother    • Diabetes Mother    • Hyperlipidemia Sister    • Hypertension Sister    • Diabetes Sister    • Diabetes type II Maternal Aunt    • Diabetes type II Maternal Uncle    • Diabetes type II Maternal Grandfather    • Pancreatic cancer Paternal Grandmother    • Cancer Paternal Grandmother    • No Known Problems Father    • No Known Problems Daughter    • No Known Problems Maternal Grandmother    • No Known Problems Paternal Grandfather    • No Known Problems Daughter    • No Known Problems Maternal Aunt    • No Known Problems Paternal Aunt    • No Known Problems Paternal Aunt    • No Known Problems Paternal Aunt        Medications:    Current Outpatient Medications:   •  Accu-Chek FastClix Lancets MISC, Use to check blood sugar 4 times a day, Disp: 100 each, Rfl: 3  •  Accu-Chek Guide test strip, USE TO CHECK BLOOD SUGAR 4 TIMES A DAY, Disp: 100 strip, Rfl: 3  •  aspirin (ECOTRIN LOW STRENGTH) 81 mg EC tablet, Take 81 mg by mouth daily, Disp: , Rfl:   •  atorvastatin (LIPITOR) 80 mg tablet, TAKE 1 TABLET BY MOUTH EVERY DAY AT NIGHT, Disp: , Rfl:   •  ergocalciferol (VITAMIN D2) 50,000 units, , Disp: , Rfl:   •  gabapentin (NEURONTIN) 600 MG tablet, TAKE 1 TABLET BY MOUTH THREE TIMES A DAY, Disp: 270 tablet, Rfl: 0  •  insulin aspart (NovoLOG FlexPen) 100 UNIT/ML injection pen, Inject 20 Units under the skin 3 (three) times a day with meals, Disp: 60 mL, Rfl: 3  •  insulin glargine (Toujeo SoloStar) 300 units/mL CONCENTRATED U-300 injection pen (1-unit dial), Inject 58 Units under the skin daily at bedtime, Disp: 60 mL, Rfl: 3  •  Insulin Pen Needle (BD Pen Needle Montse U/F) 32G X 4 MM MISC, Use daily withToujeo, Disp: 50 each, Rfl: 3  •  levothyroxine 50 mcg tablet, Take 1 tablet (50 mcg total) by mouth daily, Disp: 90 tablet, Rfl: 3  •  Blood Glucose Monitoring Suppl (Accu-Chek Guide Me) w/Device KIT, Use to check blood sugar 4 times a day, Disp: 1 kit, Rfl: 0  •  Blood Glucose Monitoring Suppl (True Metrix Air Glucose Meter) w/Device KIT, Use to test blood sugar 4 times a day, Disp: 1 kit, Rfl: 0  •  Cholecalciferol 25 MCG (1000 UT) capsule, Take 2,000 Units by mouth daily  , Disp: , Rfl:   •  DULoxetine (CYMBALTA) 20 mg capsule, Take 20 mg by mouth daily, Disp: , Rfl: 3  •  DULoxetine (CYMBALTA) 60 mg delayed release capsule, TAKE 1 CAPSULE BY MOUTH NIGHTLY , Disp: , Rfl:   •  fluconazole (DIFLUCAN) 150 mg tablet, 1 tab now and 1 tab in 72 hours, Disp: , Rfl:   •  gemfibrozil (LOPID) 600 mg tablet, TAKE 1 TABLET BY MOUTH 2 TIMES A DAY BEFORE MEALS , Disp: 180 tablet, Rfl: 1  •  glipiZIDE (GLUCOTROL XL) 10 mg 24 hr tablet, Take 2 tablets (20 mg total) by mouth daily, Disp: 180 tablet, Rfl: 3  •  Icosapent Ethyl (Vascepa) 1 g CAPS, Take 1 capsule (1 g total) by mouth 2 (two) times a day, Disp: 180 capsule, Rfl: 3  •  tolterodine (DETROL) 2 mg tablet, TAKE 1 TABLET BY MOUTH 2 TIMES A DAY , Disp: 180 tablet, Rfl: 1  •  TRUEplus Lancets 33G MISC, Use to test blood sugar 4 times a day, Disp: 100 each, Rfl: 3    Patient Active Problem List   Diagnosis   • Type 2 diabetes mellitus with hyperglycemia, with long-term current use of insulin (HCC)   • Osteoporosis   • Vitamin D deficiency   • Mixed stress and urge urinary incontinence   • Hypertriglyceridemia   • Diabetes mellitus (HCC)   • Closed left hip fracture (HCC)   • Acute cystitis without hematuria   • Closed fracture of left hip (HCC)   • Primary hypertension   • Anemia   • Hypokalemia   • UTI (urinary tract infection)   • Pre-operative clearance   • Abdominal aortic aneurysm (AAA) without rupture   • History of colon polyps   • BMI 29 0-29 9,adult   • Urinary urgency   • H/O acute pancreatitis   • Neuropathy       Objective:  /81   Pulse 67   Ht 5' 6" (1 676 m)   Wt 80 7 kg (178 lb)   BMI 28 73 kg/m²     Left Hand Exam     Tests   Finkelstein's test: positive    Other   Erythema: absent    Comments:  Dupuytrens contracture   Neg [facility] grind          Tests     Left Wrist/Hand   Positive Finkelstein's  Physical Exam      Neurologic Exam    Procedures    There are no pertinent studies obtained with regards to today's office visit

## 2022-10-18 NOTE — PATIENT INSTRUCTIONS
You may use Advil (ibuprofen) 600mg every 6 hours or at least twice per day OR Aleve (naproxen) 250-500mg every 12 hours as needed for pain and inflammation  You may also take Tylenol 500mg every 4-6 hours as needed OR max 1,000mg per dose up to 3 times per day for a total of 3,000mg per day  Check with your primary care physician to see if these medications are safe to take and to make sure they do not interfere with your other medications and medical issues  Dupuytren's Contracture   WHAT YOU NEED TO KNOW:   Dupuytren's contracture occurs when tissues in your hand thicken  The thickened tissue may form cords that extend from your palm to your finger  The cords may shorten, and your palm or finger may become stuck in a bent position  Dupuytren's contracture may occur in one or both of your hands  It is more common in the right hand and the ring or little fingers  DISCHARGE INSTRUCTIONS:   Return to the emergency department if:   You have severe pain in your hand  You cannot use your hand at all  Contact your healthcare provider if:   You have a fever or chills  There is a new lump, dimple, or dent on your palm or finger  You have a pocket of fluid under your skin  Your palm or finger becomes bent again  You feel tingling or a pricking feeling on your hand  You have trouble straightening your finger or palm  You have questions or concerns about your condition or care  Self-care:   Go to physical or occupational therapy  A physical therapist teaches you exercises to improve movement and strength  Stretch your fingers  Bend them backward from your palm to straighten them  Use heat and massage  Apply heat on your hand and gently massage your fingers and palm  Wear your splint as directed  You may need to wear a splint to help straighten your fingers  Limit alcohol  Ask how much alcohol you should drink  Do not smoke    Nicotine and other chemicals in cigarettes and cigars can make your symptoms worse  Ask your healthcare provider for information if you currently smoke and need help to quit  E-cigarettes or smokeless tobacco still contain nicotine  Talk to your healthcare provider before you use these products  Follow up with your healthcare provider as directed: You may need to return every 6 months to 1 year to have your hand checked or measured  Write down your questions so you remember to ask them during your visits  © Copyright [employer] 2022 Information is for End User's use only and may not be sold, redistributed or otherwise used for commercial purposes  All illustrations and images included in CareNotes® are the copyrighted property of A D A M , Inc  or [employer]   The above information is an  only  It is not intended as medical advice for individual conditions or treatments  Talk to your doctor, nurse or pharmacist before following any medical regimen to see if it is safe and effective for you  DeQuervain Release   AMBULATORY CARE:   What do I need to know about DeQuervain release? DeQuervain release is surgery to cut the tendon sheath around your inflamed tendon  The tendon sheath forms a smooth tunnel that your tendons slide through when you move your thumb  How do I prepare for surgery? Your healthcare provider will talk to you about how to prepare for surgery  He will tell you not to eat or drink anything after midnight on the day of your surgery  He will tell you what medicines to take or not take on the day of surgery  What will happen during surgery? You may be given anesthesia to numb the surgery area  You may still feel pressure and pushing during surgery, but you should not feel any pain  You may instead be given general anesthesia to keep you asleep during surgery  Your surgeon will make an incision over the wrist near the base of your thumb   He will cut the tendon sheath so your tendon can move more freely  He will close your incision with stitches or medical tape  Your surgeon may place a bandage over the incision for 24 to 48 hours  What are the risks of surgery? You may develop numbness from nerve damage during surgery  Your symptoms may not go away completely  You may develop an infection  The tendons may slip or catch  A large scar may develop  You may have long-term tenderness at or near the surgery area  Call 911 for any of the following: You have trouble breathing  You have chest pain  Seek care immediately if:   Blood soaks through your bandage  Your incision comes apart  Contact your healthcare provider if:   You have a fever or chills  Your wound is red, swollen, or draining pus  You have nausea or vomiting  You feel dizzy or lightheaded  You have questions or concerns about your condition or care  Medicines: You may need any of the following:  NSAIDs , such as ibuprofen, help decrease swelling, pain, and fever  NSAIDs can cause stomach bleeding or kidney problems in certain people  If you take blood thinner medicine, always ask your healthcare provider if NSAIDs are safe for you  Always read the medicine label and follow directions  Prescription pain medicine  may be given  Ask how to take this medicine safely  Take your medicine as directed  Contact your healthcare provider if you think your medicine is not helping or if you have side effects  Tell him or her if you are allergic to any medicine  Keep a list of the medicines, vitamins, and herbs you take  Include the amounts, and when and why you take them  Bring the list or the pill bottles to follow-up visits  Carry your medicine list with you in case of an emergency  Wound care:  Care for your wound as directed  You may need to carefully wash the wound with soap and water  Dry the area and put on new, clean bandages as directed  Self-care:   Ice  helps decrease swelling and pain   Ice may also help prevent tissue damage  Use an ice pack, or put crushed ice in a plastic bag  Cover it with a towel and place it on your wrist for 15 to 20 minutes 5 to 6 times a day or as directed  Elevate  your wrist above the level of your heart as often as you can  This will help decrease swelling and pain  Prop your wrist on pillows or blankets to keep it elevated comfortably  Move your thumb and hand as directed  This helps prevent stiffness and improves function  Go to hand therapy if directed by your healthcare provider  Hand therapists can teach you exercises to help improve movement and strength, and to decrease pain  Follow up with your healthcare provider as directed: You will need to return to have your wound checked  Write down your questions so you remember to ask them during your visits  © Copyright [employer] 2022 Information is for End User's use only and may not be sold, redistributed or otherwise used for commercial purposes  All illustrations and images included in CareNotes® are the copyrighted property of A D A M , Inc  or [facility] [employer]   The above information is an  only  It is not intended as medical advice for individual conditions or treatments  Talk to your doctor, nurse or pharmacist before following any medical regimen to see if it is safe and effective for you

## 2022-11-07 DIAGNOSIS — E78.1 HYPERTRIGLYCERIDEMIA: Primary | ICD-10-CM

## 2022-11-07 DIAGNOSIS — M79.2 NEUROPATHIC PAIN: ICD-10-CM

## 2022-11-07 RX ORDER — GABAPENTIN 600 MG/1
TABLET ORAL
Qty: 270 TABLET | Refills: 0 | Status: SHIPPED | OUTPATIENT
Start: 2022-11-07

## 2022-11-08 ENCOUNTER — OFFICE VISIT (OUTPATIENT)
Dept: OBGYN CLINIC | Facility: MEDICAL CENTER | Age: 72
End: 2022-11-08

## 2022-11-08 VITALS
DIASTOLIC BLOOD PRESSURE: 67 MMHG | RESPIRATION RATE: 18 BRPM | WEIGHT: 181 LBS | HEIGHT: 66 IN | BODY MASS INDEX: 29.09 KG/M2 | SYSTOLIC BLOOD PRESSURE: 113 MMHG | HEART RATE: 61 BPM

## 2022-11-08 DIAGNOSIS — M65.4 DE QUERVAIN'S TENOSYNOVITIS, LEFT: ICD-10-CM

## 2022-11-08 DIAGNOSIS — M72.0 DUPUYTREN'S CONTRACTURE OF LEFT HAND: ICD-10-CM

## 2022-11-08 RX ORDER — ATORVASTATIN CALCIUM 80 MG/1
TABLET, FILM COATED ORAL
Qty: 90 TABLET | Refills: 0 | Status: SHIPPED | OUTPATIENT
Start: 2022-11-08

## 2022-11-08 RX ORDER — BETAMETHASONE SODIUM PHOSPHATE AND BETAMETHASONE ACETATE 3; 3 MG/ML; MG/ML
3 INJECTION, SUSPENSION INTRA-ARTICULAR; INTRALESIONAL; INTRAMUSCULAR; SOFT TISSUE
Status: COMPLETED | OUTPATIENT
Start: 2022-11-08 | End: 2022-11-08

## 2022-11-08 RX ORDER — BETAMETHASONE SODIUM PHOSPHATE AND BETAMETHASONE ACETATE 3; 3 MG/ML; MG/ML
6 INJECTION, SUSPENSION INTRA-ARTICULAR; INTRALESIONAL; INTRAMUSCULAR; SOFT TISSUE
Status: COMPLETED | OUTPATIENT
Start: 2022-11-08 | End: 2022-11-08

## 2022-11-08 RX ADMIN — BETAMETHASONE SODIUM PHOSPHATE AND BETAMETHASONE ACETATE 3 MG: 3; 3 INJECTION, SUSPENSION INTRA-ARTICULAR; INTRALESIONAL; INTRAMUSCULAR; SOFT TISSUE at 10:25

## 2022-11-08 RX ADMIN — BETAMETHASONE SODIUM PHOSPHATE AND BETAMETHASONE ACETATE 6 MG: 3; 3 INJECTION, SUSPENSION INTRA-ARTICULAR; INTRALESIONAL; INTRAMUSCULAR; SOFT TISSUE at 10:25

## 2022-11-08 NOTE — PROGRESS NOTES
[facility] SPECIALISTS [facility] [address] [place] [id number]       Jabier Cogan  [id number]  [birth year]    ORTHOPAEDIC SURGERY OUTPATIENT NOTE  11/8/2022      HISTORY:  81 y o  female  Presents for referral from Dr Jr Zavala for her left hand  She has pain in the thumb and radial wrist  She was placed into a thumb spica splint and it has improved  She reports catching in the left thumb and pain over the left radial wrist with gripping  She also reports a long history of left palmar nodule that is not painful to her and does not limit her finger ROM  No numbness or tingling in the hand  Past Medical History:   Diagnosis Date   • Acute cystitis without hematuria 08/10/2018   • Aortic aneurysm, abdominal     noted on cat scan from 2/2020   • Cataract    • Closed left hip fracture ([facility] [address] ) 08/10/2018   • Colon polyp    • Diabetes mellitus ([facility] [address] )    • Hyperlipemia    • Migraines    • Multiple falls    • Osteoporosis 02/22/2019   • Shingles 7/2016   • Urinary tract infection 5/2022       Past Surgical History:   Procedure Laterality Date   • BREAST CYST ASPIRATION Right 1993   • CHOLECYSTECTOMY     • COLONOSCOPY     • ELBOW SURGERY     • FRACTURE SURGERY  ,4/2018   • GALLBLADDER SURGERY     • HIP SURGERY  08/10/2018   • WA COLONOSCOPY FLX DX W/COLLJ SPEC WHEN PFRMD N/A 12/14/2016    Procedure: COLONOSCOPY;  Surgeon: Fransisca Suresh MD;  Location: [facility];   Service: Gastroenterology   • WA OPEN RX FEMUR FX+INTRAMED OLMAN Left 08/10/2018    Procedure: Left Hip IM Long nail;  Surgeon: Madalyn Davis MD;  Location: [facility];  Service: Orthopedics   • TONSILLECTOMY         Social History     Socioeconomic History   • Marital status: /Civil Union     Spouse name: Not on file   • Number of children: 2   • Years of education: 15   • Highest education level: Not on file   Occupational History   • Not on file   Tobacco Use   • Smoking status: Former Smoker Packs/day: 2 00     Years: 30 00     Pack years: 60 00     Types: Cigarettes     Quit date: 2018     Years since quittin 8   • Smokeless tobacco: Never Used   • Tobacco comment: Quit smoking around 2018   Vaping Use   • Vaping Use: Never used   Substance and Sexual Activity   • Alcohol use: No   • Drug use: No   • Sexual activity: Not on file   Other Topics Concern   • Not on file   Social History Narrative    high school graduate     Social Determinants of Health     Financial Resource Strain: Not on file   Food Insecurity: Not on file   Transportation Needs: Not on file   Physical Activity: Not on file   Stress: Not on file   Social Connections: Not on file   Intimate Partner Violence: Not on file   Housing Stability: Not on file       Family History   Problem Relation Age of Onset   • Arthritis Family    • Pancreatic cancer Family    • Scoliosis Family    • Diabetes type II Mother    • Osteoporosis Mother    • Thyroid disease unspecified Mother    • Diabetes Mother    • Hyperlipidemia Sister    • Hypertension Sister    • Diabetes Sister    • Diabetes type II Maternal Aunt    • Diabetes type II Maternal Uncle    • Diabetes type II Maternal Grandfather    • Pancreatic cancer Paternal Grandmother    • Cancer Paternal Grandmother    • No Known Problems Father    • No Known Problems Daughter    • No Known Problems Maternal Grandmother    • No Known Problems Paternal Grandfather    • No Known Problems Daughter    • No Known Problems Maternal Aunt    • No Known Problems Paternal Aunt    • No Known Problems Paternal Aunt    • No Known Problems Paternal Aunt         Patient's Medications   New Prescriptions    No medications on file   Previous Medications    ACCU-CHEK FASTCLIX LANCETS MISC    Use to check blood sugar 4 times a day    ACCU-CHEK GUIDE TEST STRIP    USE TO CHECK BLOOD SUGAR 4 TIMES A DAY    ASPIRIN (ECOTRIN LOW STRENGTH) 81 MG EC TABLET    Take 81 mg by mouth daily    ATORVASTATIN (LIPITOR) 80 MG TABLET Take one tablet by mouth every bedtime    BLOOD GLUCOSE MONITORING SUPPL (ACCU-CHEK GUIDE ME) W/DEVICE KIT    Use to check blood sugar 4 times a day    BLOOD GLUCOSE MONITORING SUPPL (TRUE METRIX AIR GLUCOSE METER) W/DEVICE KIT    Use to test blood sugar 4 times a day    CHOLECALCIFEROL 25 MCG (1000 UT) CAPSULE    Take 2,000 Units by mouth daily      DULOXETINE (CYMBALTA) 20 MG CAPSULE    Take 20 mg by mouth daily    DULOXETINE (CYMBALTA) 60 MG DELAYED RELEASE CAPSULE    TAKE 1 CAPSULE BY MOUTH NIGHTLY  ERGOCALCIFEROL (VITAMIN D2) 50,000 UNITS        FLUCONAZOLE (DIFLUCAN) 150 MG TABLET    1 tab now and 1 tab in 72 hours    GABAPENTIN (NEURONTIN) 600 MG TABLET    TAKE 1 TABLET BY MOUTH THREE TIMES A DAY    GEMFIBROZIL (LOPID) 600 MG TABLET    TAKE 1 TABLET BY MOUTH 2 TIMES A DAY BEFORE MEALS  GLIPIZIDE (GLUCOTROL XL) 10 MG 24 HR TABLET    Take 2 tablets (20 mg total) by mouth daily    ICOSAPENT ETHYL (VASCEPA) 1 G CAPS    Take 1 capsule (1 g total) by mouth 2 (two) times a day    INSULIN ASPART (NOVOLOG FLEXPEN) 100 UNIT/ML INJECTION PEN    Inject 20 Units under the skin 3 (three) times a day with meals    INSULIN GLARGINE (TOUJEO SOLOSTAR) 300 UNITS/ML CONCENTRATED U-300 INJECTION PEN (1-UNIT DIAL)    Inject 58 Units under the skin daily at bedtime    INSULIN PEN NEEDLE (BD PEN NEEDLE TY U/F) 32G X 4 MM MISC    Use daily withToujeo    LEVOTHYROXINE 50 MCG TABLET    Take 1 tablet (50 mcg total) by mouth daily    TOLTERODINE (DETROL) 2 MG TABLET    TAKE 1 TABLET BY MOUTH 2 TIMES A DAY      TRUEPLUS LANCETS 33G MISC    Use to test blood sugar 4 times a day   Modified Medications    No medications on file   Discontinued Medications    No medications on file       Allergies   Allergen Reactions   • Zoledronic Acid GI Intolerance     Anorexia   • Codeine GI Intolerance, Nausea Only and Vomiting        /67 (BP Location: Right arm, Patient Position: Sitting)   Pulse 61   Resp 18   Ht 5' 6" (1 676 m)   Wt 82 1 kg (181 lb)   BMI 29 21 kg/m²      REVIEW OF SYSTEMS:  Constitutional: Negative  HEENT: Negative  Respiratory: Negative  Skin: Negative  Neurological: Negative  Psychiatric/Behavioral: Negative  Musculoskeletal: Negative except for that mentioned in the HPI  /67 (BP Location: Right arm, Patient Position: Sitting)   Pulse 61   Resp 18   Ht 5' 6" (1 676 m)   Wt 82 1 kg (181 lb)   BMI 29 21 kg/m²   Gen: No acute distress, resting comfortably in bed  HEENT: Eyes clear, moist mucus membranes, hearing intact  Respiratory: No audible wheezing or stridor  Cardiovascular: Well Perfused peripherally, 2+ distal pulse  Abdomen: nondistended, no peritoneal signs     PHYSICAL EXAM:    Left hand:  TTP A1 pulley of ring finger  No triggering or locking  Palpable cord over the flexor tendon of ring finger, no contracture or tenderness    TTP along the radial styloid  Positive Finkelstein      IMAGING:  No imaging    ASSESSMENT AND PLAN:  81 y o  female  With left DeQuervains tenosynovitis, pain over the A1 pully left ring finger without triggering  Her contracture of the flexor tendon in the hand of ring finger is nontender and she has no contracture  Discussed that her trigger finger is mild and can be monitored, consider injection in the future  We did discuss injection for her De Quervains tenosynovitis  She elected to proceed with this  She will continue the brace for the next few days and ice the hand  She will remove the splint for range of motion of the wrist and hand     Hand/upper extremity injection: L extensor compartment 1  Universal Protocol:  Consent: Verbal consent obtained    Consent given by: patient  Patient identity confirmed: verbally with patient    Supporting Documentation  Indications: diagnostic and pain   Procedure Details  Condition:de Quervain's tenosynovitis Site: L extensor compartment 1   Needle size: 25 G  Ultrasound guidance: no  Approach: radial  Medications administered: 3 mg betamethasone acetate-betamethasone sodium phosphate 6 (3-3) mg/mL; 6 mg betamethasone acetate-betamethasone sodium phosphate 6 (3-3) mg/mL    Patient tolerance: patient tolerated the procedure well with no immediate complications  Dressing:  Sterile dressing applied         Scribe Attestation      I,:  Shanda Tovar PA-C am acting as a scribe while in the presence of the attending physician :       I,:  Henry Tucker personally performed the services described in this documentation    as scribed in my presence :

## 2023-01-03 DIAGNOSIS — Z79.4 TYPE 2 DIABETES MELLITUS WITH HYPERGLYCEMIA, WITH LONG-TERM CURRENT USE OF INSULIN (HCC): ICD-10-CM

## 2023-01-03 DIAGNOSIS — E11.65 TYPE 2 DIABETES MELLITUS WITH HYPERGLYCEMIA, WITH LONG-TERM CURRENT USE OF INSULIN (HCC): ICD-10-CM

## 2023-01-03 RX ORDER — INSULIN GLARGINE 300 U/ML
INJECTION, SOLUTION SUBCUTANEOUS
Qty: 60 ML | Refills: 0 | Status: SHIPPED | OUTPATIENT
Start: 2023-01-03

## 2023-01-13 ENCOUNTER — HOSPITAL ENCOUNTER (OUTPATIENT)
Dept: RADIOLOGY | Facility: MEDICAL CENTER | Age: 73
Discharge: HOME | End: 2023-01-13

## 2023-01-13 VITALS — WEIGHT: 181 LBS | HEIGHT: 66 IN | BODY MASS INDEX: 29.09 KG/M2

## 2023-01-13 DIAGNOSIS — Z12.31 ENCOUNTER FOR SCREENING MAMMOGRAM FOR MALIGNANT NEOPLASM OF BREAST: ICD-10-CM

## 2023-01-19 DIAGNOSIS — E03.9 ACQUIRED HYPOTHYROIDISM: ICD-10-CM

## 2023-01-19 DIAGNOSIS — E78.1 HYPERTRIGLYCERIDEMIA: ICD-10-CM

## 2023-01-20 DIAGNOSIS — Z79.4 TYPE 2 DIABETES MELLITUS WITH HYPERGLYCEMIA, WITH LONG-TERM CURRENT USE OF INSULIN (HCC): ICD-10-CM

## 2023-01-20 DIAGNOSIS — E11.65 TYPE 2 DIABETES MELLITUS WITH HYPERGLYCEMIA, WITH LONG-TERM CURRENT USE OF INSULIN (HCC): ICD-10-CM

## 2023-01-20 RX ORDER — PEN NEEDLE, DIABETIC 32GX 5/32"
NEEDLE, DISPOSABLE MISCELLANEOUS
Qty: 50 EACH | Refills: 3 | Status: SHIPPED | OUTPATIENT
Start: 2023-01-20 | End: 2023-01-25 | Stop reason: SDUPTHER

## 2023-01-23 DIAGNOSIS — Z79.4 TYPE 2 DIABETES MELLITUS WITH HYPERGLYCEMIA, WITH LONG-TERM CURRENT USE OF INSULIN (HCC): ICD-10-CM

## 2023-01-23 DIAGNOSIS — E11.65 TYPE 2 DIABETES MELLITUS WITH HYPERGLYCEMIA, WITH LONG-TERM CURRENT USE OF INSULIN (HCC): ICD-10-CM

## 2023-01-23 RX ORDER — LEVOTHYROXINE SODIUM 0.05 MG/1
TABLET ORAL
Qty: 90 TABLET | Refills: 3 | Status: SHIPPED | OUTPATIENT
Start: 2023-01-23

## 2023-01-23 RX ORDER — ATORVASTATIN CALCIUM 80 MG/1
TABLET, FILM COATED ORAL
Qty: 90 TABLET | Refills: 0 | Status: SHIPPED | OUTPATIENT
Start: 2023-01-23

## 2023-01-23 RX ORDER — PEN NEEDLE, DIABETIC 32GX 5/32"
NEEDLE, DISPOSABLE MISCELLANEOUS
Qty: 50 EACH | Refills: 3 | OUTPATIENT
Start: 2023-01-23

## 2023-01-23 RX ORDER — ERGOCALCIFEROL 1.25 MG/1
CAPSULE ORAL
Qty: 12 CAPSULE | Refills: 0 | OUTPATIENT
Start: 2023-01-23

## 2023-01-25 DIAGNOSIS — Z79.4 TYPE 2 DIABETES MELLITUS WITH HYPERGLYCEMIA, WITH LONG-TERM CURRENT USE OF INSULIN (HCC): ICD-10-CM

## 2023-01-25 DIAGNOSIS — E11.65 TYPE 2 DIABETES MELLITUS WITH HYPERGLYCEMIA, WITH LONG-TERM CURRENT USE OF INSULIN (HCC): ICD-10-CM

## 2023-01-25 RX ORDER — PEN NEEDLE, DIABETIC 32GX 5/32"
NEEDLE, DISPOSABLE MISCELLANEOUS
Qty: 50 EACH | Refills: 3 | Status: SHIPPED | OUTPATIENT
Start: 2023-01-25 | End: 2023-01-30 | Stop reason: SDUPTHER

## 2023-01-30 DIAGNOSIS — E11.65 TYPE 2 DIABETES MELLITUS WITH HYPERGLYCEMIA, WITH LONG-TERM CURRENT USE OF INSULIN (HCC): ICD-10-CM

## 2023-01-30 DIAGNOSIS — Z79.4 TYPE 2 DIABETES MELLITUS WITH HYPERGLYCEMIA, WITH LONG-TERM CURRENT USE OF INSULIN (HCC): ICD-10-CM

## 2023-01-30 RX ORDER — PEN NEEDLE, DIABETIC 32GX 5/32"
NEEDLE, DISPOSABLE MISCELLANEOUS
Qty: 50 EACH | Refills: 3 | Status: SHIPPED | OUTPATIENT
Start: 2023-01-30

## 2023-02-06 ENCOUNTER — HOSPITAL ENCOUNTER (OUTPATIENT)
Dept: MAMMOGRAPHY | Facility: CLINIC | Age: 73
Discharge: HOME | End: 2023-02-06

## 2023-02-06 ENCOUNTER — HOSPITAL ENCOUNTER (OUTPATIENT)
Dept: ULTRASOUND IMAGING | Facility: CLINIC | Age: 73
Discharge: HOME | End: 2023-02-06

## 2023-02-06 VITALS — WEIGHT: 181 LBS | BODY MASS INDEX: 29.09 KG/M2 | HEIGHT: 66 IN

## 2023-02-06 DIAGNOSIS — R92.8 ABNORMAL MAMMOGRAM: ICD-10-CM

## 2023-02-23 DIAGNOSIS — E11.65 TYPE 2 DIABETES MELLITUS WITH HYPERGLYCEMIA, WITH LONG-TERM CURRENT USE OF INSULIN (HCC): ICD-10-CM

## 2023-02-23 DIAGNOSIS — Z79.4 TYPE 2 DIABETES MELLITUS WITH HYPERGLYCEMIA, WITH LONG-TERM CURRENT USE OF INSULIN (HCC): ICD-10-CM

## 2023-02-24 ENCOUNTER — TELEPHONE (OUTPATIENT)
Dept: ENDOCRINOLOGY | Facility: CLINIC | Age: 73
End: 2023-02-24

## 2023-02-24 RX ORDER — INSULIN ASPART 100 [IU]/ML
INJECTION, SOLUTION INTRAVENOUS; SUBCUTANEOUS
Qty: 60 ML | Refills: 3 | Status: SHIPPED | OUTPATIENT
Start: 2023-02-24

## 2023-02-27 ENCOUNTER — TELEPHONE (OUTPATIENT)
Dept: ENDOCRINOLOGY | Facility: CLINIC | Age: 73
End: 2023-02-27

## 2023-02-28 ENCOUNTER — TELEPHONE (OUTPATIENT)
Dept: ENDOCRINOLOGY | Facility: CLINIC | Age: 73
End: 2023-02-28

## 2023-03-07 DIAGNOSIS — E78.1 HYPERTRIGLYCERIDEMIA: Primary | ICD-10-CM

## 2023-03-08 RX ORDER — GEMFIBROZIL 600 MG/1
TABLET, FILM COATED ORAL
Qty: 180 TABLET | Refills: 1 | Status: SHIPPED | OUTPATIENT
Start: 2023-03-08

## 2023-03-08 NOTE — TELEPHONE ENCOUNTER
Please let Jayna know that I would like have her complete the lab work ordered by Dr Miranda Ritchie in June 2022 prior to the next office appointment  Also, can she send in blood glucose logs for review prior to office visit?

## 2023-03-14 DIAGNOSIS — M79.2 NEUROPATHIC PAIN: ICD-10-CM

## 2023-03-15 RX ORDER — GABAPENTIN 600 MG/1
TABLET ORAL
Qty: 270 TABLET | Refills: 0 | Status: SHIPPED | OUTPATIENT
Start: 2023-03-15

## 2023-05-15 ENCOUNTER — APPOINTMENT (OUTPATIENT)
Dept: RADIOLOGY | Facility: MEDICAL CENTER | Age: 73
End: 2023-05-15

## 2023-05-15 ENCOUNTER — TELEPHONE (OUTPATIENT)
Dept: FAMILY MEDICINE CLINIC | Facility: MEDICAL CENTER | Age: 73
End: 2023-05-15

## 2023-05-15 ENCOUNTER — OFFICE VISIT (OUTPATIENT)
Dept: FAMILY MEDICINE CLINIC | Facility: MEDICAL CENTER | Age: 73
End: 2023-05-15

## 2023-05-15 VITALS
HEART RATE: 68 BPM | DIASTOLIC BLOOD PRESSURE: 74 MMHG | WEIGHT: 184 LBS | SYSTOLIC BLOOD PRESSURE: 148 MMHG | OXYGEN SATURATION: 97 % | HEIGHT: 66 IN | RESPIRATION RATE: 20 BRPM | BODY MASS INDEX: 29.57 KG/M2

## 2023-05-15 DIAGNOSIS — R07.89 CHEST WALL PAIN: ICD-10-CM

## 2023-05-15 DIAGNOSIS — E11.65 TYPE 2 DIABETES MELLITUS WITH HYPERGLYCEMIA, WITH LONG-TERM CURRENT USE OF INSULIN (HCC): ICD-10-CM

## 2023-05-15 DIAGNOSIS — R07.89 CHEST WALL PAIN: Primary | ICD-10-CM

## 2023-05-15 DIAGNOSIS — Z79.4 TYPE 2 DIABETES MELLITUS WITH HYPERGLYCEMIA, WITH LONG-TERM CURRENT USE OF INSULIN (HCC): ICD-10-CM

## 2023-05-15 RX ORDER — MELOXICAM 15 MG/1
15 TABLET ORAL DAILY PRN
Qty: 30 TABLET | Refills: 3 | Status: SHIPPED | OUTPATIENT
Start: 2023-05-15

## 2023-05-15 RX ORDER — MELOXICAM 15 MG/1
15 TABLET ORAL DAILY PRN
Qty: 30 TABLET | Refills: 3 | Status: SHIPPED | OUTPATIENT
Start: 2023-05-15 | End: 2023-05-15

## 2023-05-15 NOTE — PROGRESS NOTES
"This is a 74-year-old woman who was stepping off a sidewalk and she fell and hit her left side of her chest wall  Along the lower costal margin  He has moderately severe pain with deep breath or palpation  No cough, no fever, or no shortness of breath  I noticed when I was reviewing the record that she had a last A1c of 10 1 and that was last August   Apparently her endocrinologist went off on maternity leave, and there was no follow-up was given at that time  According to the patient, she was not told to call her family doctor  She is getting blood work this week and she is going to the endocrinologist at the end of the month  I emphasized the need to go  /74 (BP Location: Left arm, Patient Position: Sitting, Cuff Size: Adult)   Pulse 68   Resp 20   Ht 5' 6\" (1 676 m)   Wt 83 5 kg (184 lb)   SpO2 97%   BMI 29 70 kg/m²     Physical Exam  Vitals and nursing note reviewed  Constitutional:       Appearance: Normal appearance  She is well-developed  HENT:      Head: Normocephalic and atraumatic  Right Ear: Hearing, tympanic membrane, ear canal and external ear normal       Left Ear: Hearing, tympanic membrane, ear canal and external ear normal       Nose: Nose normal  No mucosal edema or rhinorrhea  Mouth/Throat:      Mouth: Mucous membranes are moist       Pharynx: Oropharynx is clear  Uvula midline  No oropharyngeal exudate or posterior oropharyngeal erythema  Eyes:      General: Lids are normal          Right eye: No discharge  Left eye: No discharge  Extraocular Movements: Extraocular movements intact  Conjunctiva/sclera: Conjunctivae normal       Pupils: Pupils are equal, round, and reactive to light  Neck:      Thyroid: No thyroid mass or thyromegaly  Vascular: No carotid bruit  Cardiovascular:      Rate and Rhythm: Normal rate and regular rhythm  Pulses: Normal pulses        Heart sounds: Normal heart sounds, S1 normal and S2 normal  No murmur " heard     No gallop  Pulmonary:      Effort: Pulmonary effort is normal       Breath sounds: Rales present  No wheezing or rhonchi  Comments: Bilateral basilar rales  They sound chronic  Musculoskeletal:         General: Normal range of motion  Cervical back: Normal range of motion and neck supple  Comments: Tenderness over the lower costal margin on the left side of her chest   Along the anterior axillary line  Lymphadenopathy:      Cervical: No cervical adenopathy  Skin:     General: Skin is warm and dry  Findings: No rash  Neurological:      Mental Status: She is oriented to person, place, and time  Cranial Nerves: No cranial nerve deficit  Sensory: No sensory deficit  Coordination: Coordination normal    Psychiatric:         Mood and Affect: Mood normal          Speech: Speech normal          Behavior: Behavior normal  Behavior is cooperative  Thought Content: Thought content normal          Judgment: Judgment normal      Rib fracture versus rib bruise  We will give her meloxicam, we will also get an x-ray of the ribs  Follow-up with endocrinologist for DM

## 2023-05-15 NOTE — TELEPHONE ENCOUNTER
fyi-C/o fall Friday, c/o left rib pain and hurts to breath or reach with left arm  Denies Sob  Able to sleep ok on her right side    appt this afternoon

## 2023-05-17 ENCOUNTER — TELEPHONE (OUTPATIENT)
Dept: VASCULAR SURGERY | Facility: CLINIC | Age: 73
End: 2023-05-17

## 2023-05-17 NOTE — TELEPHONE ENCOUNTER
Attempted to contact patient to schedule appointment(s) listed below  Requested patient call [phone] option 3 to schedule appointment(s)  Patient's appointment(s) are due on or after 6/5/2023  Dopplers  [x] Abdominal Aorta Iliac (AOIL) 7/24/2023  [] Carotid (CV)   [] Celiac and/or Mesenteric  [] Endovascular Aortic Repair (EVAR)   [] Hemodialysis Access (HD)   [] Lower Limb Arterial (LIBERTAD)  [] Lower Limb Venous (LEV)  [] Lower Limb Venous Duplex with Reflux (LEVDR)  [] Renal Artery  [] Upper Limb Arterial (UEA)    [] Upper Limb Venous (UEV)              [] INDIRA and Waveform analysis     Advanced Imaging   [] CTA head/neck    [] CTA abdomen    [] CTA abdomen & pelvis    [] CT abdomen with/ without contrast  [] CT abdomen with contrast  [] CT abdomen without contrast    [] CT abdomen & pelvis with/ without contrast  [] CT abdomen & pelvis with contrast  [] CT abdomen & pelvis without contrast    Office Visit   [] New patient, patient last seen over 3 years ago  [x] Risk factor modification (RFM)   [] Follow up   [] Lost to follow up (LTFU)  L/S CAMMY 6/7/2022  RFM 1 year f/u due after AOIL 6/5/2023

## 2023-05-22 LAB — HBA1C MFR BLD HPLC: 11.2 %

## 2023-05-22 PROCEDURE — 3046F HEMOGLOBIN A1C LEVEL >9.0%: CPT | Performed by: NURSE PRACTITIONER

## 2023-05-30 ENCOUNTER — OFFICE VISIT (OUTPATIENT)
Dept: ENDOCRINOLOGY | Facility: CLINIC | Age: 73
End: 2023-05-30

## 2023-05-30 ENCOUNTER — RA CDI HCC (OUTPATIENT)
Dept: OTHER | Facility: HOSPITAL | Age: 73
End: 2023-05-30

## 2023-05-30 VITALS
HEIGHT: 66 IN | WEIGHT: 183.4 LBS | HEART RATE: 67 BPM | BODY MASS INDEX: 29.47 KG/M2 | SYSTOLIC BLOOD PRESSURE: 130 MMHG | DIASTOLIC BLOOD PRESSURE: 80 MMHG

## 2023-05-30 DIAGNOSIS — E11.65 TYPE 2 DIABETES MELLITUS WITH HYPERGLYCEMIA, WITH LONG-TERM CURRENT USE OF INSULIN (HCC): Primary | ICD-10-CM

## 2023-05-30 DIAGNOSIS — Z79.4 TYPE 2 DIABETES MELLITUS WITH HYPERGLYCEMIA, WITH LONG-TERM CURRENT USE OF INSULIN (HCC): Primary | ICD-10-CM

## 2023-05-30 DIAGNOSIS — G62.9 NEUROPATHY: ICD-10-CM

## 2023-05-30 DIAGNOSIS — M80.00XD AGE-RELATED OSTEOPOROSIS WITH CURRENT PATHOLOGICAL FRACTURE WITH ROUTINE HEALING, SUBSEQUENT ENCOUNTER: ICD-10-CM

## 2023-05-30 DIAGNOSIS — E78.1 HYPERTRIGLYCERIDEMIA: ICD-10-CM

## 2023-05-30 RX ORDER — INSULIN GLARGINE 300 U/ML
INJECTION, SOLUTION SUBCUTANEOUS
Qty: 60 ML | Refills: 0 | Status: SHIPPED | OUTPATIENT
Start: 2023-05-30

## 2023-05-30 NOTE — ASSESSMENT & PLAN NOTE
Lab Results   Component Value Date    HGBA1C 11 2 05/22/2023     HGA1C elevated  No glucose levels for review  BGL Reviewed: Recommend checking at least 3-4 blood sugars per day  Would like to consider CGM  Treatment regimen: Will further adjust after blood sugars to review  Toujeo 60 units at bedtime  Novolog 20 units three times daily with sliding scale (does not use sliding scale BGL >200 mg/dL):   151-200: 1 unit  201-250: 2 units  251-300: 3 units  301-350: 4 units  > 350: 5 units    Discussed risks/complications associated with uncontrolled diabetes including organ involvement, heart attack, stroke, death  Recommended referral to diabetes education  Advised lifestyle modifications including attention to diet including the amount and types of carbohydrates consumed and regular activity  Call for blood sugars less than 70 mg/dl or over 300 mg/dl  Monitor blood glucose levels at least 2 times a day, if on insulin ideally before all insulin administration times  Discussed symptoms and treatment of hypoglycemia  Routine follow up for diabetic eye and foot exams  Ordered blood work to complete prior to next visit  Follow up in 2 months for closer monitoring

## 2023-05-30 NOTE — PATIENT INSTRUCTIONS
Please write down blood sugars and send in for review (from the past week)  Will order Dexcom G7  Placed the order for continuous glucose monitor placement and medical nutrition therapy  Increase Toujeo 60 units  Continue mealtime insulin for now, will adjust based on blood sugar log  Consider balance therapy with physical therapy  Recommend podiatry   Confirm amount of Omega-3 dose2

## 2023-05-30 NOTE — PROGRESS NOTES
[facility]  coding opportunities          Chart Reviewed number of suggestions sent to Provider: 1   [id number] [phone]    Patients Insurance     Medicare Insurance: [facility] Ad[employer]age

## 2023-05-30 NOTE — PROGRESS NOTES
Established Patient Progress Note      CC: Type 2 Diabetes Mellitus      Impression & Plan:    Problem List Items Addressed This Visit        Endocrine    Type 2 diabetes mellitus with hyperglycemia, with long-term current use of insulin ([facility] ) - Primary       Lab Results   Component Value Date    HGBA1C 11 2 05/22/2023     HGA1C elevated  No glucose levels for review  BGL Reviewed: Recommend checking at least 3-4 blood sugars per day  Would like to consider CGM  Treatment regimen: Will further adjust after blood sugars to review  Toujeo 60 units at bedtime  Novolog 20 units three times daily with sliding scale (does not use sliding scale BGL >200 mg/dL):   151-200: 1 unit  201-250: 2 units  251-300: 3 units  301-350: 4 units  > 350: 5 units    Discussed risks/complications associated with uncontrolled diabetes including organ involvement, heart attack, stroke, death  Recommended referral to diabetes education  Advised lifestyle modifications including attention to diet including the amount and types of carbohydrates consumed and regular activity  Call for blood sugars less than 70 mg/dl or over 300 mg/dl  Monitor blood glucose levels at least 2 times a day, if on insulin ideally before all insulin administration times  Discussed symptoms and treatment of hypoglycemia  Routine follow up for diabetic eye and foot exams  Ordered blood work to complete prior to next visit  Follow up in 2 months for closer monitoring  Relevant Medications    insulin glargine (Toujeo SoloStar) 300 units/mL CONCENTRATED U-300 injection pen (1-unit dial)    Other Relevant Orders    Ambulatory Referral to Diabetic Education    Ambulatory referral to Podiatry       Nervous and Auditory    Neuropathy     Frequent falls, recommend balance therapy and to establish care with podiatry               Musculoskeletal and Integument    Osteoporosis     Will discuss with PCP at follow up as primary care has been managing, per Jayna  Other    Hypertriglyceridemia     Currently taking Omega-3 since Vascepa was not covered  Orders Placed This Encounter   Procedures   • Ambulatory Referral to Diabetic Education     Standing Status:   Future     Standing Expiration Date:   5/30/2024     Referral Priority:   Routine     Referral Type:   Consult - AMB     Referral Reason:   Specialty Services Required     Requested Specialty:   Diabetes Services     Number of Visits Requested:   1     Expiration Date:   5/30/2024   • Ambulatory referral to Podiatry     Standing Status:   Future     Standing Expiration Date:   5/30/2024     Referral Priority:   Routine     Referral Type:   Consult - AMB     Referral Reason:   Specialty Services Required     Requested Specialty:   Podiatry     Number of Visits Requested:   1     Expiration Date:   5/30/2024       History of Present Illness:   Ruslan Ford is a 81 y o  female with a history of type 2 diabetes with long term use of insulin, diagnosed approximately 2003 on insulin  Reports complications of neuropathy on gabapentin  Last hemoglobin A1c was 11 2% in May 2023  Last office visit was June 2022 with Dr Marielos Huitron  Home glucose monitoring: are performed regularly 3-4 times per day  First morning glucose levels consistently over 200 mg/dL, per report  Walks every day about 20-25 minutes  Diet continues to be a struggle  Current regimen: Toujeo 58 units at bed  Novolog 20 units three times daily with sliding scale (does not use sliding scale BGL >200 mg/dL):   151-200: 1 unit  201-250: 2 units  251-300: 3 units  301-350: 4 units  > 350: 5 units    Last Eye Exam: Needs to schedule, no history retinopathy  Last Foot Exam: UTD, neuropathy on Gabapentin    Has hyperlipidemia: Taking atorvastatin and gemfibrozil  Fish oil  Has history of   Has hypothyroidism: Taking levothyroxine 50 mcg daily, regularly and properly    Denies symptoms of hypo/herythyroidism  Has history of vitamin D deficiency: Taking vitamin D 50,000 IU weekly and 2000 IU daily  Has history of pancreatitis  Has history of urinary tract infection and urinary urgency and incontinence    Patient Active Problem List   Diagnosis   • Type 2 diabetes mellitus with hyperglycemia, with long-term current use of insulin ([facility] )   • Osteoporosis   • Vitamin D deficiency   • Mixed stress and urge urinary incontinence   • Hypertriglyceridemia   • Diabetes mellitus ([facility] )   • Closed left hip fracture (HCC)   • Acute cystitis without hematuria   • Closed fracture of left hip (HCC)   • Primary hypertension   • Anemia   • Hypokalemia   • UTI (urinary tract infection)   • Pre-operative clearance   • Abdominal aortic aneurysm (AAA) without rupture (HCC)   • History of colon polyps   • BMI 29 0-29 9,adult   • Urinary urgency   • H/O acute pancreatitis   • Neuropathy      Past Medical History:   Diagnosis Date   • Acute cystitis without hematuria 08/10/2018   • A[facility] aneurysm, abdominal ([facility] )     noted on cat scan from 2/2020   • Cataract    • Closed left hip fracture ([facility] ) 08/10/2018   • Colon polyp    • Diabetes mellitus ([facility] )    • Hyperlipemia    • Migraines    • Multiple falls    • Osteoporosis 02/22/2019   • Shingles 7/2016   • Urinary tract infection 5/2022      Past Surgical History:   Procedure Laterality Date   • BREAST CYST ASPIRATION Right 1993   • CHOLECYSTECTOMY     • COLONOSCOPY     • ELBOW SURGERY     • FRACTURE SURGERY  ,4/2018   • GALLBLADDER SURGERY     • HIP SURGERY  08/10/2018   • [place] COLONOSCOPY FLX DX W/COLLJ SPEC WHEN PFRMD N/A 12/14/2016    Procedure: COLONOSCOPY;  Surgeon: Kaila Herrera MD;  Location: [facility];   Service: Gastroenterology   • [place] OPTX FEM SHFT FX W/INSJ IMED IMPLT W/WO SCREW Left 08/10/2018    Procedure: Left Hip IM Long nail;  Surgeon: Josep Beasley MD;  Location: [facility];  Service: Orthopedics   • TONSILLECTOMY        Family History   Problem Relation Age of Onset   • Diabetes type II Mother    • Osteoporosis Mother    • Thyroid disease unspecified Mother    • Diabetes Mother    • No Known Problems Father    • Hyperlipidemia Sister    • Hypertension Sister    • Diabetes Sister    • No Known Problems Daughter    • No Known Problems Daughter    • No Known Problems Maternal Grandmother    • Diabetes type II Maternal Grandfather    • Pancreatic cancer Paternal Grandmother    • Cancer Paternal Grandmother    • No Known Problems Paternal Grandfather    • Diabetes type II Maternal Aunt    • No Known Problems Maternal Aunt    • Diabetes type II Maternal Uncle    • No Known Problems Paternal Aunt    • No Known Problems Paternal Aunt    • No Known Problems Paternal Aunt    • Arthritis Family    • Pancreatic cancer Family    • Scoliosis Family    • Breast cancer Neg Hx      Social History     Tobacco Use   • Smoking status: Former     Packs/day: 2 00     Years: 30 00     Total pack years: 60 00     Types: Cigarettes     Quit date: 2018     Years since quittin 4   • Smokeless tobacco: Never   • Tobacco comments:     Quit smoking around    Substance Use Topics   • Alcohol use: No     Allergies   Allergen Reactions   • Zoledronic Acid GI Intolerance     Anorexia   • Codeine GI Intolerance, Nausea Only and Vomiting         Current Outpatient Medications:   •  Accu-Chek FastClix Lancets MISC, Use to check blood sugar 4 times a day, Disp: 100 each, Rfl: 3  •  Accu-Chek Guide test strip, USE TO CHECK BLOOD SUGAR 4 TIMES A DAY, Disp: 100 strip, Rfl: 3  •  aspirin (ECOTRIN LOW STRENGTH) 81 mg EC tablet, Take 81 mg by mouth daily, Disp: , Rfl:   •  atorvastatin (LIPITOR) 80 mg tablet, TAKE 1 TABLET AT BEDTIME, Disp: 90 tablet, Rfl: 0  •  ergocalciferol (VITAMIN D2) 50,000 units, , Disp: , Rfl:   •  gabapentin (NEURONTIN) 600 MG tablet, TAKE 1 TABLET BY MOUTH THREE TIMES A DAY, Disp: 270 tablet, Rfl: 0  •  gemfibrozil (LOPID) 600 mg tablet, Take one tablet by mouth "twice a day, Disp: 180 tablet, Rfl: 1  •  insulin glargine (Toujeo SoloStar) 300 units/mL CONCENTRATED U-300 injection pen (1-unit dial), Inject 60 units under the skin daily at bedtime  , Disp: 60 mL, Rfl: 0  •  Insulin Pen Needle (BD Pen Needle Montse U/F) 32G X 4 MM MISC, Use daily withToujeo, Disp: 50 each, Rfl: 3  •  levothyroxine 50 mcg tablet, TAKE 1 TABLET EVERY DAY, Disp: 90 tablet, Rfl: 3  •  meloxicam (MOBIC) 15 mg tablet, Take 1 tablet (15 mg total) by mouth daily as needed for moderate pain, Disp: 30 tablet, Rfl: 3  •  NovoLOG FlexPen 100 units/mL injection pen, INJECT 20 UNITS UNDER THE SKIN THREE TIMES DAILY WITH MEALS, Disp: 60 mL, Rfl: 3    Review of Systems   Constitutional: Negative for activity change, appetite change, fatigue and unexpected weight change  HENT: Negative for sore throat, trouble swallowing and voice change  Eyes: Negative for visual disturbance  Respiratory: Negative for shortness of breath  Pain under ribs after fall  Cardiovascular: Negative for chest pain and palpitations  Gastrointestinal: Negative for abdominal distention, abdominal pain, constipation, diarrhea and vomiting  Endocrine: Negative for cold intolerance, heat intolerance, polydipsia and polyuria  Musculoskeletal: Negative for arthralgias and back pain  Skin: Negative for color change and rash  Neurological: Positive for numbness or tingling  All other systems reviewed and are negative  Physical Exam:  Body mass index is 29 6 kg/m²  /80   Pulse 67   Ht 5' 6\" (1 676 m)   Wt 83 2 kg (183 lb 6 4 oz)   BMI 29 60 kg/m²    Wt Readings from Last 3 Encounters:   05/30/23 83 2 kg (183 lb 6 4 oz)   05/15/23 83 5 kg (184 lb)   02/06/23 82 1 kg (181 lb)       Physical Exam   Vitals reviewed  Constitutional:       Appearance: Normal appearance  Cardiovascular:      Rate and Rhythm: Normal rate and regular rhythm  Pulses: Normal pulses  Heart sounds: Normal heart sounds   " "  Pulmonary:      Effort: Pulmonary effort is normal       Breath sounds: Normal breath sounds  Skin:     General: Skin is warm and dry  Capillary Refill: Capillary refill takes less than 2 seconds  Neurological:      General: No focal deficit present  Mental Status: She is alert and oriented to person, place, and time  Numbness and decreased sensation in b/l feet  Psychiatric:         Mood and Affect: Mood normal          Behavior: Behavior normal        Labs:   Lab Results   Component Value Date    HGBA1C 11 2 05/22/2023    HGBA1C 10 6 08/21/2022    HGBA1C 9 6 06/09/2022     Lab Results   Component Value Date    BUN 23 09/14/2022     09/14/2022    CO2 23 09/14/2022    CREATININE 0 61 09/14/2022    CREATININE 0 67 08/14/2018    CREATININE 0 69 08/13/2018    K 4 3 09/14/2022     (L) 10/25/2016     eGFR   Date Value Ref Range Status   09/14/2022 96 > OR = 60 mL/min/1 73m2 Final     Comment:     The eGFR is based on the CKD-EPI 2021 equation  To calculate   the new eGFR from a previous Creatinine or Cystatin C  result, go to [email or web address] at  [email or web address]     08/14/2018 92 ml/min/1 73sq m Final     Lab Results   Component Value Date    CHOL 492 (H) 10/25/2016    HDL 25 (L) 10/25/2016    TRIG 3177 (H) 10/25/2016     Lab Results   Component Value Date    ALKPHOS 71 08/10/2018    ALT 63 08/10/2018    AST 45 08/10/2018    BILITOT 0 4 10/25/2016     Lab Results   Component Value Date    GPA2EGOIRVFW 4 47 10/25/2016     No results found for: \"FREET4\", \"TSI\"          Discussed with the patient and all questioned fully answered  She will call me if any problems arise  Follow-up appointment in 2 months  Counseled patient on diagnostic results, prognosis, risk and benefit of treatment options, instruction for management, importance of treatment compliance, Risk  factor reduction and impressions    Patient Instructions   1   Please write down blood sugars and " send in for review (from the past week)  2  Will order Dexcom G7  3  Placed the order for continuous glucose monitor placement and medical nutrition therapy  4  Increase Toujeo 60 units  5  Continue mealtime insulin for now, will adjust based on blood sugar log  6  Consider balance therapy with physical therapy  7  Recommend podiatry   8   Confirm amount of Omega-3 dose2      CARRINGTON [place]

## 2023-06-01 LAB
DME PARACHUTE DELIVERY DATE REQUESTED: NORMAL
DME PARACHUTE ITEM DESCRIPTION: NORMAL
DME PARACHUTE ITEM DESCRIPTION: NORMAL
DME PARACHUTE ORDER STATUS: NORMAL
DME PARACHUTE SUPPLIER NAME: NORMAL
DME PARACHUTE SUPPLIER PHONE: NORMAL

## 2023-06-04 DIAGNOSIS — M79.2 NEUROPATHIC PAIN: ICD-10-CM

## 2023-06-05 RX ORDER — GABAPENTIN 600 MG/1
TABLET ORAL
Qty: 270 TABLET | Refills: 0 | Status: SHIPPED | OUTPATIENT
Start: 2023-06-05

## 2023-06-06 NOTE — PROGRESS NOTES
Diabetic Foot Exam    Patient's shoes and socks removed  Right Foot/Ankle   Right Foot Inspection  Skin Exam: skin normal and skin intact  No dry skin, no warmth, no callus, no erythema, no maceration, no abnormal color, no pre-ulcer, no ulcer and no callus  Toe Exam: ROM and strength within normal limits  Sensory   Monofilament testing: intact    Left Foot/Ankle  Left Foot Inspection  Skin Exam: skin normal and skin intact  No dry skin, no warmth, no erythema, no maceration, normal color, no pre-ulcer, no ulcer and no callus  Toe Exam: ROM and strength within normal limits       Sensory   Monofilament testing: intact    Assign Risk Category  No deformity present  Loss of protective sensation  Weak pulses  Risk: 2   Assessment and Plan:     Problem List Items Addressed This Visit        Endocrine    Type 2 diabetes mellitus with hyperglycemia, with long-term current use of insulin (HCC)    Relevant Orders    IRIS Diabetic eye exam       Cardiovascular and Mediastinum    Primary hypertension     · Elevated blood pressure reading today  · Patient advised to resume antihypertensive  · Losartan 25 mg p o  every morning sent to pharmacy  · Recent kidney function reviewed  · Return to office in 1 to 2 weeks for blood pressure recheck         Relevant Medications    losartan (COZAAR) 25 mg tablet       Other    Hypertriglyceridemia    Relevant Orders    Lipid Panel with Direct LDL reflex    Medicare annual wellness visit, subsequent - Primary     · Immunizations reviewed  · Advised about COVID-19 bivalent vaccine  · Advised about tetanus booster  · Advised about Shingrix vaccination series and related potential side effects of vaccine        Other Visit Diagnoses     Encounter for screening mammogram for malignant neoplasm of breast        Relevant Orders    Mammo screening bilateral w 3d & cad           Preventive health issues were discussed with patient, and age appropriate screening tests were ordered as noted in patient's After Visit Summary  Personalized health advice and appropriate referrals for health education or preventive services given if needed, as noted in patient's After Visit Summary       History of Present Illness:     Patient presents for a Medicare Wellness Visit    [facility]   Patient Care Team:  Renay Sanz DO as PCP - General (Family Medicine)  MD Angie James Cea, MD Christie Carina, MD (Endocrinology)  Sunil Jones MD as Endoscopist     Review of Systems:     Review of Systems     As noted in HPI      Problem List:     Patient Active Problem List   Diagnosis   • Type 2 diabetes mellitus with hyperglycemia, with long-term current use of insulin ([facility] [address] )   • Osteoporosis   • Vitamin D deficiency   • Mixed stress and urge urinary incontinence   • Hypertriglyceridemia   • Diabetes mellitus ([address] )   • Closed left hip fracture ([address] )   • Acute cystitis without hematuria   • Closed fracture of left hip ([address] )   • Primary hypertension   • Anemia   • Hypokalemia   • UTI (urinary tract infection)   • Pre-operative clearance   • Abdominal aortic aneurysm (AAA) without rupture ([address] )   • History of colon polyps   • BMI 29 0-29 9,adult   • Urinary urgency   • H/O acute pancreatitis   • Neuropathy   • Medicare annual wellness visit, subsequent      Past Medical and Surgical History:     Past Medical History:   Diagnosis Date   • Acute cystitis without hematuria 08/10/2018   • Aortic aneurysm, abdominal ([address] )     noted on cat scan from 2/2020   • Cataract    • Closed left hip fracture ([address] ) 08/10/2018   • Colon polyp    • Diabetes mellitus ([address] )    • Hyperlipemia    • Migraines    • Multiple falls    • Osteoporosis 02/22/2019   • Shingles 7/2016   • Urinary tract infection 5/2022     Past Surgical History:   Procedure Laterality Date   • BREAST CYST ASPIRATION Right 1993   • CHOLECYSTECTOMY     • COLONOSCOPY     • ELBOW SURGERY     • FRACTURE SURGERY  ,4/2018   • GALLBLADDER SURGERY • HIP SURGERY  08/10/2018   • TX COLONOSCOPY FLX DX W/COLLJ SPEC WHEN PFRMD N/A 2016    Procedure: COLONOSCOPY;  Surgeon: Pablito Solomon MD;  [facility];   Service: Gastroenterology   • TX OPTX FEM SHFT FX W/INSJ IMED IMPLT W/WO SCREW Left 08/10/2018    Procedure: Left Hip IM Long nail;  Surgeon: Adrian Raza MD;  [facility];  Service: Orthopedics   • TONSILLECTOMY        Family History:     Family History   Problem Relation Age of Onset   • Diabetes type II Mother    • Osteoporosis Mother    • Thyroid disease unspecified Mother    • Diabetes Mother    • No Known Problems Father    • Hyperlipidemia Sister    • Hypertension Sister    • Diabetes Sister    • No Known Problems Daughter    • No Known Problems Daughter    • No Known Problems Maternal Grandmother    • Diabetes type II Maternal Grandfather    • Pancreatic cancer Paternal Grandmother    • Cancer Paternal Grandmother    • No Known Problems Paternal Grandfather    • Diabetes type II Maternal Aunt    • No Known Problems Maternal Aunt    • Diabetes type II Maternal Uncle    • No Known Problems Paternal Aunt    • No Known Problems Paternal Aunt    • No Known Problems Paternal Aunt    • Arthritis Family    • Pancreatic cancer Family    • Scoliosis Family    • Breast cancer Neg Hx       Social History:     Social History     Socioeconomic History   • Marital status: /Civil Union     Spouse name: None   • Number of children: 2   • Years of education: 12   • Highest education level: None   Occupational History   • None   Tobacco Use   • Smoking status: Former     Packs/day: 2 00     Years: 30 00     Total pack years: 60 00     Types: Cigarettes     Quit date: 2018     Years since quittin 4   • Smokeless tobacco: Never   • Tobacco comments:     Quit smoking around    Vaping Use   • Vaping Use: Never used   Substance and Sexual Activity   • Alcohol use: No   • Drug use: No   • Sexual activity: None   Other Topics Concern   • None   Social History Narrative    high school graduate     Social Determinants of Health     Financial Resource Strain: Low Risk  (5/31/2023)    Overall Financial Resource Strain (CARDIA)    • Difficulty of Paying Living Expenses: Not hard at all   Food Insecurity: Not on file   Transportation Needs: No Transportation Needs (5/31/2023)    PRAPARE - Transportation    • Lack of Transportation (Medical): No    • Lack of Transportation (Non-Medical): No   Physical Activity: Not on file   Stress: Not on file   Social Connections: Not on file   Intimate Partner Violence: Not on file   Housing Stability: Not on file      Medications and Allergies:     Current Outpatient Medications   Medication Sig Dispense Refill   • Accu-Chek FastClix Lancets MISC Use to check blood sugar 4 times a day 100 each 3   • Accu-Chek Guide test strip USE TO CHECK BLOOD SUGAR 4 TIMES A  strip 3   • aspirin (ECOTRIN LOW STRENGTH) 81 mg EC tablet Take 81 mg by mouth daily     • atorvastatin (LIPITOR) 80 mg tablet TAKE 1 TABLET AT BEDTIME 90 tablet 0   • ergocalciferol (VITAMIN D2) 50,000 units      • gabapentin (NEURONTIN) 600 MG tablet TAKE 1 TABLET BY MOUTH THREE TIMES A  tablet 0   • gemfibrozil (LOPID) 600 mg tablet Take one tablet by mouth twice a day 180 tablet 1   • insulin glargine (Toujeo SoloStar) 300 units/mL CONCENTRATED U-300 injection pen (1-unit dial) Inject 60 units under the skin daily at bedtime   60 mL 0   • Insulin Pen Needle (BD Pen Needle Montse U/F) 32G X 4 MM MISC Use daily withToujeo 50 each 3   • levothyroxine 50 mcg tablet TAKE 1 TABLET EVERY DAY 90 tablet 3   • losartan (COZAAR) 25 mg tablet Take 1 tablet (25 mg total) by mouth daily 30 tablet 0   • meloxicam (MOBIC) 15 mg tablet Take 1 tablet (15 mg total) by mouth daily as needed for moderate pain 30 tablet 3   • NovoLOG FlexPen 100 units/mL injection pen INJECT 20 UNITS UNDER THE SKIN THREE TIMES DAILY WITH MEALS 60 mL 3     No current facility-administered medications for this visit  Allergies   Allergen Reactions   • Zoledronic Acid GI Intolerance     Anorexia   • Codeine GI Intolerance, Nausea Only and Vomiting      Immunizations:     Immunization History   Administered Date(s) Administered   • COVID-19 PFIZER VACCINE 0 3 ML IM 02/21/2021, 03/14/2021, 12/21/2021   • INFLUENZA 11/01/2020, 10/12/2021, 09/28/2022   • Influenza Quadrivalent Preservative Free 3 years and older IM 02/21/2018   • Influenza Quadrivalent, 6-35 Months IM 10/20/2016   • Influenza Split High Dose Preservative Free IM 02/22/2019   • Influenza, high dose seasonal 0 7 mL 10/23/2019, 09/28/2022   • Pneumococcal Conjugate 13-Valent 08/17/2017   • Pneumococcal Conjugate Vaccine 20-valent (Pcv20), Polysace 09/02/2022   • Pneumococcal Polysaccharide PPV23 04/01/2019      Health Maintenance:         Topic Date Due   • Hepatitis C Screening  Never done   • Lung Cancer Screening  Never done   • Breast Cancer Screening: Mammogram  01/13/2025   • Colorectal Cancer Screening  12/12/2026   • DXA SCAN  01/11/2027         Topic Date Due   • COVID-19 Vaccine (4 - Pfizer series) 02/15/2022      Medicare Screening Tests and Risk Assessments:     Kiara Davidson is here for her Subsequent Wellness visit  Health Risk Assessment:   Patient rates overall health as good  Patient feels that their physical health rating is same  Patient is satisfied with their life  Eyesight was rated as same  Hearing was rated as same  Patient feels that their emotional and mental health rating is slightly better  Patients states they are never, rarely angry  Patient states they are never, rarely unusually tired/fatigued  Pain experienced in the last 7 days has been some  Patient's pain rating has been 5/10  Patient states that she has experienced no weight loss or gain in last 6 months  Fall Risk Screening:    In the past year, patient has experienced: history of falling in past year      Urinary Incontinence Screening:   Patient has leaked urine accidently in the last six months  Home Safety:  Patient does not have trouble with stairs inside or outside of their home  Patient has working smoke alarms and has working carbon monoxide detector  Home safety hazards include: none  Nutrition:   Current diet is Regular  Medications:   Patient is currently taking over-the-counter supplements  OTC medications include: CoQ10  Vitamin D3 2000 units a day  Patient is able to manage medications  Activities of Daily Living (ADLs)/Instrumental Activities of Daily Living (IADLs):   Walk and transfer into and out of bed and chair?: Yes  Dress and groom yourself?: Yes    Bathe or shower yourself?: Yes    Feed yourself?  Yes  Do your laundry/housekeeping?: Yes  Manage your money, pay your bills and track your expenses?: Yes  Make your own meals?: Yes    Do your own shopping?: Yes    Previous Hospitalizations:   Any hospitalizations or ED visits within the last 12 months?: Yes    How many hospitalizations have you had in the last year?: 1-2    Hospitalization Comments: [facility]    Advance Care Planning:   Living will: No    Durable POA for healthcare: No    Advanced directive: No    Advanced directive counseling given: Yes    Five wishes given: Yes    Patient declined ACP directive: No      Cognitive Screening:   Provider or family/friend/caregiver concerned regarding cognition?: No    PREVENTIVE SCREENINGS      Cardiovascular Screening:    General: History Lipid Disorder      Diabetes Screening:     General: History Diabetes      Colorectal Cancer Screening:     General: Screening Current      Breast Cancer Screening:     General: Screening Current      Cervical Cancer Screening:    General: Screening Not Indicated      Osteoporosis Screening:    General: History Osteoporosis and Screening Current      Abdominal Aortic Aneurysm (AAA) Screening:        General: History AAA      Lung Cancer Screening:     General: Risks "and Benefits Discussed    Due for: Low Dose CT (LDCT)      Preventive Screening Comments: Aorta imaging scheduled     Screening, Brief Intervention, and Referral to Treatment (SBIRT)    Screening  Typical number of drinks in a day: 0  Typical number of drinks in a week: 0  Interpretation: Low risk drinking behavior  AUDIT-C Screenin) How often did you have a drink containing alcohol in the past year? never  2) How many drinks did you have on a typical day when you were drinking in the past year? 0  3) How often did you have 6 or more drinks on one occasion in the past year? never    AUDIT-C Score: 0  Interpretation: Score 0-2 (female): Negative screen for alcohol misuse    Single Item Drug Screening:  How often have you used an illegal drug (including marijuana) or a prescription medication for non-medical reasons in the past year? never    Single Item Drug Screen Score: 0  Interpretation: Negative screen for possible drug use disorder         Physical Exam:     /88 (BP Location: Left arm, Patient Position: Sitting, Cuff Size: Adult)   Pulse 71   Temp 99 °F (37 2 °C)   Resp 16   Ht 5' 6\" (1 676 m)   Wt 83 5 kg (184 lb)   SpO2 97%   BMI 29 70 kg/m²     Physical Exam  Vitals reviewed  Constitutional:       General: She is not in acute distress  Appearance: She is obese  HENT:      Head: Normocephalic and atraumatic  Eyes:      Conjunctiva/sclera: Conjunctivae normal    Cardiovascular:      Rate and Rhythm: Normal rate and regular rhythm  Pulses: Pulses are weak  Heart sounds: Normal heart sounds  Pulmonary:      Effort: Pulmonary effort is normal       Breath sounds: Normal breath sounds  Musculoskeletal:      Right lower leg: No edema  Left lower leg: No edema  Feet:      Right foot:      Skin integrity: No ulcer, skin breakdown, erythema, warmth, callus or dry skin  Left foot:      Skin integrity: No ulcer, skin breakdown, erythema, warmth, callus or dry skin   " Skin:     General: Skin is warm and dry  Neurological:      Mental Status: She is alert and oriented to person, place, and time  Psychiatric:         Mood and Affect: Mood normal          Behavior: Behavior normal          Thought Content:  Thought content normal          Judgment: Judgment normal           Serge Fossa, DO

## 2023-06-07 ENCOUNTER — VBI (OUTPATIENT)
Dept: ADMINISTRATIVE | Facility: OTHER | Age: 73
End: 2023-06-07

## 2023-06-07 ENCOUNTER — OFFICE VISIT (OUTPATIENT)
Dept: FAMILY MEDICINE CLINIC | Facility: MEDICAL CENTER | Age: 73
End: 2023-06-07
Payer: COMMERCIAL

## 2023-06-07 VITALS
HEIGHT: 66 IN | OXYGEN SATURATION: 97 % | BODY MASS INDEX: 29.57 KG/M2 | WEIGHT: 184 LBS | SYSTOLIC BLOOD PRESSURE: 154 MMHG | HEART RATE: 71 BPM | RESPIRATION RATE: 16 BRPM | TEMPERATURE: 99 F | DIASTOLIC BLOOD PRESSURE: 88 MMHG

## 2023-06-07 DIAGNOSIS — E78.1 HYPERTRIGLYCERIDEMIA: ICD-10-CM

## 2023-06-07 DIAGNOSIS — I10 PRIMARY HYPERTENSION: ICD-10-CM

## 2023-06-07 DIAGNOSIS — E11.65 TYPE 2 DIABETES MELLITUS WITH HYPERGLYCEMIA, WITH LONG-TERM CURRENT USE OF INSULIN (HCC): ICD-10-CM

## 2023-06-07 DIAGNOSIS — Z00.00 MEDICARE ANNUAL WELLNESS VISIT, SUBSEQUENT: Primary | ICD-10-CM

## 2023-06-07 DIAGNOSIS — Z79.4 TYPE 2 DIABETES MELLITUS WITH HYPERGLYCEMIA, WITH LONG-TERM CURRENT USE OF INSULIN (HCC): ICD-10-CM

## 2023-06-07 DIAGNOSIS — Z12.31 ENCOUNTER FOR SCREENING MAMMOGRAM FOR MALIGNANT NEOPLASM OF BREAST: ICD-10-CM

## 2023-06-07 LAB
LEFT EYE DIABETIC RETINOPATHY: NORMAL
LEFT EYE IMAGE QUALITY: NORMAL
LEFT EYE MACULAR EDEMA: NORMAL
LEFT EYE OTHER RETINOPATHY: NORMAL
RIGHT EYE DIABETIC RETINOPATHY: NORMAL
RIGHT EYE IMAGE QUALITY: NORMAL
RIGHT EYE MACULAR EDEMA: NORMAL
RIGHT EYE OTHER RETINOPATHY: NORMAL
SEVERITY (EYE EXAM): NORMAL

## 2023-06-07 PROCEDURE — G0439 PPPS, SUBSEQ VISIT: HCPCS | Performed by: STUDENT IN AN ORGANIZED HEALTH CARE EDUCATION/TRAINING PROGRAM

## 2023-06-07 PROCEDURE — 99213 OFFICE O/P EST LOW 20 MIN: CPT | Performed by: STUDENT IN AN ORGANIZED HEALTH CARE EDUCATION/TRAINING PROGRAM

## 2023-06-07 PROCEDURE — 92250 FUNDUS PHOTOGRAPHY W/I&R: CPT | Performed by: STUDENT IN AN ORGANIZED HEALTH CARE EDUCATION/TRAINING PROGRAM

## 2023-06-07 RX ORDER — LOSARTAN POTASSIUM 25 MG/1
25 TABLET ORAL DAILY
Qty: 30 TABLET | Refills: 0 | Status: SHIPPED | OUTPATIENT
Start: 2023-06-07

## 2023-06-07 NOTE — ASSESSMENT & PLAN NOTE
· Elevated blood pressure reading today  · Patient advised to resume antihypertensive  · Losartan 25 mg p o  every morning sent to pharmacy  · Recent kidney function reviewed  · Return to office in 1 to 2 weeks for blood pressure recheck

## 2023-06-07 NOTE — ASSESSMENT & PLAN NOTE
· Immunizations reviewed  · Advised about COVID-19 bivalent vaccine  · Advised about tetanus booster  · Advised about Shingrix vaccination series and related potential side effects of vaccine

## 2023-06-11 DIAGNOSIS — Z79.4 TYPE 2 DIABETES MELLITUS WITH HYPERGLYCEMIA, WITH LONG-TERM CURRENT USE OF INSULIN (HCC): ICD-10-CM

## 2023-06-11 DIAGNOSIS — E11.65 TYPE 2 DIABETES MELLITUS WITH HYPERGLYCEMIA, WITH LONG-TERM CURRENT USE OF INSULIN (HCC): ICD-10-CM

## 2023-06-12 DIAGNOSIS — E78.1 HYPERTRIGLYCERIDEMIA: ICD-10-CM

## 2023-06-12 RX ORDER — GEMFIBROZIL 600 MG/1
TABLET, FILM COATED ORAL
Qty: 180 TABLET | Refills: 1 | Status: SHIPPED | OUTPATIENT
Start: 2023-06-12

## 2023-06-12 RX ORDER — BLOOD SUGAR DIAGNOSTIC
STRIP MISCELLANEOUS
Qty: 300 STRIP | Refills: 3 | Status: SHIPPED | OUTPATIENT
Start: 2023-06-12

## 2023-06-15 LAB
CREAT ?TM UR-SCNC: 68 UMOL/L
EXT ALBUMIN URINE RANDOM: 57.2
MICROALBUMIN/CREAT UR: 841 MG/G{CREAT}

## 2023-06-16 ENCOUNTER — TELEPHONE (OUTPATIENT)
Dept: OBGYN CLINIC | Facility: HOSPITAL | Age: 73
End: 2023-06-16

## 2023-06-16 LAB
CHOLEST SERPL-MCNC: 206 MG/DL
CHOLEST/HDLC SERPL: 6.4 (CALC)
HCV AB S/CO SERPL IA: 0.04
HCV AB SERPL QL IA: NORMAL
HDLC SERPL-MCNC: 32 MG/DL
LDLC SERPL CALC-MCNC: 121 MG/DL (CALC)
NONHDLC SERPL-MCNC: 174 MG/DL (CALC)
TRIGL SERPL-MCNC: 371 MG/DL

## 2023-06-16 NOTE — TELEPHONE ENCOUNTER
Caller: patient     Doctor: N/A    Reason for call: Patient missed, I was a referral for Podiatry so I transferred the call

## 2023-06-19 ENCOUNTER — HOSPITAL ENCOUNTER (OUTPATIENT)
Dept: NON INVASIVE DIAGNOSTICS | Facility: CLINIC | Age: 73
Discharge: HOME | End: 2023-06-19
Payer: COMMERCIAL

## 2023-06-19 DIAGNOSIS — I71.40 ABDOMINAL AORTIC ANEURYSM (AAA) WITHOUT RUPTURE (HCC): ICD-10-CM

## 2023-06-19 PROCEDURE — 93923 UPR/LXTR ART STDY 3+ LVLS: CPT

## 2023-06-19 PROCEDURE — 93978 VASCULAR STUDY: CPT | Performed by: SURGERY

## 2023-06-19 PROCEDURE — 93978 VASCULAR STUDY: CPT

## 2023-06-20 ENCOUNTER — TELEPHONE (OUTPATIENT)
Dept: VASCULAR SURGERY | Facility: CLINIC | Age: 73
End: 2023-06-20

## 2023-06-26 ENCOUNTER — OFFICE VISIT (OUTPATIENT)
Dept: FAMILY MEDICINE CLINIC | Facility: MEDICAL CENTER | Age: 73
End: 2023-06-26
Payer: COMMERCIAL

## 2023-06-26 VITALS
TEMPERATURE: 97.2 F | HEIGHT: 66 IN | HEART RATE: 70 BPM | BODY MASS INDEX: 29.41 KG/M2 | SYSTOLIC BLOOD PRESSURE: 136 MMHG | DIASTOLIC BLOOD PRESSURE: 80 MMHG | WEIGHT: 183 LBS | OXYGEN SATURATION: 96 %

## 2023-06-26 DIAGNOSIS — I10 PRIMARY HYPERTENSION: Primary | ICD-10-CM

## 2023-06-26 DIAGNOSIS — R01.1 CARDIAC MURMUR: ICD-10-CM

## 2023-06-26 PROCEDURE — 99214 OFFICE O/P EST MOD 30 MIN: CPT | Performed by: STUDENT IN AN ORGANIZED HEALTH CARE EDUCATION/TRAINING PROGRAM

## 2023-06-26 RX ORDER — LOSARTAN POTASSIUM 25 MG/1
25 TABLET ORAL DAILY
Qty: 90 TABLET | Refills: 1 | Status: SHIPPED | OUTPATIENT
Start: 2023-06-26

## 2023-06-26 NOTE — PROGRESS NOTES
[address] [facility] - Clinic Note  [facility]     Joyce Castanon MRN: [id number] : 1950 Age: 81 y o  Assessment/Plan     1  Primary hypertension    -Blood pressure today 136/80 after resuming losartan 25 mg p o  daily, we will continue losartan at current dose  - losartan (COZAAR) 25 mg tablet; Take 1 tablet (25 mg total) by mouth daily  Dispense: 90 tablet; Refill: 1  -Standing order for BMP    2  Cardiac murmur    -No chest pain, no shortness of breath, no syncope  - Echo complete w/ contrast if indicated; Future    Joyce Castanon acknowledged understanding of treatment plan, all questions answered  Keep follow-up appointment in September and sooner as needed  Subjective      Joyce Castanon is a 81 y o  female who presents for blood pressure recheck, she has resumed losartan 25 mg p o  daily  Patient feeling well today  She denies any chest pain, shortness of breath, or syncopal events      The following portions of the patient's history were reviewed and updated as appropriate: allergies, current medications, past family history, past medical history, past social history, past surgical history and problem list      Past Medical History:   Diagnosis Date   • Acute cystitis without hematuria 08/10/2018   • Aortic aneurysm, abdominal ([address] )     noted on cat scan from 2020   • Cataract    • Closed left hip fracture ([address] ) 08/10/2018   • Colon polyp    • Diabetes mellitus ([address] )    • Hyperlipemia    • Migraines    • Multiple falls    • Osteoporosis 2019   • Shingles 2016   • Urinary tract infection 2022       Allergies   Allergen Reactions   • Zoledronic Acid GI Intolerance     Anorexia   • Codeine GI Intolerance, Nausea Only and Vomiting       Past Surgical History:   Procedure Laterality Date   • BREAST CYST ASPIRATION Right    • CHOLECYSTECTOMY     • COLONOSCOPY     • ELBOW SURGERY     • FRACTURE SURGERY  ,2018   • GALLBLADDER SURGERY     • HIP SURGERY  08/10/2018   • CA COLONOSCOPY FLX DX W/COLLJ SPEC WHEN PFRMD N/A 2016    Procedure: COLONOSCOPY;  Surgeon: Akin Chou MD;  [facility];   Service: Gastroenterology   • CA OPTX FEM SHFT FX W/INSJ IMED IMPLT W/WO SCREW Left 08/10/2018    Procedure: Left Hip IM Long nail;  Surgeon: Pramod Melendrez MD;  [facility];  Service: Orthopedics   • TONSILLECTOMY         Family History   Problem Relation Age of Onset   • Diabetes type II Mother    • Osteoporosis Mother    • Thyroid disease unspecified Mother    • Diabetes Mother    • No Known Problems Father    • Hyperlipidemia Sister    • Hypertension Sister    • Diabetes Sister    • No Known Problems Daughter    • No Known Problems Daughter    • No Known Problems Maternal Grandmother    • Diabetes type II Maternal Grandfather    • Pancreatic cancer Paternal Grandmother    • Cancer Paternal Grandmother    • No Known Problems Paternal Grandfather    • Diabetes type II Maternal Aunt    • No Known Problems Maternal Aunt    • Diabetes type II Maternal Uncle    • No Known Problems Paternal Aunt    • No Known Problems Paternal Aunt    • No Known Problems Paternal Aunt    • Arthritis Family    • Pancreatic cancer Family    • Scoliosis Family    • Breast cancer Neg Hx        Social History     Socioeconomic History   • Marital status: /Civil Union     Spouse name: None   • Number of children: 2   • Years of education: 12   • Highest education level: None   Occupational History   • None   Tobacco Use   • Smoking status: Former     Packs/day: 2 00     Years: 30 00     Total pack years: 60 00     Types: Cigarettes     Quit date:      Years since quittin 4   • Smokeless tobacco: Never   • Tobacco comments:     Quit smoking around 2018   Vaping Use   • Vaping Use: Never used   Substance and Sexual Activity   • Alcohol use: No   • Drug use: No   • Sexual activity: None   Other Topics Concern   • None   Social History Narrative    high school graduate     Social Determinants of Health     Financial Resource Strain: Low Risk  (5/31/2023)    Overall Financial Resource Strain (CARDIA)    • Difficulty of Paying Living Expenses: Not hard at all   Food Insecurity: Not on file   Transportation Needs: No Transportation Needs (5/31/2023)    PRAPARE - Transportation    • Lack of Transportation (Medical): No    • Lack of Transportation (Non-Medical): No   Physical Activity: Not on file   Stress: Not on file   Social Connections: Not on file   Intimate Partner Violence: Not on file   Housing Stability: Not on file       Current Outpatient Medications   Medication Sig Dispense Refill   • Accu-Chek FastClix Lancets MISC Use to check blood sugar 4 times a day 100 each 3   • aspirin (ECOTRIN LOW STRENGTH) 81 mg EC tablet Take 81 mg by mouth daily     • atorvastatin (LIPITOR) 80 mg tablet TAKE 1 TABLET AT BEDTIME 90 tablet 0   • ergocalciferol (VITAMIN D2) 50,000 units      • gabapentin (NEURONTIN) 600 MG tablet TAKE 1 TABLET BY MOUTH THREE TIMES A  tablet 0   • gemfibrozil (LOPID) 600 mg tablet Take one tablet by mouth twice a day 180 tablet 1   • glucose blood (Accu-Chek Guide) test strip Checking sugars four times a day or as directed Dx: E11 65, E11 8 300 strip 3   • insulin glargine (Toujeo SoloStar) 300 units/mL CONCENTRATED U-300 injection pen (1-unit dial) Inject 60 units under the skin daily at bedtime  60 mL 0   • Insulin Pen Needle (BD Pen Needle Montse U/F) 32G X 4 MM MISC Use daily withToujeo 50 each 3   • levothyroxine 50 mcg tablet TAKE 1 TABLET EVERY DAY 90 tablet 3   • losartan (COZAAR) 25 mg tablet Take 1 tablet (25 mg total) by mouth daily 90 tablet 1   • NovoLOG FlexPen 100 units/mL injection pen INJECT 20 UNITS UNDER THE SKIN THREE TIMES DAILY WITH MEALS 60 mL 3     No current facility-administered medications for this visit         Review of Systems     As noted in HPI    Objective      /80 (BP Location: Left arm, Patient Position: "Sitting, Cuff Size: Large)   Pulse 70   Temp (!) 97 2 °F (36 2 °C)   Ht 5' 6\" (1 676 m)   Wt 83 kg (183 lb)   SpO2 96%   BMI 29 54 kg/m²     Physical Exam  Vitals reviewed  Constitutional:       General: She is not in acute distress  Appearance: Normal appearance  HENT:      Head: Normocephalic and atraumatic  Eyes:      Conjunctiva/sclera: Conjunctivae normal    Cardiovascular:      Rate and Rhythm: Normal rate and regular rhythm  Pulses: Normal pulses  Heart sounds: Murmur heard  Pulmonary:      Effort: Pulmonary effort is normal       Breath sounds: Normal breath sounds  Musculoskeletal:      Right lower leg: No edema  Left lower leg: No edema  Neurological:      Mental Status: She is alert and oriented to person, place, and time  Psychiatric:         Mood and Affect: Mood normal          Thought Content: Thought content normal              Some portions of this record may have been generated with voice recognition software  There may be translation, syntax, or grammatical errors  Occasional wrong word or \"sound-a-like\" substitutions may have occurred due to the inherent limitations of the voice recognition software  Read the chart carefully and recognize, using context, where substations may have occurred  If you have any questions, please contact the dictating provider for clarification or correction, as needed    "

## 2023-07-10 ENCOUNTER — OFFICE VISIT (OUTPATIENT)
Dept: PODIATRY | Facility: CLINIC | Age: 73
End: 2023-07-10
Payer: COMMERCIAL

## 2023-07-10 VITALS
OXYGEN SATURATION: 96 % | SYSTOLIC BLOOD PRESSURE: 131 MMHG | HEART RATE: 74 BPM | DIASTOLIC BLOOD PRESSURE: 84 MMHG | BODY MASS INDEX: 29.57 KG/M2 | HEIGHT: 66 IN | WEIGHT: 184 LBS

## 2023-07-10 DIAGNOSIS — E11.65 TYPE 2 DIABETES MELLITUS WITH HYPERGLYCEMIA, WITH LONG-TERM CURRENT USE OF INSULIN (HCC): ICD-10-CM

## 2023-07-10 DIAGNOSIS — E11.42 DIABETIC POLYNEUROPATHY ASSOCIATED WITH TYPE 2 DIABETES MELLITUS (HCC): Primary | ICD-10-CM

## 2023-07-10 DIAGNOSIS — Z79.4 TYPE 2 DIABETES MELLITUS WITH HYPERGLYCEMIA, WITH LONG-TERM CURRENT USE OF INSULIN (HCC): ICD-10-CM

## 2023-07-10 PROCEDURE — 99203 OFFICE O/P NEW LOW 30 MIN: CPT | Performed by: PODIATRIST

## 2023-07-10 RX ORDER — DULOXETIN HYDROCHLORIDE 20 MG/1
20 CAPSULE, DELAYED RELEASE ORAL DAILY
Qty: 30 CAPSULE | Refills: 2 | Status: SHIPPED | OUTPATIENT
Start: 2023-07-10

## 2023-07-11 NOTE — PROGRESS NOTES
Assessment/Plan    The patient's clinical examination today is significant for diffuse paresthesias of both feet without any open lesions nor pretrophic changes to either lower extremity. The interdigital spaces are clear without maceration. Pedal pulses are palpable bilaterally. Capillary fill time to the toes is within normal limits. Gradients within normal limits. Her most recent hemoglobin A1c was 11.2 from May 2023, prior to this it was 10.6 in August 2022. We discussed the potential benefits of good glycemic control and its relationship with peripheral neuropathy. A comprehensive diabetic foot evaluation was performed and the patient is deemed to be in the moderate risk category. The patient's main complaint today is severe burning pains in her feet, that is worse at nighttime. She states that during the day and the gabapentin seems to work very well she has no discomfort. But at nighttime she states she typically falls asleep for about an hour before she wakes up in severe pain and has difficulty going back to sleep. She is thus far tolerating gabapentin well without any side effects. Given her current symptomatology, I have recommended that she start Cymbalta 20 mg p.o. nightly as an adjunct to her gabapentin. She will monitor for side effects, and we can titrate up as necessary on follow-up. Recommend follow-up in 1 month. Diagnoses and all orders for this visit:    Diabetic polyneuropathy associated with type 2 diabetes mellitus ([address])  -     DULoxetine (CYMBALTA) 20 mg capsule; Take 1 capsule (20 mg total) by mouth daily    Type 2 diabetes mellitus with hyperglycemia, with long-term current use of insulin ([facility])  -     Ambulatory referral to Podiatry  -     DULoxetine (CYMBALTA) 20 mg capsule; Take 1 capsule (20 mg total) by mouth daily          Subjective:      Patient ID: Ginna Scales is a 72 y.o. female.     The patient presents today for initial consultation with [facility] with a chief complaint of severe neuropathic pain to both feet. She states that she has suffered from diabetic neuropathy for quite some time now and is currently on gabapentin 600 mg 3 times a day. The patient's main complaint today is severe burning pains in her feet, that is worse at nighttime. She states that during the day and the gabapentin seems to work very well she has no discomfort. But at nighttime she states she typically falls asleep for about an hour before she wakes up in severe pain and has difficulty going back to sleep. She is thus far tolerating gabapentin well without any side effects. The following portions of the patient's history were reviewed and updated as appropriate: allergies, current medications, past family history, past medical history, past social history, past surgical history and problem list.      PAST MEDICAL HISTORY:  Past Medical History:   Diagnosis Date   • Acute cystitis without hematuria 08/10/2018   • Aortic aneurysm, abdominal ([address])     noted on cat scan from 2/2020   • Cataract    • Closed left hip fracture ([address]) 08/10/2018   • Colon polyp    • Diabetes mellitus ([address])    • Hyperlipemia    • Migraines    • Multiple falls    • Osteoporosis 02/22/2019   • Shingles 7/2016   • Urinary tract infection 5/2022       PAST SURGICAL HISTORY:  Past Surgical History:   Procedure Laterality Date   • BREAST CYST ASPIRATION Right 1993   • CHOLECYSTECTOMY     • COLONOSCOPY     • ELBOW SURGERY     • FRACTURE SURGERY  ,4/2018   • GALLBLADDER SURGERY     • HIP SURGERY  08/10/2018   • NV COLONOSCOPY FLX DX W/COLLJ SPEC WHEN PFRMD N/A 12/14/2016    Procedure: COLONOSCOPY;  Surgeon: Christo Woodward MD;  Location: [facility];   Service: Gastroenterology   • NV OPTX FEM SHFT FX W/INSJ IMED IMPLT W/WO SCREW Left 08/10/2018    Procedure: Left Hip IM Long nail;  Surgeon: Kd Velasquez MD;  Location: [facility];  Service: Orthopedics   • TONSILLECTOMY ALLERGIES:  Zoledronic acid and Codeine    MEDICATIONS:  Current Outpatient Medications   Medication Sig Dispense Refill   • Accu-Chek FastClix Lancets MISC Use to check blood sugar 4 times a day 100 each 3   • aspirin (ECOTRIN LOW STRENGTH) 81 mg EC tablet Take 81 mg by mouth daily     • atorvastatin (LIPITOR) 80 mg tablet TAKE 1 TABLET AT BEDTIME 90 tablet 0   • DULoxetine (CYMBALTA) 20 mg capsule Take 1 capsule (20 mg total) by mouth daily 30 capsule 2   • ergocalciferol (VITAMIN D2) 50,000 units      • gabapentin (NEURONTIN) 600 MG tablet TAKE 1 TABLET BY MOUTH THREE TIMES A  tablet 0   • gemfibrozil (LOPID) 600 mg tablet Take one tablet by mouth twice a day 180 tablet 1   • glucose blood (Accu-Chek Guide) test strip Checking sugars four times a day or as directed Dx: E11.65, E11.8 300 strip 3   • insulin glargine (Toujeo SoloStar) 300 units/mL CONCENTRATED U-300 injection pen (1-unit dial) Inject 60 units under the skin daily at bedtime. 60 mL 0   • Insulin Pen Needle (BD Pen Needle Montse U/F) 32G X 4 MM MISC Use daily withToujeo 50 each 3   • levothyroxine 50 mcg tablet TAKE 1 TABLET EVERY DAY 90 tablet 3   • losartan (COZAAR) 25 mg tablet Take 1 tablet (25 mg total) by mouth daily 90 tablet 1   • NovoLOG FlexPen 100 units/mL injection pen INJECT 20 UNITS UNDER THE SKIN THREE TIMES DAILY WITH MEALS 60 mL 3     No current facility-administered medications for this visit.        SOCIAL HISTORY:  Social History     Socioeconomic History   • Marital status: /Civil Union     Spouse name: None   • Number of children: 2   • Years of education: 12   • Highest education level: None   Occupational History   • None   Tobacco Use   • Smoking status: Former     Packs/day: 2.00     Years: 30.00     Total pack years: 60.00     Types: Cigarettes     Quit date: 2018     Years since quittin.5   • Smokeless tobacco: Never   • Tobacco comments:     Quit smoking around 2018   Vaping Use   • Vaping Use: Never used   Substance and Sexual Activity   • Alcohol use: No   • Drug use: No   • Sexual activity: None   Other Topics Concern   • None   Social History Narrative    high school graduate     Social Determinants of Health     Financial Resource Strain: Low Risk  (5/31/2023)    Overall Financial Resource Strain (CARDIA)    • Difficulty of Paying Living Expenses: Not hard at all   Food Insecurity: Not on file   Transportation Needs: No Transportation Needs (5/31/2023)    PRAPARE - Transportation    • Lack of Transportation (Medical): No    • Lack of Transportation (Non-Medical): No   Physical Activity: Not on file   Stress: Not on file   Social Connections: Not on file   Intimate Partner Violence: Not on file   Housing Stability: Not on file        Review of Systems   Constitutional: Negative. HENT: Negative. Eyes: Negative. Respiratory: Negative. Cardiovascular: Negative. Endocrine: Negative. Musculoskeletal: Negative. Neurological: Negative. Hematological: Negative. Psychiatric/Behavioral: Negative. Objective:      /84 (BP Location: Right arm, Patient Position: Sitting, Cuff Size: Standard)   Pulse 74   Ht 5' 6" (1.676 m)   Wt 83.5 kg (184 lb)   SpO2 96%   BMI 29.70 kg/m²          Physical Exam  Constitutional:       Appearance: Normal appearance. HENT:      Head: Normocephalic and atraumatic. Nose: Nose normal.   Cardiovascular:      Pulses: no weak pulses          Dorsalis pedis pulses are 2+ on the right side and 2+ on the left side. Posterior tibial pulses are 2+ on the right side and 2+ on the left side. Pulmonary:      Effort: Pulmonary effort is normal.   Feet:      Right foot:      Protective Sensation: 7 sites tested. 2 sites sensed. Skin integrity: Skin integrity normal. No ulcer, skin breakdown, erythema, warmth, callus or dry skin. Toenail Condition: Right toenails are normal.      Left foot:      Protective Sensation: 7 sites tested.  2 sites sensed. Skin integrity: Skin integrity normal. No ulcer, skin breakdown, erythema, warmth, callus or dry skin. Toenail Condition: Left toenails are normal.      Comments: The patient's clinical examination today is significant for diffuse paresthesias of both feet without any open lesions nor pretrophic changes to either lower extremity. The interdigital spaces are clear without maceration. Pedal pulses are palpable bilaterally. Capillary fill time to the toes is within normal limits. Gradients within normal limits. Skin:     General: Skin is warm. Capillary Refill: Capillary refill takes less than 2 seconds. Neurological:      General: No focal deficit present. Mental Status: She is alert and oriented to person, place, and time. Psychiatric:         Mood and Affect: Mood normal.         Behavior: Behavior normal.         Thought Content: Thought content normal.           Diabetic Foot Exam    Patient's shoes and socks removed. Right Foot/Ankle   Right Foot Inspection  Skin Exam: skin normal and skin intact. No dry skin, no warmth, no callus, no erythema, no maceration, no abnormal color, no pre-ulcer, no ulcer and no callus. Toe Exam: ROM and strength within normal limits. Sensory   Vibration: diminished  Proprioception: intact  Monofilament testing: diminished    Vascular  Capillary refills: < 3 seconds  The right DP pulse is 2+. The right PT pulse is 2+. Left Foot/Ankle  Left Foot Inspection  Skin Exam: skin normal and skin intact. No dry skin, no warmth, no erythema, no maceration, normal color, no pre-ulcer, no ulcer and no callus. Toe Exam: ROM and strength within normal limits. Sensory   Vibration: diminished  Proprioception: intact  Monofilament testing: diminished    Vascular  Capillary refills: < 3 seconds  The left DP pulse is 2+. The left PT pulse is 2+.      Assign Risk Category  No deformity present  Loss of protective sensation  No weak pulses  Risk: 1

## 2023-08-02 ENCOUNTER — PATIENT OUTREACH (OUTPATIENT)
Dept: FAMILY MEDICINE CLINIC | Facility: MEDICAL CENTER | Age: 73
End: 2023-08-02

## 2023-08-02 DIAGNOSIS — Z78.9 NEEDS ASSISTANCE WITH COMMUNITY RESOURCES: Primary | ICD-10-CM

## 2023-08-02 NOTE — PROGRESS NOTES
Received referral based on elevated HgbA1c report. Chart reviewed. Last HgbA1c 5/22/23 was 11.2. Call to patient, left message on machine with my name, number and request for return call.

## 2023-08-03 DIAGNOSIS — E11.65 TYPE 2 DIABETES MELLITUS WITH HYPERGLYCEMIA, WITH LONG-TERM CURRENT USE OF INSULIN (HCC): ICD-10-CM

## 2023-08-03 DIAGNOSIS — E11.42 DIABETIC POLYNEUROPATHY ASSOCIATED WITH TYPE 2 DIABETES MELLITUS (HCC): ICD-10-CM

## 2023-08-03 DIAGNOSIS — Z79.4 TYPE 2 DIABETES MELLITUS WITH HYPERGLYCEMIA, WITH LONG-TERM CURRENT USE OF INSULIN (HCC): ICD-10-CM

## 2023-08-06 PROBLEM — Z00.00 MEDICARE ANNUAL WELLNESS VISIT, SUBSEQUENT: Status: RESOLVED | Noted: 2023-06-07 | Resolved: 2023-08-06

## 2023-08-07 ENCOUNTER — PATIENT OUTREACH (OUTPATIENT)
Dept: FAMILY MEDICINE CLINIC | Facility: MEDICAL CENTER | Age: 73
End: 2023-08-07

## 2023-08-07 RX ORDER — DULOXETIN HYDROCHLORIDE 20 MG/1
20 CAPSULE, DELAYED RELEASE ORAL DAILY
Qty: 90 CAPSULE | Refills: 0 | Status: SHIPPED | OUTPATIENT
Start: 2023-08-07

## 2023-08-07 NOTE — PROGRESS NOTES
Received a call back message from patient on this [facility] RN voicemail. Returned call to patient, left 2nd voicemail.

## 2023-08-07 NOTE — PROGRESS NOTES
Received call back from patient. Introduced self and role of complex care manager. Pt agrees to enroll in telephone outreach. Has my name and contact information. Jayna reports that she has some knowledge on Diabetes Management. She was to receive a [address] however she never was contacted further and did not receive it. She has a manual glucometer at home and is to check 4 times a day. She denies episodes of hypoglycemia. She shared that when her "blood sugars fall below 200, it tends to make her neuropathy flare up". Has been referred to the Diabetes Educator however she would like to receive her Dexcom first and have all the education "at once". Medication list was reviewed. She is taking Novolog 20 units three times a day with meals and Tujeo 60 untis at bedtime. Inquired if she has rescheduled her Endocrine appointment. As per patient she was seeing a physician but they have since moved to the [facility]. Offered to assist patient in pursuing the [address] and rescheduling her appointment and she agrees to same.

## 2023-08-07 NOTE — PROGRESS NOTES
2nd call to patient, left message on machine. Unable to reach letter mailed to patient via [employer] message. Will review chart in two weeks and close at that time if no response from patient.

## 2023-08-07 NOTE — PROGRESS NOTES
Call to Endo office, left message on machine with my name, number and request for return call to reschedule patient appointment. Pt prefers "MD or DO" at [facility] or [facility] location.

## 2023-08-07 NOTE — PROGRESS NOTES
Call to [facility], patient is to have [address] sent to [facility]. They contract with [facility].  Will send note to Endocrinology to place new oder via [employer] to [address] [place] [address]

## 2023-08-07 NOTE — LETTER
Date: 08/07/23    Dear Crispin Veloz,   My name is Ju Storey; I am a registered nurse care manager working with [address] [facility] [address] at [facility]. I have not been able to reach you and would like to set a time that I can talk with you over the phone. My work is to help patients that have complex medical conditions get the care they need. This includes patients who may have been in the hospital or emergency room. I have enclosed information for you. Please call me with any questions you may have. I look forward to speaking with you.   Sincerely,  Elveria Merlin RN  [phone]  Outpatient Care Manager

## 2023-08-08 ENCOUNTER — TELEPHONE (OUTPATIENT)
Age: 73
End: 2023-08-08

## 2023-08-08 NOTE — TELEPHONE ENCOUNTER
Caller: patient    Doctor: hand    Reason for call: looking for hand surgeon for trigger fingers    Call back#: [phone]

## 2023-08-09 ENCOUNTER — TELEPHONE (OUTPATIENT)
Dept: ENDOCRINOLOGY | Facility: CLINIC | Age: 73
End: 2023-08-09

## 2023-08-09 ENCOUNTER — PATIENT OUTREACH (OUTPATIENT)
Dept: FAMILY MEDICINE CLINIC | Facility: MEDICAL CENTER | Age: 73
End: 2023-08-09

## 2023-08-09 NOTE — PROGRESS NOTES
Received call back from Endocrine, left message to call back. [address] for Diabetes and Endocrinology, spoke with Dung Barragan. She will contact patient to reschedule her next office appointment. She will send request to clinical team to send Dexcom G7 order to [address] [place] [address].  Next follow up with em on Friday 8/11/23.

## 2023-08-09 NOTE — TELEPHONE ENCOUNTER
Ryan De eLon called from [facility] she is a case nurse for patient. [phone]. She left a message that the patients Dexcom was not received. I called her back and left a message that I would send this to clinical. Please advise.

## 2023-08-09 NOTE — TELEPHONE ENCOUNTER
Left  Voice message for patient to call our  office to reschedule  Her  appt for  August 7,23 that was with  Dr Jazmin Seay.

## 2023-08-11 ENCOUNTER — PATIENT OUTREACH (OUTPATIENT)
Dept: FAMILY MEDICINE CLINIC | Facility: MEDICAL CENTER | Age: 73
End: 2023-08-11

## 2023-08-11 NOTE — PROGRESS NOTES
Call to [address] [facility], they received order for Dexcom G7. Representative verified insurance. They do participate with patients insurance. They will be reaching out to patient to confirm request and review if there is any copay.

## 2023-08-11 NOTE — PROGRESS NOTES
Call to patient, left message on machine with my name, number and request for return call. Advised patient to call [facility] for Diabetes to reschedule Endocrinology appointment and to expect a call from [address] in next few days.

## 2023-08-17 ENCOUNTER — PATIENT OUTREACH (OUTPATIENT)
Dept: FAMILY MEDICINE CLINIC | Facility: MEDICAL CENTER | Age: 73
End: 2023-08-17

## 2023-08-17 NOTE — PROGRESS NOTES
Telephone call to patient, left my name, number and request for return call on voicemail and via [facility] [employer].

## 2023-08-18 DIAGNOSIS — E78.1 HYPERTRIGLYCERIDEMIA: ICD-10-CM

## 2023-08-18 RX ORDER — ATORVASTATIN CALCIUM 80 MG/1
TABLET, FILM COATED ORAL
Qty: 90 TABLET | Refills: 0 | Status: SHIPPED | OUTPATIENT
Start: 2023-08-18

## 2023-08-22 ENCOUNTER — VBI (OUTPATIENT)
Dept: ADMINISTRATIVE | Facility: OTHER | Age: 73
End: 2023-08-22

## 2023-08-30 ENCOUNTER — PATIENT OUTREACH (OUTPATIENT)
Dept: FAMILY MEDICINE CLINIC | Facility: MEDICAL CENTER | Age: 73
End: 2023-08-30

## 2023-08-30 NOTE — PROGRESS NOTES
Chart reviewed. No return message from patient via phone or my chart. Second attempt to reach pt by phone, left message on machine to contact [facility] regarding request for Dexcom. Also reminded to reschedule Endocrinology appointment. Provided my contact information again. Will close if no return call from patient in two weeks.

## 2023-09-07 DIAGNOSIS — E78.1 HYPERTRIGLYCERIDEMIA: ICD-10-CM

## 2023-09-07 RX ORDER — GEMFIBROZIL 600 MG/1
TABLET, FILM COATED ORAL
Qty: 180 TABLET | Refills: 0 | Status: SHIPPED | OUTPATIENT
Start: 2023-09-07

## 2023-09-14 ENCOUNTER — PATIENT OUTREACH (OUTPATIENT)
Dept: FAMILY MEDICINE CLINIC | Facility: MEDICAL CENTER | Age: 73
End: 2023-09-14

## 2023-09-14 NOTE — PROGRESS NOTES
Unable to reach after two calls and [employer] message. Will close complex care management episode at this time.

## 2023-09-21 DIAGNOSIS — M79.2 NEUROPATHIC PAIN: ICD-10-CM

## 2023-09-21 DIAGNOSIS — Z51.81 MEDICATION MONITORING ENCOUNTER: Primary | ICD-10-CM

## 2023-09-21 RX ORDER — GABAPENTIN 600 MG/1
TABLET ORAL
Qty: 90 TABLET | Refills: 0 | Status: SHIPPED | OUTPATIENT
Start: 2023-09-21

## 2023-09-21 NOTE — TELEPHONE ENCOUNTER
BMP order placed, standing order for A1c to complete. Please advise patient to complete prior to upcoming appointment.

## 2023-09-26 NOTE — TELEPHONE ENCOUNTER
Patient returned call and states was in [place] due to mother passing away and is unable to get labs completed before appointment. Advised to keep appointment and inform Dr Maig Hansen on what is going on.  Patient will complete bw after appointment

## 2023-09-27 ENCOUNTER — OFFICE VISIT (OUTPATIENT)
Dept: FAMILY MEDICINE CLINIC | Facility: MEDICAL CENTER | Age: 73
End: 2023-09-27
Payer: COMMERCIAL

## 2023-09-27 VITALS
SYSTOLIC BLOOD PRESSURE: 132 MMHG | OXYGEN SATURATION: 94 % | WEIGHT: 181.8 LBS | HEART RATE: 72 BPM | DIASTOLIC BLOOD PRESSURE: 76 MMHG | TEMPERATURE: 97.6 F | BODY MASS INDEX: 29.22 KG/M2 | HEIGHT: 66 IN

## 2023-09-27 DIAGNOSIS — E78.1 HYPERTRIGLYCERIDEMIA: ICD-10-CM

## 2023-09-27 DIAGNOSIS — R22.9 SUBCUTANEOUS MASS: ICD-10-CM

## 2023-09-27 DIAGNOSIS — I71.40 ABDOMINAL AORTIC ANEURYSM (AAA) WITHOUT RUPTURE, UNSPECIFIED PART (HCC): ICD-10-CM

## 2023-09-27 DIAGNOSIS — F17.211 NICOTINE DEPENDENCE, CIGARETTES, IN REMISSION: ICD-10-CM

## 2023-09-27 DIAGNOSIS — Z09 FOLLOW-UP EXAM, 3-6 MONTHS SINCE PREVIOUS EXAM: Primary | ICD-10-CM

## 2023-09-27 DIAGNOSIS — Z12.2 ENCOUNTER FOR SCREENING FOR LUNG CANCER: ICD-10-CM

## 2023-09-27 DIAGNOSIS — R01.1 CARDIAC MURMUR: ICD-10-CM

## 2023-09-27 DIAGNOSIS — Z79.4 TYPE 2 DIABETES MELLITUS WITH HYPERGLYCEMIA, WITH LONG-TERM CURRENT USE OF INSULIN (HCC): ICD-10-CM

## 2023-09-27 DIAGNOSIS — Z23 IMMUNIZATION DUE: ICD-10-CM

## 2023-09-27 DIAGNOSIS — G62.9 NEUROPATHY: ICD-10-CM

## 2023-09-27 DIAGNOSIS — E11.65 TYPE 2 DIABETES MELLITUS WITH HYPERGLYCEMIA, WITH LONG-TERM CURRENT USE OF INSULIN (HCC): ICD-10-CM

## 2023-09-27 DIAGNOSIS — I10 PRIMARY HYPERTENSION: ICD-10-CM

## 2023-09-27 DIAGNOSIS — M81.0 AGE-RELATED OSTEOPOROSIS WITHOUT CURRENT PATHOLOGICAL FRACTURE: ICD-10-CM

## 2023-09-27 PROBLEM — D64.9 ANEMIA: Status: RESOLVED | Noted: 2018-08-13 | Resolved: 2023-09-27

## 2023-09-27 LAB — SL AMB POCT HEMOGLOBIN AIC: 12 (ref ?–6.5)

## 2023-09-27 PROCEDURE — G0008 ADMIN INFLUENZA VIRUS VAC: HCPCS

## 2023-09-27 PROCEDURE — 83036 HEMOGLOBIN GLYCOSYLATED A1C: CPT | Performed by: STUDENT IN AN ORGANIZED HEALTH CARE EDUCATION/TRAINING PROGRAM

## 2023-09-27 PROCEDURE — 90662 IIV NO PRSV INCREASED AG IM: CPT

## 2023-09-27 PROCEDURE — 99214 OFFICE O/P EST MOD 30 MIN: CPT | Performed by: STUDENT IN AN ORGANIZED HEALTH CARE EDUCATION/TRAINING PROGRAM

## 2023-09-27 NOTE — PROGRESS NOTES
[address] - Clinic Note  Judi Melgar, [place], 23     Jaclyn Bias MRN: [id number] : 1950 Age: 72 y.o. Assessment/Plan     1. Follow-up exam, 3-6 months since previous exam    -Patient presents for medical follow-up today, she states she was unable to complete her lab work due to as her mother recently passed away  -Immunizations reviewed: Discussed newest COVID-19 vaccine, tetanus booster and Shingrix vaccination series, she will consider  -Discussed influenza vaccine, agreeable to receiving today  -Return to office in 3 months and sooner as needed    2. Immunization due    - influenza vaccine, high-dose, PF 0.7 mL (FLUZONE HIGH-DOSE)    3. Type 2 diabetes mellitus with hyperglycemia, with long-term current use of insulin (HCC)    - POCT hemoglobin A1c 12.0  - Poorly controlled type 2 diabetes mellitus, strongly urged closer follow-up with her Endocrinologist  -Continue daily ARB  -Continue daily statin  -Continue follow-up with podiatry  -Yearly dilated eye exams    4. Abdominal aortic aneurysm (AAA) without rupture, unspecified part [facility])    -Vascular Surgery has attempted to reach out to patient unsuccessfully, provided information to patient to call    5. Primary hypertension    -Stable current management, continue losartan 25 mg p.o. daily  -Follow-up BMP    6. Hypertriglyceridemia    -Continue gemfibrozil and Lipitor    7. Age-related osteoporosis without current pathological fracture    -We will revisit in regards to treatment options    8. Encounter for screening for lung cancer    - CT lung screening program; Future    9. Nicotine dependence, cigarettes, in remission    - CT lung screening program; Future    10. Subcutaneous mass    - US head neck soft tissue; Future    11. Neuropathy    -Continue gabapentin and Cymbalta  -Following with Podiatry    12.  Cardiac murmur    -Reminded about standing order for echocardiogram  -Denies chest pain, shortness of breath or syncope      BMI Counseling: Body mass index is 29.34 kg/m². The BMI is above normal. Nutrition recommendations include moderation in carbohydrate intake. Exercise recommendations include exercising 3-5 times per week. Rationale for BMI follow-up plan is due to patient being overweight or obese. Depression Screening and Follow-up Plan: Patient was screened for depression during today's encounter. They screened negative with a PHQ-2 score of 0. Ej Cohen acknowledged understanding of treatment plan, all questions answered. Subjective      Ej Cohen is a 72 y.o. female who presents for medical follow-up. Patient states she did not complete lab work due as her mother recently passed away unfortunately. Patient states she has been taking her medications as prescribed. Denies any chest pain, shortness of breath or syncope. Patient does complain of lump left jaw or region.     The following portions of the patient's history were reviewed and updated as appropriate: allergies, current medications, past family history, past medical history, past social history, past surgical history and problem list.     Past Medical History:   Diagnosis Date   • Acute cystitis without hematuria 08/10/2018   • Anemia 8/13/2018   • Aortic aneurysm, abdominal ([address])     noted on cat scan from 2/2020   • Cataract    • Closed left hip fracture ([address]) 08/10/2018   • Colon polyp    • Diabetes mellitus ([address])    • Hyperlipemia    • Migraines    • Multiple falls    • Osteoporosis 02/22/2019   • Shingles 7/2016   • Urinary tract infection 5/2022       Allergies   Allergen Reactions   • Zoledronic Acid GI Intolerance     Anorexia   • Codeine GI Intolerance, Nausea Only and Vomiting       Past Surgical History:   Procedure Laterality Date   • BREAST CYST ASPIRATION Right 1993   • CHOLECYSTECTOMY     • COLONOSCOPY     • ELBOW SURGERY     • FRACTURE SURGERY  ,4/2018   • GALLBLADDER SURGERY     • HIP SURGERY  08/10/2018 • [place] COLONOSCOPY FLX DX W/COLLJ SPEC WHEN PFRMD N/A 2016    Procedure: COLONOSCOPY;  Surgeon: Genet Shultz MD;  Location[facility];   Service: Gastroenterology   • [place] OPTX FEM SHFT FX W/INSJ IMED IMPLT W/WO SCREW Left 08/10/2018    Procedure: Left Hip IM Long nail;  Surgeon: Jason Renee MD;  [facility];  Service: Orthopedics   • TONSILLECTOMY         Family History   Problem Relation Age of Onset   • Diabetes type II Mother    • Osteoporosis Mother    • Thyroid disease unspecified Mother    • Diabetes Mother    • No Known Problems Father    • Hyperlipidemia Sister    • Hypertension Sister    • Diabetes Sister    • No Known Problems Daughter    • No Known Problems Daughter    • No Known Problems Maternal Grandmother    • Diabetes type II Maternal Grandfather    • Pancreatic cancer Paternal Grandmother    • Cancer Paternal Grandmother    • No Known Problems Paternal Grandfather    • Diabetes type II Maternal Aunt    • No Known Problems Maternal Aunt    • Diabetes type II Maternal Uncle    • No Known Problems Paternal Aunt    • No Known Problems Paternal Aunt    • No Known Problems Paternal Aunt    • Arthritis Family    • Pancreatic cancer Family    • Scoliosis Family    • Breast cancer Neg Hx        Social History     Socioeconomic History   • Marital status: /Civil Union     Spouse name: None   • Number of children: 2   • Years of education: 15   • Highest education level: None   Occupational History   • None   Tobacco Use   • Smoking status: Former     Packs/day: 2.00     Years: 30.00     Total pack years: 60.00     Types: Cigarettes     Quit date: 2018     Years since quittin.7   • Smokeless tobacco: Never   • Tobacco comments:     Quit smoking around 2018   Vaping Use   • Vaping Use: Never used   Substance and Sexual Activity   • Alcohol use: No   • Drug use: No   • Sexual activity: None   Other Topics Concern   • None   Social History Narrative    high school graduate Social Determinants of Health     Financial Resource Strain: Low Risk  (5/31/2023)    Overall Financial Resource Strain (CARDIA)    • Difficulty of Paying Living Expenses: Not hard at all   Food Insecurity: Not on file   Transportation Needs: No Transportation Needs (5/31/2023)    PRAPARE - Transportation    • Lack of Transportation (Medical): No    • Lack of Transportation (Non-Medical): No   Physical Activity: Not on file   Stress: Not on file   Social Connections: Not on file   Intimate Partner Violence: Not on file   Housing Stability: Not on file       Current Outpatient Medications   Medication Sig Dispense Refill   • Accu-Chek FastClix Lancets MISC Use to check blood sugar 4 times a day 100 each 3   • aspirin (ECOTRIN LOW STRENGTH) 81 mg EC tablet Take 81 mg by mouth daily     • atorvastatin (LIPITOR) 80 mg tablet TAKE 1 TABLET AT BEDTIME 90 tablet 0   • DULoxetine (CYMBALTA) 20 mg capsule TAKE 1 CAPSULE BY MOUTH EVERY DAY 90 capsule 0   • ergocalciferol (VITAMIN D2) 50,000 units      • gabapentin (NEURONTIN) 600 MG tablet TAKE 1 TABLET BY MOUTH THREE TIMES A DAY 90 tablet 0   • gemfibrozil (LOPID) 600 mg tablet Take one tablet by mouth twice a day 180 tablet 0   • glucose blood (Accu-Chek Guide) test strip Checking sugars four times a day or as directed Dx: E11.65, E11.8 300 strip 3   • insulin glargine (Toujeo SoloStar) 300 units/mL CONCENTRATED U-300 injection pen (1-unit dial) Inject 60 units under the skin daily at bedtime. 60 mL 0   • Insulin Pen Needle (BD Pen Needle Montse U/F) 32G X 4 MM MISC Use daily withToujeo 50 each 3   • levothyroxine 50 mcg tablet TAKE 1 TABLET EVERY DAY 90 tablet 3   • losartan (COZAAR) 25 mg tablet Take 1 tablet (25 mg total) by mouth daily 90 tablet 1   • NovoLOG FlexPen 100 units/mL injection pen INJECT 20 UNITS UNDER THE SKIN THREE TIMES DAILY WITH MEALS 60 mL 3     No current facility-administered medications for this visit.        Review of Systems     As noted in HPI    Objective      /76 (BP Location: Left arm, Patient Position: Sitting, Cuff Size: Large)   Pulse 72   Temp 97.6 °F (36.4 °C)   Ht 5' 6" (1.676 m)   Wt 82.5 kg (181 lb 12.8 oz)   SpO2 94%   BMI 29.34 kg/m²     Physical Exam  Vitals reviewed. Constitutional:       General: She is not in acute distress. Appearance: Normal appearance. She is not ill-appearing or toxic-appearing. HENT:      Head: Normocephalic and atraumatic. Comments: Subcutaneous nodularity left jaw or region  Eyes:      Conjunctiva/sclera: Conjunctivae normal.   Cardiovascular:      Rate and Rhythm: Normal rate and regular rhythm. Heart sounds: Murmur heard. Pulmonary:      Effort: Pulmonary effort is normal.      Breath sounds: Normal breath sounds. Abdominal:      General: Bowel sounds are normal.      Palpations: Abdomen is soft. Tenderness: There is no abdominal tenderness. Neurological:      Mental Status: She is alert and oriented to person, place, and time. Psychiatric:         Mood and Affect: Affect is tearful (mother recently passed away). Behavior: Behavior normal.         Thought Content: Thought content normal.             Some portions of this record may have been generated with voice recognition software. There may be translation, syntax, or grammatical errors. Occasional wrong word or "sound-a-like" substitutions may have occurred due to the inherent limitations of the voice recognition software. Read the chart carefully and recognize, using context, where substations may have occurred. If you have any questions, please contact the dictating provider for clarification or correction, as needed.

## 2023-10-02 ENCOUNTER — OFFICE VISIT (OUTPATIENT)
Dept: OBGYN CLINIC | Facility: CLINIC | Age: 73
End: 2023-10-02
Payer: COMMERCIAL

## 2023-10-02 VITALS
HEART RATE: 79 BPM | HEIGHT: 66 IN | SYSTOLIC BLOOD PRESSURE: 188 MMHG | BODY MASS INDEX: 29.67 KG/M2 | WEIGHT: 184.6 LBS | DIASTOLIC BLOOD PRESSURE: 131 MMHG

## 2023-10-02 DIAGNOSIS — M65.341 TRIGGER FINGER, RIGHT RING FINGER: ICD-10-CM

## 2023-10-02 DIAGNOSIS — M65.331 TRIGGER FINGER, RIGHT MIDDLE FINGER: Primary | ICD-10-CM

## 2023-10-02 PROCEDURE — 99214 OFFICE O/P EST MOD 30 MIN: CPT | Performed by: STUDENT IN AN ORGANIZED HEALTH CARE EDUCATION/TRAINING PROGRAM

## 2023-10-02 PROCEDURE — 20550 NJX 1 TENDON SHEATH/LIGAMENT: CPT | Performed by: STUDENT IN AN ORGANIZED HEALTH CARE EDUCATION/TRAINING PROGRAM

## 2023-10-02 RX ORDER — BUPIVACAINE HYDROCHLORIDE 2.5 MG/ML
1 INJECTION, SOLUTION INFILTRATION; PERINEURAL
Status: COMPLETED | OUTPATIENT
Start: 2023-10-02 | End: 2023-10-02

## 2023-10-02 RX ORDER — BETAMETHASONE SODIUM PHOSPHATE AND BETAMETHASONE ACETATE 3; 3 MG/ML; MG/ML
6 INJECTION, SUSPENSION INTRA-ARTICULAR; INTRALESIONAL; INTRAMUSCULAR; SOFT TISSUE
Status: COMPLETED | OUTPATIENT
Start: 2023-10-02 | End: 2023-10-02

## 2023-10-02 RX ORDER — BUPIVACAINE HYDROCHLORIDE 2.5 MG/ML
0.5 INJECTION, SOLUTION INFILTRATION; PERINEURAL
Status: COMPLETED | OUTPATIENT
Start: 2023-10-02 | End: 2023-10-02

## 2023-10-02 RX ADMIN — BETAMETHASONE SODIUM PHOSPHATE AND BETAMETHASONE ACETATE 6 MG: 3; 3 INJECTION, SUSPENSION INTRA-ARTICULAR; INTRALESIONAL; INTRAMUSCULAR; SOFT TISSUE at 15:00

## 2023-10-02 RX ADMIN — BUPIVACAINE HYDROCHLORIDE 0.5 ML: 2.5 INJECTION, SOLUTION INFILTRATION; PERINEURAL at 15:00

## 2023-10-02 RX ADMIN — BUPIVACAINE HYDROCHLORIDE 1 ML: 2.5 INJECTION, SOLUTION INFILTRATION; PERINEURAL at 15:00

## 2023-10-03 NOTE — PROGRESS NOTES
ORTHOPAEDIC HAND, WRIST, AND ELBOW OFFICE  VISIT       ASSESSMENT/PLAN:      Diagnoses and all orders for this visit:    Trigger finger, right middle finger  -     Hand/upper extremity injection: R long A1  -     betamethasone acetate-betamethasone sodium phosphate (CELESTONE) injection 6 mg  -     bupivacaine (MARCAINE) 0.25 % injection 0.5 mL    Trigger finger, right ring finger  -     Hand/upper extremity injection: R ring A1  -     betamethasone acetate-betamethasone sodium phosphate (CELESTONE) injection 6 mg  -     bupivacaine (MARCAINE) 0.25 % injection 1 mL          72 y.o. female with right middle and ring trigger fingers  Treatment options and expected outcomes were discussed. The patient verbalized understanding of exam findings and treatment plan. The patient was given the opportunity to ask questions. Questions were answered to the patient's satisfaction. The patient decided to move forward with right middle and ring trigger finger injections via shared decision making. Follow Up:  6 weeks       To Do Next Visit:  Re-evaluation of current issue      Discussions:  Trigger Finger: The anatomy and physiology of trigger finger was discussed with the patient today in the office. Edema and increased contact pressure within the flexor tendons at the A1 pulley can cause pain, crepitation, and triggering or locking of the digit resulting in limitation of function. Symptoms can occur at anytime but are typically worse in the morning or after a brief rest from repetitive activity. Treatment options include resting/nighttime MP blocking splints to decrease edema, oral anti-inflammatory medications, home or formal therapy exercises, up to 2 steroid injections within the tendon sheath, or surgical release. While majority of patients do respond to conservative treatment, up to 20% may require surgical release.        Ivanna Parada MD  Attending, Orthopaedic Surgery  Hand, Wrist, and Elbow [address] [facility]    ____________________________________________________________________________________________________________________________________________      CHIEF COMPLAINT:  Chief Complaint   Patient presents with   • Right Ring Finger - Pain   • Right Middle Finger - Pain       SUBJECTIVE:  Meka Moise is a 72y.o. year old RHD female who presents today  for evaluation and treatment of right middle and ring finger locking. Symptoms have been going on for several weeks now. I have personally reviewed all the relevant PMH, PSH, SH, FH, Medications and allergies      PAST MEDICAL HISTORY:  Past Medical History:   Diagnosis Date   • Acute cystitis without hematuria 08/10/2018   • Anemia 8/13/2018   • Aortic aneurysm, abdominal ([address])     noted on cat scan from 2/2020   • Cataract    • Closed left hip fracture ([address]) 08/10/2018   • Colon polyp    • Diabetes mellitus ([address])    • Hyperlipemia    • Migraines    • Multiple falls    • Osteoporosis 02/22/2019   • Shingles 7/2016   • Urinary tract infection 5/2022       PAST SURGICAL HISTORY:  Past Surgical History:   Procedure Laterality Date   • BREAST CYST ASPIRATION Right 1993   • CHOLECYSTECTOMY     • COLONOSCOPY     • ELBOW SURGERY     • FRACTURE SURGERY  ,4/2018   • GALLBLADDER SURGERY     • HIP SURGERY  08/10/2018   • [place] COLONOSCOPY FLX DX W/COLLJ SPEC WHEN PFRMD N/A 12/14/2016    Procedure: COLONOSCOPY;  Surgeon: Amarilis Bruner MD;  [facility];   Service: Gastroenterology   • [place] OPTX FEM SHFT FX W/INSJ IMED IMPLT W/WO SCREW Left 08/10/2018    Procedure: Left Hip IM Long nail;  Surgeon: Talat Camarena MD;  [facility];  Service: Orthopedics   • TONSILLECTOMY         FAMILY HISTORY:  Family History   Problem Relation Age of Onset   • Diabetes type II Mother    • Osteoporosis Mother    • Thyroid disease unspecified Mother    • Diabetes Mother    • No Known Problems Father    • Hyperlipidemia Sister    • Hypertension Sister    • Diabetes Sister    • No Known Problems Daughter    • No Known Problems Daughter    • No Known Problems Maternal Grandmother    • Diabetes type II Maternal Grandfather    • Pancreatic cancer Paternal Grandmother    • Cancer Paternal Grandmother    • No Known Problems Paternal Grandfather    • Diabetes type II Maternal Aunt    • No Known Problems Maternal Aunt    • Diabetes type II Maternal Uncle    • No Known Problems Paternal Aunt    • No Known Problems Paternal Aunt    • No Known Problems Paternal Aunt    • Arthritis Family    • Pancreatic cancer Family    • Scoliosis Family    • Breast cancer Neg Hx        SOCIAL HISTORY:  Social History     Tobacco Use   • Smoking status: Former     Packs/day: 2.00     Years: 30.00     Total pack years: 60.00     Types: Cigarettes     Quit date: 2018     Years since quittin.7   • Smokeless tobacco: Never   • Tobacco comments:     Quit smoking around 2018   Vaping Use   • Vaping Use: Never used   Substance Use Topics   • Alcohol use: No   • Drug use: No       MEDICATIONS:    Current Outpatient Medications:   •  Accu-Chek FastClix Lancets MISC, Use to check blood sugar 4 times a day, Disp: 100 each, Rfl: 3  •  aspirin (ECOTRIN LOW STRENGTH) 81 mg EC tablet, Take 81 mg by mouth daily, Disp: , Rfl:   •  atorvastatin (LIPITOR) 80 mg tablet, TAKE 1 TABLET AT BEDTIME, Disp: 90 tablet, Rfl: 0  •  DULoxetine (CYMBALTA) 20 mg capsule, TAKE 1 CAPSULE BY MOUTH EVERY DAY, Disp: 90 capsule, Rfl: 0  •  ergocalciferol (VITAMIN D2) 50,000 units, , Disp: , Rfl:   •  gabapentin (NEURONTIN) 600 MG tablet, TAKE 1 TABLET BY MOUTH THREE TIMES A DAY, Disp: 90 tablet, Rfl: 0  •  gemfibrozil (LOPID) 600 mg tablet, Take one tablet by mouth twice a day, Disp: 180 tablet, Rfl: 0  •  glucose blood (Accu-Chek Guide) test strip, Checking sugars four times a day or as directed Dx: E11.65, E11.8, Disp: 300 strip, Rfl: 3  •  insulin glargine (Toujeo SoloStar) 300 units/mL CONCENTRATED U-300 injection pen (1-unit dial), Inject 60 units under the skin daily at bedtime. , Disp: 60 mL, Rfl: 0  •  Insulin Pen Needle (BD Pen Needle Montse U/F) 32G X 4 MM MISC, Use daily withToujeo, Disp: 50 each, Rfl: 3  •  levothyroxine 50 mcg tablet, TAKE 1 TABLET EVERY DAY, Disp: 90 tablet, Rfl: 3  •  losartan (COZAAR) 25 mg tablet, Take 1 tablet (25 mg total) by mouth daily, Disp: 90 tablet, Rfl: 1  •  NovoLOG FlexPen 100 units/mL injection pen, INJECT 20 UNITS UNDER THE SKIN THREE TIMES DAILY WITH MEALS, Disp: 60 mL, Rfl: 3  No current facility-administered medications for this visit. ALLERGIES:  Allergies   Allergen Reactions   • Zoledronic Acid GI Intolerance     Anorexia   • Codeine GI Intolerance, Nausea Only and Vomiting           REVIEW OF SYSTEMS:  Musculoskeletal:        As noted in HPI. All other systems reviewed and are negative. VITALS:  Vitals:    10/02/23 1426   BP: (!) 188/131   Pulse: 79       LABS:  HgA1c:   Lab Results   Component Value Date    HGBA1C 12.0 (A) 09/27/2023     BMP:   Lab Results   Component Value Date    CALCIUM 9.8 09/14/2022     (L) 10/25/2016    K 4.3 09/14/2022    CO2 23 09/14/2022     09/14/2022    BUN 23 09/14/2022    CREATININE 0.61 09/14/2022       _____________________________________________________  PHYSICAL EXAMINATION:  General: well developed and well nourished, alert, oriented times 3 and appears comfortable  Psychiatric: Normal  HEENT: Normocephalic, Atraumatic Trachea Midline, No torticollis  Pulmonary: No audible wheezing or respiratory distress   Abdomen/GI: Non tender, non distended   Cardiovascular: No pitting edema, 2+ radial pulse   Skin: No masses, erythema, lacerations, fluctation, ulcerations  Neurovascular: Sensation Intact to the Median, Ulnar, Radial Nerve, Motor Intact to the Median, Ulnar, Radial Nerve and Pulses Intact  Musculoskeletal: Normal, except as noted in detailed exam and in HPI.       MUSCULOSKELETAL EXAMINATION:  Right hand  Able to make a composite fist  Able to fully extend digits  Active triggering of middle and ring fingers  Tenderness over A1 pulley of middle and ring finger finger    ___________________________________________________  STUDIES REVIEWED:  No studies to review         PROCEDURES PERFORMED:  Hand/upper extremity injection: R long A1  Universal Protocol:  Consent: Verbal consent obtained. Risks and benefits: risks, benefits and alternatives were discussed  Consent given by: patient  Time out: Immediately prior to procedure a "time out" was called to verify the correct patient, procedure, equipment, support staff and site/side marked as required. Patient understanding: patient states understanding of the procedure being performed  Site marked: the operative site was marked  Patient identity confirmed: verbally with patient    Supporting Documentation  Indications: tendon swelling   Procedure Details  Condition:trigger finger Location: long finger - R long A1   Preparation: Patient was prepped and draped in the usual sterile fashion  Needle size: 25 G  Approach: volar  Medications administered: 6 mg betamethasone acetate-betamethasone sodium phosphate 6 (3-3) mg/mL; 0.5 mL bupivacaine 0.25 %    Patient tolerance: patient tolerated the procedure well with no immediate complications  Dressing:  Sterile dressing applied     Hand/upper extremity injection: R ring A1  Universal Protocol:  Consent: Verbal consent obtained. Risks and benefits: risks, benefits and alternatives were discussed  Consent given by: patient  Time out: Immediately prior to procedure a "time out" was called to verify the correct patient, procedure, equipment, support staff and site/side marked as required.   Patient understanding: patient states understanding of the procedure being performed  Site marked: the operative site was marked  Patient identity confirmed: verbally with patient    Supporting Documentation  Indications: tendon swelling   Procedure Details  Condition:trigger finger Location: ring finger - R ring A1   Preparation: Patient was prepped and draped in the usual sterile fashion  Needle size: 25 G  Approach: volar  Medications administered: 6 mg betamethasone acetate-betamethasone sodium phosphate 6 (3-3) mg/mL; 1 mL bupivacaine 0.25 %    Patient tolerance: patient tolerated the procedure well with no immediate complications  Dressing:  Sterile dressing applied           _____________________________________________________      Shannon Montoya    I,:   am acting as a scribe while in the presence of the attending physician.:       I,:   personally performed the services described in this documentation    as scribed in my presence.:

## 2023-10-28 DIAGNOSIS — M79.2 NEUROPATHIC PAIN: ICD-10-CM

## 2023-10-30 ENCOUNTER — OFFICE VISIT (OUTPATIENT)
Dept: ENDOCRINOLOGY | Facility: CLINIC | Age: 73
End: 2023-10-30
Payer: COMMERCIAL

## 2023-10-30 VITALS
HEART RATE: 60 BPM | BODY MASS INDEX: 29.6 KG/M2 | OXYGEN SATURATION: 96 % | WEIGHT: 184.2 LBS | SYSTOLIC BLOOD PRESSURE: 130 MMHG | DIASTOLIC BLOOD PRESSURE: 78 MMHG | TEMPERATURE: 97.6 F | HEIGHT: 66 IN

## 2023-10-30 DIAGNOSIS — E78.1 HYPERTRIGLYCERIDEMIA: ICD-10-CM

## 2023-10-30 DIAGNOSIS — E11.65 TYPE 2 DIABETES MELLITUS WITH HYPERGLYCEMIA, WITH LONG-TERM CURRENT USE OF INSULIN (HCC): Primary | ICD-10-CM

## 2023-10-30 DIAGNOSIS — E11.65 TYPE 2 DIABETES MELLITUS WITH HYPERGLYCEMIA, WITH LONG-TERM CURRENT USE OF INSULIN (HCC): ICD-10-CM

## 2023-10-30 DIAGNOSIS — E55.9 VITAMIN D DEFICIENCY: ICD-10-CM

## 2023-10-30 DIAGNOSIS — Z79.4 TYPE 2 DIABETES MELLITUS WITH HYPERGLYCEMIA, WITH LONG-TERM CURRENT USE OF INSULIN (HCC): Primary | ICD-10-CM

## 2023-10-30 DIAGNOSIS — M81.0 AGE-RELATED OSTEOPOROSIS WITHOUT CURRENT PATHOLOGICAL FRACTURE: ICD-10-CM

## 2023-10-30 DIAGNOSIS — Z79.4 TYPE 2 DIABETES MELLITUS WITH HYPERGLYCEMIA, WITH LONG-TERM CURRENT USE OF INSULIN (HCC): ICD-10-CM

## 2023-10-30 DIAGNOSIS — E03.9 HYPOTHYROIDISM, UNSPECIFIED TYPE: ICD-10-CM

## 2023-10-30 DIAGNOSIS — G62.9 NEUROPATHY: ICD-10-CM

## 2023-10-30 PROCEDURE — 1159F MED LIST DOCD IN RCRD: CPT | Performed by: INTERNAL MEDICINE

## 2023-10-30 PROCEDURE — 95250 CONT GLUC MNTR PHYS/QHP EQP: CPT | Performed by: INTERNAL MEDICINE

## 2023-10-30 PROCEDURE — 1160F RVW MEDS BY RX/DR IN RCRD: CPT | Performed by: INTERNAL MEDICINE

## 2023-10-30 PROCEDURE — 3078F DIAST BP <80 MM HG: CPT | Performed by: INTERNAL MEDICINE

## 2023-10-30 PROCEDURE — 3075F SYST BP GE 130 - 139MM HG: CPT | Performed by: INTERNAL MEDICINE

## 2023-10-30 PROCEDURE — 99215 OFFICE O/P EST HI 40 MIN: CPT | Performed by: INTERNAL MEDICINE

## 2023-10-30 RX ORDER — ACYCLOVIR 400 MG/1
1 TABLET ORAL CONTINUOUS
Qty: 1 EACH | Refills: 0 | Status: SHIPPED | OUTPATIENT
Start: 2023-10-30

## 2023-10-30 RX ORDER — ACYCLOVIR 400 MG/1
1 TABLET ORAL
Qty: 3 EACH | Refills: 5 | Status: SHIPPED | OUTPATIENT
Start: 2023-10-30

## 2023-10-30 RX ORDER — CHLORAL HYDRATE 500 MG
2 CAPSULE ORAL
COMMUNITY

## 2023-10-30 NOTE — PROGRESS NOTES
Follow-up Patient Progress Note      CC: type 2 diabetes    History of Present Illness:   73yr female with type 2 diabetes 2002, HTN, HLD, hypertriglyceridemia pancreatitis 1999, hypothyroidism, Osteoporosis, vit D deficiency, DPN, AAA, Hx Lt hip fracture, Hx UTI's, bladder dysfunction and vitamin D deficiency. Last visit was 5/30/23 with Alec Leggett. Since last visit, weight is same. She reports dietary indiscretions with snacks in between food and uses iced tea with aspartame. Home blood glucose monitoring: checks intermittently. Current meds: Levemir doesn't work for her. Novolog 30u tidac plus 1u/50 above 150mg/dL  Toujeo 60u qhs    Opthamology: yes  Podiatry: yes, 3 months ago. vaccination: yes  Dental: no  Pancreatitis: yes in 1999 2" hypertriglyceridemia. Ace/ARB: losartan   Statin:lipitor 80  Thyroid issues: hypothyroidism on levothyroxine 50mcg qdaily    Osteoporosis: She lost 3 inches. 1/11/22 DXA: Tscores -3.1 LS, -2.3 RTH, -1.7 RFN. 10 yr FRAX score 2.5% hip and 16% major osteoporostic fracture. Had Reclast in 2019 and developed acute phase reaction.     Patient Active Problem List   Diagnosis    Type 2 diabetes mellitus with hyperglycemia, with long-term current use of insulin ([address])    Osteoporosis    Vitamin D deficiency    Mixed stress and urge urinary incontinence    Hypertriglyceridemia    Diabetes mellitus ([address])    Closed left hip fracture (HCC)    Acute cystitis without hematuria    Closed fracture of left hip ([address])    Primary hypertension    Hypokalemia    UTI (urinary tract infection)    Pre-operative clearance    Abdominal aortic aneurysm (AAA) without rupture (HCC)    History of colon polyps    BMI 29.0-29.9,adult    Urinary urgency    H/O acute pancreatitis    Neuropathy     Past Medical History:   Diagnosis Date    Acute cystitis without hematuria 08/10/2018    Anemia 8/13/2018    Aortic aneurysm, abdominal (HCC)     noted on cat scan from 2/2020    Cataract     Closed left hip fracture ([address]) 08/10/2018    Colon polyp     Diabetes mellitus ([address])     Hyperlipemia     Migraines     Multiple falls     Osteoporosis 2019    Shingles 2016    Urinary tract infection 2022      Past Surgical History:   Procedure Laterality Date    BREAST CYST ASPIRATION Right 1993    CHOLECYSTECTOMY      COLONOSCOPY      ELBOW SURGERY      FRACTURE SURGERY  ,2018    GALLBLADDER SURGERY      HIP SURGERY  08/10/2018    OH COLONOSCOPY FLX DX W/COLLJ SPEC WHEN PFRMD N/A 2016    Procedure: COLONOSCOPY;  Surgeon: Nell Coppola MD;  Location: [facility];   Service: Gastroenterology    OH OPTX FEM SHFT FX W/INSJ IMED IMPLT W/WO SCREW Left 08/10/2018    Procedure: Left Hip IM Long nail;  Surgeon: Mary Kate Cruz MD;  [facility];  Service: Orthopedics    TONSILLECTOMY        Family History   Problem Relation Age of Onset    Diabetes type II Mother     Osteoporosis Mother     Thyroid disease unspecified Mother     Diabetes Mother     No Known Problems Father     Hyperlipidemia Sister     Hypertension Sister     Diabetes Sister     No Known Problems Daughter     No Known Problems Daughter     No Known Problems Maternal Grandmother     Diabetes type II Maternal Grandfather     Pancreatic cancer Paternal Grandmother     Cancer Paternal Grandmother     No Known Problems Paternal Grandfather     Diabetes type II Maternal Aunt     No Known Problems Maternal Aunt     Diabetes type II Maternal Uncle     No Known Problems Paternal Aunt     No Known Problems Paternal Aunt     No Known Problems Paternal Aunt     Arthritis Family     Pancreatic cancer Family     Scoliosis Family     Breast cancer Neg Hx      Social History     Tobacco Use    Smoking status: Former     Packs/day: 2.00     Years: 30.00     Total pack years: 60.00     Types: Cigarettes     Quit date:      Years since quittin.8    Smokeless tobacco: Never    Tobacco comments:     Quit smoking around 2018   Substance Use Topics Alcohol use: No     Allergies   Allergen Reactions    Zoledronic Acid GI Intolerance     Anorexia    Codeine GI Intolerance, Nausea Only and Vomiting         Current Outpatient Medications:     Accu-Chek FastClix Lancets MISC, Use to check blood sugar 4 times a day, Disp: 100 each, Rfl: 3    aspirin (ECOTRIN LOW STRENGTH) 81 mg EC tablet, Take 81 mg by mouth daily, Disp: , Rfl:     atorvastatin (LIPITOR) 80 mg tablet, TAKE 1 TABLET AT BEDTIME, Disp: 90 tablet, Rfl: 0    DULoxetine (CYMBALTA) 20 mg capsule, TAKE 1 CAPSULE BY MOUTH EVERY DAY, Disp: 90 capsule, Rfl: 0    ergocalciferol (VITAMIN D2) 50,000 units, , Disp: , Rfl:     gabapentin (NEURONTIN) 600 MG tablet, TAKE 1 TABLET BY MOUTH THREE TIMES A DAY, Disp: 90 tablet, Rfl: 0    gemfibrozil (LOPID) 600 mg tablet, Take one tablet by mouth twice a day, Disp: 180 tablet, Rfl: 0    glucose blood (Accu-Chek Guide) test strip, Checking sugars four times a day or as directed Dx: E11.65, E11.8, Disp: 300 strip, Rfl: 3    insulin glargine (Toujeo SoloStar) 300 units/mL CONCENTRATED U-300 injection pen (1-unit dial), Inject 60 units under the skin daily at bedtime. , Disp: 60 mL, Rfl: 0    Insulin Pen Needle (BD Pen Needle Montse U/F) 32G X 4 MM MISC, Use daily withToujeo, Disp: 50 each, Rfl: 3    levothyroxine 50 mcg tablet, TAKE 1 TABLET EVERY DAY, Disp: 90 tablet, Rfl: 3    losartan (COZAAR) 25 mg tablet, Take 1 tablet (25 mg total) by mouth daily, Disp: 90 tablet, Rfl: 1    NovoLOG FlexPen 100 units/mL injection pen, INJECT 20 UNITS UNDER THE SKIN THREE TIMES DAILY WITH MEALS (Patient taking differently: 30 Units 3 (three) times a day with meals), Disp: 60 mL, Rfl: 3    Omega-3 Fatty Acids (fish oil) 1,000 mg, 2 capsules, Disp: , Rfl:     Review of Systems   HENT: Negative. Eyes: Negative. Respiratory: Negative. Cardiovascular: Negative. Gastrointestinal: Negative. Endocrine: Negative. Musculoskeletal: Negative. Skin: Negative. Allergic/Immunologic: Negative. Neurological: Negative. Hematological: Negative. Psychiatric/Behavioral: Negative. Physical Exam:  Body mass index is 29.73 kg/m². /78 (BP Location: Right arm, Patient Position: Sitting, Cuff Size: Standard)   Pulse 60   Temp 97.6 °F (36.4 °C) (Tympanic)   Ht 5' 6" (1.676 m)   Wt 83.6 kg (184 lb 3.2 oz)   SpO2 96%   BMI 29.73 kg/m²    Vitals:    10/30/23 1212   Weight: 83.6 kg (184 lb 3.2 oz)        Physical Exam  Constitutional:       General: She is not in acute distress. Appearance: She is well-developed. She is not ill-appearing. HENT:      Head: Normocephalic and atraumatic. Nose: Nose normal.      Mouth/Throat:      Pharynx: Oropharynx is clear. Eyes:      Extraocular Movements: Extraocular movements intact. Conjunctiva/sclera: Conjunctivae normal.   Neck:      Thyroid: No thyromegaly. Cardiovascular:      Rate and Rhythm: Normal rate. Pulmonary:      Effort: Pulmonary effort is normal.   Musculoskeletal:         General: No deformity. Cervical back: Normal range of motion. Skin:     Capillary Refill: Capillary refill takes less than 2 seconds. Coloration: Skin is not pale. Findings: No rash. Neurological:      Mental Status: She is alert and oriented to person, place, and time. Psychiatric:         Behavior: Behavior normal.         Labs:   Lab Results   Component Value Date    HGBA1C 12.0 (A) 09/27/2023       Lab Results   Component Value Date    WKF3CEOABLPU 4.47 10/25/2016       Lab Results   Component Value Date    CREATININE 0.61 09/14/2022    CREATININE 0.67 08/14/2018    CREATININE 0.69 08/13/2018    BUN 23 09/14/2022     (L) 10/25/2016    K 4.3 09/14/2022     09/14/2022    CO2 23 09/14/2022     eGFR   Date Value Ref Range Status   09/14/2022 96 > OR = 60 mL/min/1.73m2 Final     Comment:     The eGFR is based on the CKD-EPI 2021 equation.  To calculate   the new eGFR from a previous Creatinine or Cystatin C  result, go to [email or web address]  [email or web address]     08/14/2018 92 ml/min/1.73sq m Final       Lab Results   Component Value Date    ALT 63 08/10/2018    AST 45 08/10/2018    ALKPHOS 71 08/10/2018    BILITOT 0.4 10/25/2016       Lab Results   Component Value Date    CHOLESTEROL 206 (H) 06/15/2023     Lab Results   Component Value Date    HDL 32 (L) 06/15/2023    HDL 25 (L) 10/25/2016     Lab Results   Component Value Date    TRIG 371 (H) 06/15/2023    TRIG 3177 (H) 10/25/2016     Lab Results   Component Value Date    NONHDLC 467 (H) 10/25/2016         Impression:  1. Type 2 diabetes mellitus with hyperglycemia, with long-term current use of insulin ([address])    2. Neuropathy    3. Age-related osteoporosis without current pathological fracture    4. Vitamin D deficiency    5. Hypertriglyceridemia    6. Hypothyroidism, unspecified type         Plan:    Skyler Magallanes was seen today for follow-up and diabetes type 2. Diagnoses and all orders for this visit:    Type 2 diabetes mellitus with hyperglycemia, with long-term current use of insulin ([address] She is uncontrolled with A1c 12% in spite of regular insulin use. Suspect that much of this is intermittent snacking related. I suspect she has insulin deficiency from past pancreatitis. Today we discussed all aspects of diabetes including pathophysiology, risk factors, complications, SAGM, CGM, diet, lifestyle modifications, medical fitness training, diabetes education, goals of therapy, follow up needs and medications including insulin, metformin, Jardiance and GLP1 agonists. Advised to maintain goal blood sugars 70-180mg/dL. Will get a repeat C-peptide, professional cgm data and continue current dose insulin at this time. As intervention advised to  - STOP snacks in between meals  -do not drink liquids with carbs in them  - do not take any food or drink except water after 6pm daily.     Will titrate insulin therapy next visit based on professional cgm data. She requires 4 insulin injections/day and 4 fingersticks daily so she will benefit from a personal cgm that I think is medically necessary. Explained role of cgm to patient. Follow up in 4-6 weeks. -     Albumin / creatinine urine ratio; Future  -     Continous glucose monitoring urszula placement; Future  -     Continous glucose monitoring urszula intrepretation; Future  -     Continuous Blood Gluc Sensor (Dexcom G7 Sensor); Use 1 Device every 10 days  -     Continuous Blood Gluc  (Dexcom G7 ) CHAITANYA; Use 1 each continuous  -     C-peptide; Future  -     Hemoglobin A1C; Future  -     Comprehensive metabolic panel; Future  -     Continous glucose monitoring urszula placement    Neuropathy. On cymbalta. Age-related osteoporosis without current pathological fracture. She is due for a DXA. She received 1 dose of Reclast but could not tolerate it. She will not be a candidate for evenity due to AAA and increased CAD risk. Best option will likely be prolia in future. Today we discussed all aspects of osteoporosis including pathophysiology, risk factors, complications, diet, calcium 1200mg/day, vitamin D supplementation to maintain goal >30ng/dL, lifestyle modifications, medical fitness training, goals of therapy, follow up needs and medications including Alendronate, zolendronate, denosumab, romosozumab, foreo and tymlos. We agreed to review after next DXA bone scan. Follow up in 3 months. -     DXA bone density spine hip and pelvis; Future    Vitamin D deficiency. Take adequate vit D to maintain goal >30ng/dL range. -     Vitamin D 25 hydroxy; Future    Hypertriglyceridemia. Continue gemfibrozil, lipitor and omega 3. Hypothyroidism, unspecified type. She seems clinically and biochemically euthyroid. Continue levothyroxine 50mcg qdaily and repeat labs prior to next visit. -     T4, free; Future  -     TSH, 3rd generation;  Future        I have spent 42 minutes with patient today in which greater than 50% of this time was spent in counseling/coordination of care for this patient with multiple active medical issues. She is seeing me for the first time. Discussed with the patient and all questioned fully answered. She will call me if any problems arise. Educated/ Counseled patient on diagnostic test results, prognosis, risk vs benefit of treatment options, importance of treatment compliance, healthy life and lifestyle choices.       Ginna

## 2023-10-30 NOTE — PROGRESS NOTES
Continous glucose monitoring urszula placement     Date/Time  10/30/2023 12:20 PM     Performed by  Mary Orr by  Brianda Joiner MD     Universal Protocol   Consent: Verbal consent obtained. Written consent obtained. Consent given by: patient  Timeout called at: 10/30/2023 1:27 PM.  Patient understanding: patient states understanding of the procedure being performed  Patient consent: the patient's understanding of the procedure matches consent given  Procedure consent: procedure consent matches procedure scheduled  Relevant documents: relevant documents present and verified  Test results: test results available and properly labeled  Site marked: the operative site was marked  Radiology Images displayed and confirmed.  If images not available, report reviewed: imaging studies not available  Patient identity confirmed: verbally with patient      Local anesthesia used: no     Anesthesia   Local anesthesia used: no     Sedation   Patient sedated: no        Specimen: no    Culture: no   Procedure Details   Procedure Notes: Sensor placed on right arm  SN: [id number]  EXP: 03/31/2024

## 2023-10-31 RX ORDER — GABAPENTIN 600 MG/1
TABLET ORAL
Qty: 90 TABLET | Refills: 0 | Status: SHIPPED | OUTPATIENT
Start: 2023-10-31

## 2023-11-02 LAB
DME PARACHUTE DELIVERY DATE REQUESTED: NORMAL
DME PARACHUTE ITEM DESCRIPTION: NORMAL
DME PARACHUTE ORDER STATUS: NORMAL
DME PARACHUTE SUPPLIER NAME: NORMAL
DME PARACHUTE SUPPLIER PHONE: NORMAL

## 2023-11-04 DIAGNOSIS — E11.42 DIABETIC POLYNEUROPATHY ASSOCIATED WITH TYPE 2 DIABETES MELLITUS (HCC): ICD-10-CM

## 2023-11-04 DIAGNOSIS — E11.65 TYPE 2 DIABETES MELLITUS WITH HYPERGLYCEMIA, WITH LONG-TERM CURRENT USE OF INSULIN (HCC): ICD-10-CM

## 2023-11-04 DIAGNOSIS — Z79.4 TYPE 2 DIABETES MELLITUS WITH HYPERGLYCEMIA, WITH LONG-TERM CURRENT USE OF INSULIN (HCC): ICD-10-CM

## 2023-11-06 RX ORDER — DULOXETIN HYDROCHLORIDE 20 MG/1
20 CAPSULE, DELAYED RELEASE ORAL DAILY
Qty: 90 CAPSULE | Refills: 1 | Status: SHIPPED | OUTPATIENT
Start: 2023-11-06

## 2023-11-13 ENCOUNTER — CLINICAL SUPPORT (OUTPATIENT)
Dept: ENDOCRINOLOGY | Facility: CLINIC | Age: 73
End: 2023-11-13
Payer: COMMERCIAL

## 2023-11-13 ENCOUNTER — OFFICE VISIT (OUTPATIENT)
Dept: OBGYN CLINIC | Facility: CLINIC | Age: 73
End: 2023-11-13
Payer: COMMERCIAL

## 2023-11-13 VITALS — HEIGHT: 66 IN | BODY MASS INDEX: 29.57 KG/M2 | WEIGHT: 184 LBS

## 2023-11-13 DIAGNOSIS — Z79.4 TYPE 2 DIABETES MELLITUS WITH HYPERGLYCEMIA, WITH LONG-TERM CURRENT USE OF INSULIN (HCC): ICD-10-CM

## 2023-11-13 DIAGNOSIS — M65.331 TRIGGER FINGER, RIGHT MIDDLE FINGER: Primary | ICD-10-CM

## 2023-11-13 DIAGNOSIS — M65.332 TRIGGER FINGER, LEFT MIDDLE FINGER: ICD-10-CM

## 2023-11-13 DIAGNOSIS — E11.65 TYPE 2 DIABETES MELLITUS WITH HYPERGLYCEMIA, WITH LONG-TERM CURRENT USE OF INSULIN (HCC): ICD-10-CM

## 2023-11-13 DIAGNOSIS — M65.341 TRIGGER FINGER, RIGHT RING FINGER: ICD-10-CM

## 2023-11-13 PROCEDURE — 95251 CONT GLUC MNTR ANALYSIS I&R: CPT | Performed by: INTERNAL MEDICINE

## 2023-11-13 PROCEDURE — 20550 NJX 1 TENDON SHEATH/LIGAMENT: CPT | Performed by: STUDENT IN AN ORGANIZED HEALTH CARE EDUCATION/TRAINING PROGRAM

## 2023-11-13 PROCEDURE — 99213 OFFICE O/P EST LOW 20 MIN: CPT | Performed by: STUDENT IN AN ORGANIZED HEALTH CARE EDUCATION/TRAINING PROGRAM

## 2023-11-13 RX ORDER — BETAMETHASONE SODIUM PHOSPHATE AND BETAMETHASONE ACETATE 3; 3 MG/ML; MG/ML
6 INJECTION, SUSPENSION INTRA-ARTICULAR; INTRALESIONAL; INTRAMUSCULAR; SOFT TISSUE
Status: COMPLETED | OUTPATIENT
Start: 2023-11-13 | End: 2023-11-13

## 2023-11-13 RX ORDER — BUPIVACAINE HYDROCHLORIDE 2.5 MG/ML
1 INJECTION, SOLUTION EPIDURAL; INFILTRATION; INTRACAUDAL
Status: COMPLETED | OUTPATIENT
Start: 2023-11-13 | End: 2023-11-13

## 2023-11-13 RX ADMIN — BUPIVACAINE HYDROCHLORIDE 1 ML: 2.5 INJECTION, SOLUTION EPIDURAL; INFILTRATION; INTRACAUDAL at 14:45

## 2023-11-13 RX ADMIN — BETAMETHASONE SODIUM PHOSPHATE AND BETAMETHASONE ACETATE 6 MG: 3; 3 INJECTION, SUSPENSION INTRA-ARTICULAR; INTRALESIONAL; INTRAMUSCULAR; SOFT TISSUE at 14:45

## 2023-11-13 NOTE — PROGRESS NOTES
Continous glucose monitoring urszula intrepretation     Date/Time  11/13/2023 1:15 PM     Performed by  Bill Kamara   Authorized by  Becka Serrano MD     Universal Protocol   Consent: Verbal consent obtained. Written consent obtained. Consent given by: patient  Timeout called at: 11/13/2023 1:09 PM.  Patient understanding: patient states understanding of the procedure being performed  Patient identity confirmed: verbally with patient      Local anesthesia used: no     Anesthesia   Local anesthesia used: no     Sedation   Patient sedated: no        Specimen: no    Culture: no   Procedure Details   Procedure Notes: Sensor removed from right arm for interpretation.    Patient tolerance: patient tolerated the procedure well with no immediate complications

## 2023-11-13 NOTE — PROGRESS NOTES
ORTHOPAEDIC HAND, WRIST, AND ELBOW OFFICE  VISIT       ASSESSMENT/PLAN:      Diagnoses and all orders for this visit:    Trigger finger, right middle finger    Trigger finger, right ring finger  -     Diclofenac Sodium (VOLTAREN) 1 %; Apply 2 g topically 4 (four) times a day  -     Hand/upper extremity injection: R ring A1  -     bupivacaine (PF) (MARCAINE) 0.25 % injection 1 mL  -     betamethasone acetate-betamethasone sodium phosphate (CELESTONE) injection 6 mg    Trigger finger, left middle finger  -     Diclofenac Sodium (VOLTAREN) 1 %; Apply 2 g topically 4 (four) times a day          72 y.o. female with right ring and left long trigger fingers  Treatment options and expected outcomes were discussed. The patient verbalized understanding of exam findings and treatment plan. The patient was given the opportunity to ask questions. Questions were answered to the patient's satisfaction. The patient decided to move forward with 2nd injx for the right ring finger via shared decision making. Regarding the left long, this likely is the beginning of a trigger finger or could possibly be some arthritis. Pt declined steroid injx today. She would first like to try voltaren gel. If she continues to have pain at follow up we will proceed with XR      Follow Up:  2 months       To Do Next Visit:  X-rays of the  left  long finger ONLY if still bothersome            Shelbi Rodriguez MD  Attending, Orthopaedic Surgery  Hand, Wrist, and Elbow Surgery  [address] [place] [address]    ____________________________________________________________________________________________________________________________________________      CHIEF COMPLAINT:  Chief Complaint   Patient presents with   • Right Ring Finger - Follow-up   • Left Middle Finger - Follow-up       SUBJECTIVE:  Patient is a 72 y.o. RHD female who presents today for follow up of right ring and long trigger fingers.  Pt states her ring finger is better, but still locking. The middle finger doesn't lock anymore and is symptom free. Pt also has soreness to the left middle finger. Describes this as the whole finger and constant. No known injury. I have personally reviewed all the relevant PMH, PSH, SH, FH, Medications and allergies      PAST MEDICAL HISTORY:  Past Medical History:   Diagnosis Date   • Acute cystitis without hematuria 08/10/2018   • Anemia 8/13/2018   • Aortic aneurysm, abdominal ([address])     noted on cat scan from 2/2020   • Cataract    • Closed left hip fracture ([address]) 08/10/2018   • Colon polyp    • Diabetes mellitus ([address])    • Hyperlipemia    • Migraines    • Multiple falls    • Osteoporosis 02/22/2019   • Shingles 7/2016   • Urinary tract infection 5/2022       PAST SURGICAL HISTORY:  Past Surgical History:   Procedure Laterality Date   • BREAST CYST ASPIRATION Right 1993   • CHOLECYSTECTOMY     • COLONOSCOPY     • ELBOW SURGERY     • FRACTURE SURGERY  ,4/2018   • GALLBLADDER SURGERY     • HIP SURGERY  08/10/2018   • OR COLONOSCOPY FLX DX W/COLLJ SPEC WHEN PFRMD N/A 12/14/2016    Procedure: COLONOSCOPY;  Surgeon: David Lyon MD;  Location[facility];   Service: Gastroenterology   • OR OPTX FEM SHFT FX W/INSJ IMED IMPLT W/WO SCREW Left 08/10/2018    Procedure: Left Hip IM Long nail;  Surgeon: Collin Haas MD;  [facility];  Service: Orthopedics   • TONSILLECTOMY         FAMILY HISTORY:  Family History   Problem Relation Age of Onset   • Diabetes type II Mother    • Osteoporosis Mother    • Thyroid disease unspecified Mother    • Diabetes Mother    • No Known Problems Father    • Hyperlipidemia Sister    • Hypertension Sister    • Diabetes Sister    • No Known Problems Daughter    • No Known Problems Daughter    • No Known Problems Maternal Grandmother    • Diabetes type II Maternal Grandfather    • Pancreatic cancer Paternal Grandmother    • Cancer Paternal Grandmother    • No Known Problems Paternal Grandfather    • Diabetes type II Maternal Aunt    • No Known Problems Maternal Aunt    • Diabetes type II Maternal Uncle    • No Known Problems Paternal Aunt    • No Known Problems Paternal Aunt    • No Known Problems Paternal Aunt    • Arthritis Family    • Pancreatic cancer Family    • Scoliosis Family    • Breast cancer Neg Hx        SOCIAL HISTORY:  Social History     Tobacco Use   • Smoking status: Former     Packs/day: 2.00     Years: 30.00     Total pack years: 60.00     Types: Cigarettes     Quit date: 2018     Years since quittin.8   • Smokeless tobacco: Never   • Tobacco comments:     Quit smoking around    Vaping Use   • Vaping Use: Never used   Substance Use Topics   • Alcohol use: No   • Drug use: No       MEDICATIONS:    Current Outpatient Medications:   •  Accu-Chek FastClix Lancets MISC, Use to check blood sugar 4 times a day, Disp: 100 each, Rfl: 3  •  aspirin (ECOTRIN LOW STRENGTH) 81 mg EC tablet, Take 81 mg by mouth daily, Disp: , Rfl:   •  atorvastatin (LIPITOR) 80 mg tablet, TAKE 1 TABLET AT BEDTIME, Disp: 90 tablet, Rfl: 0  •  Continuous Blood Gluc  (Dexcom G7 ) CHAITANYA, Use 1 each continuous, Disp: 1 each, Rfl: 0  •  Continuous Blood Gluc Sensor (Dexcom G7 Sensor), Use 1 Device every 10 days, Disp: 3 each, Rfl: 5  •  Diclofenac Sodium (VOLTAREN) 1 %, Apply 2 g topically 4 (four) times a day, Disp: 100 g, Rfl: 2  •  DULoxetine (CYMBALTA) 20 mg capsule, TAKE 1 CAPSULE BY MOUTH EVERY DAY, Disp: 90 capsule, Rfl: 1  •  ergocalciferol (VITAMIN D2) 50,000 units, , Disp: , Rfl:   •  gabapentin (NEURONTIN) 600 MG tablet, TAKE 1 TABLET BY MOUTH THREE TIMES A DAY, Disp: 90 tablet, Rfl: 0  •  gemfibrozil (LOPID) 600 mg tablet, Take one tablet by mouth twice a day, Disp: 180 tablet, Rfl: 0  •  glucose blood (Accu-Chek Guide) test strip, Checking sugars four times a day or as directed Dx: E11.65, E11.8, Disp: 300 strip, Rfl: 3  •  insulin glargine (Toujeo SoloStar) 300 units/mL CONCENTRATED U-300 injection pen (1-unit dial), Inject 60 units under the skin daily at bedtime. , Disp: 60 mL, Rfl: 0  •  Insulin Pen Needle (BD Pen Needle Montse U/F) 32G X 4 MM MISC, Use daily withToujeo, Disp: 50 each, Rfl: 3  •  levothyroxine 50 mcg tablet, TAKE 1 TABLET EVERY DAY, Disp: 90 tablet, Rfl: 3  •  losartan (COZAAR) 25 mg tablet, Take 1 tablet (25 mg total) by mouth daily, Disp: 90 tablet, Rfl: 1  •  NovoLOG FlexPen 100 units/mL injection pen, INJECT 20 UNITS UNDER THE SKIN THREE TIMES DAILY WITH MEALS (Patient taking differently: 30 Units 3 (three) times a day with meals), Disp: 60 mL, Rfl: 3  •  Omega-3 Fatty Acids (fish oil) 1,000 mg, 2 capsules, Disp: , Rfl:   No current facility-administered medications for this visit. ALLERGIES:  Allergies   Allergen Reactions   • Zoledronic Acid GI Intolerance     Anorexia   • Codeine GI Intolerance, Nausea Only and Vomiting           REVIEW OF SYSTEMS:  Musculoskeletal:        As noted in HPI. All other systems reviewed and are negative. VITALS:  There were no vitals filed for this visit.     LABS:  HgA1c:   Lab Results   Component Value Date    HGBA1C 12.0 (A) 09/27/2023     BMP:   Lab Results   Component Value Date    CALCIUM 9.8 09/14/2022     (L) 10/25/2016    K 4.3 09/14/2022    CO2 23 09/14/2022     09/14/2022    BUN 23 09/14/2022    CREATININE 0.61 09/14/2022       _____________________________________________________  PHYSICAL EXAMINATION:  General: Well developed and well nourished, alert & oriented x 3, appears comfortable  Psychiatric: Normal  HEENT: Normocephalic, Atraumatic Trachea Midline, No torticollis  Pulmonary: No audible wheezing or respiratory distress   Abdomen/GI: Non tender, non distended   Cardiovascular: No pitting edema, 2+ radial pulse   Skin: No masses, erythema, lacerations, fluctation, ulcerations  Neurovascular: Sensation Intact to the Median, Ulnar, Radial Nerve, Motor Intact to the Median, Ulnar, Radial Nerve, and Pulses Intact  Musculoskeletal: Normal, except as noted in detailed exam and in HPI. MUSCULOSKELETAL EXAMINATION:  Right Hand:  +ttp A1 pulley of ring finger. Nontender long finger. + triggering ring finger  - triggering long finger    Left Hand:  Nontender to palpation A1 pulley. No obvious triggering today  Pt does have a nodule felt near A1 pulley. ___________________________________________________  STUDIES REVIEWED:  No studies to review         PROCEDURES PERFORMED:  Hand/upper extremity injection: R ring A1  Universal Protocol:  Consent: Verbal consent obtained. Risks and benefits: risks, benefits and alternatives were discussed  Consent given by: patient  Time out: Immediately prior to procedure a "time out" was called to verify the correct patient, procedure, equipment, support staff and site/side marked as required.   Patient understanding: patient states understanding of the procedure being performed  Site marked: the operative site was marked  Patient identity confirmed: verbally with patient  Supporting Documentation  Indications: tendon swelling   Procedure Details  Condition:trigger finger Location: ring finger - R ring A1   Preparation: Patient was prepped and draped in the usual sterile fashion  Needle size: 25 G  Approach: volar  Medications administered: 6 mg betamethasone acetate-betamethasone sodium phosphate 6 (3-3) mg/mL; 1 mL bupivacaine (PF) 0.25 %  Patient tolerance: patient tolerated the procedure well with no immediate complications  Dressing:  Sterile dressing applied              _____________________________________________________      Scribe Attestation    I,:  Rylie Parr PA-C am acting as a scribe while in the presence of the attending physician.:       I,:  Mahsa Haque MD personally performed the services described in this documentation    as scribed in my presence.:

## 2023-11-14 ENCOUNTER — TREATMENT (OUTPATIENT)
Dept: OTHER | Facility: HOSPITAL | Age: 73
End: 2023-11-14
Payer: COMMERCIAL

## 2023-11-14 ENCOUNTER — VBI (OUTPATIENT)
Dept: ADMINISTRATIVE | Facility: OTHER | Age: 73
End: 2023-11-14

## 2023-11-14 ENCOUNTER — TELEPHONE (OUTPATIENT)
Age: 73
End: 2023-11-14

## 2023-11-14 DIAGNOSIS — E11.65 TYPE 2 DIABETES MELLITUS WITH HYPERGLYCEMIA, WITH LONG-TERM CURRENT USE OF INSULIN (HCC): ICD-10-CM

## 2023-11-14 DIAGNOSIS — Z79.4 TYPE 2 DIABETES MELLITUS WITH HYPERGLYCEMIA, WITH LONG-TERM CURRENT USE OF INSULIN (HCC): ICD-10-CM

## 2023-11-14 PROCEDURE — 95251 CONT GLUC MNTR ANALYSIS I&R: CPT | Performed by: INTERNAL MEDICINE

## 2023-11-14 RX ORDER — INSULIN ASPART 100 [IU]/ML
INJECTION, SOLUTION INTRAVENOUS; SUBCUTANEOUS
Qty: 60 ML | Refills: 0 | Status: SHIPPED | OUTPATIENT
Start: 2023-11-14

## 2023-11-14 RX ORDER — INSULIN GLARGINE 300 U/ML
INJECTION, SOLUTION SUBCUTANEOUS
Qty: 60 ML | Refills: 1 | Status: SHIPPED | OUTPATIENT
Start: 2023-11-14

## 2023-11-14 RX ORDER — PEN NEEDLE, DIABETIC 32GX 5/32"
NEEDLE, DISPOSABLE MISCELLANEOUS
Qty: 200 EACH | Refills: 5 | Status: SHIPPED | OUTPATIENT
Start: 2023-11-14

## 2023-11-14 NOTE — TELEPHONE ENCOUNTER
Patient stated Dr wanted her to get her tetanus shot. Jayna called [facility] and they told her they only have " T & DAP with whooping cough". Patient was calling to see if this was ok. Please call patient back. Thank you.

## 2023-11-14 NOTE — PROGRESS NOTES
CGM data review[de-identified]  Device: Matheus Pagan Dates: 10/31/23-23  Usage: 100 %  Av glu: 239 mg/dL  SD:  mg/dL CV: 28.8  % GMI: 9 %  TIR: 24 %  TAR: 35+41 %  TBR: 0 %    Glycemic patters:  severe fasting and pp hyperglycemia  Hypoglycemia: No    Recommendation:  Diagnoses and all orders for this visit:    Type 2 diabetes mellitus with hyperglycemia, with long-term current use of insulin (HCC)  -     insulin glargine (Toujeo SoloStar) 300 units/mL CONCENTRATED U-300 injection pen (1-unit dial); Inject 40 units under the skin every 12 hrs  -     insulin aspart (NovoLOG FlexPen) 100 UNIT/ML injection pen; Use 40u before each meal plus 3u for every 50 above 150mg/dL; max daily dose 200u  -     Insulin Pen Needle (BD Pen Needle Montse U/F) 32G X 4 MM MISC; Use daily withToujeo        INSULIN DOSAGE INSTRUCTIONS    Name: Bill Dye                        : 1950  MRN #: [id number]    Your Current Insulin  and dose is: Before Breakfast Before Lunch Before Evening Meal Bedtime     Novolog Insulin   40u   40u   40u    Regular, Apidra, Humalog orNovolog Sliding Scale:   <80              151-200 + 3 +3 +3    201-250 +6 +6 +6 +   251-300 +9 +9 +9 +   301-350 +12 +12 +12 +   >350 +15 +15 +15 +       Toujeo 40u   40u     Additional Instructions:   Please test your blood sugar:  _4_ Times per day. X_ Before Breakfast                _ Alternate Testing  X_ Before Lunch                _ 2 Hours After  Meal  X_ Before Evening Meal               _ 3 a.m.  x_ Before Bedtime Snack     Target Blood sugar range _70_to _180__. Call if your Jarrell Quiet, DO  blood sugar is less than _60_ or greater than _400__.     Today's Date: 2023

## 2023-11-15 NOTE — TELEPHONE ENCOUNTER
Per email from Babita Valentino at [facility] Patient is only covered at 80% and is not able to afford the $200 deductible.

## 2023-11-16 RX ORDER — ACYCLOVIR 400 MG/1
1 TABLET ORAL
Qty: 3 EACH | Refills: 5 | Status: SHIPPED | OUTPATIENT
Start: 2023-11-16

## 2023-11-16 RX ORDER — ACYCLOVIR 400 MG/1
1 TABLET ORAL CONTINUOUS
Qty: 1 EACH | Refills: 0 | Status: SHIPPED | OUTPATIENT
Start: 2023-11-16

## 2023-11-20 ENCOUNTER — HOSPITAL ENCOUNTER (OUTPATIENT)
Dept: CT IMAGING | Facility: HOSPITAL | Age: 73
Discharge: HOME | End: 2023-11-20
Payer: COMMERCIAL

## 2023-11-20 ENCOUNTER — HOSPITAL ENCOUNTER (OUTPATIENT)
Dept: ULTRASOUND IMAGING | Facility: HOSPITAL | Age: 73
Discharge: HOME | End: 2023-11-20
Payer: COMMERCIAL

## 2023-11-20 DIAGNOSIS — Z12.2 ENCOUNTER FOR SCREENING FOR LUNG CANCER: ICD-10-CM

## 2023-11-20 DIAGNOSIS — R22.9 SUBCUTANEOUS MASS: ICD-10-CM

## 2023-11-20 DIAGNOSIS — F17.211 NICOTINE DEPENDENCE, CIGARETTES, IN REMISSION: ICD-10-CM

## 2023-11-20 PROCEDURE — 76536 US EXAM OF HEAD AND NECK: CPT

## 2023-11-20 PROCEDURE — 71271 CT THORAX LUNG CANCER SCR C-: CPT

## 2023-11-22 LAB
25(OH)D3 SERPL-MCNC: 53 NG/ML (ref 30–100)
ALBUMIN SERPL-MCNC: 4.3 G/DL (ref 3.6–5.1)
ALBUMIN/CREAT UR: 1420 MCG/MG CREAT
ALBUMIN/GLOB SERPL: 1.7 (CALC) (ref 1–2.5)
ALP SERPL-CCNC: 82 U/L (ref 37–153)
ALT SERPL-CCNC: 24 U/L (ref 6–29)
AST SERPL-CCNC: 19 U/L (ref 10–35)
BILIRUB SERPL-MCNC: 0.5 MG/DL (ref 0.2–1.2)
BUN SERPL-MCNC: 23 MG/DL (ref 7–25)
BUN/CREAT SERPL: ABNORMAL (CALC) (ref 6–22)
C PEPTIDE SERPL-MCNC: 2.44 NG/ML (ref 0.8–3.85)
CALCIUM SERPL-MCNC: 9.6 MG/DL (ref 8.6–10.4)
CHLORIDE SERPL-SCNC: 101 MMOL/L (ref 98–110)
CO2 SERPL-SCNC: 25 MMOL/L (ref 20–32)
CREAT SERPL-MCNC: 0.69 MG/DL (ref 0.6–1)
CREAT UR-MCNC: 65 MG/DL (ref 20–275)
GFR/BSA.PRED SERPLBLD CYS-BASED-ARV: 93 ML/MIN/1.73M2
GLOBULIN SER CALC-MCNC: 2.5 G/DL (CALC) (ref 1.9–3.7)
GLUCOSE SERPL-MCNC: 244 MG/DL (ref 65–99)
HBA1C MFR BLD: 11 % OF TOTAL HGB
MICROALBUMIN UR-MCNC: 92.3 MG/DL
POTASSIUM SERPL-SCNC: 4.4 MMOL/L (ref 3.5–5.3)
PROT SERPL-MCNC: 6.8 G/DL (ref 6.1–8.1)
SODIUM SERPL-SCNC: 137 MMOL/L (ref 135–146)
T4 FREE SERPL-MCNC: 1 NG/DL (ref 0.8–1.8)
TSH SERPL-ACNC: 2.25 MIU/L (ref 0.4–4.5)

## 2023-11-24 DIAGNOSIS — M79.2 NEUROPATHIC PAIN: ICD-10-CM

## 2023-11-27 ENCOUNTER — OFFICE VISIT (OUTPATIENT)
Dept: ENDOCRINOLOGY | Facility: CLINIC | Age: 73
End: 2023-11-27
Payer: COMMERCIAL

## 2023-11-27 VITALS
TEMPERATURE: 98.5 F | BODY MASS INDEX: 30.05 KG/M2 | SYSTOLIC BLOOD PRESSURE: 130 MMHG | OXYGEN SATURATION: 95 % | HEART RATE: 73 BPM | DIASTOLIC BLOOD PRESSURE: 86 MMHG | HEIGHT: 66 IN | WEIGHT: 187 LBS | RESPIRATION RATE: 14 BRPM

## 2023-11-27 DIAGNOSIS — G47.33 OSA (OBSTRUCTIVE SLEEP APNEA): ICD-10-CM

## 2023-11-27 DIAGNOSIS — E78.1 HYPERTRIGLYCERIDEMIA: ICD-10-CM

## 2023-11-27 DIAGNOSIS — E11.65 TYPE 2 DIABETES MELLITUS WITH HYPERGLYCEMIA, WITH LONG-TERM CURRENT USE OF INSULIN (HCC): Primary | ICD-10-CM

## 2023-11-27 DIAGNOSIS — E55.9 VITAMIN D DEFICIENCY: ICD-10-CM

## 2023-11-27 DIAGNOSIS — Z79.4 TYPE 2 DIABETES MELLITUS WITH HYPERGLYCEMIA, WITH LONG-TERM CURRENT USE OF INSULIN (HCC): Primary | ICD-10-CM

## 2023-11-27 PROCEDURE — 95251 CONT GLUC MNTR ANALYSIS I&R: CPT | Performed by: INTERNAL MEDICINE

## 2023-11-27 PROCEDURE — 99214 OFFICE O/P EST MOD 30 MIN: CPT | Performed by: INTERNAL MEDICINE

## 2023-11-27 RX ORDER — GABAPENTIN 600 MG/1
TABLET ORAL
Qty: 90 TABLET | Refills: 1 | Status: SHIPPED | OUTPATIENT
Start: 2023-11-27

## 2023-11-27 NOTE — PROGRESS NOTES
Follow-up Patient Progress Note      CC: type 2 diabetes     History of Present Illness:   73yr female with type 2 diabetes 2002, HTN, HLD, hypertriglyceridemia pancreatitis 1999, hypothyroidism, Osteoporosis, vit D deficiency, DPN, AAA, Hx Lt hip fracture, Hx UTI's, bladder dysfunction and vitamin D deficiency. Last visit was 10/30/23. Since last visit, weight is sup 3 lbs. She reports dietary indiscretions with snacks in between food and uses iced tea with aspartame. CGM data review[de-identified]  Device: urszula    Dates: 10/31/23-11/13/23                   Usage: 100 %             Av glu: 239 mg/dL             SD:  mg/dL      CV: 28.8  %     GMI: 9 %  TIR: 24 %                    TAR: 35+41 %             TBR: 0 %     Glycemic patters:  severe fasting and pp hyperglycemia  Hypoglycemia: No     Current meds: Levemir doesn't work for her. Novolog 30u tidac plus 1u/50 above 150mg/dL  Toujeo 60u qhs     Opthamology: yes  Podiatry: yes, 3 months ago. vaccination: yes  Dental: no  Pancreatitis: yes in 1999 2" hypertriglyceridemia. Ace/ARB: losartan   Statin:lipitor 80  Thyroid issues: hypothyroidism on levothyroxine 50mcg qdaily     Osteoporosis: She lost 3 inches. 1/11/22 DXA: Tscores -3.1 LS, -2.3 RTH, -1.7 RFN. 10 yr FRAX score 2.5% hip and 16% major osteoporostic fracture. Had Reclast in 2019 and developed acute phase reaction.        Patient Active Problem List   Diagnosis    Type 2 diabetes mellitus with hyperglycemia, with long-term current use of insulin ([address])    Osteoporosis    Vitamin D deficiency    Mixed stress and urge urinary incontinence    Hypertriglyceridemia    Closed left hip fracture (HCC)    Acute cystitis without hematuria    Closed fracture of left hip (HCC)    Primary hypertension    Hypokalemia    UTI (urinary tract infection)    Pre-operative clearance    Abdominal aortic aneurysm (AAA) without rupture (HCC)    History of colon polyps    BMI 29.0-29.9,adult    Urinary urgency    H/O acute pancreatitis    Neuropathy     Past Medical History:   Diagnosis Date    Acute cystitis without hematuria 08/10/2018    Anemia 8/13/2018    Aortic aneurysm, abdominal ([address])     noted on cat scan from 2/2020    Cataract     Closed left hip fracture ([address]) 08/10/2018    Colon polyp     Diabetes mellitus ([address])     Hyperlipemia     Migraines     Multiple falls     Osteoporosis 02/22/2019    Shingles 7/2016    Urinary tract infection 5/2022      Past Surgical History:   Procedure Laterality Date    BREAST CYST ASPIRATION Right 1993    CHOLECYSTECTOMY      COLONOSCOPY      ELBOW SURGERY      FRACTURE SURGERY  ,4/2018    GALLBLADDER SURGERY      HIP SURGERY  08/10/2018    GA COLONOSCOPY FLX DX W/COLLJ SPEC WHEN PFRMD N/A 12/14/2016    Procedure: COLONOSCOPY;  Surgeon: Giacomo Lau MD;  Location: [facility];   Service: Gastroenterology    GA OPTX FEM SHFT FX W/INSJ IMED IMPLT W/WO SCREW Left 08/10/2018    Procedure: Left Hip IM Long nail;  Surgeon: Krystyna Finley MD;  Location: [facility];  Service: Orthopedics    TONSILLECTOMY        Family History   Problem Relation Age of Onset    Diabetes type II Mother     Osteoporosis Mother     Thyroid disease unspecified Mother     Diabetes Mother     No Known Problems Father     Hyperlipidemia Sister     Hypertension Sister     Diabetes Sister     No Known Problems Daughter     No Known Problems Daughter     No Known Problems Maternal Grandmother     Diabetes type II Maternal Grandfather     Pancreatic cancer Paternal Grandmother     Cancer Paternal Grandmother     No Known Problems Paternal Grandfather     Diabetes type II Maternal Aunt     No Known Problems Maternal Aunt     Diabetes type II Maternal Uncle     No Known Problems Paternal Aunt     No Known Problems Paternal Aunt     No Known Problems Paternal Aunt     Arthritis Family     Pancreatic cancer Family     Scoliosis Family     Breast cancer Neg Hx      Social History     Tobacco Use    Smoking status: Former Packs/day: 2.00     Years: 30.00     Total pack years: 60.00     Types: Cigarettes     Quit date: 2018     Years since quittin.9    Smokeless tobacco: Never    Tobacco comments:     Quit smoking around 2018   Substance Use Topics    Alcohol use: No     Allergies   Allergen Reactions    Zoledronic Acid GI Intolerance     Anorexia    Codeine GI Intolerance, Nausea Only and Vomiting         Current Outpatient Medications:     Accu-Chek FastClix Lancets MISC, Use to check blood sugar 4 times a day, Disp: 100 each, Rfl: 3    aspirin (ECOTRIN LOW STRENGTH) 81 mg EC tablet, Take 81 mg by mouth daily, Disp: , Rfl:     atorvastatin (LIPITOR) 80 mg tablet, TAKE 1 TABLET AT BEDTIME, Disp: 90 tablet, Rfl: 0    Diclofenac Sodium (VOLTAREN) 1 %, Apply 2 g topically 4 (four) times a day, Disp: 100 g, Rfl: 2    DULoxetine (CYMBALTA) 20 mg capsule, TAKE 1 CAPSULE BY MOUTH EVERY DAY, Disp: 90 capsule, Rfl: 1    ergocalciferol (VITAMIN D2) 50,000 units, , Disp: , Rfl:     gabapentin (NEURONTIN) 600 MG tablet, TAKE 1 TABLET BY MOUTH THREE TIMES A DAY, Disp: 90 tablet, Rfl: 1    gemfibrozil (LOPID) 600 mg tablet, Take one tablet by mouth twice a day, Disp: 180 tablet, Rfl: 0    glucose blood (Accu-Chek Guide) test strip, Checking sugars four times a day or as directed Dx: E11.65, E11.8, Disp: 300 strip, Rfl: 3    insulin aspart (NovoLOG FlexPen) 100 UNIT/ML injection pen, Use 40u before each meal plus 3u for every 50 above 150mg/dL; max daily dose 200u, Disp: 60 mL, Rfl: 0    insulin glargine (Toujeo SoloStar) 300 units/mL CONCENTRATED U-300 injection pen (1-unit dial), Inject 40 units under the skin every 12 hrs, Disp: 60 mL, Rfl: 1    Insulin Pen Needle (BD Pen Needle Montse U/F) 32G X 4 MM MISC, Use daily withToujeo, Disp: 200 each, Rfl: 5    levothyroxine 50 mcg tablet, TAKE 1 TABLET EVERY DAY, Disp: 90 tablet, Rfl: 3    losartan (COZAAR) 25 mg tablet, Take 1 tablet (25 mg total) by mouth daily, Disp: 90 tablet, Rfl: 1    Omega-3 Fatty Acids (fish oil) 1,000 mg, 2 capsules, Disp: , Rfl:     Continuous Blood Gluc  (Dexcom G7 ) CHAITANYA, USE 1 EACH CONTINUOUS (Patient not taking: Reported on 11/27/2023), Disp: 1 each, Rfl: 0    Continuous Blood Gluc Sensor (Dexcom G7 Sensor), USE 1 DEVICE EVERY 10 DAYS (Patient not taking: Reported on 11/27/2023), Disp: 3 each, Rfl: 5    Review of Systems   HENT: Negative. Eyes: Negative. Respiratory: Negative. Cardiovascular: Negative. Gastrointestinal: Negative. Endocrine: Negative. Musculoskeletal: Negative. Skin: Negative. Allergic/Immunologic: Negative. Neurological: Negative. Hematological: Negative. Psychiatric/Behavioral: Negative. Physical Exam:  Body mass index is 30.18 kg/m². /86 (BP Location: Left arm, Patient Position: Sitting)   Pulse 73   Temp 98.5 °F (36.9 °C) (Tympanic)   Resp 14   Ht 5' 6" (1.676 m)   Wt 84.8 kg (187 lb)   SpO2 95%   BMI 30.18 kg/m²    Vitals:    11/27/23 1413   Weight: 84.8 kg (187 lb)        Physical Exam  Constitutional:       General: She is not in acute distress. Appearance: She is well-developed. She is not ill-appearing. HENT:      Head: Normocephalic and atraumatic. Nose: Nose normal.      Mouth/Throat:      Pharynx: Oropharynx is clear. Eyes:      Extraocular Movements: Extraocular movements intact. Conjunctiva/sclera: Conjunctivae normal.   Neck:      Thyroid: No thyromegaly. Cardiovascular:      Rate and Rhythm: Normal rate. Pulmonary:      Effort: Pulmonary effort is normal.   Musculoskeletal:         General: No deformity. Cervical back: Normal range of motion. Skin:     Capillary Refill: Capillary refill takes less than 2 seconds. Coloration: Skin is not pale. Findings: No rash. Neurological:      Mental Status: She is alert and oriented to person, place, and time.    Psychiatric:         Behavior: Behavior normal.         Labs:   Lab Results   Component Value Date    HGBA1C 11.0 (H) 11/21/2023       Lab Results   Component Value Date    LFS3WYVMGPHF 4.47 10/25/2016    TSH 2.25 11/21/2023       Lab Results   Component Value Date    CREATININE 0.69 11/21/2023    CREATININE 0.61 09/14/2022    CREATININE 0.67 08/14/2018    BUN 23 11/21/2023     (L) 10/25/2016    K 4.4 11/21/2023     11/21/2023    CO2 25 11/21/2023     eGFR   Date Value Ref Range Status   11/21/2023 93 > OR = 60 mL/min/1.73m2 Final   08/14/2018 92 ml/min/1.73sq m Final       Lab Results   Component Value Date    ALT 24 11/21/2023    AST 19 11/21/2023    ALKPHOS 82 11/21/2023    BILITOT 0.4 10/25/2016       Lab Results   Component Value Date    CHOLESTEROL 206 (H) 06/15/2023     Lab Results   Component Value Date    HDL 32 (L) 06/15/2023    HDL 25 (L) 10/25/2016     Lab Results   Component Value Date    TRIG 371 (H) 06/15/2023    TRIG 3177 (H) 10/25/2016     Lab Results   Component Value Date    NONHDLC 467 (H) 10/25/2016         Impression:  1. Type 2 diabetes mellitus with hyperglycemia, with long-term current use of insulin ([address])    2. Hypertriglyceridemia    3. Vitamin D deficiency    4. JEANCARLOS (obstructive sleep apnea)         Plan:    Cyn Trevino was seen today for follow-up. Diagnoses and all orders for this visit:    Type 2 diabetes mellitus with hyperglycemia, with long-term current use of insulin ([address] She is uncontrolled and based on review of data we agreed to adjust to following regimen. Your Current Insulin  and dose is: Before Breakfast Before Lunch Before Evening Meal Bedtime      Novolog Insulin    40u    40u    40u     Regular, Apidra, Humalog orNovolog Sliding Scale:   <80                      151-200 + 3 +3 +3     201-250 +6 +6 +6 +   251-300 +9 +9 +9 +   301-350 +12 +12 +12 +   >350 +15 +15 +15 +      Toujeo 40u     40u     In future, will add other agents once A1c is <10% range. Follow up in 6 weeks. Hypertriglyceridemia. Diet and lifestyle change.  Continue lipitor and gemfibrozil. Vitamin D deficiency. Take vit D3 5000IU daily. JEANCARLOS (obstructive sleep apnea)  -     Ambulatory referral to Sleep Medicine; Future        I have spent 32 minutes with patient today in which greater than 50% of this time was spent in counseling/coordination of care. Discussed with the patient and all questioned fully answered. She will call me if any problems arise. Educated/ Counseled patient on diagnostic test results, prognosis, risk vs benefit of treatment options, importance of treatment compliance, healthy life and lifestyle choices.       [place]

## 2023-11-27 NOTE — PATIENT INSTRUCTIONS
INSULIN DOSAGE INSTRUCTIONS     Name: Artemio Tong                        : 1950  MRN #: [id number]     Your Current Insulin  and dose is: Before Breakfast Before Lunch Before Evening Meal Bedtime      Novolog Insulin    40u    40u    40u     Regular, Apidra, Humalog orNovolog Sliding Scale:   <80                      151-200 + 3 +3 +3     201-250 +6 +6 +6 +   251-300 +9 +9 +9 +   301-350 +12 +12 +12 +   >350 +15 +15 +15 +         Toujeo 40u     40u      Additional Instructions:   Please test your blood sugar:  _4_ Times per day. X_ Before Breakfast                                                    _ Alternate Testing  X_ Before Lunch                                                         _ 2 Hours After  Meal  X_ Before Evening Meal                                             _ 3 a.m.  x_ Before Bedtime Snack                                 Target Blood sugar range _70_to _180__. Call if your Rigoberto Isaac, DO  blood sugar is less than _60_ or greater than _400__.

## 2023-11-28 ENCOUNTER — TELEPHONE (OUTPATIENT)
Dept: ENDOCRINOLOGY | Facility: CLINIC | Age: 73
End: 2023-11-28

## 2023-11-28 NOTE — TELEPHONE ENCOUNTER
Patient called to request a refill on gemfibrozil (LOPID).  Please send to [facility] in [facility]

## 2023-11-30 ENCOUNTER — TELEPHONE (OUTPATIENT)
Dept: FAMILY MEDICINE CLINIC | Facility: MEDICAL CENTER | Age: 73
End: 2023-11-30

## 2023-11-30 ENCOUNTER — TELEPHONE (OUTPATIENT)
Dept: ENDOCRINOLOGY | Facility: CLINIC | Age: 73
End: 2023-11-30

## 2023-11-30 DIAGNOSIS — J84.10 PULMONARY FIBROSIS (HCC): ICD-10-CM

## 2023-11-30 DIAGNOSIS — Z12.2 ENCOUNTER FOR SCREENING FOR LUNG CANCER: ICD-10-CM

## 2023-11-30 DIAGNOSIS — I77.810 ASCENDING AORTA DILATATION (HCC): ICD-10-CM

## 2023-11-30 DIAGNOSIS — F17.211 NICOTINE DEPENDENCE, CIGARETTES, IN REMISSION: Primary | ICD-10-CM

## 2023-11-30 NOTE — TELEPHONE ENCOUNTER
----- Message from Luciana Bonilla DO sent at 11/30/2023  1:17 PM EST -----  Please inform patient that ultrasound shows area of concern consistent with lymph node which appears normal in size.

## 2023-11-30 NOTE — PROGRESS NOTES
Assessment/Plan:    Abdominal aortic aneurysm (AAA) without rupture [facility])  79yo female former smoker, with uncontrolled  DM II (A1c 11.0 ) w/ neuropathy, HTN, osteoporosis, ascending aotic aneurysm (4cm), AAA, L iliac artery aneurysm and L iliac artery stenosis presents to review imaging from June and [facility]. - Presents without complaints. Denies abdominal, back or leg pain. - Denies claudication, no rest pain or wounds,  - Denies post prandial fullness, n//v, unintentional weight loss. - reports worsening b/l foot neuropathy. Blood sugar uncontrolled. A1c 11.0  - Palpable femoral, DP/PT bilaterally. - Patient father with h/o aneurysmal disease. (Mortality)    AOIL- 6/19/23: the abdominal aorta is patent and aneurysmal 3.1cm (Prior: 2.8 cm). The left common iliac artery is aneurysmal measuring 2.4cm (Prior 1.8cm) with a >75% stenosis in the mid to distal segment. The right common and bilateral external iliac arteries are patent and normal in caliber. >75% stenosis identified in the Celiac artery. Superior mesenteric artery is patent. INDIRA R 1.06/168/111 and L 0.92/154/103       Plan:  - AOIL in 6 months  - Return to office in 1 year  -Continue ASA 81 mg  -Continue statin  -Continue adequate BP control  -Continue strict glucose control, mgmt per endocrinology. Most recent A1c 11.0  Not at goal.    -Continued smoking cessation.  -Discussed importance family screening for AAA given possible genetic history at age 48   -Educated on s/sx of abdominal aortic rupture and to call 911 immediately if symptoms occur.   -Instructed to contact the office with questions, concerns, or new symptoms.           Primary hypertension  -BP well controlled  -continue current medical regimen  -management per PCP         Diagnoses and all orders for this visit:    Abdominal aortic aneurysm (AAA) without rupture, unspecified part ([address])  -     VAS abdominal aorta/iliacs; with INDIRA's; Future    Common iliac aneurysm (HCC)  -     VAS abdominal aorta/iliacs; with INDIRA's; Future    Iliac artery stenosis, left (HCC)  -     VAS abdominal aorta/iliacs; with INDIRA's; Future          Subjective:      Patient ID: Sandie Du is a 72 y.o. female. Patient presents today to review AOIL done 6/19/23. Denies any new/worsening abdominal pain or back pain. Denies any leg pain. HPI    See assessment and plan      The following portions of the patient's history were reviewed and updated as appropriate: allergies, current medications, past family history, past medical history, past social history, past surgical history, and problem list.    Review of Systems   Constitutional: Negative. HENT: Negative. Eyes: Negative. Respiratory: Negative. Cardiovascular: Negative. Gastrointestinal: Negative. Endocrine: Negative. Genitourinary: Negative. Musculoskeletal: Negative. Skin: Negative. Allergic/Immunologic: Negative. Neurological:  Positive for numbness. Hematological: Negative. Psychiatric/Behavioral: Negative. I have reviewed and made appropriate changes to the review of systems input by the medical assistant. Objective:      /84 (BP Location: Right arm, Patient Position: Sitting)   Pulse 72   Ht 5' 6" (1.676 m)   Wt 83.5 kg (184 lb)   BMI 29.70 kg/m²          Physical Exam  Constitutional:       General: She is not in acute distress. Appearance: She is obese. HENT:      Head: Normocephalic and atraumatic. Mouth/Throat:      Comments: Poor dentition  Cardiovascular:      Rate and Rhythm: Normal rate. Pulses: Normal pulses. Radial pulses are 2+ on the right side and 2+ on the left side. Dorsalis pedis pulses are 2+ on the right side and 2+ on the left side. Posterior tibial pulses are 2+ on the right side and 2+ on the left side. Heart sounds: Murmur heard. Pulmonary:      Effort: Pulmonary effort is normal. No respiratory distress.       Breath sounds: Normal breath sounds. No wheezing. Abdominal:      General: Bowel sounds are normal. There is no abdominal bruit. Palpations: Abdomen is soft. There is no mass or pulsatile mass. Tenderness: There is no abdominal tenderness. Musculoskeletal:         General: Normal range of motion. Right lower leg: No edema. Left lower leg: No edema. Skin:     General: Skin is warm. Capillary Refill: Capillary refill takes less than 2 seconds. Neurological:      Mental Status: She is alert and oriented to person, place, and time. Sensory: Sensory deficit present. Motor: No weakness. Comments: B/l foot neuropathy   Psychiatric:         Behavior: Behavior normal.         I have spent a total time of 30 minutes on 12/05/23 in caring for this patient including Diagnostic results, Prognosis, Instructions for management, Patient and family education, Importance of tx compliance, Risk factor reductions, Impressions, Documenting in the medical record, Reviewing / ordering tests, medicine, procedures  , and Obtaining or reviewing history  .       Vitals:    12/05/23 1027   BP: 122/84   BP Location: Right arm   Patient Position: Sitting   Pulse: 72   Weight: 83.5 kg (184 lb)   Height: 5' 6" (1.676 m)       Patient Active Problem List   Diagnosis    Type 2 diabetes mellitus with hyperglycemia, with long-term current use of insulin (HCC)    Osteoporosis    Vitamin D deficiency    Mixed stress and urge urinary incontinence    Hypertriglyceridemia    Closed left hip fracture (HCC)    Acute cystitis without hematuria    Closed fracture of left hip (HCC)    Primary hypertension    Hypokalemia    UTI (urinary tract infection)    Pre-operative clearance    Abdominal aortic aneurysm (AAA) without rupture (HCC)    History of colon polyps    BMI 29.0-29.9,adult    Urinary urgency    H/O acute pancreatitis    Neuropathy       Past Surgical History:   Procedure Laterality Date    BREAST CYST ASPIRATION Right 1993    CHOLECYSTECTOMY      COLONOSCOPY      ELBOW SURGERY      FRACTURE SURGERY  ,2018    GALLBLADDER SURGERY      HIP SURGERY  08/10/2018    NV COLONOSCOPY FLX DX W/COLLJ SPEC WHEN PFRMD N/A 2016    Procedure: COLONOSCOPY;  Surgeon: Gagan Corona MD;  Location: [facility];   Service: Gastroenterology    NV OPTX FEM SHFT FX W/INSJ IMED IMPLT W/WO SCREW Left 08/10/2018    Procedure: Left Hip IM Long nail;  Surgeon: Nedra Encarnacion MD;  Location: [facility];  Service: Orthopedics    TONSILLECTOMY         Family History   Problem Relation Age of Onset    Diabetes type II Mother     Osteoporosis Mother     Thyroid disease unspecified Mother     Diabetes Mother     No Known Problems Father     Hyperlipidemia Sister     Hypertension Sister     Diabetes Sister     No Known Problems Daughter     No Known Problems Daughter     No Known Problems Maternal Grandmother     Diabetes type II Maternal Grandfather     Pancreatic cancer Paternal Grandmother     Cancer Paternal Grandmother     No Known Problems Paternal Grandfather     Diabetes type II Maternal Aunt     No Known Problems Maternal Aunt     Diabetes type II Maternal Uncle     No Known Problems Paternal Aunt     No Known Problems Paternal Aunt     No Known Problems Paternal Aunt     Arthritis Family     Pancreatic cancer Family     Scoliosis Family     Breast cancer Neg Hx        Social History     Socioeconomic History    Marital status: /Civil Union     Spouse name: Not on file    Number of children: 2    Years of education: 12    Highest education level: Not on file   Occupational History    Not on file   Tobacco Use    Smoking status: Former     Packs/day: 2.00     Years: 30.00     Total pack years: 60.00     Types: Cigarettes     Quit date: 2018     Years since quittin.9    Smokeless tobacco: Never    Tobacco comments:     Quit smoking around 2018   Vaping Use    Vaping Use: Never used   Substance and Sexual Activity Alcohol use: No    Drug use: No    Sexual activity: Not on file   Other Topics Concern    Not on file   Social History Narrative    high school graduate     Social Determinants of Health     Financial Resource Strain: Low Risk  (5/31/2023)    Overall Financial Resource Strain (CARDIA)     Difficulty of Paying Living Expenses: Not hard at all   Food Insecurity: Not on file   Transportation Needs: No Transportation Needs (5/31/2023)    PRAPARE - Transportation     Lack of Transportation (Medical): No     Lack of Transportation (Non-Medical):  No   Physical Activity: Not on file   Stress: Not on file   Social Connections: Not on file   Intimate Partner Violence: Not on file   Housing Stability: Not on file       Allergies   Allergen Reactions    Zoledronic Acid GI Intolerance     Anorexia    Codeine GI Intolerance, Nausea Only and Vomiting         Current Outpatient Medications:     Accu-Chek FastClix Lancets MISC, Use to check blood sugar 4 times a day, Disp: 100 each, Rfl: 3    aspirin (ECOTRIN LOW STRENGTH) 81 mg EC tablet, Take 81 mg by mouth daily, Disp: , Rfl:     atorvastatin (LIPITOR) 80 mg tablet, TAKE 1 TABLET AT BEDTIME, Disp: 90 tablet, Rfl: 0    DULoxetine (CYMBALTA) 20 mg capsule, TAKE 1 CAPSULE BY MOUTH EVERY DAY, Disp: 90 capsule, Rfl: 1    ergocalciferol (VITAMIN D2) 50,000 units, , Disp: , Rfl:     gabapentin (NEURONTIN) 600 MG tablet, TAKE 1 TABLET BY MOUTH THREE TIMES A DAY, Disp: 90 tablet, Rfl: 1    gemfibrozil (LOPID) 600 mg tablet, Take one tablet by mouth twice a day, Disp: 180 tablet, Rfl: 1    glucose blood (Accu-Chek Guide) test strip, Checking sugars four times a day or as directed Dx: E11.65, E11.8, Disp: 300 strip, Rfl: 3    insulin aspart (NovoLOG FlexPen) 100 UNIT/ML injection pen, Use 40u before each meal plus 3u for every 50 above 150mg/dL; max daily dose 200u, Disp: 60 mL, Rfl: 0    insulin glargine (Toujeo SoloStar) 300 units/mL CONCENTRATED U-300 injection pen (1-unit dial), Inject 40 units under the skin every 12 hrs, Disp: 60 mL, Rfl: 1    Insulin Pen Needle (BD Pen Needle Montse U/F) 32G X 4 MM MISC, Use daily withToujeo, Disp: 200 each, Rfl: 5    levothyroxine 50 mcg tablet, TAKE 1 TABLET EVERY DAY, Disp: 90 tablet, Rfl: 3    losartan (COZAAR) 25 mg tablet, Take 1 tablet (25 mg total) by mouth daily, Disp: 90 tablet, Rfl: 1    Omega-3 Fatty Acids (fish oil) 1,000 mg, 2 capsules, Disp: , Rfl:     Continuous Blood Gluc  (Dexcom G7 ) CHAITANYA, USE 1 EACH CONTINUOUS (Patient not taking: Reported on 11/27/2023), Disp: 1 each, Rfl: 0    Continuous Blood Gluc Sensor (Dexcom G7 Sensor), USE 1 DEVICE EVERY 10 DAYS (Patient not taking: Reported on 11/27/2023), Disp: 3 each, Rfl: 5    Diclofenac Sodium (VOLTAREN) 1 %, Apply 2 g topically 4 (four) times a day, Disp: 100 g, Rfl: 2

## 2023-11-30 NOTE — TELEPHONE ENCOUNTER
Pt is requesting refill for gemfibrozil (LOPID) 600 mg tablet. Would like it sent to [facility] on [address] [facility] [address].

## 2023-12-01 DIAGNOSIS — E78.1 HYPERTRIGLYCERIDEMIA: ICD-10-CM

## 2023-12-01 RX ORDER — GEMFIBROZIL 600 MG/1
TABLET, FILM COATED ORAL
Qty: 180 TABLET | Refills: 1 | Status: SHIPPED | OUTPATIENT
Start: 2023-12-01

## 2023-12-05 ENCOUNTER — OFFICE VISIT (OUTPATIENT)
Dept: VASCULAR SURGERY | Facility: CLINIC | Age: 73
End: 2023-12-05
Payer: COMMERCIAL

## 2023-12-05 ENCOUNTER — HOSPITAL ENCOUNTER (INPATIENT)
Facility: HOSPITAL | Age: 73
LOS: 1 days | Discharge: HOME HEALTH | DRG: 184 | End: 2023-12-07
Attending: EMERGENCY MEDICINE | Admitting: STUDENT IN AN ORGANIZED HEALTH CARE EDUCATION/TRAINING PROGRAM
Payer: COMMERCIAL

## 2023-12-05 ENCOUNTER — APPOINTMENT (EMERGENCY)
Dept: CT IMAGING | Facility: HOSPITAL | Age: 73
DRG: 184 | End: 2023-12-05
Payer: COMMERCIAL

## 2023-12-05 ENCOUNTER — APPOINTMENT (EMERGENCY)
Dept: RADIOLOGY | Facility: HOSPITAL | Age: 73
DRG: 184 | End: 2023-12-05
Payer: COMMERCIAL

## 2023-12-05 VITALS
DIASTOLIC BLOOD PRESSURE: 84 MMHG | HEART RATE: 72 BPM | HEIGHT: 66 IN | SYSTOLIC BLOOD PRESSURE: 122 MMHG | WEIGHT: 184 LBS | BODY MASS INDEX: 29.57 KG/M2

## 2023-12-05 DIAGNOSIS — S42.201A CLOSED FRACTURE OF RIGHT PROXIMAL HUMERUS: Primary | ICD-10-CM

## 2023-12-05 DIAGNOSIS — I71.40 ABDOMINAL AORTIC ANEURYSM (AAA) WITHOUT RUPTURE, UNSPECIFIED PART (HCC): Primary | ICD-10-CM

## 2023-12-05 DIAGNOSIS — I72.3 COMMON ILIAC ANEURYSM (HCC): ICD-10-CM

## 2023-12-05 DIAGNOSIS — M79.2 NEUROPATHIC PAIN: ICD-10-CM

## 2023-12-05 DIAGNOSIS — S22.42XA MULTIPLE FRACTURES OF RIBS, LEFT SIDE, INITIAL ENCOUNTER FOR CLOSED FRACTURE: ICD-10-CM

## 2023-12-05 DIAGNOSIS — I77.1 ILIAC ARTERY STENOSIS, LEFT (HCC): ICD-10-CM
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
## 2023-12-05 PROBLEM — E78.5 HYPERLIPIDEMIA: Status: ACTIVE | Noted: 2023-12-05

## 2023-12-05 LAB
ABO GROUP BLD: NORMAL
ALBUMIN SERPL BCP-MCNC: 4.6 G/DL (ref 3.5–5)
ALP SERPL-CCNC: 83 U/L (ref 34–104)
ALT SERPL W P-5'-P-CCNC: 53 U/L (ref 7–52)
ANION GAP SERPL CALCULATED.3IONS-SCNC: 12 MMOL/L
AST SERPL W P-5'-P-CCNC: 67 U/L (ref 13–39)
ATRIAL RATE: 68 BPM
BASOPHILS # BLD AUTO: 0.12 THOUSANDS/ÂΜL (ref 0–0.1)
BASOPHILS NFR BLD AUTO: 1 % (ref 0–1)
BILIRUB SERPL-MCNC: 0.42 MG/DL (ref 0.2–1)
BLD GP AB SCN SERPL QL: NEGATIVE
BUN SERPL-MCNC: 24 MG/DL (ref 5–25)
CALCIUM SERPL-MCNC: 10.3 MG/DL (ref 8.4–10.2)
CHLORIDE SERPL-SCNC: 100 MMOL/L (ref 96–108)
CO2 SERPL-SCNC: 23 MMOL/L (ref 21–32)
CREAT SERPL-MCNC: 0.68 MG/DL (ref 0.6–1.3)
EOSINOPHIL # BLD AUTO: 0.34 THOUSAND/ÂΜL (ref 0–0.61)
EOSINOPHIL NFR BLD AUTO: 3 % (ref 0–6)
ERYTHROCYTE [DISTWIDTH] IN BLOOD BY AUTOMATED COUNT: 13.7 % (ref 11.6–15.1)
GFR SERPL CREATININE-BSD FRML MDRD: 88 ML/MIN/1.73SQ M
GLUCOSE SERPL-MCNC: 314 MG/DL (ref 65–140)
GLUCOSE SERPL-MCNC: 321 MG/DL (ref 65–140)
HCT VFR BLD AUTO: 44.1 % (ref 34.8–46.1)
HGB BLD-MCNC: 14.8 G/DL (ref 11.5–15.4)
IMM GRANULOCYTES # BLD AUTO: 0.05 THOUSAND/UL (ref 0–0.2)
IMM GRANULOCYTES NFR BLD AUTO: 1 % (ref 0–2)
LIPASE SERPL-CCNC: 45 U/L (ref 11–82)
LYMPHOCYTES # BLD AUTO: 2.8 THOUSANDS/ÂΜL (ref 0.6–4.47)
LYMPHOCYTES NFR BLD AUTO: 27 % (ref 14–44)
MCH RBC QN AUTO: 30.5 PG (ref 26.8–34.3)
MCHC RBC AUTO-ENTMCNC: 33.6 G/DL (ref 31.4–37.4)
MCV RBC AUTO: 91 FL (ref 82–98)
MONOCYTES # BLD AUTO: 0.69 THOUSAND/ÂΜL (ref 0.17–1.22)
MONOCYTES NFR BLD AUTO: 7 % (ref 4–12)
NEUTROPHILS # BLD AUTO: 6.25 THOUSANDS/ÂΜL (ref 1.85–7.62)
NEUTS SEG NFR BLD AUTO: 61 % (ref 43–75)
NRBC BLD AUTO-RTO: 0 /100 WBCS
P AXIS: 44 DEGREES
PLATELET # BLD AUTO: 320 THOUSANDS/UL (ref 149–390)
PLATELET # BLD AUTO: 338 THOUSANDS/UL (ref 149–390)
PMV BLD AUTO: 10.1 FL (ref 8.9–12.7)
PMV BLD AUTO: 10.2 FL (ref 8.9–12.7)
POTASSIUM SERPL-SCNC: 4 MMOL/L (ref 3.5–5.3)
PR INTERVAL: 186 MS
PROT SERPL-MCNC: 7.6 G/DL (ref 6.4–8.4)
QRS AXIS: -33 DEGREES
QRSD INTERVAL: 100 MS
QT INTERVAL: 406 MS
QTC INTERVAL: 431 MS
RBC # BLD AUTO: 4.86 MILLION/UL (ref 3.81–5.12)
RH BLD: POSITIVE
SODIUM SERPL-SCNC: 135 MMOL/L (ref 135–147)
SPECIMEN EXPIRATION DATE: NORMAL
T WAVE AXIS: 91 DEGREES
VENTRICULAR RATE: 68 BPM
WBC # BLD AUTO: 10.25 THOUSAND/UL (ref 4.31–10.16)

## 2023-12-05 PROCEDURE — G1004 CDSM NDSC: HCPCS

## 2023-12-05 PROCEDURE — 71045 X-RAY EXAM CHEST 1 VIEW: CPT

## 2023-12-05 PROCEDURE — 86850 RBC ANTIBODY SCREEN: CPT

## 2023-12-05 PROCEDURE — 96374 THER/PROPH/DIAG INJ IV PUSH: CPT

## 2023-12-05 PROCEDURE — 2W38XYZ IMMOBILIZATION OF RIGHT UPPER EXTREMITY USING OTHER DEVICE: ICD-10-PCS

## 2023-12-05 PROCEDURE — 86900 BLOOD TYPING SEROLOGIC ABO: CPT

## 2023-12-05 PROCEDURE — 99223 1ST HOSP IP/OBS HIGH 75: CPT | Performed by: SURGERY

## 2023-12-05 PROCEDURE — 96376 TX/PRO/DX INJ SAME DRUG ADON: CPT

## 2023-12-05 PROCEDURE — 85049 AUTOMATED PLATELET COUNT: CPT

## 2023-12-05 PROCEDURE — 82948 REAGENT STRIP/BLOOD GLUCOSE: CPT

## 2023-12-05 PROCEDURE — 83690 ASSAY OF LIPASE: CPT

## 2023-12-05 PROCEDURE — 85025 COMPLETE CBC W/AUTO DIFF WBC: CPT

## 2023-12-05 PROCEDURE — 99213 OFFICE O/P EST LOW 20 MIN: CPT | Performed by: NURSE PRACTITIONER

## 2023-12-05 PROCEDURE — 80053 COMPREHEN METABOLIC PANEL: CPT

## 2023-12-05 PROCEDURE — 73030 X-RAY EXAM OF SHOULDER: CPT

## 2023-12-05 PROCEDURE — 74177 CT ABD & PELVIS W/CONTRAST: CPT

## 2023-12-05 PROCEDURE — 71260 CT THORAX DX C+: CPT

## 2023-12-05 PROCEDURE — 93005 ELECTROCARDIOGRAM TRACING: CPT

## 2023-12-05 PROCEDURE — 70450 CT HEAD/BRAIN W/O DYE: CPT

## 2023-12-05 PROCEDURE — 99291 CRITICAL CARE FIRST HOUR: CPT | Performed by: EMERGENCY MEDICINE

## 2023-12-05 PROCEDURE — 99285 EMERGENCY DEPT VISIT HI MDM: CPT

## 2023-12-05 PROCEDURE — 86901 BLOOD TYPING SEROLOGIC RH(D): CPT

## 2023-12-05 PROCEDURE — 36415 COLL VENOUS BLD VENIPUNCTURE: CPT

## 2023-12-05 RX ORDER — INSULIN LISPRO 100 [IU]/ML
4-20 INJECTION, SOLUTION INTRAVENOUS; SUBCUTANEOUS
Status: DISCONTINUED | OUTPATIENT
Start: 2023-12-05 | End: 2023-12-07 | Stop reason: HOSPADM

## 2023-12-05 RX ORDER — MORPHINE SULFATE 4 MG/ML
4 INJECTION, SOLUTION INTRAMUSCULAR; INTRAVENOUS ONCE
Status: DISCONTINUED | OUTPATIENT
Start: 2023-12-05 | End: 2023-12-05

## 2023-12-05 RX ORDER — GABAPENTIN 100 MG/1
100 CAPSULE ORAL
Status: DISCONTINUED | OUTPATIENT
Start: 2023-12-05 | End: 2023-12-05

## 2023-12-05 RX ORDER — HYDROMORPHONE HCL IN WATER/PF 6 MG/30 ML
0.2 PATIENT CONTROLLED ANALGESIA SYRINGE INTRAVENOUS EVERY 2 HOUR PRN
Status: DISCONTINUED | OUTPATIENT
Start: 2023-12-05 | End: 2023-12-06

## 2023-12-05 RX ORDER — ATORVASTATIN CALCIUM 40 MG/1
80 TABLET, FILM COATED ORAL
Status: DISCONTINUED | OUTPATIENT
Start: 2023-12-05 | End: 2023-12-07 | Stop reason: HOSPADM

## 2023-12-05 RX ORDER — DULOXETIN HYDROCHLORIDE 20 MG/1
20 CAPSULE, DELAYED RELEASE ORAL DAILY
Status: DISCONTINUED | OUTPATIENT
Start: 2023-12-06 | End: 2023-12-07 | Stop reason: HOSPADM

## 2023-12-05 RX ORDER — LEVOTHYROXINE SODIUM 0.05 MG/1
50 TABLET ORAL
Status: DISCONTINUED | OUTPATIENT
Start: 2023-12-06 | End: 2023-12-07 | Stop reason: HOSPADM

## 2023-12-05 RX ORDER — ACETAMINOPHEN 325 MG/1
650 TABLET ORAL ONCE
Status: DISCONTINUED | OUTPATIENT
Start: 2023-12-05 | End: 2023-12-05

## 2023-12-05 RX ORDER — OXYCODONE HYDROCHLORIDE 5 MG/1
5 TABLET ORAL EVERY 4 HOURS PRN
Status: DISCONTINUED | OUTPATIENT
Start: 2023-12-05 | End: 2023-12-07 | Stop reason: HOSPADM

## 2023-12-05 RX ORDER — ONDANSETRON 2 MG/ML
4 INJECTION INTRAMUSCULAR; INTRAVENOUS EVERY 4 HOURS PRN
Status: DISCONTINUED | OUTPATIENT
Start: 2023-12-05 | End: 2023-12-07 | Stop reason: HOSPADM

## 2023-12-05 RX ORDER — ENOXAPARIN SODIUM 100 MG/ML
30 INJECTION SUBCUTANEOUS EVERY 12 HOURS SCHEDULED
Status: DISCONTINUED | OUTPATIENT
Start: 2023-12-05 | End: 2023-12-07 | Stop reason: HOSPADM

## 2023-12-05 RX ORDER — ACETAMINOPHEN 325 MG/1
650 TABLET ORAL EVERY 4 HOURS PRN
Status: DISCONTINUED | OUTPATIENT
Start: 2023-12-05 | End: 2023-12-06

## 2023-12-05 RX ORDER — LOSARTAN POTASSIUM 25 MG/1
25 TABLET ORAL DAILY
Status: DISCONTINUED | OUTPATIENT
Start: 2023-12-06 | End: 2023-12-07 | Stop reason: HOSPADM

## 2023-12-05 RX ORDER — GEMFIBROZIL 600 MG/1
600 TABLET, FILM COATED ORAL
Status: DISCONTINUED | OUTPATIENT
Start: 2023-12-06 | End: 2023-12-07 | Stop reason: HOSPADM

## 2023-12-05 RX ORDER — DOCUSATE SODIUM 100 MG/1
100 CAPSULE, LIQUID FILLED ORAL 2 TIMES DAILY
Status: DISCONTINUED | OUTPATIENT
Start: 2023-12-05 | End: 2023-12-07 | Stop reason: HOSPADM

## 2023-12-05 RX ORDER — SODIUM CHLORIDE, SODIUM GLUCONATE, SODIUM ACETATE, POTASSIUM CHLORIDE, MAGNESIUM CHLORIDE, SODIUM PHOSPHATE, DIBASIC, AND POTASSIUM PHOSPHATE .53; .5; .37; .037; .03; .012; .00082 G/100ML; G/100ML; G/100ML; G/100ML; G/100ML; G/100ML; G/100ML
100 INJECTION, SOLUTION INTRAVENOUS CONTINUOUS
Status: DISCONTINUED | OUTPATIENT
Start: 2023-12-06 | End: 2023-12-07

## 2023-12-05 RX ORDER — GABAPENTIN 300 MG/1
600 CAPSULE ORAL 3 TIMES DAILY
Status: DISCONTINUED | OUTPATIENT
Start: 2023-12-05 | End: 2023-12-07 | Stop reason: HOSPADM

## 2023-12-05 RX ORDER — FENTANYL CITRATE 50 UG/ML
75 INJECTION, SOLUTION INTRAMUSCULAR; INTRAVENOUS ONCE
Status: COMPLETED | OUTPATIENT
Start: 2023-12-05 | End: 2023-12-05

## 2023-12-05 RX ORDER — FENTANYL CITRATE 50 UG/ML
50 INJECTION, SOLUTION INTRAMUSCULAR; INTRAVENOUS ONCE
Status: COMPLETED | OUTPATIENT
Start: 2023-12-05 | End: 2023-12-05

## 2023-12-05 RX ADMIN — ENOXAPARIN SODIUM 30 MG: 30 INJECTION SUBCUTANEOUS at 22:44

## 2023-12-05 RX ADMIN — IOHEXOL 100 ML: 350 INJECTION, SOLUTION INTRAVENOUS at 16:18

## 2023-12-05 RX ADMIN — OXYCODONE HYDROCHLORIDE 5 MG: 5 TABLET ORAL at 19:43

## 2023-12-05 RX ADMIN — FENTANYL CITRATE 50 MCG: 50 INJECTION INTRAMUSCULAR; INTRAVENOUS at 17:50

## 2023-12-05 RX ADMIN — SODIUM CHLORIDE, SODIUM GLUCONATE, SODIUM ACETATE, POTASSIUM CHLORIDE, MAGNESIUM CHLORIDE, SODIUM PHOSPHATE, DIBASIC, AND POTASSIUM PHOSPHATE 100 ML/HR: .53; .5; .37; .037; .03; .012; .00082 INJECTION, SOLUTION INTRAVENOUS at 23:57

## 2023-12-05 RX ADMIN — INSULIN LISPRO 12 UNITS: 100 INJECTION, SOLUTION INTRAVENOUS; SUBCUTANEOUS at 22:44

## 2023-12-05 RX ADMIN — GABAPENTIN 600 MG: 300 CAPSULE ORAL at 22:44

## 2023-12-05 RX ADMIN — DOCUSATE SODIUM 100 MG: 100 CAPSULE, LIQUID FILLED ORAL at 22:44

## 2023-12-05 RX ADMIN — ATORVASTATIN CALCIUM 80 MG: 40 TABLET, FILM COATED ORAL at 22:44

## 2023-12-05 RX ADMIN — FENTANYL CITRATE 75 MCG: 50 INJECTION INTRAMUSCULAR; INTRAVENOUS at 16:02

## 2023-12-05 NOTE — ED NOTES
Pt given incentive spirometer & educated on how to use device & benefits of use. 1500ml on first attempt.       Viola Jaime RN  12/05/23 5951

## 2023-12-05 NOTE — ASSESSMENT & PLAN NOTE
Comminuted fracture of the right humeral head anteriorly and at the humeral neck   Multimodal pain regimen  Orthopedic consult-appreciate recommendations  NWB RUE  PT/OT eval and treat  Case management for disposition planning
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
## 2023-12-05 NOTE — ASSESSMENT & PLAN NOTE
Nondisplaced fractures of anterolateral left fifth, sixth, and seventh ribs  No pulmonary contusion or pneumothorax.    Continue rib fracture protocol  Continue to encourage incentive spirometer use and adequate pulmonary hygiene  Currently pulling 1500 mL on IS  Consult APS  Continue multimodal analgesic regimen  Supplemental oxygen via nasal cannula as needed to maintain saturations greater than or equal to 94%  Currently satting 99% on RA  PT/OT eval and treat  Case management for disposition planning

## 2023-12-05 NOTE — ASSESSMENT & PLAN NOTE
Lab Results   Component Value Date    HGBA1C 11.0 (H) 11/21/2023       No results for input(s): "POCGLU" in the last 72 hours.     Blood Sugar Average: Last 72 hrs:   on admission  States blood glucose has been poorly controlled over the last week  Normally on NovoLog 40 units 3 times daily plus Toujeo 300 units twice daily  SSI while admitted  Frequent Accu-Cheks  Associated neuropathy  Continue home gabapentin and duloxetine
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
## 2023-12-05 NOTE — PATIENT INSTRUCTIONS
Abdominal iliac duplex in 6 months   Return to office in 1 year    Continue Aspirin  BP control  Blood sugar control  Follow up with PCP/ CT scan ascending aneurysm  Nonruptured Abdominal Aortic Aneurysm   AMBULATORY CARE:   An abdominal aortic aneurysm (AAA)  is a bulging or weak area in your abdominal aorta. Over time, the bulge may grow and is at risk for tearing or rupturing (bursting). The aorta is a large blood vessel that extends from your heart to your abdomen. The part of the aorta that extends into your abdomen is called your abdominal aorta. Your abdominal aorta brings blood to your stomach, pelvis, and legs. Treatment may be needed so your aneurysm does not grow and rupture. An AAA rupture is a life-threatening emergency. Signs and symptoms: An AAA usually does not have signs or symptoms if it has not ruptured. If the AAA starts to leak or ruptures, you may have any of the following:  Sudden pain in your abdomen, groin, back, legs, or buttocks    A lump or swelling in your abdomen    Nausea and vomiting    Stiff abdominal muscles    Numbness or tingling in your legs    Pale, sweaty, or clammy skin    Dizziness, fainting or loss of consciousness    Call your local emergency number (911 in the [place]), or have someone else call if:   You have any of the following signs of a stroke:      Numbness or drooping on one side of your face     Weakness in an arm or leg    Confusion or difficulty speaking    Dizziness, a severe headache, or vision loss             You faint or lose consciousness. You cannot be woken. Seek care immediately if:   You have sudden sharp pain in your abdomen, groin, back, legs, or buttocks. You have nausea and vomiting. You feel dizzy. You have stiffness or swelling in your abdomen, or a lump in your abdomen. You have numbness or tingling in your legs. Your skin is pale, sweaty, or clammy.     Call your doctor if:   You have questions or concerns about your condition or care. Treatment for an AAA  may not be needed. Your healthcare provider may monitor the size of your AAA with tests, such as an ultrasound. If your AAA gets bigger, starts to leak, or ruptures, you may need any of the following:  Medicines  may be needed to lower your blood pressure (BP) or cholesterol level. Endovascular repair  is a procedure that uses a graft to repair your AAA. The graft stops blood flow to the aneurysm and protects your abdominal aorta. You may need to have more than 1 endovascular repair. Open repair  is surgery to repair or remove an AAA. Manage a nonruptured AAA:   Do not use nicotine products or stimulating drugs. Nicotine and drugs such as cocaine may raise your BP, damage your aorta, or make your AAA larger. Ask your provider for information if you currently smoke or use stimulating drugs and need help to quit. E- cigarettes or smokeless tobacco still contain nicotine. Do not use these in place of cigarettes. Avoid secondhand smoke. Manage other health conditions that can cause or worsen an AAA. Follow your treatment plan to manage conditions such as high cholesterol, obesity, or stress. Check your BP as directed if you have high BP. Your provider will show you how to do this. Check your BP 2 times, 1 minute apart. Check as often as directed each day. Keep a record of your readings and bring it to your follow-up visits. Follow physical activity directions. Your healthcare provider may help you create a physical activity plan. Your plan may include low-intensity activity, such as walking, yoga, or swimming. Do not  lift anything heavier than 10 pounds. You may also need to avoid intense physical activity, such as running. Heavy lifting or intense activity may raise your BP or put pressure on your aorta. These increase your risk for a tear or rupture. Follow the meal plan recommended by your provider.   Talk to your provider or dietitian about a heart-healthy or low-sodium meal plan. A meal plan may help you lower your cholesterol or BP levels. Heart-healthy meal plans are low in sodium, processed sugar, and some fats. They are high in potassium, calcium, heart-healthy fats, and fiber. These can be found in vegetables, fruit, and whole-grain foods. Know the risks if you choose to drink alcohol. Alcohol can increase your BP. Ask your provider if it is okay for you to drink any alcohol. Your provider can help you set limits for the number of drinks you have in 24 hours and within 1 week. A drink of alcohol is 12 ounces of beer, 5 ounces of wine, or 1½ ounces of liquor. Get vaccines as directed. Some viruses can worsen an AAA. Get an influenza (flu) vaccine as soon as recommended each year, usually in September or October. Get all recommended COVID-19 vaccine doses and boosters. A pneumonia vaccine may also be recommended. Your provider will tell you if you need other vaccines, and when to get them. What you need to know about family planning:   Before you try to get pregnant,  talk to your specialist about family planning. Genetic counseling may be needed if your AAA is caused by a condition that can be passed to your baby. You may need counseling if your condition can put you or your pregnancy at risk. You may instead need counseling about the risk of pregnancy making your AAA grow, tear, or rupture. You may also need tests to check your AAA. These tests can help you and your provider plan treatment before and during your pregnancy. You may need surgery to protect your aorta and prevent your AAA from growing, tearing, or rupturing. While you are pregnant,  you may have 1 or more specialist caring for you. You may need medicine to help lower your BP. You may need regular tests to check your AAA. You may need other medicines or surgery if your AAA grows, tears, or ruptures. Your baby may need to be born early if your aorta tears or ruptures. A  section () may be needed if you have a history of a tear in your aorta. You may need a  if you have a high risk for a ruptured aneurysm or tear. After your baby is born,  you may need tests to check your AAA. Your risk for a ruptured aneurysm or tear is high for up to 12 weeks. What you need to know about screening for an AAA:  Your healthcare provider can give you specific information about your screening. The following is general information:  Screening means you are checked for an AAA even if you have no signs or symptoms. Screening is recommended if you are male, aged 72 to 76 years, and you ever smoked cigarettes. Screening may be recommended based on your family history and other personal risk factors. Your parents, siblings, and children may need screening. More family members may also need screening if 2 or more people in your family had an AAA. Your provider will tell you which family members need screening. The benefit of screening is that your provider can diagnose an AAA early. This helps your provider monitor your AAA and start treatment, if needed. This can help prevent your AAA from growing, rupturing, or tearing. Screening can also help you plan a healthy pregnancy. Follow up with your doctor as directed: You will need regular tests to monitor the size of your AAA. Your doctor will tell you how often to have the tests. Keep all appointments. Write down your questions so you remember to ask them during your visits. © Copyright [facility]  Information is for End User's use only and may not be sold, redistributed or otherwise used for commercial purposes. The above information is an  only. It is not intended as medical advice for individual conditions or treatments. Talk to your doctor, nurse or pharmacist before following any medical regimen to see if it is safe and effective for you.
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
## 2023-12-05 NOTE — ASSESSMENT & PLAN NOTE
81yo female former smoker, with uncontrolled  DM II (A1c 11.0 ) w/ neuropathy, HTN, osteoporosis, ascending aotic aneurysm (4cm), AAA, L iliac artery aneurysm and L iliac artery stenosis presents to review imaging from June and [facility]. - Presents without complaints. Denies abdominal, back or leg pain. - Denies claudication, no rest pain or wounds,  - Denies post prandial fullness, n//v, unintentional weight loss. - reports worsening b/l foot neuropathy. Blood sugar uncontrolled. A1c 11.0  - Palpable femoral, DP/PT bilaterally. - Patient father with h/o aneurysmal disease. (Mortality)    AOIL- 6/19/23: the abdominal aorta is patent and aneurysmal 3.1cm (Prior: 2.8 cm). The left common iliac artery is aneurysmal measuring 2.4cm (Prior 1.8cm) with a >75% stenosis in the mid to distal segment. The right common and bilateral external iliac arteries are patent and normal in caliber. >75% stenosis identified in the Celiac artery. Superior mesenteric artery is patent. INDIRA R 1.06/168/111 and L 0.92/154/103       Plan:  - AOIL in 6 months  - Return to office in 1 year  -Continue ASA 81 mg  -Continue statin  -Continue adequate BP control  -Continue strict glucose control, mgmt per endocrinology. Most recent A1c 11.0  Not at goal.    -Continued smoking cessation.  -Discussed importance family screening for AAA given possible genetic history at age 48   -Educated on s/sx of abdominal aortic rupture and to call 911 immediately if symptoms occur.   -Instructed to contact the office with questions, concerns, or new symptoms.
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
## 2023-12-06 ENCOUNTER — APPOINTMENT (OUTPATIENT)
Dept: RADIOLOGY | Facility: HOSPITAL | Age: 73
DRG: 184 | End: 2023-12-06
Payer: COMMERCIAL

## 2023-12-06 PROBLEM — G89.11 ACUTE PAIN DUE TO TRAUMA: Status: ACTIVE | Noted: 2023-12-06

## 2023-12-06 LAB
ALBUMIN SERPL BCP-MCNC: 3.9 G/DL (ref 3.5–5)
ALP SERPL-CCNC: 67 U/L (ref 34–104)
ALT SERPL W P-5'-P-CCNC: 37 U/L (ref 7–52)
ANION GAP SERPL CALCULATED.3IONS-SCNC: 13 MMOL/L
APTT PPP: 27 SECONDS (ref 23–37)
AST SERPL W P-5'-P-CCNC: 29 U/L (ref 13–39)
BILIRUB SERPL-MCNC: 0.47 MG/DL (ref 0.2–1)
BUN SERPL-MCNC: 20 MG/DL (ref 5–25)
CALCIUM SERPL-MCNC: 9.1 MG/DL (ref 8.4–10.2)
CHLORIDE SERPL-SCNC: 100 MMOL/L (ref 96–108)
CO2 SERPL-SCNC: 19 MMOL/L (ref 21–32)
CREAT SERPL-MCNC: 0.62 MG/DL (ref 0.6–1.3)
ERYTHROCYTE [DISTWIDTH] IN BLOOD BY AUTOMATED COUNT: 13.9 % (ref 11.6–15.1)
GFR SERPL CREATININE-BSD FRML MDRD: 91 ML/MIN/1.73SQ M
GLUCOSE P FAST SERPL-MCNC: 336 MG/DL (ref 65–99)
GLUCOSE SERPL-MCNC: 289 MG/DL (ref 65–140)
GLUCOSE SERPL-MCNC: 323 MG/DL (ref 65–140)
GLUCOSE SERPL-MCNC: 336 MG/DL (ref 65–140)
GLUCOSE SERPL-MCNC: 403 MG/DL (ref 65–140)
HCT VFR BLD AUTO: 39 % (ref 34.8–46.1)
HGB BLD-MCNC: 13 G/DL (ref 11.5–15.4)
INR PPP: 1.04 (ref 0.84–1.19)
MCH RBC QN AUTO: 30.2 PG (ref 26.8–34.3)
MCHC RBC AUTO-ENTMCNC: 33.3 G/DL (ref 31.4–37.4)
MCV RBC AUTO: 91 FL (ref 82–98)
PLATELET # BLD AUTO: 301 THOUSANDS/UL (ref 149–390)
PMV BLD AUTO: 10.4 FL (ref 8.9–12.7)
POTASSIUM SERPL-SCNC: 3.8 MMOL/L (ref 3.5–5.3)
PROT SERPL-MCNC: 6.7 G/DL (ref 6.4–8.4)
PROTHROMBIN TIME: 14.2 SECONDS (ref 11.6–14.5)
RBC # BLD AUTO: 4.31 MILLION/UL (ref 3.81–5.12)
SODIUM SERPL-SCNC: 132 MMOL/L (ref 135–147)
WBC # BLD AUTO: 11.16 THOUSAND/UL (ref 4.31–10.16)

## 2023-12-06 PROCEDURE — 97163 PT EVAL HIGH COMPLEX 45 MIN: CPT

## 2023-12-06 PROCEDURE — 80053 COMPREHEN METABOLIC PANEL: CPT

## 2023-12-06 PROCEDURE — 85610 PROTHROMBIN TIME: CPT

## 2023-12-06 PROCEDURE — 82948 REAGENT STRIP/BLOOD GLUCOSE: CPT

## 2023-12-06 PROCEDURE — 97167 OT EVAL HIGH COMPLEX 60 MIN: CPT

## 2023-12-06 PROCEDURE — 99222 1ST HOSP IP/OBS MODERATE 55: CPT | Performed by: PHYSICIAN ASSISTANT

## 2023-12-06 PROCEDURE — 73130 X-RAY EXAM OF HAND: CPT

## 2023-12-06 PROCEDURE — 85027 COMPLETE CBC AUTOMATED: CPT

## 2023-12-06 PROCEDURE — 73060 X-RAY EXAM OF HUMERUS: CPT

## 2023-12-06 PROCEDURE — 85730 THROMBOPLASTIN TIME PARTIAL: CPT

## 2023-12-06 PROCEDURE — 99232 SBSQ HOSP IP/OBS MODERATE 35: CPT | Performed by: STUDENT IN AN ORGANIZED HEALTH CARE EDUCATION/TRAINING PROGRAM

## 2023-12-06 PROCEDURE — 97116 GAIT TRAINING THERAPY: CPT

## 2023-12-06 RX ORDER — ACETAMINOPHEN 325 MG/1
975 TABLET ORAL EVERY 8 HOURS SCHEDULED
Status: DISCONTINUED | OUTPATIENT
Start: 2023-12-06 | End: 2023-12-07 | Stop reason: HOSPADM

## 2023-12-06 RX ORDER — LIDOCAINE 50 MG/G
1 PATCH TOPICAL DAILY
Status: DISCONTINUED | OUTPATIENT
Start: 2023-12-06 | End: 2023-12-07 | Stop reason: HOSPADM

## 2023-12-06 RX ADMIN — INSULIN LISPRO 12 UNITS: 100 INJECTION, SOLUTION INTRAVENOUS; SUBCUTANEOUS at 12:18

## 2023-12-06 RX ADMIN — LIDOCAINE 5% 1 PATCH: 700 PATCH TOPICAL at 10:27

## 2023-12-06 RX ADMIN — ACETAMINOPHEN 975 MG: 325 TABLET, FILM COATED ORAL at 22:47

## 2023-12-06 RX ADMIN — GABAPENTIN 600 MG: 300 CAPSULE ORAL at 22:47

## 2023-12-06 RX ADMIN — ATORVASTATIN CALCIUM 80 MG: 40 TABLET, FILM COATED ORAL at 22:47

## 2023-12-06 RX ADMIN — ACETAMINOPHEN 975 MG: 325 TABLET, FILM COATED ORAL at 15:10

## 2023-12-06 RX ADMIN — DOCUSATE SODIUM 100 MG: 100 CAPSULE, LIQUID FILLED ORAL at 10:10

## 2023-12-06 RX ADMIN — LEVOTHYROXINE SODIUM 50 MCG: 50 TABLET ORAL at 05:28

## 2023-12-06 RX ADMIN — DOCUSATE SODIUM 100 MG: 100 CAPSULE, LIQUID FILLED ORAL at 17:16

## 2023-12-06 RX ADMIN — OXYCODONE HYDROCHLORIDE 5 MG: 5 TABLET ORAL at 12:18

## 2023-12-06 RX ADMIN — DULOXETINE 20 MG: 20 CAPSULE, DELAYED RELEASE ORAL at 10:10

## 2023-12-06 RX ADMIN — GEMFIBROZIL 600 MG: 600 TABLET ORAL at 15:10

## 2023-12-06 RX ADMIN — LOSARTAN POTASSIUM 25 MG: 25 TABLET, FILM COATED ORAL at 10:10

## 2023-12-06 RX ADMIN — ENOXAPARIN SODIUM 30 MG: 30 INJECTION SUBCUTANEOUS at 10:10

## 2023-12-06 RX ADMIN — SODIUM CHLORIDE, SODIUM GLUCONATE, SODIUM ACETATE, POTASSIUM CHLORIDE, MAGNESIUM CHLORIDE, SODIUM PHOSPHATE, DIBASIC, AND POTASSIUM PHOSPHATE 100 ML/HR: .53; .5; .37; .037; .03; .012; .00082 INJECTION, SOLUTION INTRAVENOUS at 10:28

## 2023-12-06 RX ADMIN — INSULIN LISPRO 20 UNITS: 100 INJECTION, SOLUTION INTRAVENOUS; SUBCUTANEOUS at 17:17

## 2023-12-06 RX ADMIN — GABAPENTIN 600 MG: 300 CAPSULE ORAL at 10:10

## 2023-12-06 RX ADMIN — OXYCODONE HYDROCHLORIDE 5 MG: 5 TABLET ORAL at 07:23

## 2023-12-06 RX ADMIN — ENOXAPARIN SODIUM 30 MG: 30 INJECTION SUBCUTANEOUS at 22:48

## 2023-12-06 RX ADMIN — Medication 2.5 MG: at 22:47

## 2023-12-06 RX ADMIN — GABAPENTIN 600 MG: 300 CAPSULE ORAL at 15:10

## 2023-12-06 NOTE — DISCHARGE INSTR - AVS FIRST PAGE
Discharge Instructions - Orthopedics  Demond Just 72 y.o. female MRN: [id number]  Unit/Bed#: W -01    Weight Bearing Status:                                           Non weight bearing to the right upper extremity in sling. Pain:  Continue analgesics as directed    Appt Instructions: If you do not have your appointment, please call the clinic at [phone] to schedule with Dr. James Whitaker. Otherwise follow up as scheduled. Contact the office sooner if you experience any increased numbness/tingling in the extremities. Rib Fracture Discharge Instructions  Seek medical attention if you develop worsening chest pain or shortness of breath, dizziness/lightheadness, fevers/chills or sweats. No heavy lifting, pushing or pulling >10 pounds and no strenuous physical activity until cleared by trauma. No work or driving while taking narcotic pain medications and until cleared by trauma. Use your incentive spirometer at least hourly while awake.

## 2023-12-06 NOTE — PLAN OF CARE
Problem: PAIN - ADULT  Goal: Verbalizes/displays adequate comfort level or baseline comfort level  Description: Interventions:  - Encourage patient to monitor pain and request assistance  - Assess pain using appropriate pain scale  - Administer analgesics based on type and severity of pain and evaluate response  - Implement non-pharmacological measures as appropriate and evaluate response  - Consider cultural and social influences on pain and pain management  - Notify physician/advanced practitioner if interventions unsuccessful or patient reports new pain  12/6/2023 1314 by Dwayne Burgess RN  Outcome: Progressing  12/6/2023 1313 by Dwayne Burgess RN  Outcome: Progressing     Problem: INFECTION - ADULT  Goal: Absence or prevention of progression during hospitalization  Description: INTERVENTIONS:  - Assess and monitor for signs and symptoms of infection  - Monitor lab/diagnostic results  - Monitor all insertion sites, i.e. indwelling lines, tubes, and drains  - Monitor endotracheal if appropriate and nasal secretions for changes in amount and color  - [place] appropriate cooling/warming therapies per order  - Administer medications as ordered  - Instruct and encourage patient and family to use good hand hygiene technique  - Identify and instruct in appropriate isolation precautions for identified infection/condition  12/6/2023 1314 by Dwayne Burgess RN  Outcome: Progressing  12/6/2023 1313 by Dwayne Burgess RN  Outcome: Progressing  Goal: Absence of fever/infection during neutropenic period  Description: INTERVENTIONS:  - Monitor WBC    12/6/2023 1314 by Dwayne Burgess RN  Outcome: Progressing  12/6/2023 1313 by Dwayne Burgess RN  Outcome: Progressing     Problem: SAFETY ADULT  Goal: Patient will remain free of falls  Description: INTERVENTIONS:  - Educate patient/family on patient safety including physical limitations  - Instruct patient to call for assistance with activity   - Consult OT/PT to assist with strengthening/mobility   - Keep Call bell within reach  - Keep bed low and locked with side rails adjusted as appropriate  - Keep care items and personal belongings within reach  - Initiate and maintain comfort rounds  - Make Fall Risk Sign visible to staff  - Offer Toileting every  Hours, in advance of need  - Initiate/Maintain alarm  - Obtain necessary fall risk management equipment:  - Apply yellow socks and bracelet for high fall risk patients  - Consider moving patient to room near nurses station  12/6/2023 1314 by Angi Vasquez RN  Outcome: Progressing  12/6/2023 1313 by Angi Vasquez RN  Outcome: Progressing  Goal: Maintain or return to baseline ADL function  Description: INTERVENTIONS:  -  Assess patient's ability to carry out ADLs; assess patient's baseline for ADL function and identify physical deficits which impact ability to perform ADLs (bathing, care of mouth/teeth, toileting, grooming, dressing, etc.)  - Assess/evaluate cause of self-care deficits   - Assess range of motion  - Assess patient's mobility; develop plan if impaired  - Assess patient's need for assistive devices and provide as appropriate  - Encourage maximum independence but intervene and supervise when necessary  - Involve family in performance of ADLs  - Assess for home care needs following discharge   - Consider OT consult to assist with ADL evaluation and planning for discharge  - Provide patient education as appropriate  12/6/2023 1314 by Angi Vasquez RN  Outcome: Progressing  12/6/2023 1313 by Angi Vasquez RN  Outcome: Progressing  Goal: Maintains/Returns to pre admission functional level  Description: INTERVENTIONS:  - Perform AM-PAC 6 Click Basic Mobility/ Daily Activity assessment daily.  - Set and communicate daily mobility goal to care team and patient/family/caregiver. - Collaborate with rehabilitation services on mobility goals if consulted  - Perform Range of Motion  times a day. - Reposition patient every hours.   - Dangle patient  times a day  - Stand patient  times a day  - Ambulate patient  times a day  - Out of bed to chair  times a day   - Out of bed for meals  times a day  - Out of bed for toileting  - Record patient progress and toleration of activity level   12/6/2023 1314 by Rhina Ellis RN  Outcome: Progressing  12/6/2023 1313 by Rhina Ellis RN  Outcome: Progressing     Problem: DISCHARGE PLANNING  Goal: Discharge to home or other facility with appropriate resources  Description: INTERVENTIONS:  - Identify barriers to discharge w/patient and caregiver  - Arrange for needed discharge resources and transportation as appropriate  - Identify discharge learning needs (meds, wound care, etc.)  - Arrange for interpretive services to assist at discharge as needed  - Refer to Case Management Department for coordinating discharge planning if the patient needs post-hospital services based on physician/advanced practitioner order or complex needs related to functional status, cognitive ability, or social support system  12/6/2023 1314 by Rhina Ellis RN  Outcome: Progressing  12/6/2023 1313 by Rhina Ellis RN  Outcome: Progressing     Problem: Knowledge Deficit  Goal: Patient/family/caregiver demonstrates understanding of disease process, treatment plan, medications, and discharge instructions  Description: Complete learning assessment and assess knowledge base.   Interventions:  - Provide teaching at level of understanding  - Provide teaching via preferred learning methods  12/6/2023 1314 by Rhina Ellis RN  Outcome: Progressing  12/6/2023 1313 by Rhina Ellis RN  Outcome: Progressing

## 2023-12-06 NOTE — PLAN OF CARE
Problem: PAIN - ADULT  Goal: Verbalizes/displays adequate comfort level or baseline comfort level  Description: Interventions:  - Encourage patient to monitor pain and request assistance  - Assess pain using appropriate pain scale  - Administer analgesics based on type and severity of pain and evaluate response  - Implement non-pharmacological measures as appropriate and evaluate response  - Consider cultural and social influences on pain and pain management  - Notify physician/advanced practitioner if interventions unsuccessful or patient reports new pain  Outcome: Progressing     Problem: INFECTION - ADULT  Goal: Absence or prevention of progression during hospitalization  Description: INTERVENTIONS:  - Assess and monitor for signs and symptoms of infection  - Monitor lab/diagnostic results  - Monitor all insertion sites, i.e. indwelling lines, tubes, and drains  - Monitor endotracheal if appropriate and nasal secretions for changes in amount and color  - [place] appropriate cooling/warming therapies per order  - Administer medications as ordered  - Instruct and encourage patient and family to use good hand hygiene technique  - Identify and instruct in appropriate isolation precautions for identified infection/condition  Outcome: Progressing  Goal: Absence of fever/infection during neutropenic period  Description: INTERVENTIONS:  - Monitor WBC    Outcome: Progressing     Problem: SAFETY ADULT  Goal: Patient will remain free of falls  Description: INTERVENTIONS:  - Educate patient/family on patient safety including physical limitations  - Instruct patient to call for assistance with activity   - Consult OT/PT to assist with strengthening/mobility   - Keep Call bell within reach  - Keep bed low and locked with side rails adjusted as appropriate  - Keep care items and personal belongings within reach  - Initiate and maintain comfort rounds  - Make Fall Risk Sign visible to staff  - Offer Toileting every  Hours, in advance of need  - Initiate/Maintain alarm  - Obtain necessary fall risk management equipment:  - Apply yellow socks and bracelet for high fall risk patients  - Consider moving patient to room near nurses station  Outcome: Progressing  Goal: Maintain or return to baseline ADL function  Description: INTERVENTIONS:  -  Assess patient's ability to carry out ADLs; assess patient's baseline for ADL function and identify physical deficits which impact ability to perform ADLs (bathing, care of mouth/teeth, toileting, grooming, dressing, etc.)  - Assess/evaluate cause of self-care deficits   - Assess range of motion  - Assess patient's mobility; develop plan if impaired  - Assess patient's need for assistive devices and provide as appropriate  - Encourage maximum independence but intervene and supervise when necessary  - Involve family in performance of ADLs  - Assess for home care needs following discharge   - Consider OT consult to assist with ADL evaluation and planning for discharge  - Provide patient education as appropriate  Outcome: Progressing  Goal: Maintains/Returns to pre admission functional level  Description: INTERVENTIONS:  - Perform AM-PAC 6 Click Basic Mobility/ Daily Activity assessment daily.  - Set and communicate daily mobility goal to care team and patient/family/caregiver. - Collaborate with rehabilitation services on mobility goals if consulted  - Perform Range of Motion  times a day. - Reposition patient every  hours.   - Dangle patient  times a day  - Stand patient  times a day  - Ambulate patient  times a day  - Out of bed to chair  times a day   - Out of bed for meals  times a day  - Out of bed for toileting  - Record patient progress and toleration of activity level   Outcome: Progressing     Problem: DISCHARGE PLANNING  Goal: Discharge to home or other facility with appropriate resources  Description: INTERVENTIONS:  - Identify barriers to discharge w/patient and caregiver  - Arrange for needed discharge resources and transportation as appropriate  - Identify discharge learning needs (meds, wound care, etc.)  - Arrange for interpretive services to assist at discharge as needed  - Refer to Case Management Department for coordinating discharge planning if the patient needs post-hospital services based on physician/advanced practitioner order or complex needs related to functional status, cognitive ability, or social support system  Outcome: Progressing     Problem: Knowledge Deficit  Goal: Patient/family/caregiver demonstrates understanding of disease process, treatment plan, medications, and discharge instructions  Description: Complete learning assessment and assess knowledge base.   Interventions:  - Provide teaching at level of understanding  - Provide teaching via preferred learning methods  Outcome: Progressing
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
## 2023-12-06 NOTE — INCIDENTAL FINDINGS
The following findings require follow up:  Radiographic finding   Finding:     Stable pulmonary nodules compared to November. Based on current Fleischner Society 2017 Guidelines on incidental pulmonary nodule, no routine follow-up is needed if the patient is low risk. If the patient is high risk, optional follow-up chest CT at 12 months can be considered. Patient reports she already follows with her PCP regarding these nodules and she has prior knowledge of them. She reports she just had her yearly scan and her next one is already ordered for next year. She reports she will continue to follow with PCP. Mildly enlarged fatty liver. Discussed with patient and family, recommend following up with PCP. Scarring both kidneys. Creatinine within normal limits. Discussed with patient and family, recommend following up with PCP.        Follow up required: Routine   Follow up should be done within 1 month(s)    Please notify the following clinician to assist with the follow up:  Susi Gregory DO Phone  [phone]
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
## 2023-12-06 NOTE — ASSESSMENT & PLAN NOTE
Change Tylenol to 975 mg p.o. every 8 hours scheduled. Continue Cymbalta 20 mg p.o. daily. Continue gabapentin 600 mg p.o. 3 times daily. Continue oxycodone 2.5 mg p.o. every 4 hours. Moderate pain or 5 mg p.o. every 4 hours. Severe pain. Discontinue Dilaudid 0.2 mg IV every 2 hours as needed breakthrough pain. Add lidocaine patch to the left chest wall, on for 12 hours and off for 12 hours. Bowel regimen to avoid opioid-induced constipation while on opioid pain medication. Ice to painful area for up to 20 minutes every hour as needed.
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
## 2023-12-06 NOTE — ASSESSMENT & PLAN NOTE
Lab Results   Component Value Date    HGBA1C 11.0 (H) 11/21/2023       Recent Labs     12/05/23  2212   POCGLU 314*       Blood Sugar Average: Last 72 hrs:  (P) 314BG 321 on admission  States blood glucose has been poorly controlled over the last week  Normally on NovoLog 40 units 3 times daily plus Toujeo 300 units twice daily  SSI while admitted  Frequent Accu-Cheks  Associated neuropathy  Continue home gabapentin and duloxetine
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
## 2023-12-07 VITALS
HEIGHT: 66 IN | TEMPERATURE: 98 F | WEIGHT: 181.22 LBS | RESPIRATION RATE: 16 BRPM | SYSTOLIC BLOOD PRESSURE: 173 MMHG | BODY MASS INDEX: 29.12 KG/M2 | OXYGEN SATURATION: 94 % | DIASTOLIC BLOOD PRESSURE: 84 MMHG | HEART RATE: 78 BPM

## 2023-12-07 LAB
ANION GAP SERPL CALCULATED.3IONS-SCNC: 11 MMOL/L
BASOPHILS # BLD AUTO: 0.1 THOUSANDS/ÂΜL (ref 0–0.1)
BASOPHILS NFR BLD AUTO: 1 % (ref 0–1)
BUN SERPL-MCNC: 15 MG/DL (ref 5–25)
CALCIUM SERPL-MCNC: 8.7 MG/DL (ref 8.4–10.2)
CHLORIDE SERPL-SCNC: 98 MMOL/L (ref 96–108)
CO2 SERPL-SCNC: 22 MMOL/L (ref 21–32)
CREAT SERPL-MCNC: 0.61 MG/DL (ref 0.6–1.3)
EOSINOPHIL # BLD AUTO: 0.33 THOUSAND/ÂΜL (ref 0–0.61)
EOSINOPHIL NFR BLD AUTO: 3 % (ref 0–6)
ERYTHROCYTE [DISTWIDTH] IN BLOOD BY AUTOMATED COUNT: 13.9 % (ref 11.6–15.1)
GFR SERPL CREATININE-BSD FRML MDRD: 91 ML/MIN/1.73SQ M
GLUCOSE SERPL-MCNC: 321 MG/DL (ref 65–140)
GLUCOSE SERPL-MCNC: 344 MG/DL (ref 65–140)
GLUCOSE SERPL-MCNC: 411 MG/DL (ref 65–140)
HCT VFR BLD AUTO: 40.1 % (ref 34.8–46.1)
HGB BLD-MCNC: 13.6 G/DL (ref 11.5–15.4)
IMM GRANULOCYTES # BLD AUTO: 0.03 THOUSAND/UL (ref 0–0.2)
IMM GRANULOCYTES NFR BLD AUTO: 0 % (ref 0–2)
LYMPHOCYTES # BLD AUTO: 2.99 THOUSANDS/ÂΜL (ref 0.6–4.47)
LYMPHOCYTES NFR BLD AUTO: 31 % (ref 14–44)
MCH RBC QN AUTO: 30.5 PG (ref 26.8–34.3)
MCHC RBC AUTO-ENTMCNC: 33.9 G/DL (ref 31.4–37.4)
MCV RBC AUTO: 90 FL (ref 82–98)
MONOCYTES # BLD AUTO: 0.72 THOUSAND/ÂΜL (ref 0.17–1.22)
MONOCYTES NFR BLD AUTO: 7 % (ref 4–12)
NEUTROPHILS # BLD AUTO: 5.6 THOUSANDS/ÂΜL (ref 1.85–7.62)
NEUTS SEG NFR BLD AUTO: 58 % (ref 43–75)
NRBC BLD AUTO-RTO: 0 /100 WBCS
PLATELET # BLD AUTO: 300 THOUSANDS/UL (ref 149–390)
PMV BLD AUTO: 10.1 FL (ref 8.9–12.7)
POTASSIUM SERPL-SCNC: 3.8 MMOL/L (ref 3.5–5.3)
RBC # BLD AUTO: 4.46 MILLION/UL (ref 3.81–5.12)
SODIUM SERPL-SCNC: 131 MMOL/L (ref 135–147)
WBC # BLD AUTO: 9.77 THOUSAND/UL (ref 4.31–10.16)

## 2023-12-07 PROCEDURE — 82948 REAGENT STRIP/BLOOD GLUCOSE: CPT

## 2023-12-07 PROCEDURE — 99238 HOSP IP/OBS DSCHRG MGMT 30/<: CPT | Performed by: PHYSICIAN ASSISTANT

## 2023-12-07 PROCEDURE — 85025 COMPLETE CBC W/AUTO DIFF WBC: CPT | Performed by: PHYSICIAN ASSISTANT

## 2023-12-07 PROCEDURE — 80048 BASIC METABOLIC PNL TOTAL CA: CPT | Performed by: PHYSICIAN ASSISTANT

## 2023-12-07 RX ORDER — INSULIN GLARGINE 100 [IU]/ML
40 INJECTION, SOLUTION SUBCUTANEOUS EVERY 12 HOURS SCHEDULED
Status: DISCONTINUED | OUTPATIENT
Start: 2023-12-07 | End: 2023-12-07

## 2023-12-07 RX ORDER — LIDOCAINE 50 MG/G
1 PATCH TOPICAL DAILY
Qty: 10 PATCH | Refills: 0 | Status: SHIPPED | OUTPATIENT
Start: 2023-12-08

## 2023-12-07 RX ORDER — INSULIN GLARGINE 100 [IU]/ML
60 INJECTION, SOLUTION SUBCUTANEOUS
Qty: 10 ML | Refills: 0 | Status: SHIPPED | OUTPATIENT
Start: 2023-12-07

## 2023-12-07 RX ORDER — DOCUSATE SODIUM 100 MG/1
100 CAPSULE, LIQUID FILLED ORAL 2 TIMES DAILY
Refills: 0
Start: 2023-12-07

## 2023-12-07 RX ORDER — INSULIN LISPRO 100 [IU]/ML
40 INJECTION, SOLUTION INTRAVENOUS; SUBCUTANEOUS
Status: DISCONTINUED | OUTPATIENT
Start: 2023-12-07 | End: 2023-12-07 | Stop reason: HOSPADM

## 2023-12-07 RX ORDER — ACETAMINOPHEN 325 MG/1
975 TABLET ORAL EVERY 8 HOURS SCHEDULED
Refills: 0
Start: 2023-12-07

## 2023-12-07 RX ORDER — OXYCODONE HYDROCHLORIDE 5 MG/1
TABLET ORAL
Qty: 20 TABLET | Refills: 0 | Status: SHIPPED | OUTPATIENT
Start: 2023-12-07

## 2023-12-07 RX ORDER — INSULIN GLARGINE 100 [IU]/ML
60 INJECTION, SOLUTION SUBCUTANEOUS
Status: DISCONTINUED | OUTPATIENT
Start: 2023-12-07 | End: 2023-12-07 | Stop reason: HOSPADM

## 2023-12-07 RX ADMIN — INSULIN LISPRO 16 UNITS: 100 INJECTION, SOLUTION INTRAVENOUS; SUBCUTANEOUS at 12:48

## 2023-12-07 RX ADMIN — LOSARTAN POTASSIUM 25 MG: 25 TABLET, FILM COATED ORAL at 09:53

## 2023-12-07 RX ADMIN — LEVOTHYROXINE SODIUM 50 MCG: 50 TABLET ORAL at 05:23

## 2023-12-07 RX ADMIN — INSULIN LISPRO 40 UNITS: 100 INJECTION, SOLUTION INTRAVENOUS; SUBCUTANEOUS at 09:58

## 2023-12-07 RX ADMIN — LIDOCAINE 5% 1 PATCH: 700 PATCH TOPICAL at 09:52

## 2023-12-07 RX ADMIN — DULOXETINE 20 MG: 20 CAPSULE, DELAYED RELEASE ORAL at 10:02

## 2023-12-07 RX ADMIN — ENOXAPARIN SODIUM 30 MG: 30 INJECTION SUBCUTANEOUS at 10:02

## 2023-12-07 RX ADMIN — GEMFIBROZIL 600 MG: 600 TABLET ORAL at 07:57

## 2023-12-07 RX ADMIN — ACETAMINOPHEN 975 MG: 325 TABLET, FILM COATED ORAL at 05:23

## 2023-12-07 RX ADMIN — INSULIN LISPRO 40 UNITS: 100 INJECTION, SOLUTION INTRAVENOUS; SUBCUTANEOUS at 12:47

## 2023-12-07 RX ADMIN — INSULIN LISPRO 12 UNITS: 100 INJECTION, SOLUTION INTRAVENOUS; SUBCUTANEOUS at 07:57

## 2023-12-07 RX ADMIN — Medication 2.5 MG: at 12:49

## 2023-12-07 RX ADMIN — GABAPENTIN 600 MG: 300 CAPSULE ORAL at 09:52

## 2023-12-07 RX ADMIN — DOCUSATE SODIUM 100 MG: 100 CAPSULE, LIQUID FILLED ORAL at 09:53

## 2023-12-07 RX ADMIN — OXYCODONE HYDROCHLORIDE 5 MG: 5 TABLET ORAL at 04:08

## 2023-12-07 NOTE — ASSESSMENT & PLAN NOTE
Comminuted fracture of the right humeral head anteriorly and at the humeral neck   Multimodal pain regimen  Orthopedic consult-appreciate recommendations  NWB RUE. Non operative management. PT/OT eval and treat  Case management for disposition planning  F/u outpatient with orthopedics.
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
## 2023-12-07 NOTE — ASSESSMENT & PLAN NOTE
Lab Results   Component Value Date    HGBA1C 11.0 (H) 11/21/2023       Recent Labs     12/06/23  0808 12/06/23  1056 12/06/23  1517 12/07/23  0734   POCGLU 289* 323* 403* 321*         Blood Sugar Average: Last 72 hrs:  (P) 330BG 321 on admission  States blood glucose has been poorly controlled over the last week  Normally on NovoLog 40 units 3 times daily plus Toujeo 60 qhs. This regimen has been resumed 12/7.    SSI while admitted  Frequent Accu-Cheks  Associated neuropathy  Continue home gabapentin and duloxetine
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
## 2023-12-07 NOTE — ASSESSMENT & PLAN NOTE
Nondisplaced fractures of anterolateral left fifth, sixth, and seventh ribs  No pulmonary contusion or pneumothorax. Continue rib fracture protocol  Continue to encourage incentive spirometer use and adequate pulmonary hygiene  Currently pulling 1500 mL on IS  Continue multimodal analgesic regimen  Supplemental oxygen via nasal cannula as needed to maintain saturations greater than or equal to 94%  Currently satting 99% on RA  PT/OT eval and treat. Recommending discharge home with [address] [place] [address]. Case management for disposition planning  F/u with trauma in 2 weeks.

## 2023-12-07 NOTE — PLAN OF CARE
Problem: PAIN - ADULT  Goal: Verbalizes/displays adequate comfort level or baseline comfort level  Description: Interventions:  - Encourage patient to monitor pain and request assistance  - Assess pain using appropriate pain scale  - Administer analgesics based on type and severity of pain and evaluate response  - Implement non-pharmacological measures as appropriate and evaluate response  - Consider cultural and social influences on pain and pain management  - Notify physician/advanced practitioner if interventions unsuccessful or patient reports new pain  Outcome: Progressing     Problem: INFECTION - ADULT  Goal: Absence or prevention of progression during hospitalization  Description: INTERVENTIONS:  - Assess and monitor for signs and symptoms of infection  - Monitor lab/diagnostic results  - Monitor all insertion sites, i.e. indwelling lines, tubes, and drains  - Monitor endotracheal if appropriate and nasal secretions for changes in amount and color  - [place] appropriate cooling/warming therapies per order  - Administer medications as ordered  - Instruct and encourage patient and family to use good hand hygiene technique  - Identify and instruct in appropriate isolation precautions for identified infection/condition  Outcome: Progressing  Goal: Absence of fever/infection during neutropenic period  Description: INTERVENTIONS:  - Monitor WBC    Outcome: Progressing     Problem: SAFETY ADULT  Goal: Patient will remain free of falls  Description: INTERVENTIONS:  - Educate patient/family on patient safety including physical limitations  - Instruct patient to call for assistance with activity   - Consult OT/PT to assist with strengthening/mobility   - Keep Call bell within reach  - Keep bed low and locked with side rails adjusted as appropriate  - Keep care items and personal belongings within reach  - Initiate and maintain comfort rounds  - Make Fall Risk Sign visible to staff  - Offer Toileting every  Hours, in advance of need  - Initiate/Maintain alarm  - Obtain necessary fall risk management equipment:  - Apply yellow socks and bracelet for high fall risk patients  - Consider moving patient to room near nurses station  Outcome: Progressing  Goal: Maintain or return to baseline ADL function  Description: INTERVENTIONS:  -  Assess patient's ability to carry out ADLs; assess patient's baseline for ADL function and identify physical deficits which impact ability to perform ADLs (bathing, care of mouth/teeth, toileting, grooming, dressing, etc.)  - Assess/evaluate cause of self-care deficits   - Assess range of motion  - Assess patient's mobility; develop plan if impaired  - Assess patient's need for assistive devices and provide as appropriate  - Encourage maximum independence but intervene and supervise when necessary  - Involve family in performance of ADLs  - Assess for home care needs following discharge   - Consider OT consult to assist with ADL evaluation and planning for discharge  - Provide patient education as appropriate  Outcome: Progressing  Goal: Maintains/Returns to pre admission functional level  Description: INTERVENTIONS:  - Perform AM-PAC 6 Click Basic Mobility/ Daily Activity assessment daily.  - Set and communicate daily mobility goal to care team and patient/family/caregiver. - Collaborate with rehabilitation services on mobility goals if consulted  - Perform Range of Motion  times a day. - Reposition patient every hours.   - Dangle patient  times a day  - Stand patient  times a day  - Ambulate patient  times a day  - Out of bed to chair  times a day   - Out of bed for meals  times a day  - Out of bed for toileting  - Record patient progress and toleration of activity level   Outcome: Progressing     Problem: DISCHARGE PLANNING  Goal: Discharge to home or other facility with appropriate resources  Description: INTERVENTIONS:  - Identify barriers to discharge w/patient and caregiver  - Arrange for needed discharge resources and transportation as appropriate  - Identify discharge learning needs (meds, wound care, etc.)  - Arrange for interpretive services to assist at discharge as needed  - Refer to Case Management Department for coordinating discharge planning if the patient needs post-hospital services based on physician/advanced practitioner order or complex needs related to functional status, cognitive ability, or social support system  Outcome: Progressing     Problem: Knowledge Deficit  Goal: Patient/family/caregiver demonstrates understanding of disease process, treatment plan, medications, and discharge instructions  Description: Complete learning assessment and assess knowledge base. Interventions:  - Provide teaching at level of understanding  - Provide teaching via preferred learning methods  Outcome: Progressing     Problem: Nutrition/Hydration-ADULT  Goal: Nutrient/Hydration intake appropriate for improving, restoring or maintaining nutritional needs  Description: Monitor and assess patient's nutrition/hydration status for malnutrition. Collaborate with interdisciplinary team and initiate plan and interventions as ordered. Monitor patient's weight and dietary intake as ordered or per policy. Utilize nutrition screening tool and intervene as necessary. Determine patient's food preferences and provide high-protein, high-caloric foods as appropriate.      INTERVENTIONS:  - Monitor oral intake, urinary output, labs, and treatment plans  - Assess nutrition and hydration status and recommend course of action  - Evaluate amount of meals eaten  - Assist patient with eating if necessary   - Allow adequate time for meals  - Recommend/ encourage appropriate diets, oral nutritional supplements, and vitamin/mineral supplements  - Order, calculate, and assess calorie counts as needed  - Recommend, monitor, and adjust tube feedings and TPN/PPN based on assessed needs  - Assess need for intravenous fluids  - Provide specific nutrition/hydration education as appropriate  - Include patient/family/caregiver in decisions related to nutrition  Outcome: Progressing
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
## 2023-12-08 ENCOUNTER — TRANSITIONAL CARE MANAGEMENT (OUTPATIENT)
Dept: FAMILY MEDICINE CLINIC | Facility: MEDICAL CENTER | Age: 73
End: 2023-12-08
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
## 2023-12-15 ENCOUNTER — APPOINTMENT (OUTPATIENT)
Dept: RADIOLOGY | Facility: MEDICAL CENTER | Age: 73
End: 2023-12-15
Payer: COMMERCIAL

## 2023-12-15 ENCOUNTER — OFFICE VISIT (OUTPATIENT)
Dept: FAMILY MEDICINE CLINIC | Facility: MEDICAL CENTER | Age: 73
End: 2023-12-15
Payer: COMMERCIAL

## 2023-12-15 VITALS
BODY MASS INDEX: 29.57 KG/M2 | RESPIRATION RATE: 22 BRPM | TEMPERATURE: 99.1 F | HEART RATE: 80 BPM | HEIGHT: 66 IN | WEIGHT: 184 LBS | DIASTOLIC BLOOD PRESSURE: 74 MMHG | SYSTOLIC BLOOD PRESSURE: 146 MMHG | OXYGEN SATURATION: 96 %
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
## 2023-12-15 DIAGNOSIS — M79.641 RIGHT HAND PAIN: ICD-10-CM

## 2023-12-15 DIAGNOSIS — Z76.89 ENCOUNTER FOR SUPPORT AND COORDINATION OF TRANSITION OF CARE: Primary | ICD-10-CM

## 2023-12-15 PROCEDURE — 99496 TRANSJ CARE MGMT HIGH F2F 7D: CPT | Performed by: STUDENT IN AN ORGANIZED HEALTH CARE EDUCATION/TRAINING PROGRAM

## 2023-12-15 PROCEDURE — 73130 X-RAY EXAM OF HAND: CPT

## 2023-12-18 ENCOUNTER — TELEPHONE (OUTPATIENT)
Dept: FAMILY MEDICINE CLINIC | Facility: MEDICAL CENTER | Age: 73
End: 2023-12-18

## 2023-12-18 NOTE — TELEPHONE ENCOUNTER
----- Message from Roberto Quintero DO sent at 12/18/2023  1:18 PM EST -----  Please inform patient that right hand x-ray shows no fracture.  There are some arthritic changes.

## 2023-12-18 NOTE — TELEPHONE ENCOUNTER
Voicemail left for the patient with the information below, patient was advised to call the office with any questions or concerns.      Anesthesia Volume In Cc (Will Not Render If 0): 0.5

## 2023-12-19 ENCOUNTER — APPOINTMENT (OUTPATIENT)
Dept: RADIOLOGY | Facility: AMBULARY SURGERY CENTER | Age: 73
End: 2023-12-19
Payer: COMMERCIAL

## 2023-12-19 ENCOUNTER — OFFICE VISIT (OUTPATIENT)
Dept: OBGYN CLINIC | Facility: CLINIC | Age: 73
End: 2023-12-19
Payer: COMMERCIAL

## 2023-12-19 VITALS — WEIGHT: 184 LBS | HEIGHT: 66 IN | BODY MASS INDEX: 29.57 KG/M2

## 2023-12-19 DIAGNOSIS — S42.294A OTHER CLOSED NONDISPLACED FRACTURE OF PROXIMAL END OF RIGHT HUMERUS, INITIAL ENCOUNTER: ICD-10-CM

## 2023-12-19 DIAGNOSIS — S42.294A OTHER CLOSED NONDISPLACED FRACTURE OF PROXIMAL END OF RIGHT HUMERUS, INITIAL ENCOUNTER: Primary | ICD-10-CM

## 2023-12-19 DIAGNOSIS — S42.201A CLOSED FRACTURE OF RIGHT PROXIMAL HUMERUS: ICD-10-CM

## 2023-12-19 PROCEDURE — 99204 OFFICE O/P NEW MOD 45 MIN: CPT | Performed by: STUDENT IN AN ORGANIZED HEALTH CARE EDUCATION/TRAINING PROGRAM

## 2023-12-19 PROCEDURE — 73030 X-RAY EXAM OF SHOULDER: CPT

## 2023-12-19 RX ORDER — OXYCODONE HYDROCHLORIDE 5 MG/1
5 TABLET ORAL EVERY 6 HOURS PRN
Qty: 20 TABLET | Refills: 0 | Status: SHIPPED | OUTPATIENT
Start: 2023-12-19
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
## 2024-01-03 ENCOUNTER — OFFICE VISIT (OUTPATIENT)
Dept: FAMILY MEDICINE CLINIC | Facility: MEDICAL CENTER | Age: 74
End: 2024-01-03
Payer: COMMERCIAL

## 2024-01-03 VITALS
DIASTOLIC BLOOD PRESSURE: 82 MMHG | WEIGHT: 185 LBS | OXYGEN SATURATION: 97 % | TEMPERATURE: 97.9 F | HEART RATE: 84 BPM | HEIGHT: 66 IN | BODY MASS INDEX: 29.73 KG/M2 | RESPIRATION RATE: 18 BRPM | SYSTOLIC BLOOD PRESSURE: 146 MMHG

## 2024-01-03 DIAGNOSIS — G62.9 NEUROPATHY: ICD-10-CM

## 2024-01-03 DIAGNOSIS — E03.9 ACQUIRED HYPOTHYROIDISM: ICD-10-CM

## 2024-01-03 DIAGNOSIS — S22.42XD CLOSED FRACTURE OF MULTIPLE RIBS OF LEFT SIDE WITH ROUTINE HEALING, SUBSEQUENT ENCOUNTER: ICD-10-CM

## 2024-01-03 DIAGNOSIS — I71.40 ABDOMINAL AORTIC ANEURYSM (AAA) WITHOUT RUPTURE, UNSPECIFIED PART (HCC): ICD-10-CM

## 2024-01-03 DIAGNOSIS — L89.152 PRESSURE INJURY OF SACRAL REGION, STAGE 2 (HCC): ICD-10-CM

## 2024-01-03 DIAGNOSIS — S42.201D CLOSED FRACTURE OF PROXIMAL END OF RIGHT HUMERUS WITH ROUTINE HEALING, UNSPECIFIED FRACTURE MORPHOLOGY, SUBSEQUENT ENCOUNTER: ICD-10-CM

## 2024-01-03 DIAGNOSIS — M81.0 AGE RELATED OSTEOPOROSIS, UNSPECIFIED PATHOLOGICAL FRACTURE PRESENCE: ICD-10-CM

## 2024-01-03 DIAGNOSIS — Z79.4 TYPE 2 DIABETES MELLITUS WITH HYPERGLYCEMIA, WITH LONG-TERM CURRENT USE OF INSULIN (HCC): ICD-10-CM

## 2024-01-03 DIAGNOSIS — E78.1 HYPERTRIGLYCERIDEMIA: ICD-10-CM

## 2024-01-03 DIAGNOSIS — I10 PRIMARY HYPERTENSION: ICD-10-CM

## 2024-01-03 DIAGNOSIS — E11.65 TYPE 2 DIABETES MELLITUS WITH HYPERGLYCEMIA, WITH LONG-TERM CURRENT USE OF INSULIN (HCC): ICD-10-CM

## 2024-01-03 DIAGNOSIS — Z09 FOLLOW-UP EXAM: Primary | ICD-10-CM

## 2024-01-03 PROCEDURE — 99214 OFFICE O/P EST MOD 30 MIN: CPT | Performed by: STUDENT IN AN ORGANIZED HEALTH CARE EDUCATION/TRAINING PROGRAM

## 2024-01-03 RX ORDER — DM/P-EPHED/ACETAMINOPH/DOXYLAM 15-30-325
1 CAPSULE ORAL AS NEEDED
Qty: 210 ML | Refills: 0 | Status: SHIPPED | OUTPATIENT
Start: 2024-01-03 | End: 2024-01-04 | Stop reason: SDUPTHER

## 2024-01-03 RX ORDER — OSTOMY SUPPLY 1"
1 EACH MISCELLANEOUS DAILY
Qty: 20 EACH | Refills: 0 | Status: SHIPPED | OUTPATIENT
Start: 2024-01-03 | End: 2024-01-04 | Stop reason: SDUPTHER

## 2024-01-03 RX ORDER — OSTOMY SUPPLY 1"
1 EACH MISCELLANEOUS DAILY
Qty: 20 EACH | Refills: 1 | Status: SHIPPED | OUTPATIENT
Start: 2024-01-03 | End: 2024-01-03

## 2024-01-03 NOTE — PROGRESS NOTES
[facility] - Clinic Note  Roberto Quintero DO, 24     Claudia Gamboa MRN: [id number] : 1950 Age: 73 y.o.     Assessment/Plan     1. Follow-up exam    -Patient presents for medical follow-up  -She will return in 4 weeks for wound recheck, she will also return in 6 months for next medical follow-up and sooner as needed  -Reminded about second Shingrix vaccination 2 to 6 months after first dose, she plans on completing tetanus booster at pharmacy    2. Type 2 diabetes mellitus with hyperglycemia, with long-term current use of insulin (HCC)    -Managed by Endocrinology  -Established with Podiatry  -Daily ARB, daily statin    3. Neuropathy    -Currently on gabapentin and Cymbalta    4. Hypertriglyceridemia    -Lipid panel due in about 6 months, prescription provided  -Continue Lipitor 80 mg p.o. nightly, gemfibrozil 600 mg p.o. twice daily and fish oil supplement daily    5. Abdominal aortic aneurysm (AAA) without rupture, unspecified part ([facility])    -Following with vascular surgery    6. Pressure injury of sacral region, stage 2 ([facility])    -Advised about offloading measures, change position regularly  -Advised about dressing and cleansing  - Control Gel Formula Dressing (DuoDERM CGF Dressing) MISC; Apply 1 Application topically in the morning  Dispense: 20 each; Refill: 0  - SODIUM CHLORIDE, EXTERNAL, ([facility] Saline Wound Wash) 0.9 % SOLN; Apply 1 Application topically if needed (every 3-5 days)  Dispense: 210 mL; Refill: 0  -Educational material also distributed  -Return in 4 weeks for recheck    7. Closed fracture of proximal end of right humerus with routine healing, unspecified fracture morphology, subsequent encounter    -Along with Orthopedics    8. Closed fracture of multiple ribs of left side with routine healing, subsequent encounter    -To schedule follow-up with trauma surgery    9. Age related osteoporosis, unspecified pathological fracture presence    -DXA scan scheduled    10.  Acquired hypothyroidism    -Currently on levothyroxine 50 mcg p.o. every morning  - I have reviewed pertinent labs:  Thyroid Studies:   Lab Results   Component Value Date    [id number] 4.47 10/25/2016    FREET4 1.0 11/21/2023       11. Primary hypertension    -Continue losartan 25 mg p.o. daily    Claudia Gamboa acknowledged understanding of treatment plan, all questions answered.    Subjective      Claudia Gamboa is a 73 y.o. female who presents for medical follow-up.  Patient states her pain has much improved associated with her fracture.  Patient feeling well today and is looking forward to a cruise in February to the [facility].  Patient does mention since her hospital stay she has developed a sore.  Her  has been tending to it.    The following portions of the patient's history were reviewed and updated as appropriate: allergies, current medications, past family history, past medical history, past social history, past surgical history and problem list.     Past Medical History:   Diagnosis Date    Acute cystitis without hematuria 08/10/2018    Anemia 8/13/2018    Aortic aneurysm, abdominal (HCC)     noted on cat scan from 2/2020    Cataract     Closed left hip fracture (HCC) 08/10/2018    Colon polyp     Diabetes mellitus (HCC)     Hyperlipemia     Migraines     Multiple falls     Osteoporosis 02/22/2019    Shingles 7/2016    Urinary tract infection 5/2022       Allergies   Allergen Reactions    Zoledronic Acid GI Intolerance     Anorexia    Codeine GI Intolerance, Nausea Only and Vomiting       Past Surgical History:   Procedure Laterality Date    BREAST CYST ASPIRATION Right 1993    CHOLECYSTECTOMY      COLONOSCOPY      ELBOW SURGERY      FRACTURE SURGERY  ,4/2018    GALLBLADDER SURGERY      HIP SURGERY  08/10/2018    TN COLONOSCOPY FLX DX W/COLLJ SPEC WHEN PFRMD N/A 12/14/2016    Procedure: COLONOSCOPY;  Surgeon: Juan Boykin III, MD;  Location[facility];  Service: Gastroenterology    TN  OPTX FEM SHFT FX W/INSJ IMED IMPLT W/WO SCREW Left 08/10/2018    Procedure: Left Hip IM Long nail;  Surgeon: Olegario Lynch MD;  [facility];  Service: Orthopedics    TONSILLECTOMY         Family History   Problem Relation Age of Onset    Diabetes type II Mother     Osteoporosis Mother     Thyroid disease unspecified Mother     Diabetes Mother     No Known Problems Father     Hyperlipidemia Sister     Hypertension Sister     Diabetes Sister     No Known Problems Daughter     No Known Problems Daughter     No Known Problems Maternal Grandmother     Diabetes type II Maternal Grandfather     Pancreatic cancer Paternal Grandmother     Cancer Paternal Grandmother     No Known Problems Paternal Grandfather     Diabetes type II Maternal Aunt     No Known Problems Maternal Aunt     Diabetes type II Maternal Uncle     No Known Problems Paternal Aunt     No Known Problems Paternal Aunt     No Known Problems Paternal Aunt     Arthritis Family     Pancreatic cancer Family     Scoliosis Family     Breast cancer Neg Hx        Social History     Socioeconomic History    Marital status: /Civil Union     Spouse name: None    Number of children: 2    Years of education: 12    Highest education level: None   Occupational History    None   Tobacco Use    Smoking status: Former     Current packs/day: 0.00     Average packs/day: 2.0 packs/day for 30.0 years (60.0 ttl pk-yrs)     Types: Cigarettes     Start date:      Quit date: 2018     Years since quittin.0    Smokeless tobacco: Never    Tobacco comments:     Quit smoking around 2018   Vaping Use    Vaping status: Never Used   Substance and Sexual Activity    Alcohol use: No    Drug use: No    Sexual activity: Not Currently   Other Topics Concern    None   Social History Narrative    high school graduate     Social Determinants of Health     Financial Resource Strain: Low Risk  (2023)    Overall Financial Resource Strain (CARDIA)     Difficulty of Paying  Living Expenses: Not hard at all   Food Insecurity: No Food Insecurity (12/6/2023)    Hunger Vital Sign     Worried About Running Out of Food in the Last Year: Never true     Ran Out of Food in the Last Year: Never true   Transportation Needs: No Transportation Needs (12/6/2023)    PRAPARE - Transportation     Lack of Transportation (Medical): No     Lack of Transportation (Non-Medical): No   Physical Activity: Not on file   Stress: Not on file   Social Connections: Not on file   Intimate Partner Violence: Not on file   Housing Stability: Low Risk  (12/6/2023)    Housing Stability Vital Sign     Unable to Pay for Housing in the Last Year: No     Number of Places Lived in the Last Year: 1     Unstable Housing in the Last Year: No       Current Outpatient Medications   Medication Sig Dispense Refill    Accu-Chek FastClix Lancets MISC Use to check blood sugar 4 times a day 100 each 3    acetaminophen (TYLENOL) 325 mg tablet Take 3 tablets (975 mg total) by mouth every 8 (eight) hours  0    aspirin (ECOTRIN LOW STRENGTH) 81 mg EC tablet Take 81 mg by mouth daily      atorvastatin (LIPITOR) 80 mg tablet TAKE 1 TABLET AT BEDTIME 90 tablet 0    docusate sodium (COLACE) 100 mg capsule Take 1 capsule (100 mg total) by mouth 2 (two) times a day  0    DULoxetine (CYMBALTA) 20 mg capsule TAKE 1 CAPSULE BY MOUTH EVERY DAY 90 capsule 1    ergocalciferol (VITAMIN D2) 50,000 units       gabapentin (NEURONTIN) 600 MG tablet TAKE 1 TABLET BY MOUTH THREE TIMES A DAY 90 tablet 1    gemfibrozil (LOPID) 600 mg tablet Take one tablet by mouth twice a day 180 tablet 1    glucose blood (Accu-Chek Guide) test strip Checking sugars four times a day or as directed Dx: E11.65, E11.8 300 strip 3    insulin aspart (NovoLOG FlexPen) 100 UNIT/ML injection pen Use 40u before each meal plus 3u for every 50 above 150mg/dL; max daily dose 200u 60 mL 0    Insulin Pen Needle (BD Pen Needle Montse U/F) 32G X 4 MM MISC Use daily withToujeo 200 each 5     "levothyroxine 50 mcg tablet TAKE 1 TABLET EVERY DAY 90 tablet 3    lidocaine (LIDODERM) 5 % Apply 1 patch topically over 12 hours daily Remove & Discard patch within 12 hours or as directed by MD Do not start before December 8, 2023. 10 patch 0    losartan (COZAAR) 25 mg tablet Take 1 tablet (25 mg total) by mouth daily 90 tablet 1    Omega-3 Fatty Acids (fish oil) 1,000 mg 2 capsules      oxyCODONE (Roxicodone) 5 immediate release tablet Take 1 tablet (5 mg total) by mouth every 6 (six) hours as needed for severe pain for up to 20 doses Max Daily Amount: 20 mg 20 tablet 0     No current facility-administered medications for this visit.       Review of Systems     As noted in HPI    Objective      /82 (BP Location: Left arm, Patient Position: Sitting, Cuff Size: Large)   Pulse 84   Temp 97.9 °F (36.6 °C)   Resp 18   Ht 5' 6\" (1.676 m)   Wt 83.9 kg (185 lb)   SpO2 97%   BMI 29.86 kg/m²     Physical Exam  Vitals reviewed.   Constitutional:       General: She is not in acute distress.     Appearance: Normal appearance.   HENT:      Head: Normocephalic and atraumatic.      Mouth/Throat:      Mouth: Mucous membranes are moist.      Pharynx: Oropharynx is clear.   Eyes:      Conjunctiva/sclera: Conjunctivae normal.   Cardiovascular:      Rate and Rhythm: Normal rate and regular rhythm.      Pulses: Normal pulses.      Heart sounds: Normal heart sounds.   Pulmonary:      Effort: Pulmonary effort is normal.      Breath sounds: Normal breath sounds.   Abdominal:      General: Bowel sounds are normal.      Palpations: Abdomen is soft.      Tenderness: There is no abdominal tenderness.   Musculoskeletal:      Right lower leg: No edema.      Left lower leg: No edema.   Skin:     General: Skin is warm and dry.      Findings: Lesion present.      Comments: Stage II sacral ulcers   Neurological:      Mental Status: She is alert and oriented to person, place, and time.   Psychiatric:         Mood and Affect: Mood " "normal.         Behavior: Behavior normal.         Thought Content: Thought content normal.             Some portions of this record may have been generated with voice recognition software. There may be translation, syntax, or grammatical errors. Occasional wrong word or \"sound-a-like\" substitutions may have occurred due to the inherent limitations of the voice recognition software. Read the chart carefully and recognize, using context, where substations may have occurred. If you have any questions, please contact the dictating provider for clarification or correction, as needed.  "

## 2024-01-03 NOTE — TELEPHONE ENCOUNTER
Cherry from [facility] warm transferred call from pt.  Pt would like her Rxs sent to [facility] in [place] rather than the mail order.    Routed to [facility] - please resend Rxs

## 2024-01-04 DIAGNOSIS — I10 PRIMARY HYPERTENSION: ICD-10-CM

## 2024-01-04 DIAGNOSIS — L89.152 PRESSURE INJURY OF SACRAL REGION, STAGE 2 (HCC): ICD-10-CM

## 2024-01-04 RX ORDER — LOSARTAN POTASSIUM 25 MG/1
25 TABLET ORAL DAILY
Qty: 90 TABLET | Refills: 1 | Status: SHIPPED | OUTPATIENT
Start: 2024-01-04

## 2024-01-04 RX ORDER — OSTOMY SUPPLY 1"
1 EACH MISCELLANEOUS DAILY
Qty: 20 EACH | Refills: 0 | Status: SHIPPED | OUTPATIENT
Start: 2024-01-04

## 2024-01-04 RX ORDER — DM/P-EPHED/ACETAMINOPH/DOXYLAM 15-30-325
1 CAPSULE ORAL AS NEEDED
Qty: 210 ML | Refills: 0 | OUTPATIENT
Start: 2024-01-04

## 2024-01-04 RX ORDER — OSTOMY SUPPLY 1"
1 EACH MISCELLANEOUS DAILY
Qty: 20 EACH | Refills: 0 | OUTPATIENT
Start: 2024-01-04

## 2024-01-04 RX ORDER — DM/P-EPHED/ACETAMINOPH/DOXYLAM 15-30-325
1 CAPSULE ORAL AS NEEDED
Qty: 210 ML | Refills: 0 | Status: SHIPPED | OUTPATIENT
Start: 2024-01-04

## 2024-01-04 NOTE — TELEPHONE ENCOUNTER
Control Gel Formula Dressing (DuoDERM CGF Dressing)    SODIUM CHLORIDE, EXTERNAL, ([facility] Saline Wound Wash) 0.9 % SOLN   Both scripts above were sent to [facility] and they need to go to [facility] in [facility] resubmitted

## 2024-01-04 NOTE — TELEPHONE ENCOUNTER
The [facility] duoderm  dressing they are out and cannot get it in is there something else the patient can have   thank you

## 2024-01-05 ENCOUNTER — TELEPHONE (OUTPATIENT)
Dept: ENDOCRINOLOGY | Facility: CLINIC | Age: 74
End: 2024-01-05

## 2024-01-05 NOTE — TELEPHONE ENCOUNTER
Jayna is following up on what alternative can be sent the [facility] for her DuoDERM CGF Dressing as the pharmacy does not have these in stock.   [facility] did not give her another alternative that they have - they told her to contact her pcp to get something else sent over.     Please advise, Jayna has been waiting to pick these up for 2 days.    Thank you!

## 2024-01-05 NOTE — TELEPHONE ENCOUNTER
Patient returned call and stated that she is not interested in Omni pod at this time and will discuss with provider at next visit. Patient also request refill for Toujeo

## 2024-01-05 NOTE — TELEPHONE ENCOUNTER
Called patient to confirm script request for Omni pod from [facility]. Lvm with call back number.

## 2024-01-08 ENCOUNTER — TELEPHONE (OUTPATIENT)
Dept: PULMONOLOGY | Facility: CLINIC | Age: 74
End: 2024-01-08

## 2024-01-08 NOTE — TELEPHONE ENCOUNTER
I left a v/m for pt to see if she wants to reschedule.     From: Mychart, Generic   Sent: 1/5/2024   5:18 PM EST   To: *   Subject: Appointment canceled                              Appointment canceled for Claudia Gamboa ([id number])   Visit Type: CONSULT [facility]   Date        Time      Length    Provider                  Department   1/17/2024   11:20 AM  40 mins.  Josafat Wilson           [facility] [place]      Reason for Cancellation: Canceled via [email or web address]

## 2024-01-11 ENCOUNTER — TELEPHONE (OUTPATIENT)
Dept: ENDOCRINOLOGY | Facility: CLINIC | Age: 74
End: 2024-01-11

## 2024-01-12 ENCOUNTER — EVALUATION (OUTPATIENT)
Dept: PHYSICAL THERAPY | Facility: MEDICAL CENTER | Age: 74
End: 2024-01-12
Payer: COMMERCIAL

## 2024-01-12 DIAGNOSIS — S42.294A OTHER CLOSED NONDISPLACED FRACTURE OF PROXIMAL END OF RIGHT HUMERUS, INITIAL ENCOUNTER: Primary | ICD-10-CM

## 2024-01-12 PROCEDURE — 97162 PT EVAL MOD COMPLEX 30 MIN: CPT

## 2024-01-12 PROCEDURE — 97112 NEUROMUSCULAR REEDUCATION: CPT
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
## 2024-01-15 NOTE — TELEPHONE ENCOUNTER
Pt left v/m that she needs refill of Toujeo sent to [facility] or called in.  She is going on a cruise in two weeks and will need it.

## 2024-01-16 DIAGNOSIS — Z79.4 TYPE 2 DIABETES MELLITUS WITH HYPERGLYCEMIA, WITH LONG-TERM CURRENT USE OF INSULIN (HCC): ICD-10-CM

## 2024-01-16 DIAGNOSIS — E11.65 TYPE 2 DIABETES MELLITUS WITH HYPERGLYCEMIA, WITH LONG-TERM CURRENT USE OF INSULIN (HCC): ICD-10-CM

## 2024-01-16 RX ORDER — INSULIN ASPART 100 [IU]/ML
INJECTION, SOLUTION INTRAVENOUS; SUBCUTANEOUS
Qty: 60 ML | Refills: 0 | Status: SHIPPED | OUTPATIENT
Start: 2024-01-16

## 2024-01-16 RX ORDER — INSULIN GLARGINE 300 U/ML
60 INJECTION, SOLUTION SUBCUTANEOUS
Qty: 30 ML | Refills: 0 | Status: SHIPPED | OUTPATIENT
Start: 2024-01-16

## 2024-01-17 ENCOUNTER — OFFICE VISIT (OUTPATIENT)
Dept: PHYSICAL THERAPY | Facility: MEDICAL CENTER | Age: 74
End: 2024-01-17
Payer: COMMERCIAL

## 2024-01-17 DIAGNOSIS — S42.294A OTHER CLOSED NONDISPLACED FRACTURE OF PROXIMAL END OF RIGHT HUMERUS, INITIAL ENCOUNTER: Primary | ICD-10-CM

## 2024-01-17 PROCEDURE — 97140 MANUAL THERAPY 1/> REGIONS: CPT

## 2024-01-17 PROCEDURE — 97110 THERAPEUTIC EXERCISES: CPT

## 2024-01-17 PROCEDURE — 97112 NEUROMUSCULAR REEDUCATION: CPT

## 2024-01-17 NOTE — PROGRESS NOTES
"Daily Note     Today's date: 2024  Patient name: Claudia Gamboa  : 1950  MRN: [id number]  Referring provider: Shelton Angeles DO  Dx:   Encounter Diagnosis     ICD-10-CM    1. Other closed nondisplaced fracture of proximal end of right humerus, initial encounter  S42.294A           Start Time: 1215  Stop Time: 1300  Total time in clinic (min): 45 minutes    Subjective: \"I feel pretty good today.\"      Objective: See treatment diary below      Assessment: Tolerated treatment well. Patient did well with progressions of all exercises today, demonstrating minimal sx irritability. Patient would benefit from continued PT      Plan: Continue per plan of care.      Precautions:   Past Medical History:   Diagnosis Date    Acute cystitis without hematuria 08/10/2018    Anemia 2018    Aortic aneurysm, abdominal (HCC)     noted on cat scan from 2020    Cataract     Closed left hip fracture (HCC) 08/10/2018    Colon polyp     Diabetes mellitus (HCC)     Hyperlipemia     Migraines     Multiple falls     Osteoporosis 2019    Shingles 2016    Urinary tract infection 2022     Allergies   Allergen Reactions    Zoledronic Acid GI Intolerance     Anorexia    Codeine GI Intolerance, Nausea Only and Vomiting       Access Code: [email or web address]  URL: [email or web address]  Date: 2024  Prepared by: Dallas Mathew    Exercises  - Supine Shoulder Flexion Extension AAROM with Dowel  - 1 x daily - 7 x weekly - 1 sets - 15 reps - 3 second hold  - Supine Shoulder External Rotation with Dowel  - 1 x daily - 7 x weekly - 1 sets - 15 reps - 3 second hold  - Sitting Scapular Squeezes  - 1 x daily - 7 x weekly - 3 sets - 10 reps - 3 second hold  - Seated Shoulder Scaption Slide at Table Top with Forearm in Neutral  - 1 x daily - 7 x weekly - 1 sets - 15 reps  - Towel Squeeze  - 1 x daily - 7 x weekly - 1 sets - 20 reps - 3 second hold  - Seated Upper Trapezius Stretch  - 1 x daily - 7 x weekly - 3 sets - 30 " "second hold    Manuals 1/12 1/17           R shoulder PROM  CM           R shoulder mobs  CM                                     Neuro Re-Ed             HEP review 25'            Pendulums   30\" ea cw/ccw/fwd/back           Scap squeezes  2x10 3\"           Sup pro  2x10 dowel                                                  Ther Ex             Table slides  1x10 5\" ea flx/abd           Supine cane AAROM  1x10 flx 3\", 1x10 ER 3\"            squeeze  20x 3\" 7#           Finger ext  20x yellow            Elbow flx/ext  20x ea dowel                                                  Ther Activity                                       Gait Training                                       Modalities                                            "

## 2024-01-19 ENCOUNTER — OFFICE VISIT (OUTPATIENT)
Dept: PHYSICAL THERAPY | Facility: MEDICAL CENTER | Age: 74
End: 2024-01-19
Payer: COMMERCIAL

## 2024-01-19 DIAGNOSIS — S42.294A OTHER CLOSED NONDISPLACED FRACTURE OF PROXIMAL END OF RIGHT HUMERUS, INITIAL ENCOUNTER: Primary | ICD-10-CM

## 2024-01-19 PROCEDURE — 97112 NEUROMUSCULAR REEDUCATION: CPT

## 2024-01-19 PROCEDURE — 97140 MANUAL THERAPY 1/> REGIONS: CPT

## 2024-01-19 PROCEDURE — 97110 THERAPEUTIC EXERCISES: CPT

## 2024-01-19 NOTE — PROGRESS NOTES
"Daily Note     Today's date: 2024  Patient name: Claudia Gamboa  : 1950  MRN: [id number]  Referring provider: Shelton Angeles DO  Dx:   Encounter Diagnosis     ICD-10-CM    1. Other closed nondisplaced fracture of proximal end of right humerus, initial encounter  S42.294A           Start Time: 1215  Stop Time: 1255  Total time in clinic (min): 40 minutes    Subjective: \"I was sore from the exercises yesterday. I'm much better today.\"      Objective: See treatment diary below      Assessment: Tolerated treatment well. Min sx irritability noted throughout today's session. Patient reported fatigue at the end of the session. Patient would benefit from continued PT      Plan: Continue per plan of care.      Precautions:   Past Medical History:   Diagnosis Date    Acute cystitis without hematuria 08/10/2018    Anemia 2018    Aortic aneurysm, abdominal (HCC)     noted on cat scan from 2020    Cataract     Closed left hip fracture (HCC) 08/10/2018    Colon polyp     Diabetes mellitus (HCC)     Hyperlipemia     Migraines     Multiple falls     Osteoporosis 2019    Shingles 2016    Urinary tract infection 2022     Allergies   Allergen Reactions    Zoledronic Acid GI Intolerance     Anorexia    Codeine GI Intolerance, Nausea Only and Vomiting       Access Code: [email or web address]  URL: [email or web address]  Date: 2024  Prepared by: Dallas Mathew    Exercises  - Supine Shoulder Flexion Extension AAROM with Dowel  - 1 x daily - 7 x weekly - 1 sets - 15 reps - 3 second hold  - Supine Shoulder External Rotation with Dowel  - 1 x daily - 7 x weekly - 1 sets - 15 reps - 3 second hold  - Sitting Scapular Squeezes  - 1 x daily - 7 x weekly - 3 sets - 10 reps - 3 second hold  - Seated Shoulder Scaption Slide at Table Top with Forearm in Neutral  - 1 x daily - 7 x weekly - 1 sets - 15 reps  - Towel Squeeze  - 1 x daily - 7 x weekly - 1 sets - 20 reps - 3 second hold  - Seated Upper Trapezius " "Stretch  - 1 x daily - 7 x weekly - 3 sets - 30 second hold    Manuals 1/12 1/17 1/19          R shoulder PROM  CM CM          R shoulder mobs  CM CM                                    Neuro Re-Ed             HEP review 25'            Pendulums   30\" ea cw/ccw/fwd/back 30\" ea cw/ccw/fwd/back          Scap squeezes  2x10 3\" 2x10 3\"          Sup pro  2x10 dowel 2x10 dowel                                                 Ther Ex             Table slides  1x10 5\" ea flx/abd 1x10 5\" ea flx/abd          Supine cane AAROM  1x10 flx 3\", 1x10 ER 3\" 1x10 flx 3\"           squeeze  20x 3\" 7# 30x 3\" 7#          Finger ext  20x yellow  30x yellow          Elbow flx/ext  20x ea dowel 20x ea dowel                                                 Ther Activity                                       Gait Training                                       Modalities                                            "

## 2024-01-23 ENCOUNTER — VBI (OUTPATIENT)
Dept: ADMINISTRATIVE | Facility: OTHER | Age: 74
End: 2024-01-23

## 2024-01-24 ENCOUNTER — APPOINTMENT (OUTPATIENT)
Dept: PHYSICAL THERAPY | Facility: MEDICAL CENTER | Age: 74
End: 2024-01-24
Payer: COMMERCIAL

## 2024-01-24 ENCOUNTER — APPOINTMENT (EMERGENCY)
Dept: RADIOLOGY | Facility: HOSPITAL | Age: 74
DRG: 689 | End: 2024-01-24
Payer: COMMERCIAL

## 2024-01-24 ENCOUNTER — HOSPITAL ENCOUNTER (INPATIENT)
Facility: HOSPITAL | Age: 74
LOS: 2 days | Discharge: HOME | DRG: 689 | End: 2024-01-27
Attending: EMERGENCY MEDICINE | Admitting: INTERNAL MEDICINE
Payer: COMMERCIAL

## 2024-01-24 DIAGNOSIS — N39.0 UTI (URINARY TRACT INFECTION): ICD-10-CM

## 2024-01-24 DIAGNOSIS — Z79.4 TYPE 2 DIABETES MELLITUS WITH HYPERGLYCEMIA, WITH LONG-TERM CURRENT USE OF INSULIN (HCC): ICD-10-CM

## 2024-01-24 DIAGNOSIS — R68.89 RIGORS: Primary | ICD-10-CM

## 2024-01-24 DIAGNOSIS — E11.65 TYPE 2 DIABETES MELLITUS WITH HYPERGLYCEMIA, WITH LONG-TERM CURRENT USE OF INSULIN (HCC): ICD-10-CM

## 2024-01-24 PROBLEM — R19.7 VOMITING AND DIARRHEA: Status: ACTIVE | Noted: 2024-01-24

## 2024-01-24 PROBLEM — R11.10 VOMITING AND DIARRHEA: Status: ACTIVE | Noted: 2024-01-24

## 2024-01-24 LAB
ALBUMIN SERPL BCP-MCNC: 3.9 G/DL (ref 3.5–5)
ALP SERPL-CCNC: 100 U/L (ref 34–104)
ALT SERPL W P-5'-P-CCNC: 23 U/L (ref 7–52)
ANION GAP SERPL CALCULATED.3IONS-SCNC: 12 MMOL/L
AST SERPL W P-5'-P-CCNC: 26 U/L (ref 13–39)
BACTERIA UR QL AUTO: ABNORMAL /HPF
BASE EX.OXY STD BLDV CALC-SCNC: 93.8 % (ref 60–80)
BASE EXCESS BLDV CALC-SCNC: -0.1 MMOL/L
BASOPHILS # BLD AUTO: 0.05 THOUSANDS/ÂΜL (ref 0–0.1)
BASOPHILS NFR BLD AUTO: 1 % (ref 0–1)
BILIRUB SERPL-MCNC: 0.5 MG/DL (ref 0.2–1)
BILIRUB UR QL STRIP: NEGATIVE
BUN SERPL-MCNC: 21 MG/DL (ref 5–25)
CALCIUM SERPL-MCNC: 9.6 MG/DL (ref 8.4–10.2)
CHLORIDE SERPL-SCNC: 105 MMOL/L (ref 96–108)
CLARITY UR: ABNORMAL
CO2 SERPL-SCNC: 20 MMOL/L (ref 21–32)
COLOR UR: YELLOW
CREAT SERPL-MCNC: 0.62 MG/DL (ref 0.6–1.3)
EOSINOPHIL # BLD AUTO: 0.11 THOUSAND/ÂΜL (ref 0–0.61)
EOSINOPHIL NFR BLD AUTO: 2 % (ref 0–6)
ERYTHROCYTE [DISTWIDTH] IN BLOOD BY AUTOMATED COUNT: 14 % (ref 11.6–15.1)
FLUAV RNA RESP QL NAA+PROBE: NEGATIVE
FLUBV RNA RESP QL NAA+PROBE: NEGATIVE
GFR SERPL CREATININE-BSD FRML MDRD: 91 ML/MIN/1.73SQ M
GLUCOSE SERPL-MCNC: 155 MG/DL (ref 65–140)
GLUCOSE SERPL-MCNC: 176 MG/DL (ref 65–140)
GLUCOSE SERPL-MCNC: 199 MG/DL (ref 65–140)
GLUCOSE SERPL-MCNC: 223 MG/DL (ref 65–140)
GLUCOSE UR STRIP-MCNC: ABNORMAL MG/DL
HCO3 BLDV-SCNC: 22.1 MMOL/L (ref 24–30)
HCT VFR BLD AUTO: 40.9 % (ref 34.8–46.1)
HGB BLD-MCNC: 13.8 G/DL (ref 11.5–15.4)
HGB UR QL STRIP.AUTO: ABNORMAL
IMM GRANULOCYTES # BLD AUTO: 0.02 THOUSAND/UL (ref 0–0.2)
IMM GRANULOCYTES NFR BLD AUTO: 0 % (ref 0–2)
KETONES UR STRIP-MCNC: NEGATIVE MG/DL
LACTATE SERPL-SCNC: 1.7 MMOL/L (ref 0.5–2)
LACTATE SERPL-SCNC: 2.4 MMOL/L (ref 0.5–2)
LACTATE SERPL-SCNC: 3 MMOL/L (ref 0.5–2)
LEUKOCYTE ESTERASE UR QL STRIP: ABNORMAL
LYMPHOCYTES # BLD AUTO: 0.53 THOUSANDS/ÂΜL (ref 0.6–4.47)
LYMPHOCYTES NFR BLD AUTO: 9 % (ref 14–44)
MCH RBC QN AUTO: 30.9 PG (ref 26.8–34.3)
MCHC RBC AUTO-ENTMCNC: 33.7 G/DL (ref 31.4–37.4)
MCV RBC AUTO: 92 FL (ref 82–98)
MONOCYTES # BLD AUTO: 0.07 THOUSAND/ÂΜL (ref 0.17–1.22)
MONOCYTES NFR BLD AUTO: 1 % (ref 4–12)
MUCOUS THREADS UR QL AUTO: ABNORMAL
NEUTROPHILS # BLD AUTO: 5.3 THOUSANDS/ÂΜL (ref 1.85–7.62)
NEUTS SEG NFR BLD AUTO: 87 % (ref 43–75)
NITRITE UR QL STRIP: NEGATIVE
NON-SQ EPI CELLS URNS QL MICRO: ABNORMAL /HPF
NRBC BLD AUTO-RTO: 0 /100 WBCS
O2 CT BLDV-SCNC: 18 ML/DL
PCO2 BLDV: 29.4 MM HG (ref 42–50)
PH BLDV: 7.49 [PH] (ref 7.3–7.4)
PH UR STRIP.AUTO: 6 [PH]
PLATELET # BLD AUTO: 218 THOUSANDS/UL (ref 149–390)
PMV BLD AUTO: 11.8 FL (ref 8.9–12.7)
PO2 BLDV: 77.7 MM HG (ref 35–45)
POTASSIUM SERPL-SCNC: 4.1 MMOL/L (ref 3.5–5.3)
PROCALCITONIN SERPL-MCNC: 3.86 NG/ML
PROT SERPL-MCNC: 6.8 G/DL (ref 6.4–8.4)
PROT UR STRIP-MCNC: ABNORMAL MG/DL
RBC # BLD AUTO: 4.46 MILLION/UL (ref 3.81–5.12)
RBC #/AREA URNS AUTO: ABNORMAL /HPF
RSV RNA RESP QL NAA+PROBE: NEGATIVE
SARS-COV-2 RNA RESP QL NAA+PROBE: NEGATIVE
SODIUM SERPL-SCNC: 137 MMOL/L (ref 135–147)
SP GR UR STRIP.AUTO: 1.02 (ref 1–1.03)
UROBILINOGEN UR STRIP-ACNC: <2 MG/DL
WBC # BLD AUTO: 6.08 THOUSAND/UL (ref 4.31–10.16)
WBC #/AREA URNS AUTO: ABNORMAL /HPF

## 2024-01-24 PROCEDURE — 83605 ASSAY OF LACTIC ACID: CPT | Performed by: STUDENT IN AN ORGANIZED HEALTH CARE EDUCATION/TRAINING PROGRAM

## 2024-01-24 PROCEDURE — 36415 COLL VENOUS BLD VENIPUNCTURE: CPT | Performed by: EMERGENCY MEDICINE

## 2024-01-24 PROCEDURE — 82948 REAGENT STRIP/BLOOD GLUCOSE: CPT

## 2024-01-24 PROCEDURE — 0241U HB NFCT DS VIR RESP RNA 4 TRGT: CPT | Performed by: EMERGENCY MEDICINE

## 2024-01-24 PROCEDURE — 81001 URINALYSIS AUTO W/SCOPE: CPT | Performed by: EMERGENCY MEDICINE

## 2024-01-24 PROCEDURE — 80053 COMPREHEN METABOLIC PANEL: CPT | Performed by: EMERGENCY MEDICINE

## 2024-01-24 PROCEDURE — 71045 X-RAY EXAM CHEST 1 VIEW: CPT

## 2024-01-24 PROCEDURE — 99291 CRITICAL CARE FIRST HOUR: CPT | Performed by: EMERGENCY MEDICINE

## 2024-01-24 PROCEDURE — 87086 URINE CULTURE/COLONY COUNT: CPT | Performed by: EMERGENCY MEDICINE

## 2024-01-24 PROCEDURE — 99223 1ST HOSP IP/OBS HIGH 75: CPT | Performed by: INTERNAL MEDICINE

## 2024-01-24 PROCEDURE — 85025 COMPLETE CBC W/AUTO DIFF WBC: CPT | Performed by: EMERGENCY MEDICINE

## 2024-01-24 PROCEDURE — 96375 TX/PRO/DX INJ NEW DRUG ADDON: CPT

## 2024-01-24 PROCEDURE — 83605 ASSAY OF LACTIC ACID: CPT | Performed by: EMERGENCY MEDICINE

## 2024-01-24 PROCEDURE — 87077 CULTURE AEROBIC IDENTIFY: CPT | Performed by: EMERGENCY MEDICINE

## 2024-01-24 PROCEDURE — 84145 PROCALCITONIN (PCT): CPT | Performed by: EMERGENCY MEDICINE

## 2024-01-24 PROCEDURE — 87154 CUL TYP ID BLD PTHGN 6+ TRGT: CPT | Performed by: EMERGENCY MEDICINE

## 2024-01-24 PROCEDURE — 96365 THER/PROPH/DIAG IV INF INIT: CPT

## 2024-01-24 PROCEDURE — 82805 BLOOD GASES W/O2 SATURATION: CPT | Performed by: EMERGENCY MEDICINE

## 2024-01-24 PROCEDURE — 87186 SC STD MICRODIL/AGAR DIL: CPT | Performed by: EMERGENCY MEDICINE

## 2024-01-24 PROCEDURE — 99285 EMERGENCY DEPT VISIT HI MDM: CPT

## 2024-01-24 PROCEDURE — 87040 BLOOD CULTURE FOR BACTERIA: CPT | Performed by: EMERGENCY MEDICINE

## 2024-01-24 PROCEDURE — 93005 ELECTROCARDIOGRAM TRACING: CPT

## 2024-01-24 RX ORDER — DULOXETIN HYDROCHLORIDE 20 MG/1
20 CAPSULE, DELAYED RELEASE ORAL DAILY
Status: DISCONTINUED | OUTPATIENT
Start: 2024-01-25 | End: 2024-01-27 | Stop reason: HOSPADM

## 2024-01-24 RX ORDER — GABAPENTIN 300 MG/1
600 CAPSULE ORAL 3 TIMES DAILY
Status: DISCONTINUED | OUTPATIENT
Start: 2024-01-24 | End: 2024-01-27 | Stop reason: HOSPADM

## 2024-01-24 RX ORDER — LEVOTHYROXINE SODIUM 0.05 MG/1
50 TABLET ORAL
Status: DISCONTINUED | OUTPATIENT
Start: 2024-01-25 | End: 2024-01-27 | Stop reason: HOSPADM

## 2024-01-24 RX ORDER — ENOXAPARIN SODIUM 100 MG/ML
40 INJECTION SUBCUTANEOUS DAILY
Status: DISCONTINUED | OUTPATIENT
Start: 2024-01-25 | End: 2024-01-27 | Stop reason: HOSPADM

## 2024-01-24 RX ORDER — LOSARTAN POTASSIUM 25 MG/1
25 TABLET ORAL DAILY
Status: DISCONTINUED | OUTPATIENT
Start: 2024-01-25 | End: 2024-01-25

## 2024-01-24 RX ORDER — ATORVASTATIN CALCIUM 40 MG/1
80 TABLET, FILM COATED ORAL
Status: DISCONTINUED | OUTPATIENT
Start: 2024-01-24 | End: 2024-01-27 | Stop reason: HOSPADM

## 2024-01-24 RX ORDER — ACETAMINOPHEN 325 MG/1
650 TABLET ORAL ONCE
Status: COMPLETED | OUTPATIENT
Start: 2024-01-24 | End: 2024-01-24

## 2024-01-24 RX ORDER — INSULIN LISPRO 100 [IU]/ML
40 INJECTION, SOLUTION INTRAVENOUS; SUBCUTANEOUS
Status: DISCONTINUED | OUTPATIENT
Start: 2024-01-24 | End: 2024-01-25

## 2024-01-24 RX ORDER — INSULIN GLARGINE 100 [IU]/ML
60 INJECTION, SOLUTION SUBCUTANEOUS
Status: DISCONTINUED | OUTPATIENT
Start: 2024-01-24 | End: 2024-01-26

## 2024-01-24 RX ORDER — CHLORAL HYDRATE 500 MG
1000 CAPSULE ORAL DAILY
Status: DISCONTINUED | OUTPATIENT
Start: 2024-01-25 | End: 2024-01-27 | Stop reason: HOSPADM

## 2024-01-24 RX ORDER — INSULIN LISPRO 100 [IU]/ML
1-6 INJECTION, SOLUTION INTRAVENOUS; SUBCUTANEOUS
Status: DISCONTINUED | OUTPATIENT
Start: 2024-01-24 | End: 2024-01-26

## 2024-01-24 RX ORDER — ACETAMINOPHEN 325 MG/1
650 TABLET ORAL EVERY 6 HOURS PRN
Status: DISCONTINUED | OUTPATIENT
Start: 2024-01-24 | End: 2024-01-27 | Stop reason: HOSPADM

## 2024-01-24 RX ADMIN — ASPIRIN 81 MG: 81 TABLET, COATED ORAL at 21:16

## 2024-01-24 RX ADMIN — INSULIN LISPRO 2 UNITS: 100 INJECTION, SOLUTION INTRAVENOUS; SUBCUTANEOUS at 18:55

## 2024-01-24 RX ADMIN — INSULIN LISPRO 40 UNITS: 100 INJECTION, SOLUTION INTRAVENOUS; SUBCUTANEOUS at 18:56

## 2024-01-24 RX ADMIN — ACETAMINOPHEN 650 MG: 325 TABLET, FILM COATED ORAL at 13:39

## 2024-01-24 RX ADMIN — SODIUM CHLORIDE, SODIUM LACTATE, POTASSIUM CHLORIDE, AND CALCIUM CHLORIDE 500 ML: .6; .31; .03; .02 INJECTION, SOLUTION INTRAVENOUS at 13:38

## 2024-01-24 RX ADMIN — SODIUM CHLORIDE, SODIUM LACTATE, POTASSIUM CHLORIDE, AND CALCIUM CHLORIDE 500 ML: .6; .31; .03; .02 INJECTION, SOLUTION INTRAVENOUS at 16:20

## 2024-01-24 RX ADMIN — DEXTROSE 1000 MG: 50 INJECTION, SOLUTION INTRAVENOUS at 15:02

## 2024-01-24 RX ADMIN — INSULIN GLARGINE 60 UNITS: 100 INJECTION, SOLUTION SUBCUTANEOUS at 21:16

## 2024-01-24 RX ADMIN — ATORVASTATIN CALCIUM 80 MG: 40 TABLET, FILM COATED ORAL at 18:55

## 2024-01-24 RX ADMIN — GABAPENTIN 600 MG: 300 CAPSULE ORAL at 21:16

## 2024-01-24 RX ADMIN — ACETAMINOPHEN 650 MG: 325 TABLET, FILM COATED ORAL at 22:42
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
## 2024-01-24 NOTE — ED PROCEDURE NOTE
PROCEDURE  ECG 12 Lead Documentation Only    Date/Time: 1/24/2024 2:24 PM    Performed by: Wil Ron MD  Authorized by: Wil Ron MD    Indications / Diagnosis:  Rigors  ECG reviewed by me, the ED Provider: yes    Patient location:  ED and bedside  Previous ECG:     Previous ECG:  Unavailable    Comparison to cardiac monitor: Yes    Interpretation:     Interpretation: non-specific    Rate:     ECG rate:  94    ECG rate assessment: normal    Rhythm:     Rhythm: sinus rhythm    Ectopy:     Ectopy: none    QRS:     QRS axis:  Left    QRS intervals:  Normal  Conduction:     Conduction: normal    ST segments:     ST segments:  Normal  T waves:     T waves: flattening      Flattening:  I, aVL, V1, V2 and V6  Q waves:     Q waves:  V1, V3 and V2  Other findings:     Other findings: U wave    Comments:       No ecg signs of ischemia/ injury / r heart strain / roxy/pericarditis        Wil Ron MD  01/24/24 [id number]

## 2024-01-24 NOTE — PLAN OF CARE
Problem: PAIN - ADULT  Goal: Verbalizes/displays adequate comfort level or baseline comfort level  Description: Interventions:  - Encourage patient to monitor pain and request assistance  - Assess pain using appropriate pain scale  - Administer analgesics based on type and severity of pain and evaluate response  - Implement non-pharmacological measures as appropriate and evaluate response  - Consider cultural and social influences on pain and pain management  - Notify physician/advanced practitioner if interventions unsuccessful or patient reports new pain  Outcome: Progressing     Problem: INFECTION - ADULT  Goal: Absence or prevention of progression during hospitalization  Description: INTERVENTIONS:  - Assess and monitor for signs and symptoms of infection  - Monitor lab/diagnostic results  - Monitor all insertion sites, i.e. indwelling lines, tubes, and drains  - Monitor endotracheal if appropriate and nasal secretions for changes in amount and color  - [place] appropriate cooling/warming therapies per order  - Administer medications as ordered  - Instruct and encourage patient and family to use good hand hygiene technique  - Identify and instruct in appropriate isolation precautions for identified infection/condition  Outcome: Progressing  Goal: Absence of fever/infection during neutropenic period  Description: INTERVENTIONS:  - Monitor WBC    Outcome: Progressing     Problem: SAFETY ADULT  Goal: Patient will remain free of falls  Description: INTERVENTIONS:  - Educate patient/family on patient safety including physical limitations  - Instruct patient to call for assistance with activity   - Consult OT/PT to assist with strengthening/mobility   - Keep Call bell within reach  - Keep bed low and locked with side rails adjusted as appropriate  - Keep care items and personal belongings within reach  - Initiate and maintain comfort rounds  - Make Fall Risk Sign visible to staff  - Offer Toileting every 2 Hours,  in advance of need  - Initiate/Maintain \alarm  - Obtain necessary fall risk management equipment  - Apply yellow socks and bracelet for high fall risk patients  - Consider moving patient to room near nurses station  Outcome: Progressing  Goal: Maintain or return to baseline ADL function  Description: INTERVENTIONS:  -  Assess patient's ability to carry out ADLs; assess patient's baseline for ADL function and identify physical deficits which impact ability to perform ADLs (bathing, care of mouth/teeth, toileting, grooming, dressing, etc.)  - Assess/evaluate cause of self-care deficits   - Assess range of motion  - Assess patient's mobility; develop plan if impaired  - Assess patient's need for assistive devices and provide as appropriate  - Encourage maximum independence but intervene and supervise when necessary  - Involve family in performance of ADLs  - Assess for home care needs following discharge   - Consider OT consult to assist with ADL evaluation and planning for discharge  - Provide patient education as appropriate  Outcome: Progressing  Goal: Maintains/Returns to pre admission functional level  Description: INTERVENTIONS:  - Perform AM-PAC 6 Click Basic Mobility/ Daily Activity assessment daily.  - Set and communicate daily mobility goal to care team and patient/family/caregiver.   - Collaborate with rehabilitation services on mobility goals if consulted  - Perform Range of Motion 3 times a day.  - Reposition patient every 2 hours.  - Dangle patient 3 times a day  - Stand patient 3 times a day  - Ambulate patient 3 times a day  - Out of bed to chair 3 times a day   - Out of bed for meals 3 times a day  - Out of bed for toileting  - Record patient progress and toleration of activity level   Outcome: Progressing     Problem: DISCHARGE PLANNING  Goal: Discharge to home or other facility with appropriate resources  Description: INTERVENTIONS:  - Identify barriers to discharge w/patient and caregiver  -  Arrange for needed discharge resources and transportation as appropriate  - Identify discharge learning needs (meds, wound care, etc.)  - Arrange for interpretive services to assist at discharge as needed  - Refer to Case Management Department for coordinating discharge planning if the patient needs post-hospital services based on physician/advanced practitioner order or complex needs related to functional status, cognitive ability, or social support system  Outcome: Progressing

## 2024-01-25 ENCOUNTER — APPOINTMENT (OUTPATIENT)
Dept: CT IMAGING | Facility: HOSPITAL | Age: 74
DRG: 689 | End: 2024-01-25
Payer: COMMERCIAL

## 2024-01-25 PROBLEM — R65.20 SEVERE SEPSIS (HCC): Status: ACTIVE | Noted: 2024-01-24

## 2024-01-25 PROBLEM — A41.9 SEVERE SEPSIS (HCC): Status: ACTIVE | Noted: 2024-01-24

## 2024-01-25 LAB
ANION GAP SERPL CALCULATED.3IONS-SCNC: 9 MMOL/L
BUN SERPL-MCNC: 18 MG/DL (ref 5–25)
CALCIUM SERPL-MCNC: 8.8 MG/DL (ref 8.4–10.2)
CHLORIDE SERPL-SCNC: 102 MMOL/L (ref 96–108)
CO2 SERPL-SCNC: 23 MMOL/L (ref 21–32)
CREAT SERPL-MCNC: 0.58 MG/DL (ref 0.6–1.3)
ERYTHROCYTE [DISTWIDTH] IN BLOOD BY AUTOMATED COUNT: 14.3 % (ref 11.6–15.1)
GFR SERPL CREATININE-BSD FRML MDRD: 93 ML/MIN/1.73SQ M
GLUCOSE SERPL-MCNC: 133 MG/DL (ref 65–140)
GLUCOSE SERPL-MCNC: 145 MG/DL (ref 65–140)
GLUCOSE SERPL-MCNC: 155 MG/DL (ref 65–140)
GLUCOSE SERPL-MCNC: 255 MG/DL (ref 65–140)
GLUCOSE SERPL-MCNC: 280 MG/DL (ref 65–140)
GLUCOSE SERPL-MCNC: 304 MG/DL (ref 65–140)
GLUCOSE SERPL-MCNC: 345 MG/DL (ref 65–140)
HCT VFR BLD AUTO: 35.9 % (ref 34.8–46.1)
HGB BLD-MCNC: 12 G/DL (ref 11.5–15.4)
MCH RBC QN AUTO: 30.8 PG (ref 26.8–34.3)
MCHC RBC AUTO-ENTMCNC: 33.4 G/DL (ref 31.4–37.4)
MCV RBC AUTO: 92 FL (ref 82–98)
PLATELET # BLD AUTO: 242 THOUSANDS/UL (ref 149–390)
PMV BLD AUTO: 10.4 FL (ref 8.9–12.7)
POTASSIUM SERPL-SCNC: 3.4 MMOL/L (ref 3.5–5.3)
PROCALCITONIN SERPL-MCNC: 8.5 NG/ML
RBC # BLD AUTO: 3.9 MILLION/UL (ref 3.81–5.12)
SODIUM SERPL-SCNC: 134 MMOL/L (ref 135–147)
WBC # BLD AUTO: 7.41 THOUSAND/UL (ref 4.31–10.16)

## 2024-01-25 PROCEDURE — 99232 SBSQ HOSP IP/OBS MODERATE 35: CPT | Performed by: INTERNAL MEDICINE

## 2024-01-25 PROCEDURE — G1004 CDSM NDSC: HCPCS

## 2024-01-25 PROCEDURE — 85027 COMPLETE CBC AUTOMATED: CPT

## 2024-01-25 PROCEDURE — 84145 PROCALCITONIN (PCT): CPT | Performed by: EMERGENCY MEDICINE

## 2024-01-25 PROCEDURE — 80048 BASIC METABOLIC PNL TOTAL CA: CPT

## 2024-01-25 PROCEDURE — 74177 CT ABD & PELVIS W/CONTRAST: CPT

## 2024-01-25 PROCEDURE — 82948 REAGENT STRIP/BLOOD GLUCOSE: CPT

## 2024-01-25 PROCEDURE — 71260 CT THORAX DX C+: CPT

## 2024-01-25 RX ORDER — POTASSIUM CHLORIDE 14.9 MG/ML
20 INJECTION INTRAVENOUS
Qty: 200 ML | Refills: 0 | Status: DISCONTINUED | OUTPATIENT
Start: 2024-01-25 | End: 2024-01-25

## 2024-01-25 RX ORDER — INSULIN LISPRO 100 [IU]/ML
20 INJECTION, SOLUTION INTRAVENOUS; SUBCUTANEOUS
Status: DISCONTINUED | OUTPATIENT
Start: 2024-01-25 | End: 2024-01-25

## 2024-01-25 RX ORDER — INSULIN LISPRO 100 [IU]/ML
40 INJECTION, SOLUTION INTRAVENOUS; SUBCUTANEOUS
Status: DISCONTINUED | OUTPATIENT
Start: 2024-01-25 | End: 2024-01-26

## 2024-01-25 RX ADMIN — LEVOTHYROXINE SODIUM 50 MCG: 50 TABLET ORAL at 05:27

## 2024-01-25 RX ADMIN — ASPIRIN 81 MG: 81 TABLET, COATED ORAL at 21:12

## 2024-01-25 RX ADMIN — IOHEXOL 100 ML: 350 INJECTION, SOLUTION INTRAVENOUS at 14:24

## 2024-01-25 RX ADMIN — INSULIN LISPRO 3 UNITS: 100 INJECTION, SOLUTION INTRAVENOUS; SUBCUTANEOUS at 11:12

## 2024-01-25 RX ADMIN — LOSARTAN POTASSIUM 25 MG: 25 TABLET, FILM COATED ORAL at 08:33

## 2024-01-25 RX ADMIN — ENOXAPARIN SODIUM 40 MG: 40 INJECTION SUBCUTANEOUS at 08:33

## 2024-01-25 RX ADMIN — GABAPENTIN 600 MG: 300 CAPSULE ORAL at 08:33

## 2024-01-25 RX ADMIN — INSULIN LISPRO 4 UNITS: 100 INJECTION, SOLUTION INTRAVENOUS; SUBCUTANEOUS at 16:27

## 2024-01-25 RX ADMIN — POTASSIUM CHLORIDE 20 MEQ: 14.9 INJECTION, SOLUTION INTRAVENOUS at 08:33

## 2024-01-25 RX ADMIN — ACETAMINOPHEN 650 MG: 325 TABLET, FILM COATED ORAL at 07:46

## 2024-01-25 RX ADMIN — OMEGA-3 FATTY ACIDS CAP 1000 MG 1000 MG: 1000 CAP at 08:33

## 2024-01-25 RX ADMIN — INSULIN LISPRO 20 UNITS: 100 INJECTION, SOLUTION INTRAVENOUS; SUBCUTANEOUS at 11:12

## 2024-01-25 RX ADMIN — GABAPENTIN 600 MG: 300 CAPSULE ORAL at 21:12

## 2024-01-25 RX ADMIN — INSULIN GLARGINE 60 UNITS: 100 INJECTION, SOLUTION SUBCUTANEOUS at 21:13

## 2024-01-25 RX ADMIN — INSULIN LISPRO 40 UNITS: 100 INJECTION, SOLUTION INTRAVENOUS; SUBCUTANEOUS at 16:29

## 2024-01-25 RX ADMIN — DULOXETINE HYDROCHLORIDE 20 MG: 20 CAPSULE, DELAYED RELEASE ORAL at 08:38

## 2024-01-25 RX ADMIN — GABAPENTIN 600 MG: 300 CAPSULE ORAL at 16:26

## 2024-01-25 RX ADMIN — ATORVASTATIN CALCIUM 80 MG: 40 TABLET, FILM COATED ORAL at 16:27

## 2024-01-25 RX ADMIN — SODIUM CHLORIDE 1000 ML: 0.9 INJECTION, SOLUTION INTRAVENOUS at 10:55

## 2024-01-25 RX ADMIN — Medication 1000 MG: at 14:47

## 2024-01-25 NOTE — ASSESSMENT & PLAN NOTE
Patient with history of neuropathy secondary to type 2 diabetes mellitus    Plan:  Continue with Neurontin

## 2024-01-25 NOTE — ASSESSMENT & PLAN NOTE
Lab Results   Component Value Date    HGBA1C 11.0 (H) 11/21/2023       Recent Labs     01/24/24  2108 01/25/24  0654 01/25/24  0739 01/25/24  1034   POCGLU 155* 155* 145* 255*         Blood Sugar Average: Last 72 hrs:  (P) [id number]    History of poorly controlled T2DM.  Patient at home takes 40 nightly of Lantus and 40 of rapid acting insulin with meals.Adjusted patient meal time insuline due to poor oral intake. Patient report no emesis after breakfast. Change meal insuline back to home regiment     Plan:  Stopped 40 Basaglar insulin at night and 40 rapid acting with meals and sliding scale  Placed patient on insulin drip temporarily for better control of sugars  Hypoglycemia protocol

## 2024-01-25 NOTE — ED PROCEDURE NOTE
PROCEDURE  CriticalCare Time    Date/Time: 1/24/2024 7:38 PM    Performed by: Wil Ron MD  Authorized by: Wil Ron MD    Critical care provider statement:     Critical care time (minutes):  35    Critical care start time:  1/24/2024 2:00 PM    Critical care end time:  1/24/2024 2:35 PM    Critical care time was exclusive of:  Separately billable procedures and treating other patients and teaching time    Critical care was necessary to treat or prevent imminent or life-threatening deterioration of the following conditions:  Sepsis    Critical care was time spent personally by me on the following activities:  Examination of patient, discussions with consultants, development of treatment plan with patient or surrogate, obtaining history from patient or surrogate, evaluation of patient's response to treatment, review of old charts, re-evaluation of patient's condition, ordering and review of radiographic studies, ordering and review of laboratory studies and ordering and performing treatments and interventions    I assumed direction of critical care for this patient from another provider in my specialty: no    Comments:      - PT WITH SEVERE SEPSIS B Y ELEVATED LACTIC ACID-- PT WITH FREQUENT RE-EVALUATIONS -ORDERING OF INTERVENTIONS AND ADMIT       Wil Ron MD  01/24/24 193
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
## 2024-01-25 NOTE — QUICK NOTE
2 sets of preliminary blood cultures came back positive for gram-negative rods.  Patient is already on IV ceftriaxone for UTI since admission. Patient had a fever with Tmax 102.5 °F last night between 21-23 pm.  Patient met SIRS criteria with fever 102.5 °F and tachycardia at 200/min last night.  The temperature went down to 98.8 °F after receiving Tylenol.  Follow-up final blood cultures and urine culture results.  Will continue IV ceftriaxone for UTI and gram-negative rods bacteremia at present.
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
## 2024-01-25 NOTE — ASSESSMENT & PLAN NOTE
Patient presenting after sudden onset of chills with nausea, vomiting,2 episode of solid large solid stool.   Asymptomatic on arrival to emergency department.  EKG normal sinus rhythm, no signs of ischemia.  Initial workup positive for initial lactic acid of 3 trended down to 1.7 s/p 1 L of LR and procalcitonin 3.86  Vital stable, denying any recent fevers or infectious symptoms.  No urinary symptoms but UA positive for leukocytes, bacteria seen on microscopy, awaiting culture results  Possibility of UTI, patient has had history of UTIs with Klebsiella in 2022 in 2020.  Patient blood culture positive for Gram negative zane and procalc is elevated tat 8.50. Patient had fever overnight and this morning.     Plan:  CT with contrast of chest/abdominal/pelvis for source of infection  1 Liter IV FLUID    Continue ceftriaxone  Monitor for fever, infectious symptoms, WBCs

## 2024-01-25 NOTE — ASSESSMENT & PLAN NOTE
Patient presenting after sudden onset of chills with nausea, vomiting, diarrhea.  Asymptomatic on arrival to emergency department.  EKG normal sinus rhythm, no signs of ischemia.  Initial workup positive for initial lactic acid of 3 trended down to 1.7 s/p 1 L of LR and procalcitonin 3.86  Vital stable, denying any recent fevers or infectious symptoms.  No urinary symptoms but UA positive for leukocytes, bacteria seen on microscopy, awaiting culture results  Possibility of UTI, patient has had history of UTIs with Klebsiella in 2022 in 2020.    Plan:  Continue ceftriaxone  Follow-up urine and blood cultures  Follow-up morning Pro-Jacky  Monitor for fever, infectious symptoms, WBCs

## 2024-01-25 NOTE — ASSESSMENT & PLAN NOTE
Lab Results   Component Value Date    HGBA1C 11.0 (H) 11/21/2023       Recent Labs     01/24/24  1328 01/24/24  1829   POCGLU 199* 223*       Blood Sugar Average: Last 72 hrs:  (P) 211    History of poorly controlled T2DM.  Patient at home takes 40 nightly of Lantus and 40 of rapid acting insulin with meals.    Plan:  Continue with 40 Basaglar insulin at night and 40 rapid acting with meals  Sliding scale insulin in place  Hypoglycemia protocol

## 2024-01-25 NOTE — ASSESSMENT & PLAN NOTE
Lab Results   Component Value Date    HGBA1C 11.0 (H) 11/21/2023       Recent Labs     01/24/24  1328 01/24/24  1829 01/24/24  2108 01/25/24  0739   POCGLU 199* 223* 155* 145*         Blood Sugar Average: Last 72 hrs:  (P) 180.5    History of poorly controlled T2DM.  Patient at home takes 40 nightly of Lantus and 40 of rapid acting insulin with meals.    Plan:  Continue with 40 Basaglar insulin at night and 40 rapid acting with meals  Sliding scale insulin in place  Hypoglycemia protocol

## 2024-01-25 NOTE — ASSESSMENT & PLAN NOTE
Patient presenting after sudden onset of chills with nausea, vomiting,2 episode of solid large stool.   Asymptomatic on arrival to emergency department.  EKG normal sinus rhythm, no signs of ischemia.  Initial workup positive for initial lactic acid of 3 trended down to 1.7 s/p 1 L of LR and procalcitonin 3.86  Vital stable, denying any recent fevers or infectious symptoms.  No urinary symptoms but UA positive for leukocytes, bacteria seen on microscopy, awaiting culture results  Possibility of UTI, patient has had history of UTIs with Klebsiella in 2022 in 2020.  Patient blood culture positive for Gram negative zane and procalc is elevated at 8.50. Patient had fever overnight and this morning.   Severe sepsis: Febrile, elevated lactic acid, Tachycardiac, with UA OR GI possible source of infection  CT suggestive of cystitis and pyelonephritis    Plan:   Continue ceftriaxone  Monitor for fever, infectious symptoms, WBCs

## 2024-01-25 NOTE — PLAN OF CARE
Problem: PAIN - ADULT  Goal: Verbalizes/displays adequate comfort level or baseline comfort level  Description: Interventions:  - Encourage patient to monitor pain and request assistance  - Assess pain using appropriate pain scale  - Administer analgesics based on type and severity of pain and evaluate response  - Implement non-pharmacological measures as appropriate and evaluate response  - Consider cultural and social influences on pain and pain management  - Notify physician/advanced practitioner if interventions unsuccessful or patient reports new pain  Outcome: Progressing     Problem: INFECTION - ADULT  Goal: Absence or prevention of progression during hospitalization  Description: INTERVENTIONS:  - Assess and monitor for signs and symptoms of infection  - Monitor lab/diagnostic results  - Monitor all insertion sites, i.e. indwelling lines, tubes, and drains  - Monitor endotracheal if appropriate and nasal secretions for changes in amount and color  - [place] appropriate cooling/warming therapies per order  - Administer medications as ordered  - Instruct and encourage patient and family to use good hand hygiene technique  - Identify and instruct in appropriate isolation precautions for identified infection/condition  Outcome: Progressing  Goal: Absence of fever/infection during neutropenic period  Description: INTERVENTIONS:  - Monitor WBC    Outcome: Progressing     Problem: SAFETY ADULT  Goal: Patient will remain free of falls  Description: INTERVENTIONS:  - Educate patient/family on patient safety including physical limitations  - Instruct patient to call for assistance with activity   - Consult OT/PT to assist with strengthening/mobility   - Keep Call bell within reach  - Keep bed low and locked with side rails adjusted as appropriate  - Keep care items and personal belongings within reach  - Initiate and maintain comfort rounds  - Make Fall Risk Sign visible to staff  - Obtain necessary fall risk  management equipment  - Apply yellow socks and bracelet for high fall risk patients  - Consider moving patient to room near nurses station  Outcome: Progressing  Goal: Maintain or return to baseline ADL function  Description: INTERVENTIONS:  -  Assess patient's ability to carry out ADLs; assess patient's baseline for ADL function and identify physical deficits which impact ability to perform ADLs (bathing, care of mouth/teeth, toileting, grooming, dressing, etc.)  - Assess/evaluate cause of self-care deficits   - Assess range of motion  - Assess patient's mobility; develop plan if impaired  - Assess patient's need for assistive devices and provide as appropriate  - Encourage maximum independence but intervene and supervise when necessary  - Involve family in performance of ADLs  - Assess for home care needs following discharge   - Consider OT consult to assist with ADL evaluation and planning for discharge  - Provide patient education as appropriate  Outcome: Progressing  Goal: Maintains/Returns to pre admission functional level  Description: INTERVENTIONS:  - Perform AM-PAC 6 Click Basic Mobility/ Daily Activity assessment daily.  - Set and communicate daily mobility goal to care team and patient/family/caregiver.   - Collaborate with rehabilitation services on mobility goals if consulted  - Out of bed for toileting  - Record patient progress and toleration of activity level   Outcome: Progressing     Problem: DISCHARGE PLANNING  Goal: Discharge to home or other facility with appropriate resources  Description: INTERVENTIONS:  - Identify barriers to discharge w/patient and caregiver  - Arrange for needed discharge resources and transportation as appropriate  - Identify discharge learning needs (meds, wound care, etc.)  - Arrange for interpretive services to assist at discharge as needed  - Refer to Case Management Department for coordinating discharge planning if the patient needs post-hospital services based on  physician/advanced practitioner order or complex needs related to functional status, cognitive ability, or social support system  Outcome: Progressing     Problem: Knowledge Deficit  Goal: Patient/family/caregiver demonstrates understanding of disease process, treatment plan, medications, and discharge instructions  Description: Complete learning assessment and assess knowledge base.  Interventions:  - Provide teaching at level of understanding  - Provide teaching via preferred learning methods  Outcome: Progressing
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
## 2024-01-26 ENCOUNTER — APPOINTMENT (OUTPATIENT)
Dept: PHYSICAL THERAPY | Facility: MEDICAL CENTER | Age: 74
End: 2024-01-26
Payer: COMMERCIAL

## 2024-01-26 PROBLEM — R93.89 ABNORMAL CT SCAN: Status: ACTIVE | Noted: 2024-01-26

## 2024-01-26 LAB
ANION GAP SERPL CALCULATED.3IONS-SCNC: 9 MMOL/L
BACTERIA UR CULT: ABNORMAL
BASOPHILS # BLD AUTO: 0.04 THOUSANDS/ÂΜL (ref 0–0.1)
BASOPHILS NFR BLD AUTO: 1 % (ref 0–1)
BUN SERPL-MCNC: 15 MG/DL (ref 5–25)
CALCIUM SERPL-MCNC: 8.1 MG/DL (ref 8.4–10.2)
CHLORIDE SERPL-SCNC: 103 MMOL/L (ref 96–108)
CO2 SERPL-SCNC: 21 MMOL/L (ref 21–32)
CREAT SERPL-MCNC: 0.61 MG/DL (ref 0.6–1.3)
EOSINOPHIL # BLD AUTO: 0.13 THOUSAND/ÂΜL (ref 0–0.61)
EOSINOPHIL NFR BLD AUTO: 2 % (ref 0–6)
ERYTHROCYTE [DISTWIDTH] IN BLOOD BY AUTOMATED COUNT: 14.6 % (ref 11.6–15.1)
GFR SERPL CREATININE-BSD FRML MDRD: 91 ML/MIN/1.73SQ M
GLUCOSE SERPL-MCNC: 145 MG/DL (ref 65–140)
GLUCOSE SERPL-MCNC: 184 MG/DL (ref 65–140)
GLUCOSE SERPL-MCNC: 197 MG/DL (ref 65–140)
GLUCOSE SERPL-MCNC: 198 MG/DL (ref 65–140)
GLUCOSE SERPL-MCNC: 238 MG/DL (ref 65–140)
GLUCOSE SERPL-MCNC: 242 MG/DL (ref 65–140)
GLUCOSE SERPL-MCNC: 265 MG/DL (ref 65–140)
GLUCOSE SERPL-MCNC: 290 MG/DL (ref 65–140)
GLUCOSE SERPL-MCNC: 308 MG/DL (ref 65–140)
GLUCOSE SERPL-MCNC: 388 MG/DL (ref 65–140)
HCT VFR BLD AUTO: 34.4 % (ref 34.8–46.1)
HGB BLD-MCNC: 11.2 G/DL (ref 11.5–15.4)
IMM GRANULOCYTES # BLD AUTO: 0.03 THOUSAND/UL (ref 0–0.2)
IMM GRANULOCYTES NFR BLD AUTO: 1 % (ref 0–2)
LYMPHOCYTES # BLD AUTO: 1.71 THOUSANDS/ÂΜL (ref 0.6–4.47)
LYMPHOCYTES NFR BLD AUTO: 26 % (ref 14–44)
MCH RBC QN AUTO: 30.3 PG (ref 26.8–34.3)
MCHC RBC AUTO-ENTMCNC: 32.6 G/DL (ref 31.4–37.4)
MCV RBC AUTO: 93 FL (ref 82–98)
MONOCYTES # BLD AUTO: 0.63 THOUSAND/ÂΜL (ref 0.17–1.22)
MONOCYTES NFR BLD AUTO: 10 % (ref 4–12)
NEUTROPHILS # BLD AUTO: 3.96 THOUSANDS/ÂΜL (ref 1.85–7.62)
NEUTS SEG NFR BLD AUTO: 60 % (ref 43–75)
NRBC BLD AUTO-RTO: 0 /100 WBCS
PLATELET # BLD AUTO: 217 THOUSANDS/UL (ref 149–390)
PMV BLD AUTO: 10.3 FL (ref 8.9–12.7)
POTASSIUM SERPL-SCNC: 3.5 MMOL/L (ref 3.5–5.3)
RBC # BLD AUTO: 3.7 MILLION/UL (ref 3.81–5.12)
SODIUM SERPL-SCNC: 133 MMOL/L (ref 135–147)
WBC # BLD AUTO: 6.5 THOUSAND/UL (ref 4.31–10.16)

## 2024-01-26 PROCEDURE — 80048 BASIC METABOLIC PNL TOTAL CA: CPT

## 2024-01-26 PROCEDURE — 99232 SBSQ HOSP IP/OBS MODERATE 35: CPT | Performed by: INTERNAL MEDICINE

## 2024-01-26 PROCEDURE — 97166 OT EVAL MOD COMPLEX 45 MIN: CPT

## 2024-01-26 PROCEDURE — 85025 COMPLETE CBC W/AUTO DIFF WBC: CPT

## 2024-01-26 PROCEDURE — 82948 REAGENT STRIP/BLOOD GLUCOSE: CPT

## 2024-01-26 RX ORDER — INSULIN LISPRO 100 [IU]/ML
1-6 INJECTION, SOLUTION INTRAVENOUS; SUBCUTANEOUS
Status: DISCONTINUED | OUTPATIENT
Start: 2024-01-27 | End: 2024-01-27 | Stop reason: HOSPADM

## 2024-01-26 RX ORDER — INSULIN GLARGINE 100 [IU]/ML
60 INJECTION, SOLUTION SUBCUTANEOUS
Status: DISCONTINUED | OUTPATIENT
Start: 2024-01-26 | End: 2024-01-27 | Stop reason: HOSPADM

## 2024-01-26 RX ORDER — INSULIN LISPRO 100 [IU]/ML
40 INJECTION, SOLUTION INTRAVENOUS; SUBCUTANEOUS
Status: DISCONTINUED | OUTPATIENT
Start: 2024-01-27 | End: 2024-01-27 | Stop reason: HOSPADM

## 2024-01-26 RX ADMIN — GABAPENTIN 600 MG: 300 CAPSULE ORAL at 23:27

## 2024-01-26 RX ADMIN — SODIUM CHLORIDE 11 UNITS/HR: 9 INJECTION, SOLUTION INTRAVENOUS at 19:58

## 2024-01-26 RX ADMIN — OMEGA-3 FATTY ACIDS CAP 1000 MG 1000 MG: 1000 CAP at 08:16

## 2024-01-26 RX ADMIN — LEVOTHYROXINE SODIUM 50 MCG: 50 TABLET ORAL at 05:40

## 2024-01-26 RX ADMIN — INSULIN GLARGINE 15 UNITS: 100 INJECTION, SOLUTION SUBCUTANEOUS at 23:26

## 2024-01-26 RX ADMIN — SODIUM CHLORIDE 4 UNITS/HR: 9 INJECTION, SOLUTION INTRAVENOUS at 07:38

## 2024-01-26 RX ADMIN — Medication 1000 MG: at 13:45

## 2024-01-26 RX ADMIN — GABAPENTIN 600 MG: 300 CAPSULE ORAL at 08:16

## 2024-01-26 RX ADMIN — GABAPENTIN 600 MG: 300 CAPSULE ORAL at 16:00

## 2024-01-26 RX ADMIN — ASPIRIN 81 MG: 81 TABLET, COATED ORAL at 23:27

## 2024-01-26 RX ADMIN — ATORVASTATIN CALCIUM 80 MG: 40 TABLET, FILM COATED ORAL at 16:00

## 2024-01-26 RX ADMIN — ENOXAPARIN SODIUM 40 MG: 40 INJECTION SUBCUTANEOUS at 08:16

## 2024-01-26 RX ADMIN — DULOXETINE HYDROCHLORIDE 20 MG: 20 CAPSULE, DELAYED RELEASE ORAL at 08:16

## 2024-01-26 NOTE — ASSESSMENT & PLAN NOTE
Patient presenting after sudden onset of chills with nausea, vomiting,2 episode of solid large stool.   Asymptomatic on arrival to emergency department.  EKG normal sinus rhythm, no signs of ischemia.  Initial workup positive for initial lactic acid of 3 trended down to 1.7 s/p 1 L of LR and procalcitonin 3.86  Vital stable, denying any recent fevers or infectious symptoms.  No urinary symptoms but UA positive for leukocytes, bacteria seen on microscopy, awaiting culture results  Possibility of UTI, patient has had history of UTIs with Klebsiella in 2022 in 2020.  Patient blood culture positive for Gram negative zane and procalc is elevated at 8.50. Patient had fever overnight and this morning.   Severe sepsis: Febrile, elevated lactic acid, Tachycardiac, with UA OR GI possible source of infection  CT suggestive of cystitis and pyelonephritis  Culture back positive for E. coli, pansensitive  Status post ceftriaxone for 3 days    Plan:   Start taking Keflex for additional 11 days, follow instructions provided in summary  Follow-up with primary care physician in 1 week  If you experiencing fever or new onset of suprapubic or flank pain please call your PCP office or come to ER
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
## 2024-01-26 NOTE — PLAN OF CARE
Problem: PAIN - ADULT  Goal: Verbalizes/displays adequate comfort level or baseline comfort level  Description: Interventions:  - Encourage patient to monitor pain and request assistance  - Assess pain using appropriate pain scale  - Administer analgesics based on type and severity of pain and evaluate response  - Implement non-pharmacological measures as appropriate and evaluate response  - Consider cultural and social influences on pain and pain management  - Notify physician/advanced practitioner if interventions unsuccessful or patient reports new pain  Outcome: Progressing     Problem: INFECTION - ADULT  Goal: Absence or prevention of progression during hospitalization  Description: INTERVENTIONS:  - Assess and monitor for signs and symptoms of infection  - Monitor lab/diagnostic results  - Monitor all insertion sites, i.e. indwelling lines, tubes, and drains  - Monitor endotracheal if appropriate and nasal secretions for changes in amount and color  - [place] appropriate cooling/warming therapies per order  - Administer medications as ordered  - Instruct and encourage patient and family to use good hand hygiene technique  - Identify and instruct in appropriate isolation precautions for identified infection/condition  Outcome: Progressing  Goal: Absence of fever/infection during neutropenic period  Description: INTERVENTIONS:  - Monitor WBC    Outcome: Progressing     Problem: SAFETY ADULT  Goal: Patient will remain free of falls  Description: INTERVENTIONS:  - Educate patient/family on patient safety including physical limitations  - Instruct patient to call for assistance with activity   - Consult OT/PT to assist with strengthening/mobility   - Keep Call bell within reach  - Keep bed low and locked with side rails adjusted as appropriate  - Keep care items and personal belongings within reach  - Initiate and maintain comfort rounds  - Obtain necessary fall risk management equipment  - Apply yellow socks  and bracelet for high fall risk patients  - Consider moving patient to room near nurses station  Outcome: Progressing  Goal: Maintain or return to baseline ADL function  Description: INTERVENTIONS:  -  Assess patient's ability to carry out ADLs; assess patient's baseline for ADL function and identify physical deficits which impact ability to perform ADLs (bathing, care of mouth/teeth, toileting, grooming, dressing, etc.)  - Assess/evaluate cause of self-care deficits   - Assess range of motion  - Assess patient's mobility; develop plan if impaired  - Assess patient's need for assistive devices and provide as appropriate  - Encourage maximum independence but intervene and supervise when necessary  - Involve family in performance of ADLs  - Assess for home care needs following discharge   - Consider OT consult to assist with ADL evaluation and planning for discharge  - Provide patient education as appropriate  Outcome: Progressing  Goal: Maintains/Returns to pre admission functional level  Description: INTERVENTIONS:  - Perform AM-PAC 6 Click Basic Mobility/ Daily Activity assessment daily.  - Set and communicate daily mobility goal to care team and patient/family/caregiver.   - Collaborate with rehabilitation services on mobility goals if consulted  - Out of bed for toileting  - Record patient progress and toleration of activity level   Outcome: Progressing     Problem: DISCHARGE PLANNING  Goal: Discharge to home or other facility with appropriate resources  Description: INTERVENTIONS:  - Identify barriers to discharge w/patient and caregiver  - Arrange for needed discharge resources and transportation as appropriate  - Identify discharge learning needs (meds, wound care, etc.)  - Arrange for interpretive services to assist at discharge as needed  - Refer to Case Management Department for coordinating discharge planning if the patient needs post-hospital services based on physician/advanced practitioner order or  complex needs related to functional status, cognitive ability, or social support system  Outcome: Progressing     Problem: Knowledge Deficit  Goal: Patient/family/caregiver demonstrates understanding of disease process, treatment plan, medications, and discharge instructions  Description: Complete learning assessment and assess knowledge base.  Interventions:  - Provide teaching at level of understanding  - Provide teaching via preferred learning methods  Outcome: Progressing

## 2024-01-26 NOTE — ASSESSMENT & PLAN NOTE
CT: Endometrial stripe thickened up to 1 cm, which may related to endometrial hyperplasia, endometrial polyp, or possibly neoplasm. Commend further evaluation with nonemergent pelvic ultrasound and/or tissue sampling.    Plan:  Patient will need pelvic ultrasound, can be done outpatient  Patient was notified that she needed pelvic ultrasound, explanations provided  Message to PCP was sent
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
## 2024-01-26 NOTE — PLAN OF CARE
Problem: PAIN - ADULT  Goal: Verbalizes/displays adequate comfort level or baseline comfort level  Description: Interventions:  - Encourage patient to monitor pain and request assistance  - Assess pain using appropriate pain scale  - Administer analgesics based on type and severity of pain and evaluate response  - Implement non-pharmacological measures as appropriate and evaluate response  - Consider cultural and social influences on pain and pain management  - Notify physician/advanced practitioner if interventions unsuccessful or patient reports new pain  Outcome: Progressing     Problem: INFECTION - ADULT  Goal: Absence or prevention of progression during hospitalization  Description: INTERVENTIONS:  - Assess and monitor for signs and symptoms of infection  - Monitor lab/diagnostic results  - Monitor all insertion sites, i.e. indwelling lines, tubes, and drains  - Monitor endotracheal if appropriate and nasal secretions for changes in amount and color  - [place] appropriate cooling/warming therapies per order  - Administer medications as ordered  - Instruct and encourage patient and family to use good hand hygiene technique  - Identify and instruct in appropriate isolation precautions for identified infection/condition  Outcome: Progressing  Goal: Absence of fever/infection during neutropenic period  Description: INTERVENTIONS:  - Monitor WBC    Outcome: Progressing     Problem: SAFETY ADULT  Goal: Patient will remain free of falls  Description: INTERVENTIONS:  - Educate patient/family on patient safety including physical limitations  - Instruct patient to call for assistance with activity   - Consult OT/PT to assist with strengthening/mobility   - Keep Call bell within reach  - Keep bed low and locked with side rails adjusted as appropriate  - Keep care items and personal belongings within reach  - Initiate and maintain comfort rounds  - Make Fall Risk Sign visible to staff  - Offer Toileting every  Hours,  in advance of need  - Initiate/Maintain alarm  - Obtain necessary fall risk management equipment:   - Apply yellow socks and bracelet for high fall risk patients  - Consider moving patient to room near nurses station  Outcome: Progressing  Goal: Maintain or return to baseline ADL function  Description: INTERVENTIONS:  -  Assess patient's ability to carry out ADLs; assess patient's baseline for ADL function and identify physical deficits which impact ability to perform ADLs (bathing, care of mouth/teeth, toileting, grooming, dressing, etc.)  - Assess/evaluate cause of self-care deficits   - Assess range of motion  - Assess patient's mobility; develop plan if impaired  - Assess patient's need for assistive devices and provide as appropriate  - Encourage maximum independence but intervene and supervise when necessary  - Involve family in performance of ADLs  - Assess for home care needs following discharge   - Consider OT consult to assist with ADL evaluation and planning for discharge  - Provide patient education as appropriate  Outcome: Progressing  Goal: Maintains/Returns to pre admission functional level  Description: INTERVENTIONS:  - Perform AM-PAC 6 Click Basic Mobility/ Daily Activity assessment daily.  - Set and communicate daily mobility goal to care team and patient/family/caregiver.   - Collaborate with rehabilitation services on mobility goals if consulted  - Perform Range of Motion  times a day.  - Reposition patient every  hours.  - Dangle patient  times a day  - Stand patient  times a day  - Ambulate patient  times a day  - Out of bed to chair  times a day   - Out of bed for meals  times a day  - Out of bed for toileting  - Record patient progress and toleration of activity level   Outcome: Progressing     Problem: DISCHARGE PLANNING  Goal: Discharge to home or other facility with appropriate resources  Description: INTERVENTIONS:  - Identify barriers to discharge w/patient and caregiver  - Arrange for  needed discharge resources and transportation as appropriate  - Identify discharge learning needs (meds, wound care, etc.)  - Arrange for interpretive services to assist at discharge as needed  - Refer to Case Management Department for coordinating discharge planning if the patient needs post-hospital services based on physician/advanced practitioner order or complex needs related to functional status, cognitive ability, or social support system  Outcome: Progressing     Problem: Knowledge Deficit  Goal: Patient/family/caregiver demonstrates understanding of disease process, treatment plan, medications, and discharge instructions  Description: Complete learning assessment and assess knowledge base.  Interventions:  - Provide teaching at level of understanding  - Provide teaching via preferred learning methods  Outcome: Progressing

## 2024-01-27 VITALS
RESPIRATION RATE: 18 BRPM | BODY MASS INDEX: 30.57 KG/M2 | WEIGHT: 189.38 LBS | SYSTOLIC BLOOD PRESSURE: 138 MMHG | HEART RATE: 75 BPM | OXYGEN SATURATION: 93 % | DIASTOLIC BLOOD PRESSURE: 63 MMHG | TEMPERATURE: 97.9 F

## 2024-01-27 DIAGNOSIS — M79.2 NEUROPATHIC PAIN: ICD-10-CM

## 2024-01-27 LAB
ANION GAP SERPL CALCULATED.3IONS-SCNC: 12 MMOL/L
ATRIAL RATE: 94 BPM
BACTERIA BLD CULT: ABNORMAL
BACTERIA BLD CULT: ABNORMAL
BUN SERPL-MCNC: 12 MG/DL (ref 5–25)
CALCIUM SERPL-MCNC: 8.6 MG/DL (ref 8.4–10.2)
CHLORIDE SERPL-SCNC: 104 MMOL/L (ref 96–108)
CO2 SERPL-SCNC: 21 MMOL/L (ref 21–32)
CREAT SERPL-MCNC: 0.57 MG/DL (ref 0.6–1.3)
E COLI DNA BLD POS QL NAA+NON-PROBE: DETECTED
GFR SERPL CREATININE-BSD FRML MDRD: 93 ML/MIN/1.73SQ M
GLUCOSE SERPL-MCNC: 182 MG/DL (ref 65–140)
GLUCOSE SERPL-MCNC: 183 MG/DL (ref 65–140)
GLUCOSE SERPL-MCNC: 198 MG/DL (ref 65–140)
GLUCOSE SERPL-MCNC: 199 MG/DL (ref 65–140)
GRAM STN SPEC: ABNORMAL
GRAM STN SPEC: ABNORMAL
P AXIS: 51 DEGREES
POTASSIUM SERPL-SCNC: 3.5 MMOL/L (ref 3.5–5.3)
PR INTERVAL: 164 MS
PROCALCITONIN SERPL-MCNC: 3.05 NG/ML
QRS AXIS: -39 DEGREES
QRSD INTERVAL: 96 MS
QT INTERVAL: 342 MS
QTC INTERVAL: 427 MS
SODIUM SERPL-SCNC: 137 MMOL/L (ref 135–147)
T WAVE AXIS: 111 DEGREES
VENTRICULAR RATE: 94 BPM

## 2024-01-27 PROCEDURE — 84145 PROCALCITONIN (PCT): CPT

## 2024-01-27 PROCEDURE — 80048 BASIC METABOLIC PNL TOTAL CA: CPT | Performed by: STUDENT IN AN ORGANIZED HEALTH CARE EDUCATION/TRAINING PROGRAM

## 2024-01-27 PROCEDURE — 99239 HOSP IP/OBS DSCHRG MGMT >30: CPT | Performed by: INTERNAL MEDICINE

## 2024-01-27 PROCEDURE — 82948 REAGENT STRIP/BLOOD GLUCOSE: CPT

## 2024-01-27 RX ORDER — CEPHALEXIN 500 MG/1
500 CAPSULE ORAL EVERY 6 HOURS SCHEDULED
Qty: 44 CAPSULE | Refills: 0 | Status: SHIPPED | OUTPATIENT
Start: 2024-01-27 | End: 2024-02-07

## 2024-01-27 RX ORDER — CEPHALEXIN 500 MG/1
500 CAPSULE ORAL EVERY 6 HOURS SCHEDULED
Qty: 44 CAPSULE | Refills: 0 | Status: SHIPPED | OUTPATIENT
Start: 2024-01-27 | End: 2024-01-27

## 2024-01-27 RX ADMIN — OMEGA-3 FATTY ACIDS CAP 1000 MG 1000 MG: 1000 CAP at 09:35

## 2024-01-27 RX ADMIN — INSULIN LISPRO 1 UNITS: 100 INJECTION, SOLUTION INTRAVENOUS; SUBCUTANEOUS at 09:36

## 2024-01-27 RX ADMIN — INSULIN LISPRO 40 UNITS: 100 INJECTION, SOLUTION INTRAVENOUS; SUBCUTANEOUS at 09:42

## 2024-01-27 RX ADMIN — ENOXAPARIN SODIUM 40 MG: 40 INJECTION SUBCUTANEOUS at 09:35

## 2024-01-27 RX ADMIN — DULOXETINE HYDROCHLORIDE 20 MG: 20 CAPSULE, DELAYED RELEASE ORAL at 09:42

## 2024-01-27 RX ADMIN — GABAPENTIN 600 MG: 300 CAPSULE ORAL at 09:35

## 2024-01-27 RX ADMIN — LEVOTHYROXINE SODIUM 50 MCG: 50 TABLET ORAL at 05:05

## 2024-01-27 RX ADMIN — ACETAMINOPHEN 650 MG: 325 TABLET, FILM COATED ORAL at 02:05

## 2024-01-27 NOTE — INCIDENTAL FINDINGS
The following findings require follow up:  Radiographic finding   Finding:CT AP w contrast: Trace free fluid in the left paracolic gutter and dependent portion of the pelvis, however with no organized collections to suggest an abscess. Endometrial stripe thickened up to 1 cm, which may related to endometrial hyperplasia, endometrial polyp, or possibly neoplasm.    Follow up required: Pelvic US   Follow up should be done within 1 month(s)    Please notify the following clinician to assist with the follow up:   Dr. Roberto Quintero, DO    Email sent to PCP

## 2024-01-27 NOTE — ASSESSMENT & PLAN NOTE
Lab Results   Component Value Date    HGBA1C 11.0 (H) 11/21/2023       Recent Labs     01/26/24  2354 01/27/24  0156 01/27/24  0459 01/27/24  0636   POCGLU 184* 199* 198* 182*         Blood Sugar Average: Last 72 hrs:  (P) [id number]    History of poorly controlled T2DM.  Patient at home takes 60 nightly of long acting insulin and 40 of rapid acting insulin with meals.   Pt was placed on non-dka ggt shortly on 1/26, bridged to Sbq insulin home regimen in the evening    Plan:  Continue with home regimen  Continue with diabetic diet  Continue with frequent blood sugar checks  Follow-up with PCP in 1 week

## 2024-01-27 NOTE — DISCHARGE INSTR - AVS FIRST PAGE
Dear Claudia Gamboa,     It was our pleasure to care for you here at [facility].  It is our hope that we were always able to exceed the expected standards for your care during your stay.  You were hospitalized due to pyelonephritis, uti.  You were cared for on the 4 floor under the service of Cindy Ndiaye MD with the [facility] who covers for your primary care physician (PCP), Roberto Quintero DO, while you were hospitalized.  If you have any questions or concerns related to this hospitalization, you may contact us at .  For follow up as well as medication refills, we recommend that you follow up with your primary care physician.  A registered nurse will reach out to you by phone within a few days after your discharge to answer any additional questions that you may have after going home.  However, at this time we provide for you here, the most important instructions / recommendations at discharge:     Notable Medication Adjustments -   We discharged you on a total of 11 days of Keflex 500 mg four times a day   If you have fevers and chills, back pain, burning on urination , abdominal pain please come back into the ER or call your PCP.   Testing Required after Discharge -   You may need repeat CT scan to ensure that the changes on your CT scan have resolved.   You will need a transvaginal US to investigate the thickening of your uterus, failure to follow up may result in a missed or delayed diagnosis of a potential cancer.   ** Please contact your PCP to request testing orders for any of the testing recommended here **  Important follow up information -   As above .  Other Instructions -   As above   Please review this entire after visit summary as additional general instructions including medication list, appointments, activity, diet, any pertinent wound care, and other additional recommendations from your care team that may be provided for  you.      Sincerely,     Cindy Ndiaye MD

## 2024-01-27 NOTE — PLAN OF CARE
Problem: PAIN - ADULT  Goal: Verbalizes/displays adequate comfort level or baseline comfort level  Description: Interventions:  - Encourage patient to monitor pain and request assistance  - Assess pain using appropriate pain scale  - Administer analgesics based on type and severity of pain and evaluate response  - Implement non-pharmacological measures as appropriate and evaluate response  - Consider cultural and social influences on pain and pain management  - Notify physician/advanced practitioner if interventions unsuccessful or patient reports new pain  Outcome: Progressing     Problem: INFECTION - ADULT  Goal: Absence or prevention of progression during hospitalization  Description: INTERVENTIONS:  - Assess and monitor for signs and symptoms of infection  - Monitor lab/diagnostic results  - Monitor all insertion sites, i.e. indwelling lines, tubes, and drains  - Monitor endotracheal if appropriate and nasal secretions for changes in amount and color  - [facility] appropriate cooling/warming therapies per order  - Administer medications as ordered  - Instruct and encourage patient and family to use good hand hygiene technique  - Identify and instruct in appropriate isolation precautions for identified infection/condition  Outcome: Progressing  Goal: Absence of fever/infection during neutropenic period  Description: INTERVENTIONS:  - Monitor WBC    Outcome: Progressing     Problem: SAFETY ADULT  Goal: Patient will remain free of falls  Description: INTERVENTIONS:  - Educate patient/family on patient safety including physical limitations  - Instruct patient to call for assistance with activity   - Consult OT/PT to assist with strengthening/mobility   - Keep Call bell within reach  - Keep bed low and locked with side rails adjusted as appropriate  - Keep care items and personal belongings within reach  - Initiate and maintain comfort rounds  - Make Fall Risk Sign visible to staff  - Apply yellow socks and bracelet  for high fall risk patients  - Consider moving patient to room near nurses station  Outcome: Progressing  Goal: Maintain or return to baseline ADL function  Description: INTERVENTIONS:  -  Assess patient's ability to carry out ADLs; assess patient's baseline for ADL function and identify physical deficits which impact ability to perform ADLs (bathing, care of mouth/teeth, toileting, grooming, dressing, etc.)  - Assess/evaluate cause of self-care deficits   - Assess range of motion  - Assess patient's mobility; develop plan if impaired  - Assess patient's need for assistive devices and provide as appropriate  - Encourage maximum independence but intervene and supervise when necessary  - Involve family in performance of ADLs  - Assess for home care needs following discharge   - Consider OT consult to assist with ADL evaluation and planning for discharge  - Provide patient education as appropriate  Outcome: Progressing  Goal: Maintains/Returns to pre admission functional level  Description: INTERVENTIONS:  - Perform AM-PAC 6 Click Basic Mobility/ Daily Activity assessment daily.  - Set and communicate daily mobility goal to care team and patient/family/caregiver.   - Collaborate with rehabilitation services on mobility goals if consulted  - Out of bed for toileting  - Record patient progress and toleration of activity level   Outcome: Progressing     Problem: DISCHARGE PLANNING  Goal: Discharge to home or other facility with appropriate resources  Description: INTERVENTIONS:  - Identify barriers to discharge w/patient and caregiver  - Arrange for needed discharge resources and transportation as appropriate  - Identify discharge learning needs (meds, wound care, etc.)  - Arrange for interpretive services to assist at discharge as needed  - Refer to Case Management Department for coordinating discharge planning if the patient needs post-hospital services based on physician/advanced practitioner order or complex needs  related to functional status, cognitive ability, or social support system  Outcome: Progressing     Problem: Knowledge Deficit  Goal: Patient/family/caregiver demonstrates understanding of disease process, treatment plan, medications, and discharge instructions  Description: Complete learning assessment and assess knowledge base.  Interventions:  - Provide teaching at level of understanding  - Provide teaching via preferred learning methods  Outcome: Progressing

## 2024-01-27 NOTE — NURSING NOTE
Patient was ordered 60U of lantus after finishing, insulin drip. I was told to override and give 15U as a one time dose, by Jaja Villarreal.
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
## 2024-01-28 RX ORDER — GABAPENTIN 600 MG/1
TABLET ORAL
Qty: 90 TABLET | Refills: 1 | Status: SHIPPED | OUTPATIENT
Start: 2024-01-28

## 2024-01-29 NOTE — UTILIZATION REVIEW
NOTIFICATION OF ADMISSION DISCHARGE   This is a Notification of Discharge from [facility]. Please be advised that this patient has been discharge from our facility. Below you will find the admission and discharge date and time including the patient’s disposition.   UTILIZATION REVIEW CONTACT:  Ubaldo Herrera  Utilization   Network Utilization Review Department  Phone: [phone] x carefully listen to the prompts. All voicemails are confidential.  Email: [email or web address]     ADMISSION INFORMATION  PRESENTATION DATE: 1/24/2024 12:37 PM  OBERVATION ADMISSION DATE:   INPATIENT ADMISSION DATE: 1/25/24  2:06 PM   DISCHARGE DATE: 1/27/2024 10:44 AM   DISPOSITION:Home/Self Care    Network Utilization Review Department  ATTENTION: Please call with any questions or concerns to [phone] and carefully listen to the prompts so that you are directed to the right person. All voicemails are confidential.   For Discharge needs, contact Care Management DC Support Team at [phone] opt. 2  Send all requests for admission clinical reviews, approved or denied determinations and any other requests to dedicated fax number below belonging to the campus where the patient is receiving treatment. List of dedicated fax numbers for the Facilities:  FACILITY NAME UR FAX NUMBER   ADMISSION DENIALS (Administrative/Medical Necessity) [phone]   DISCHARGE SUPPORT TEAM ([facility]) [phone]   PARENT CHILD HEALTH (Maternity/NICU/Pediatrics) [phone]   [facility] [phone]   [facility] [phone]   [facility] [phone]   [facility] [phone]   [facility] [phone]   [facility] [phone]   [facility] [phone]   [facility] [phone]   [facility]  [facility] [phone]   [facility] [phone]   [facility] [phone]   [facility] [phone]

## 2024-01-30 ENCOUNTER — APPOINTMENT (OUTPATIENT)
Dept: PHYSICAL THERAPY | Facility: MEDICAL CENTER | Age: 74
End: 2024-01-30
Payer: COMMERCIAL

## 2024-01-30 ENCOUNTER — APPOINTMENT (OUTPATIENT)
Dept: RADIOLOGY | Facility: AMBULARY SURGERY CENTER | Age: 74
End: 2024-01-30
Attending: STUDENT IN AN ORGANIZED HEALTH CARE EDUCATION/TRAINING PROGRAM
Payer: COMMERCIAL

## 2024-01-30 ENCOUNTER — OFFICE VISIT (OUTPATIENT)
Dept: OBGYN CLINIC | Facility: CLINIC | Age: 74
End: 2024-01-30
Payer: COMMERCIAL

## 2024-01-30 ENCOUNTER — TRANSITIONAL CARE MANAGEMENT (OUTPATIENT)
Dept: FAMILY MEDICINE CLINIC | Facility: MEDICAL CENTER | Age: 74
End: 2024-01-30

## 2024-01-30 VITALS — HEIGHT: 66 IN | WEIGHT: 189.38 LBS | BODY MASS INDEX: 30.44 KG/M2

## 2024-01-30 DIAGNOSIS — S42.294A OTHER CLOSED NONDISPLACED FRACTURE OF PROXIMAL END OF RIGHT HUMERUS, INITIAL ENCOUNTER: ICD-10-CM

## 2024-01-30 DIAGNOSIS — S42.294A OTHER CLOSED NONDISPLACED FRACTURE OF PROXIMAL END OF RIGHT HUMERUS, INITIAL ENCOUNTER: Primary | ICD-10-CM

## 2024-01-30 DIAGNOSIS — E11.42 DIABETIC POLYNEUROPATHY ASSOCIATED WITH TYPE 2 DIABETES MELLITUS (HCC): ICD-10-CM

## 2024-01-30 PROCEDURE — 99213 OFFICE O/P EST LOW 20 MIN: CPT | Performed by: STUDENT IN AN ORGANIZED HEALTH CARE EDUCATION/TRAINING PROGRAM

## 2024-01-30 PROCEDURE — 73030 X-RAY EXAM OF SHOULDER: CPT
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
## 2024-01-31 ENCOUNTER — OFFICE VISIT (OUTPATIENT)
Dept: FAMILY MEDICINE CLINIC | Facility: MEDICAL CENTER | Age: 74
End: 2024-01-31
Payer: COMMERCIAL

## 2024-01-31 VITALS
WEIGHT: 186 LBS | SYSTOLIC BLOOD PRESSURE: 128 MMHG | HEART RATE: 75 BPM | RESPIRATION RATE: 16 BRPM | OXYGEN SATURATION: 97 % | BODY MASS INDEX: 29.89 KG/M2 | DIASTOLIC BLOOD PRESSURE: 78 MMHG | TEMPERATURE: 97 F | HEIGHT: 66 IN

## 2024-01-31 DIAGNOSIS — R93.89 INCREASED ENDOMETRIAL STRIPE THICKNESS: ICD-10-CM

## 2024-01-31 DIAGNOSIS — Z86.2 HISTORY OF ANEMIA: ICD-10-CM

## 2024-01-31 DIAGNOSIS — Z76.89 ENCOUNTER FOR SUPPORT AND COORDINATION OF TRANSITION OF CARE: Primary | ICD-10-CM

## 2024-01-31 DIAGNOSIS — L89.151 PRESSURE INJURY OF SACRAL REGION, STAGE 1: ICD-10-CM

## 2024-01-31 PROCEDURE — 99496 TRANSJ CARE MGMT HIGH F2F 7D: CPT | Performed by: STUDENT IN AN ORGANIZED HEALTH CARE EDUCATION/TRAINING PROGRAM
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
## 2024-02-01 ENCOUNTER — OFFICE VISIT (OUTPATIENT)
Dept: PHYSICAL THERAPY | Facility: MEDICAL CENTER | Age: 74
End: 2024-02-01
Payer: COMMERCIAL

## 2024-02-01 DIAGNOSIS — S42.294A OTHER CLOSED NONDISPLACED FRACTURE OF PROXIMAL END OF RIGHT HUMERUS, INITIAL ENCOUNTER: Primary | ICD-10-CM

## 2024-02-01 PROCEDURE — 97140 MANUAL THERAPY 1/> REGIONS: CPT

## 2024-02-01 PROCEDURE — 97110 THERAPEUTIC EXERCISES: CPT

## 2024-02-01 PROCEDURE — 97112 NEUROMUSCULAR REEDUCATION: CPT

## 2024-02-01 NOTE — PROGRESS NOTES
"Daily Note     Today's date: 2024  Patient name: Claudia Gamboa  : 1950  MRN: [id number]  Referring provider: Shelton Angeles DO  Dx:   Encounter Diagnosis     ICD-10-CM    1. Other closed nondisplaced fracture of proximal end of right humerus, initial encounter  S42.294A           Start Time: 1350  Stop Time: 1445  Total time in clinic (min): 55 minutes  Eval/Re-Eval POC Expires Auth #/ Referral # Total Visits Start Date Expiration Date Extension Info Visits Limitation        [id number] 1/11 3/31                                                                                1 2 3 4 5 6     RE      7 8 9 10 11 12                 13 14 15 16 17 18                 19 20 21 22 23 24                 25 26 27 28 29 30                   Subjective: \"I'm feeling pretty good.\"      Objective: See treatment diary below      Assessment: Tolerated treatment well. Initiated gentle R UE strengthening with good tolerance from the patient today, displaying only minor R shoulder sx throughout the session. Attempted to progress protraction from active assist to active with poor tolerance, pt unable to perform 2/to increased shoulder pain. She reported UE fatigue at the end of the session. Patient would benefit from continued PT      Plan: Continue per plan of care.  Patient to go on 11 day vacation. HEP provided (see below) to cont with current POC while away. Pt to be RE upon return.     Precautions:   Past Medical History:   Diagnosis Date    Acute cystitis without hematuria 08/10/2018    Anemia 2018    Aortic aneurysm, abdominal (HCC)     noted on cat scan from 2020    Cataract     Closed left hip fracture (HCC) 08/10/2018    Colon polyp     Diabetes mellitus (HCC)     Hyperlipemia     Migraines     Multiple falls     Osteoporosis 2019    Shingles 2016    Urinary tract infection 2022     Allergies   Allergen Reactions    Zoledronic Acid GI Intolerance     Anorexia    Codeine GI " "Intolerance, Nausea Only and Vomiting       Access Code: [email or web address]  URL: [email or web address]  Date: 01/12/2024  Prepared by: Dallas Mathew    Exercises  - Supine Shoulder Flexion Extension AAROM with Dowel  - 1 x daily - 7 x weekly - 1 sets - 15 reps - 3 second hold  - Supine Shoulder External Rotation with Dowel  - 1 x daily - 7 x weekly - 1 sets - 15 reps - 3 second hold  - Sitting Scapular Squeezes  - 1 x daily - 7 x weekly - 3 sets - 10 reps - 3 second hold  - Seated Shoulder Scaption Slide at Table Top with Forearm in Neutral  - 1 x daily - 7 x weekly - 1 sets - 15 reps  - Towel Squeeze  - 1 x daily - 7 x weekly - 1 sets - 20 reps - 3 second hold  - Seated Upper Trapezius Stretch  - 1 x daily - 7 x weekly - 3 sets - 30 second hold    Access Code: [email or web address]  URL: [email or web address]  Date: 02/01/2024  Prepared by: Dallas Mathew    Exercises  - Standing Isometric Shoulder Internal Rotation at Doorway  - 1 x daily - 7 x weekly - 2 sets - 10 reps - 3 seconds hold  - Standing Isometric Shoulder External Rotation with Doorway  - 1 x daily - 7 x weekly - 2 sets - 10 reps - 3 seconds hold  - Standing Isometric Shoulder Flexion with Doorway - Arm Bent  - 1 x daily - 7 x weekly - 2 sets - 10 reps - 3 seconds hold  - Standing Isometric Shoulder Extension with Doorway - Arm Bent  - 1 x daily - 7 x weekly - 2 sets - 10 reps - 3 seconds hold  - Standing Shoulder Row with Anchored Resistance  - 1 x daily - 7 x weekly - 3 sets - 10 reps - 3 seconds hold    Manuals 1/12 1/17 1/19 2/1         R shoulder PROM  CM CM CM         R shoulder mobs  CM CM CM                                   Neuro Re-Ed             HEP review 25'            Pt education             Pendulums   30\" ea cw/ccw/fwd/back 30\" ea cw/ccw/fwd/back 30\" ea cw/ccw/fwd/back         Scap squeezes  2x10 3\" 2x10 3\"          Sup pro  2x10 dowel 2x10 dowel P!         Rows     3x10 blk tb         LPD    3x10 gtb         Supine ER/IR MRE    Elbow " "at side 2x10 ea         Supine alternating isometrics    2x30\"                      Ther Ex             Table slides  1x10 5\" ea flx/abd 1x10 5\" ea flx/abd          Supine cane AAROM  1x10 flx 3\", 1x10 ER 3\" 1x10 flx 3\"           squeeze  20x 3\" 7# 30x 3\" 7#          Finger ext  20x yellow  30x yellow          Elbow flx/ext  20x ea dowel 20x ea dowel          4 way isometrics    2x10 ea 3\"                                   Ther Activity                                       Gait Training                                       Modalities                                            "

## 2024-02-12 DIAGNOSIS — E78.1 HYPERTRIGLYCERIDEMIA: ICD-10-CM

## 2024-02-13 ENCOUNTER — APPOINTMENT (OUTPATIENT)
Dept: PHYSICAL THERAPY | Facility: MEDICAL CENTER | Age: 74
End: 2024-02-13
Payer: COMMERCIAL

## 2024-02-14 ENCOUNTER — HOSPITAL ENCOUNTER (OUTPATIENT)
Dept: RADIOLOGY | Facility: MEDICAL CENTER | Age: 74
Discharge: HOME | End: 2024-02-14
Payer: COMMERCIAL

## 2024-02-14 ENCOUNTER — OFFICE VISIT (OUTPATIENT)
Dept: FAMILY MEDICINE CLINIC | Facility: MEDICAL CENTER | Age: 74
End: 2024-02-14
Payer: COMMERCIAL

## 2024-02-14 VITALS — BODY MASS INDEX: 29.73 KG/M2 | HEIGHT: 66 IN | WEIGHT: 185 LBS

## 2024-02-14 DIAGNOSIS — R93.89 INCREASED ENDOMETRIAL STRIPE THICKNESS: ICD-10-CM

## 2024-02-14 DIAGNOSIS — R30.0 DYSURIA: Primary | ICD-10-CM

## 2024-02-14 LAB
SL AMB  POCT GLUCOSE, UA: ABNORMAL
SL AMB LEUKOCYTE ESTERASE,UA: ABNORMAL
SL AMB POCT BILIRUBIN,UA: ABNORMAL
SL AMB POCT BLOOD,UA: ABNORMAL
SL AMB POCT CLARITY,UA: CLEAR
SL AMB POCT COLOR,UA: YELLOW
SL AMB POCT KETONES,UA: ABNORMAL
SL AMB POCT NITRITE,UA: ABNORMAL
SL AMB POCT PH,UA: 6
SL AMB POCT SPECIFIC GRAVITY,UA: 1.02
SL AMB POCT URINE PROTEIN: ABNORMAL
SL AMB POCT UROBILINOGEN: ABNORMAL

## 2024-02-14 PROCEDURE — 81002 URINALYSIS NONAUTO W/O SCOPE: CPT | Performed by: STUDENT IN AN ORGANIZED HEALTH CARE EDUCATION/TRAINING PROGRAM

## 2024-02-14 PROCEDURE — 76856 US EXAM PELVIC COMPLETE: CPT

## 2024-02-14 PROCEDURE — 99211 OFF/OP EST MAY X REQ PHY/QHP: CPT | Performed by: STUDENT IN AN ORGANIZED HEALTH CARE EDUCATION/TRAINING PROGRAM

## 2024-02-14 PROCEDURE — 87077 CULTURE AEROBIC IDENTIFY: CPT | Performed by: STUDENT IN AN ORGANIZED HEALTH CARE EDUCATION/TRAINING PROGRAM

## 2024-02-14 PROCEDURE — 87086 URINE CULTURE/COLONY COUNT: CPT | Performed by: STUDENT IN AN ORGANIZED HEALTH CARE EDUCATION/TRAINING PROGRAM

## 2024-02-14 PROCEDURE — 76830 TRANSVAGINAL US NON-OB: CPT

## 2024-02-14 PROCEDURE — 87186 SC STD MICRODIL/AGAR DIL: CPT | Performed by: STUDENT IN AN ORGANIZED HEALTH CARE EDUCATION/TRAINING PROGRAM

## 2024-02-14 RX ORDER — ATORVASTATIN CALCIUM 80 MG/1
TABLET, FILM COATED ORAL
Qty: 90 TABLET | Refills: 3 | Status: SHIPPED | OUTPATIENT
Start: 2024-02-14

## 2024-02-15 ENCOUNTER — OFFICE VISIT (OUTPATIENT)
Dept: PHYSICAL THERAPY | Facility: MEDICAL CENTER | Age: 74
End: 2024-02-15
Payer: COMMERCIAL

## 2024-02-15 DIAGNOSIS — S42.294A OTHER CLOSED NONDISPLACED FRACTURE OF PROXIMAL END OF RIGHT HUMERUS, INITIAL ENCOUNTER: Primary | ICD-10-CM

## 2024-02-15 PROCEDURE — 97112 NEUROMUSCULAR REEDUCATION: CPT

## 2024-02-15 PROCEDURE — 97110 THERAPEUTIC EXERCISES: CPT

## 2024-02-15 NOTE — PROGRESS NOTES
Daily Note     Today's date: 2/15/2024  Patient name: Claudia Gamboa  : 1950  MRN: [id number]  Referring provider: Shelton Angeles DO  Dx:   Encounter Diagnosis     ICD-10-CM    1. Other closed nondisplaced fracture of proximal end of right humerus, initial encounter  S42.294A           Start Time: 1215  Stop Time: 1300  Total time in clinic (min): 45 minutes  Eval/Re-Eval POC Expires Auth #/ Referral # Total Visits Start Date Expiration Date Extension Info Visits Limitation        [id number] 12 1/11 3/31                                                                                1 2 3 4 5 6   1/12  1/17 1/19 2/1  2/15 (RE)      7 8 9 10 11 12                 13 14 15 16 17 18                 19 20 21 22 23 24                 25 26 27 28 29 30                   Subjective: Patient presents to PT today following 2 week absence from going on a cruise. She reports mild L shoulder sx upon arrival today but reports receiving a massage while on the cruise, stating she has had increased soreness since then.      Objective: See treatment diary below  Impairment based goals  Patient will demonstrate improvements in L shoulder AROM consistent with current phase of tx protocol. - ongoing  Patient will improve L shoulder strength to 4/5 in all planes within 6 weeks. - met  Patient will improve L shoulder strength to 5/5 in all planes by time of d/c. - not met  Patient will improve L elbow to 4/5 in all planes within 6 weeks. - met  Patient will improve L elbow to 5/5 in all planes by time of d/c. - met     Functional based goals to be completed by time of d/c  Patient will be able to perform all ADLs and grooming activities with minimal c/o L UE sx. - met  Patient will improve FOTO to greater than predicted value. - not met  Patient will be independent with home exercise program. - not met  Patient will be able to manage symptoms independently. - not met       SENSATION    LEFT RIGHT   C2-C3 Intact Intact   C4  Intact Intact   C5 Intact Intact   C6 Intact Intact   C7 Intact Intact   C8 Intact Intact   T1 Intact Intact   T2 Intact Intact      REFLEXES    LEFT RIGHT   TRICEPS 2+ 2+   BICEPS 2+ 2+   BR 2+ 2+      POSTURAL FINDINGS  Head Position X Protracted   Neutral   Retracted   Scapular Position   Protracted X  Neutral   Retracted   Thoracic Spine  X Inc Kyphosis   Neutral                CERVICAL         AROM- all 100%     RIGHT LEFT   FLX     EXT     SB       ROT       PRO     RET               MUSCLE LENGTH     RIGHT LEFT   UPPER TRAP decreased     SCM       LEV SCAP             SHOULDER             AROM PROM STRENGTH     RIGHT LEFT- WFL all RIGHT LEFT RIGHT LEFT   * P!   90* P!    4/5 P! 5/5    * P!   90* P!    4/5 P!  5/5   EXT               ER        4/5 P!  5/5    IR         4+/5  5/5   U. TRAP         5/5 5/5    M. TRAP               L. TRAP               PROTRACTION               RHOMBOIDS                        ELBOW             AROM- WFL all PROM STRENGTH - DNT     RIGHT LEFT RIGHT LEFT RIGHT LEFT   FLX          5/5 5/5    EXT          5/5 5/5   SUP               PRO                                       WRIST             AROM PROM STRENGTH     RIGHT LEFT RIGHT LEFT RIGHT LEFT   FLX         5/5 5/5   EXT         5/5 5/5   RAD. DEV         5/5 5/5   ULN. DEV         5/5 5/5       Assessment: Tolerated treatment well. Re-evaluation was performed today and the patient has demonstrated improvements in R UE strength and ROM, functional mobility and activity tolerance. Patient still presents with deficits of R UE WB intolerance, decreased shoulder and periscapular strength and decreased functional mobility, resulting in pain with ADLs. Patient intolerant of PROM, demonstrating decreased available ROM as compared with active movements with empty end feels noted. Patient asymptomatic with AAROM exercises. Initiated gentle SL AROM with fair tolerance; no sx noted with ER but patient reported intense  shoulder pain following 1x7 of SL flx. Sx resolution noted following seated rest x1 min. Will re-attempt NV. Patient would benefit from continued PT      Plan: Continue per plan of care.       Precautions:   Past Medical History:   Diagnosis Date    Acute cystitis without hematuria 08/10/2018    Anemia 8/13/2018    Aortic aneurysm, abdominal (HCC)     noted on cat scan from 2/2020    Cataract     Closed left hip fracture (HCC) 08/10/2018    Colon polyp     Diabetes mellitus (HCC)     Hyperlipemia     Migraines     Multiple falls     Osteoporosis 02/22/2019    Shingles 7/2016    Urinary tract infection 5/2022     Allergies   Allergen Reactions    Zoledronic Acid GI Intolerance     Anorexia    Codeine GI Intolerance, Nausea Only and Vomiting       Access Code: [email or web address]  URL: [email or web address]  Date: 01/12/2024  Prepared by: Dallas Mathew    Exercises  - Supine Shoulder Flexion Extension AAROM with Dowel  - 1 x daily - 7 x weekly - 1 sets - 15 reps - 3 second hold  - Supine Shoulder External Rotation with Dowel  - 1 x daily - 7 x weekly - 1 sets - 15 reps - 3 second hold  - Sitting Scapular Squeezes  - 1 x daily - 7 x weekly - 3 sets - 10 reps - 3 second hold  - Seated Shoulder Scaption Slide at Table Top with Forearm in Neutral  - 1 x daily - 7 x weekly - 1 sets - 15 reps  - Towel Squeeze  - 1 x daily - 7 x weekly - 1 sets - 20 reps - 3 second hold  - Seated Upper Trapezius Stretch  - 1 x daily - 7 x weekly - 3 sets - 30 second hold    Access Code: [email or web address]  URL: [email or web address]  Date: 02/01/2024  Prepared by: Dallas Mathew    Exercises  - Standing Isometric Shoulder Internal Rotation at Doorway  - 1 x daily - 7 x weekly - 2 sets - 10 reps - 3 seconds hold  - Standing Isometric Shoulder External Rotation with Doorway  - 1 x daily - 7 x weekly - 2 sets - 10 reps - 3 seconds hold  - Standing Isometric Shoulder Flexion with Doorway - Arm Bent  - 1 x daily - 7 x weekly - 2 sets - 10 reps  "- 3 seconds hold  - Standing Isometric Shoulder Extension with Doorway - Arm Bent  - 1 x daily - 7 x weekly - 2 sets - 10 reps - 3 seconds hold  - Standing Shoulder Row with Anchored Resistance  - 1 x daily - 7 x weekly - 3 sets - 10 reps - 3 seconds hold    Manuals 1/12 1/17 1/19 2/1 2/15        R shoulder PROM  CM CM CM CM        R shoulder mobs  CM CM CM CM                                  Neuro Re-Ed             HEP review 25'            Pt education     10'        Pendulums   30\" ea cw/ccw/fwd/back 30\" ea cw/ccw/fwd/back 30\" ea cw/ccw/fwd/back 30\" ea cw/ccw/fwd/back        Scap squeezes  2x10 3\" 2x10 3\"          Sup pro  2x10 dowel 2x10 dowel P!         Rows     3x10 blk tb Resume NV        LPD    3x10 gtb Resume NV        Supine ER/IR MRE    Elbow at side 2x10 ea Elbow at side 2x10 ea        Supine alternating isometrics    2x30\" 3x30\"                     Ther Ex             Table slides  1x10 5\" ea flx/abd 1x10 5\" ea flx/abd  1x10 5\" ea flx/abd        Supine cane AAROM  1x10 flx 3\", 1x10 ER 3\" 1x10 flx 3\"           squeeze  20x 3\" 7# 30x 3\" 7#          Finger ext  20x yellow  30x yellow          Elbow flx/ext  20x ea dowel 20x ea dowel          4 way isometrics    2x10 ea 3\"         Pulleys      5'        SL ER     2x10        SL flx     1x7 P!        SL abd     2x10                     Ther Activity                                       Gait Training                                       Modalities                                            "

## 2024-02-16 ENCOUNTER — TELEPHONE (OUTPATIENT)
Dept: FAMILY MEDICINE CLINIC | Facility: MEDICAL CENTER | Age: 74
End: 2024-02-16

## 2024-02-16 ENCOUNTER — TELEPHONE (OUTPATIENT)
Dept: ENDOCRINOLOGY | Facility: CLINIC | Age: 74
End: 2024-02-16

## 2024-02-16 DIAGNOSIS — N39.0 E. COLI UTI: Primary | ICD-10-CM

## 2024-02-16 DIAGNOSIS — B96.20 E. COLI UTI: Primary | ICD-10-CM

## 2024-02-16 RX ORDER — SULFAMETHOXAZOLE AND TRIMETHOPRIM 800; 160 MG/1; MG/1
1 TABLET ORAL 2 TIMES DAILY
Qty: 14 TABLET | Refills: 0 | Status: SHIPPED | OUTPATIENT
Start: 2024-02-16 | End: 2024-02-23

## 2024-02-16 NOTE — TELEPHONE ENCOUNTER
WHEN PT CALLS BACK PLEASE GIVE HER THE MSG BELOW:          ----- Message from Roberto Quintero DO sent at 2/16/2024  8:47 AM EST -----  Please inform patient urine culture with E. coli, would like to treat with Bactrim twice a day x 7 days, Rx sent to pharmacy.

## 2024-02-17 LAB
BACTERIA UR CULT: ABNORMAL
BACTERIA UR CULT: ABNORMAL

## 2024-02-19 ENCOUNTER — TELEPHONE (OUTPATIENT)
Age: 74
End: 2024-02-19

## 2024-02-19 DIAGNOSIS — R82.90 ABNORMAL URINE FINDINGS: Primary | ICD-10-CM

## 2024-02-19 RX ORDER — NITROFURANTOIN 25; 75 MG/1; MG/1
100 CAPSULE ORAL 2 TIMES DAILY
Qty: 10 CAPSULE | Refills: 0 | Status: SHIPPED | OUTPATIENT
Start: 2024-02-19 | End: 2024-02-24

## 2024-02-19 NOTE — TELEPHONE ENCOUNTER
Relayed message to patient regarding 2nd antibiotic sent to pharmacy.  Told the patient a detailed message had been left on her voicemail.

## 2024-02-21 ENCOUNTER — OFFICE VISIT (OUTPATIENT)
Dept: PHYSICAL THERAPY | Facility: MEDICAL CENTER | Age: 74
End: 2024-02-21
Payer: COMMERCIAL

## 2024-02-21 DIAGNOSIS — S42.294A OTHER CLOSED NONDISPLACED FRACTURE OF PROXIMAL END OF RIGHT HUMERUS, INITIAL ENCOUNTER: Primary | ICD-10-CM
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
## 2024-02-21 PROBLEM — A41.9 SEVERE SEPSIS (HCC): Status: RESOLVED | Noted: 2024-01-24 | Resolved: 2024-02-21

## 2024-02-21 PROBLEM — N30.00 ACUTE CYSTITIS WITHOUT HEMATURIA: Status: RESOLVED | Noted: 2018-08-10 | Resolved: 2024-02-21

## 2024-02-21 PROBLEM — R65.20 SEVERE SEPSIS (HCC): Status: RESOLVED | Noted: 2024-01-24 | Resolved: 2024-02-21

## 2024-02-21 PROCEDURE — 97110 THERAPEUTIC EXERCISES: CPT

## 2024-02-21 PROCEDURE — 97112 NEUROMUSCULAR REEDUCATION: CPT

## 2024-02-21 NOTE — PROGRESS NOTES
Daily Note     Today's date: 2024  Patient name: Claudia Gamboa  : 1950  MRN: [id number]  Referring provider: Shelton Angeles DO  Dx:   Encounter Diagnosis     ICD-10-CM    1. Other closed nondisplaced fracture of proximal end of right humerus, initial encounter  S42.294A           Start Time: 1230  Stop Time: 1315  Total time in clinic (min): 45 minutes  Eval/Re-Eval POC Expires Auth #/ Referral # Total Visits Start Date Expiration Date Extension Info Visits Limitation        [id number] 12 1/11 3/31                                                                                1 2 3 4 5 6   1/12  1/17 1/19 2/1  2/15 (RE)      7 8 9 10 11 12                 13 14 15 16 17 18                 19 20 21 22 23 24                 25 26 27 28 29 30                   Subjective: Patient reports      Objective: See treatment diary below      Assessment: Tolerated treatment well. PROM to be d/c 2/to patient intolerance; vast improvements in available ROM, as well as signif reductions in pain and patient apprehension noted with patient AAROM, as compared with PT PROM. SL flx again brought about severe L upper arm pain; patient description of pain consistent with muscle fatigue/weakness and pt educated on typical sx responses to exercises. VC to reduce shoulder hike and to promote UE relaxation with AAROM effective in improving available ROM and reducing soreness during. Patient would benefit from continued PT      Plan: Continue per plan of care.       Precautions:   Past Medical History:   Diagnosis Date    Acute cystitis without hematuria 08/10/2018    Anemia 2018    Aortic aneurysm, abdominal (HCC)     noted on cat scan from 2020    Cataract     Closed left hip fracture (HCC) 08/10/2018    Colon polyp     Diabetes mellitus (HCC)     Hyperlipemia     Migraines     Multiple falls     Osteoporosis 2019    Shingles 2016    Urinary tract infection 2022     Allergies   Allergen Reactions     "Zoledronic Acid GI Intolerance     Anorexia    Codeine GI Intolerance, Nausea Only and Vomiting       Access Code: [email or web address]  URL: [email or web address]  Date: 01/12/2024  Prepared by: Dallas Mathew    Exercises  - Supine Shoulder Flexion Extension AAROM with Dowel  - 1 x daily - 7 x weekly - 1 sets - 15 reps - 3 second hold  - Supine Shoulder External Rotation with Dowel  - 1 x daily - 7 x weekly - 1 sets - 15 reps - 3 second hold  - Sitting Scapular Squeezes  - 1 x daily - 7 x weekly - 3 sets - 10 reps - 3 second hold  - Seated Shoulder Scaption Slide at Table Top with Forearm in Neutral  - 1 x daily - 7 x weekly - 1 sets - 15 reps  - Towel Squeeze  - 1 x daily - 7 x weekly - 1 sets - 20 reps - 3 second hold  - Seated Upper Trapezius Stretch  - 1 x daily - 7 x weekly - 3 sets - 30 second hold    Access Code: [email or web address]  URL: [email or web address]  Date: 02/01/2024  Prepared by: Dallas Mathew    Exercises  - Standing Isometric Shoulder Internal Rotation at Doorway  - 1 x daily - 7 x weekly - 2 sets - 10 reps - 3 seconds hold  - Standing Isometric Shoulder External Rotation with Doorway  - 1 x daily - 7 x weekly - 2 sets - 10 reps - 3 seconds hold  - Standing Isometric Shoulder Flexion with Doorway - Arm Bent  - 1 x daily - 7 x weekly - 2 sets - 10 reps - 3 seconds hold  - Standing Isometric Shoulder Extension with Doorway - Arm Bent  - 1 x daily - 7 x weekly - 2 sets - 10 reps - 3 seconds hold  - Standing Shoulder Row with Anchored Resistance  - 1 x daily - 7 x weekly - 3 sets - 10 reps - 3 seconds hold    Manuals 1/12 1/17 1/19 2/1 2/15 2/21       R shoulder PROM  CM CM CM CM P! D/c       R shoulder mobs  CM CM CM CM                                  Neuro Re-Ed             HEP review 25'            Pt education     10' 10'       Pendulums   30\" ea cw/ccw/fwd/back 30\" ea cw/ccw/fwd/back 30\" ea cw/ccw/fwd/back 30\" ea cw/ccw/fwd/back        Scap squeezes  2x10 3\" 2x10 3\"          Sup pro  2x10 " "dowel 2x10 dowel P!         Rows     3x10 blk tb Resume NV 3x10 blk tb       LPD    3x10 gtb Resume NV Held        Supine ER/IR MRE    Elbow at side 2x10 ea Elbow at side 2x10 ea Elbow at side 2x10 ea        Supine alternating isometrics    2x30\" 3x30\" 3x30\" elbow at side                    Ther Ex             Table slides  1x10 5\" ea flx/abd 1x10 5\" ea flx/abd  1x10 5\" ea flx/abd 1x10 5\" ea flx/abd       Supine cane AAROM  1x10 flx 3\", 1x10 ER 3\" 1x10 flx 3\"           squeeze  20x 3\" 7# 30x 3\" 7#          Finger ext  20x yellow  30x yellow          Elbow flx/ext  20x ea dowel 20x ea dowel          4 way isometrics    2x10 ea 3\"         Pulleys      5' 5'       SL ER     2x10 2x10       SL flx     1x7 P! 3x5 P!       SL abd     2x10 2x10                    Ther Activity                                       Gait Training                                       Modalities                                            "

## 2024-02-22 ENCOUNTER — TELEPHONE (OUTPATIENT)
Age: 74
End: 2024-02-22

## 2024-02-22 NOTE — TELEPHONE ENCOUNTER
Monica from [facility] called to report significant finding of US of the Pelvis.    Report can be seen in Epic    Warm Transfer Unavailable (WTU)

## 2024-02-23 ENCOUNTER — OFFICE VISIT (OUTPATIENT)
Dept: PHYSICAL THERAPY | Facility: MEDICAL CENTER | Age: 74
End: 2024-02-23
Payer: COMMERCIAL

## 2024-02-23 DIAGNOSIS — S42.294A OTHER CLOSED NONDISPLACED FRACTURE OF PROXIMAL END OF RIGHT HUMERUS, INITIAL ENCOUNTER: Primary | ICD-10-CM

## 2024-02-23 PROCEDURE — 97110 THERAPEUTIC EXERCISES: CPT

## 2024-02-23 PROCEDURE — 97112 NEUROMUSCULAR REEDUCATION: CPT

## 2024-02-23 NOTE — PROGRESS NOTES
Daily Note     Today's date: 2024  Patient name: Claudia Gamboa  : 1950  MRN: [id number]  Referring provider: Shelton Angeles DO  Dx:   Encounter Diagnosis     ICD-10-CM    1. Other closed nondisplaced fracture of proximal end of right humerus, initial encounter  S42.294A           Start Time: 1220  Stop Time: 1305  Total time in clinic (min): 45 minutes  Eval/Re-Eval POC Expires Auth #/ Referral # Total Visits Start Date Expiration Date Extension Info Visits Limitation        [id number] 12 1/11 3/31                                                                                1 2 3 4 5 6   1/12  1/17 1/19 2/1  2/15 (RE)      7 8 9 10 11 12                 13 14 15 16 17 18                 19 20 21 22 23 24                 25 26 27 28 29 30                   Subjective: Patient reports feeling good upon arrival today.      Objective: See treatment diary below      Assessment: Tolerated treatment well. Fatigue and high sx irritability present with active ER again. Asymptomatic with all other motions during today's session. Patient would benefit from continued PT      Plan: Continue per plan of care.       Precautions:   Past Medical History:   Diagnosis Date    Acute cystitis without hematuria 08/10/2018    Anemia 2018    Aortic aneurysm, abdominal (HCC)     noted on cat scan from 2020    Cataract     Closed left hip fracture (HCC) 08/10/2018    Colon polyp     Diabetes mellitus (HCC)     Hyperlipemia     Migraines     Multiple falls     Osteoporosis 2019    Shingles 2016    Urinary tract infection 2022     Allergies   Allergen Reactions    Zoledronic Acid GI Intolerance     Anorexia    Codeine GI Intolerance, Nausea Only and Vomiting       Access Code: [email or web address]  URL: [email or web address]  Date: 2024  Prepared by: Dallas Mathew    Exercises  - Supine Shoulder Flexion Extension AAROM with Dowel  - 1 x daily - 7 x weekly - 1 sets - 15 reps - 3 second hold  -  "Supine Shoulder External Rotation with Dowel  - 1 x daily - 7 x weekly - 1 sets - 15 reps - 3 second hold  - Sitting Scapular Squeezes  - 1 x daily - 7 x weekly - 3 sets - 10 reps - 3 second hold  - Seated Shoulder Scaption Slide at Table Top with Forearm in Neutral  - 1 x daily - 7 x weekly - 1 sets - 15 reps  - Towel Squeeze  - 1 x daily - 7 x weekly - 1 sets - 20 reps - 3 second hold  - Seated Upper Trapezius Stretch  - 1 x daily - 7 x weekly - 3 sets - 30 second hold    Access Code: [email or web address]  URL: [email or web address]  Date: 02/01/2024  Prepared by: Dallas Mathew    Exercises  - Standing Isometric Shoulder Internal Rotation at Doorway  - 1 x daily - 7 x weekly - 2 sets - 10 reps - 3 seconds hold  - Standing Isometric Shoulder External Rotation with Doorway  - 1 x daily - 7 x weekly - 2 sets - 10 reps - 3 seconds hold  - Standing Isometric Shoulder Flexion with Doorway - Arm Bent  - 1 x daily - 7 x weekly - 2 sets - 10 reps - 3 seconds hold  - Standing Isometric Shoulder Extension with Doorway - Arm Bent  - 1 x daily - 7 x weekly - 2 sets - 10 reps - 3 seconds hold  - Standing Shoulder Row with Anchored Resistance  - 1 x daily - 7 x weekly - 3 sets - 10 reps - 3 seconds hold    Manuals 1/12 1/17 1/19 2/1 2/15 2/21 2/23      R shoulder PROM  CM CM CM CM P! D/c       R shoulder mobs  CM CM CM CM                                  Neuro Re-Ed             HEP review 25'            Pt education     10' 10'       Pendulums   30\" ea cw/ccw/fwd/back 30\" ea cw/ccw/fwd/back 30\" ea cw/ccw/fwd/back 30\" ea cw/ccw/fwd/back        Scap squeezes  2x10 3\" 2x10 3\"          Sup pro  2x10 dowel 2x10 dowel P!         Rows     3x10 blk tb Resume NV 3x10 blk tb 3x10 blk tb      LPD    3x10 gtb Resume NV Held  3x10 gtb      Supine ER/IR MRE    Elbow at side 2x10 ea Elbow at side 2x10 ea Elbow at side 2x10 ea  Elbow at side 2x10 ea       Supine alternating isometrics    2x30\" 3x30\" 3x30\" elbow at side 3x30\" elbow at side       " "            Ther Ex             Table slides  1x10 5\" ea flx/abd 1x10 5\" ea flx/abd  1x10 5\" ea flx/abd 1x10 5\" ea flx/abd 1x10 5\" ea flx/abd      Supine cane AAROM  1x10 flx 3\", 1x10 ER 3\" 1x10 flx 3\"           squeeze  20x 3\" 7# 30x 3\" 7#          Finger ext  20x yellow  30x yellow          Elbow flx/ext  20x ea dowel 20x ea dowel          4 way isometrics    2x10 ea 3\"         Pulleys      5' 5' 5'      SL ER     2x10 2x10 2x10 P!      SL flx     1x7 P! 3x5 P! 3x5      SL abd     2x10 2x10 2x10                   Ther Activity                                       Gait Training                                       Modalities                                            "

## 2024-02-28 ENCOUNTER — TELEPHONE (OUTPATIENT)
Dept: MAMMOGRAPHY | Facility: CLINIC | Age: 74
End: 2024-02-28

## 2024-02-28 ENCOUNTER — OFFICE VISIT (OUTPATIENT)
Dept: PHYSICAL THERAPY | Facility: MEDICAL CENTER | Age: 74
End: 2024-02-28
Payer: COMMERCIAL

## 2024-02-28 DIAGNOSIS — S42.294A OTHER CLOSED NONDISPLACED FRACTURE OF PROXIMAL END OF RIGHT HUMERUS, INITIAL ENCOUNTER: Primary | ICD-10-CM

## 2024-02-28 PROCEDURE — 97110 THERAPEUTIC EXERCISES: CPT

## 2024-02-28 PROCEDURE — 97140 MANUAL THERAPY 1/> REGIONS: CPT

## 2024-02-28 NOTE — PROGRESS NOTES
Daily Note     Today's date: 2024  Patient name: Claudia Gamboa  : 1950  MRN: [id number]  Referring provider: Shelton Angeles DO  Dx:   Encounter Diagnosis     ICD-10-CM    1. Other closed nondisplaced fracture of proximal end of right humerus, initial encounter  S42.294A           Start Time: 1230  Stop Time: 1310  Total time in clinic (min): 40 minutes  Eval/Re-Eval POC Expires Auth #/ Referral # Total Visits Start Date Expiration Date Extension Info Visits Limitation        [id number] 12 1/11 3/31                                                                                1 2 3 4 5 6   1/12  1/17 1/19 2/1  2/15 (RE)      7 8 9 10 11 12               13 14 15 16 17 18                 19 20 21 22 23 24                 25 26 27 28 29 30                   Subjective: Patient reports feeling good upon arrival today.      Objective: See treatment diary below      Assessment: Tolerated treatment well. Gr1 GHJ mobilizations performed at the beginning of the session for attempted sx reduction. Mod sx irritability noted with all active interventions today. Decreased available ROM and increased soreness noted with SL interventions today. Regressed to isometrics for strengthening today. Patient would benefit from continued PT      Plan: Continue per plan of care.       Precautions:   Past Medical History:   Diagnosis Date    Acute cystitis without hematuria 08/10/2018    Anemia 2018    Aortic aneurysm, abdominal (HCC)     noted on cat scan from 2020    Cataract     Closed left hip fracture (HCC) 08/10/2018    Colon polyp     Diabetes mellitus (HCC)     Hyperlipemia     Migraines     Multiple falls     Osteoporosis 2019    Shingles 2016    Urinary tract infection 2022     Allergies   Allergen Reactions    Zoledronic Acid GI Intolerance     Anorexia    Codeine GI Intolerance, Nausea Only and Vomiting       Access Code: [email or web address]  URL: [email or web address]  Date:  "01/12/2024  Prepared by: Dallas Mathew    Exercises  - Supine Shoulder Flexion Extension AAROM with Dowel  - 1 x daily - 7 x weekly - 1 sets - 15 reps - 3 second hold  - Supine Shoulder External Rotation with Dowel  - 1 x daily - 7 x weekly - 1 sets - 15 reps - 3 second hold  - Sitting Scapular Squeezes  - 1 x daily - 7 x weekly - 3 sets - 10 reps - 3 second hold  - Seated Shoulder Scaption Slide at Table Top with Forearm in Neutral  - 1 x daily - 7 x weekly - 1 sets - 15 reps  - Towel Squeeze  - 1 x daily - 7 x weekly - 1 sets - 20 reps - 3 second hold  - Seated Upper Trapezius Stretch  - 1 x daily - 7 x weekly - 3 sets - 30 second hold    Access Code: [email or web address]  URL: [email or web address]  Date: 02/01/2024  Prepared by: Dallas Mathew    Exercises  - Standing Isometric Shoulder Internal Rotation at Doorway  - 1 x daily - 7 x weekly - 2 sets - 10 reps - 3 seconds hold  - Standing Isometric Shoulder External Rotation with Doorway  - 1 x daily - 7 x weekly - 2 sets - 10 reps - 3 seconds hold  - Standing Isometric Shoulder Flexion with Doorway - Arm Bent  - 1 x daily - 7 x weekly - 2 sets - 10 reps - 3 seconds hold  - Standing Isometric Shoulder Extension with Doorway - Arm Bent  - 1 x daily - 7 x weekly - 2 sets - 10 reps - 3 seconds hold  - Standing Shoulder Row with Anchored Resistance  - 1 x daily - 7 x weekly - 3 sets - 10 reps - 3 seconds hold  Access Code: [email or web address]  URL: [email or web address]  Date: 02/28/2024  Prepared by: Dallas Mathew    Exercises  - Sleeper Stretch  - 1 x daily - 7 x weekly - 3 sets - 30 seconds hold    Manuals 1/12 1/17 1/19 2/1 2/15 2/21 2/23 2/28     R shoulder PROM  CM CM CM CM P! D/c       R shoulder mobs  CM CM CM CM   CM                               Neuro Re-Ed             HEP review 25'            Pt education     10' 10'       Pendulums   30\" ea cw/ccw/fwd/back 30\" ea cw/ccw/fwd/back 30\" ea cw/ccw/fwd/back 30\" ea cw/ccw/fwd/back        Scap squeezes  2x10 3\" " "2x10 3\"          Sup pro  2x10 dowel 2x10 dowel P!         Rows     3x10 blk tb Resume NV 3x10 blk tb 3x10 blk tb Resume NV     LPD    3x10 gtb Resume NV Held  3x10 gtb Resume NV     Supine ER/IR MRE    Elbow at side 2x10 ea Elbow at side 2x10 ea Elbow at side 2x10 ea  Elbow at side 2x10 ea  Resume NV     Supine alternating isometrics    2x30\" 3x30\" 3x30\" elbow at side 3x30\" elbow at side Resume NV                  Ther Ex             Table slides  1x10 5\" ea flx/abd 1x10 5\" ea flx/abd  1x10 5\" ea flx/abd 1x10 5\" ea flx/abd 1x10 5\" ea flx/abd      Supine cane AAROM  1x10 flx 3\", 1x10 ER 3\" 1x10 flx 3\"           squeeze  20x 3\" 7# 30x 3\" 7#          Finger ext  20x yellow  30x yellow          Elbow flx/ext  20x ea dowel 20x ea dowel          4 way isometrics    2x10 ea 3\"    2x10 ea 3\"     Pulleys      5' 5' 5' 5'     SL ER     2x10 2x10 2x10 P! 2x10     SL flx     1x7 P! 3x5 P! 3x5 3x5 P!     SL abd     2x10 2x10 2x10 2x10 P!                  Ther Activity                                       Gait Training                                       Modalities                                            "

## 2024-03-01 ENCOUNTER — HOSPITAL ENCOUNTER (OUTPATIENT)
Dept: NON INVASIVE DIAGNOSTICS | Facility: CLINIC | Age: 74
Discharge: HOME | End: 2024-03-01
Payer: COMMERCIAL

## 2024-03-01 VITALS
HEART RATE: 60 BPM | BODY MASS INDEX: 29.73 KG/M2 | SYSTOLIC BLOOD PRESSURE: 128 MMHG | WEIGHT: 184.97 LBS | DIASTOLIC BLOOD PRESSURE: 78 MMHG | HEIGHT: 66 IN

## 2024-03-01 DIAGNOSIS — R01.1 CARDIAC MURMUR: ICD-10-CM

## 2024-03-01 LAB
AORTIC ROOT: 2.9 CM
AORTIC VALVE MEAN VELOCITY: 15.3 M/S
APICAL FOUR CHAMBER EJECTION FRACTION: 63 %
ASCENDING AORTA: 3.7 CM
AV AREA BY CONTINUOUS VTI: 1.4 CM2
AV AREA PEAK VELOCITY: 1.4 CM2
AV LVOT MEAN GRADIENT: 2 MMHG
AV LVOT PEAK GRADIENT: 4 MMHG
AV MEAN GRADIENT: 11 MMHG
AV PEAK GRADIENT: 24 MMHG
AV VALVE AREA: 1.26 CM2
AV VELOCITY RATIO: 0.4
BSA FOR ECHO PROCEDURE: 1.93 M2
DOP CALC AO PEAK VEL: 2.5 M/S
DOP CALC AO VTI: 54 CM
DOP CALC LVOT AREA: 3.14 CM2
DOP CALC LVOT CARDIAC INDEX: 2.06 L/MIN/M2
DOP CALC LVOT CARDIAC OUTPUT: 3.97 L/MIN
DOP CALC LVOT DIAMETER: 2 CM
DOP CALC LVOT PEAK VEL VTI: 21.69 CM
DOP CALC LVOT PEAK VEL: 1 M/S
DOP CALC LVOT STROKE INDEX: 34.2 ML/M2
DOP CALC LVOT STROKE VOLUME: 68.11 CM3
E WAVE DECELERATION TIME: 237 MS
E/A RATIO: 0.94
FRACTIONAL SHORTENING: 40 (ref 28–44)
INTERVENTRICULAR SEPTUM IN DIASTOLE (PARASTERNAL SHORT AXIS VIEW): 0.9 CM
INTERVENTRICULAR SEPTUM: 0.9 CM (ref 0.6–1.1)
LAAS-AP2: 16.3 CM2
LAAS-AP4: 13.5 CM2
LEFT ATRIUM AREA SYSTOLE SINGLE PLANE A4C: 14.7 CM2
LEFT ATRIUM SIZE: 3.6 CM
LEFT ATRIUM VOLUME (MOD BIPLANE): 41 ML
LEFT ATRIUM VOLUME INDEX (MOD BIPLANE): 21.2 ML/M2
LEFT INTERNAL DIMENSION IN SYSTOLE: 3.1 CM (ref 2.1–4)
LEFT VENTRICULAR INTERNAL DIMENSION IN DIASTOLE: 5.2 CM (ref 3.5–6)
LEFT VENTRICULAR POSTERIOR WALL IN END DIASTOLE: 0.9 CM
LEFT VENTRICULAR STROKE VOLUME: 92 ML
LVSV (TEICH): 92 ML
MV E'TISSUE VEL-SEP: 5 CM/S
MV PEAK A VEL: 0.87 M/S
MV PEAK E VEL: 82 CM/S
MV STENOSIS PRESSURE HALF TIME: 69 MS
MV VALVE AREA P 1/2 METHOD: 3.19
RIGHT ATRIUM AREA SYSTOLE A4C: 11.8 CM2
RIGHT VENTRICLE ID DIMENSION: 3 CM
SL CV LEFT ATRIUM LENGTH A2C: 4.7 CM
SL CV LV EF: 60
SL CV PED ECHO LEFT VENTRICLE DIASTOLIC VOLUME (MOD BIPLANE) 2D: 130 ML
SL CV PED ECHO LEFT VENTRICLE SYSTOLIC VOLUME (MOD BIPLANE) 2D: 37 ML
TR MAX PG: 25 MMHG
TR PEAK VELOCITY: 2.5 M/S
TRICUSPID ANNULAR PLANE SYSTOLIC EXCURSION: 2.3 CM
TRICUSPID VALVE PEAK REGURGITATION VELOCITY: 2.51 M/S

## 2024-03-01 PROCEDURE — 93306 TTE W/DOPPLER COMPLETE: CPT

## 2024-03-01 PROCEDURE — 93306 TTE W/DOPPLER COMPLETE: CPT | Performed by: INTERNAL MEDICINE

## 2024-03-04 DIAGNOSIS — I31.39 PERICARDIAL EFFUSION: ICD-10-CM

## 2024-03-04 DIAGNOSIS — I35.0 MODERATE AORTIC STENOSIS: Primary | ICD-10-CM

## 2024-03-04 PROBLEM — I51.89 DIASTOLIC DYSFUNCTION: Status: ACTIVE | Noted: 2024-03-04

## 2024-03-04 NOTE — PROGRESS NOTES
In speaking with patient today over telephone, she provides update that she indeed complete both antibiotics for UTI.  She feels well today and states no urinary complaints.

## 2024-03-06 ENCOUNTER — OFFICE VISIT (OUTPATIENT)
Dept: PHYSICAL THERAPY | Facility: MEDICAL CENTER | Age: 74
End: 2024-03-06
Payer: COMMERCIAL

## 2024-03-06 DIAGNOSIS — S42.294A OTHER CLOSED NONDISPLACED FRACTURE OF PROXIMAL END OF RIGHT HUMERUS, INITIAL ENCOUNTER: Primary | ICD-10-CM

## 2024-03-06 PROCEDURE — 97112 NEUROMUSCULAR REEDUCATION: CPT

## 2024-03-06 PROCEDURE — 97110 THERAPEUTIC EXERCISES: CPT

## 2024-03-06 NOTE — PROGRESS NOTES
Daily Note     Today's date: 3/6/2024  Patient name: Claudia Gamboa  : 1950  MRN: [id number]  Referring provider: Shelton Angeles DO  Dx:   Encounter Diagnosis     ICD-10-CM    1. Other closed nondisplaced fracture of proximal end of right humerus, initial encounter  S42.294A           Start Time: 1230  Stop Time: 1310  Total time in clinic (min): 40 minutes  Eval/Re-Eval POC Expires Auth #/ Referral # Total Visits Start Date Expiration Date Extension Info Visits Limitation        [id number] 12 1/11 3/31                                                                                1 2 3 4 5 6   1/12  1/17 1/19 2/1  2/15 (RE)      7 8 9 10 11 12    2/23  2/28  3/6         13 14 15 16 17 18                 19 20 21 22 23 24                 25 26 27 28 29 30                   Subjective: Patient reports feeling good upon arrival today      Objective: See treatment diary below      Assessment: Tolerated treatment well. Improved tolerance to all interventions noted today. Able to perform SL flx with min sx and improved ecc control from previous tx sessions. Mild shoulder fatigue noted throughout. Patient would benefit from continued PT      Plan: Continue per plan of care.       Precautions:   Past Medical History:   Diagnosis Date    Acute cystitis without hematuria 08/10/2018    Anemia 2018    Aortic aneurysm, abdominal (HCC)     noted on cat scan from 2020    Cataract     Closed left hip fracture (HCC) 08/10/2018    Colon polyp     Diabetes mellitus (HCC)     Hyperlipemia     Migraines     Multiple falls     Osteoporosis 2019    Shingles 2016    Urinary tract infection 2022     Allergies   Allergen Reactions    Zoledronic Acid GI Intolerance     Anorexia    Codeine GI Intolerance, Nausea Only and Vomiting       Access Code: [email or web address]  URL: [email or web address]  Date: 2024  Prepared by: Dallas Mathew    Exercises  - Supine Shoulder Flexion Extension AAROM with Dowel  - 1  "x daily - 7 x weekly - 1 sets - 15 reps - 3 second hold  - Supine Shoulder External Rotation with Dowel  - 1 x daily - 7 x weekly - 1 sets - 15 reps - 3 second hold  - Sitting Scapular Squeezes  - 1 x daily - 7 x weekly - 3 sets - 10 reps - 3 second hold  - Seated Shoulder Scaption Slide at Table Top with Forearm in Neutral  - 1 x daily - 7 x weekly - 1 sets - 15 reps  - Towel Squeeze  - 1 x daily - 7 x weekly - 1 sets - 20 reps - 3 second hold  - Seated Upper Trapezius Stretch  - 1 x daily - 7 x weekly - 3 sets - 30 second hold    Access Code: [email or web address]  URL: [email or web address]  Date: 02/01/2024  Prepared by: Dallas Mathew    Exercises  - Standing Isometric Shoulder Internal Rotation at Doorway  - 1 x daily - 7 x weekly - 2 sets - 10 reps - 3 seconds hold  - Standing Isometric Shoulder External Rotation with Doorway  - 1 x daily - 7 x weekly - 2 sets - 10 reps - 3 seconds hold  - Standing Isometric Shoulder Flexion with Doorway - Arm Bent  - 1 x daily - 7 x weekly - 2 sets - 10 reps - 3 seconds hold  - Standing Isometric Shoulder Extension with Doorway - Arm Bent  - 1 x daily - 7 x weekly - 2 sets - 10 reps - 3 seconds hold  - Standing Shoulder Row with Anchored Resistance  - 1 x daily - 7 x weekly - 3 sets - 10 reps - 3 seconds hold  Access Code: [email or web address]  URL: [email or web address]  Date: 02/28/2024  Prepared by: Dallas Mathew    Exercises  - Sleeper Stretch  - 1 x daily - 7 x weekly - 3 sets - 30 seconds hold    Manuals 2/21 2/23 2/28 3/6    R shoulder PROM P! D/c       R shoulder mobs   CM                     Neuro Re-Ed        HEP review        Pt education 10'       Pendulums         Scap squeezes        Sup pro        Rows  3x10 blk tb 3x10 blk tb Resume NV 3x10 blk tb    LPD Held  3x10 gtb Resume NV 3x10 gtb    Supine ER/IR MRE Elbow at side 2x10 ea  Elbow at side 2x10 ea  Resume NV Elbow at side 2x10 ea     Supine alternating isometrics 3x30\" elbow at side 3x30\" elbow at side " "Resume NV             Ther Ex        Table slides 1x10 5\" ea flx/abd 1x10 5\" ea flx/abd      Supine cane AAROM         squeeze        Finger ext        Elbow flx/ext        4 way isometrics   2x10 ea 3\" 2x10 ea 3\"    Pulleys  5' 5' 5' 5'    SL ER 2x10 2x10 P! 2x10 2x10    SL flx 3x5 P! 3x5 3x5 P! 3x5 P!    SL abd 2x10 2x10 2x10 P! 2x10    Wall slide    1x15            Ther Activity                        Gait Training                        Modalities                             "

## 2024-03-07 ENCOUNTER — TELEPHONE (OUTPATIENT)
Dept: MAMMOGRAPHY | Facility: CLINIC | Age: 74
End: 2024-03-07

## 2024-03-08 ENCOUNTER — OFFICE VISIT (OUTPATIENT)
Dept: PHYSICAL THERAPY | Facility: MEDICAL CENTER | Age: 74
End: 2024-03-08
Payer: COMMERCIAL

## 2024-03-08 DIAGNOSIS — S42.294A OTHER CLOSED NONDISPLACED FRACTURE OF PROXIMAL END OF RIGHT HUMERUS, INITIAL ENCOUNTER: Primary | ICD-10-CM

## 2024-03-08 PROCEDURE — 97110 THERAPEUTIC EXERCISES: CPT

## 2024-03-08 PROCEDURE — 97112 NEUROMUSCULAR REEDUCATION: CPT

## 2024-03-08 NOTE — PROGRESS NOTES
Daily Note     Today's date: 3/8/2024  Patient name: Claudia Gamboa  : 1950  MRN: [id number]  Referring provider: Shelton Angeles DO  Dx:   Encounter Diagnosis     ICD-10-CM    1. Other closed nondisplaced fracture of proximal end of right humerus, initial encounter  S42.294A           Start Time: 1215  Stop Time: 1310  Total time in clinic (min): 55 minutes  Eval/Re-Eval POC Expires Auth #/ Referral # Total Visits Start Date Expiration Date Extension Info Visits Limitation        [id number] 12 1/11 3/31                                                                                1 2 3 4 5 6   1/12  1/17 1/19 2/1  2/15 (RE)      7 8 9 10 11 12    2/23  2/28  3/6 3/8        13 14 15 16 17 18                 19 20 21 22 23 24                 25 26 27 28 29 30                   Subjective: Patient reports feeling good upon arrival today      Objective: See treatment diary below      Assessment: Tolerated treatment well. Taught diaphragmatic breathing and incorporated into ROM interventions to promote ease and reduce patient apprehension with progression towards end range; patient with improvements in available ROM and reductions in sx severity noted following. Still signif difficulty and pain present with ecc control and movements involving active ER; extensive time spent focusing on form and control. Fatigue present at the end of the session. Patient would benefit from continued PT      Plan: Continue per plan of care.       Precautions:   Past Medical History:   Diagnosis Date    Acute cystitis without hematuria 08/10/2018    Anemia 2018    Aortic aneurysm, abdominal (HCC)     noted on cat scan from 2020    Cataract     Closed left hip fracture (HCC) 08/10/2018    Colon polyp     Diabetes mellitus (HCC)     Hyperlipemia     Migraines     Multiple falls     Osteoporosis 2019    Shingles 2016    Urinary tract infection 2022     Allergies   Allergen Reactions    Zoledronic Acid GI  Intolerance     Anorexia    Codeine GI Intolerance, Nausea Only and Vomiting       Access Code: [email or web address]  URL: [email or web address]  Date: 01/12/2024  Prepared by: Dallas Mathew    Exercises  - Supine Shoulder Flexion Extension AAROM with Dowel  - 1 x daily - 7 x weekly - 1 sets - 15 reps - 3 second hold  - Supine Shoulder External Rotation with Dowel  - 1 x daily - 7 x weekly - 1 sets - 15 reps - 3 second hold  - Sitting Scapular Squeezes  - 1 x daily - 7 x weekly - 3 sets - 10 reps - 3 second hold  - Seated Shoulder Scaption Slide at Table Top with Forearm in Neutral  - 1 x daily - 7 x weekly - 1 sets - 15 reps  - Towel Squeeze  - 1 x daily - 7 x weekly - 1 sets - 20 reps - 3 second hold  - Seated Upper Trapezius Stretch  - 1 x daily - 7 x weekly - 3 sets - 30 second hold    Access Code: [email or web address]  URL: [email or web address]  Date: 02/01/2024  Prepared by: Dallas Mathew    Exercises  - Standing Isometric Shoulder Internal Rotation at Doorway  - 1 x daily - 7 x weekly - 2 sets - 10 reps - 3 seconds hold  - Standing Isometric Shoulder External Rotation with Doorway  - 1 x daily - 7 x weekly - 2 sets - 10 reps - 3 seconds hold  - Standing Isometric Shoulder Flexion with Doorway - Arm Bent  - 1 x daily - 7 x weekly - 2 sets - 10 reps - 3 seconds hold  - Standing Isometric Shoulder Extension with Doorway - Arm Bent  - 1 x daily - 7 x weekly - 2 sets - 10 reps - 3 seconds hold  - Standing Shoulder Row with Anchored Resistance  - 1 x daily - 7 x weekly - 3 sets - 10 reps - 3 seconds hold  Access Code: [email or web address]  URL: [email or web address]  Date: 02/28/2024  Prepared by: Dallas Mathew    Exercises  - Sleeper Stretch  - 1 x daily - 7 x weekly - 3 sets - 30 seconds hold    Manuals 2/21 2/23 2/28 3/6 3/8   R shoulder PROM P! D/c       R shoulder mobs   CM                     Neuro Re-Ed        HEP review        Pt education 10'       Pendulums         Scap squeezes        Sup pro       "  Rows  3x10 blk tb 3x10 blk tb Resume NV 3x10 blk tb 3x10 blk tb   LPD Held  3x10 gtb Resume NV 3x10 gtb 3x10 gtb   Supine ER/IR MRE Elbow at side 2x10 ea  Elbow at side 2x10 ea  Resume NV Elbow at side 2x10 ea  Elbow at side 2x10 P! With ER   Supine alternating isometrics 3x30\" elbow at side 3x30\" elbow at side Resume NV     Diaphragmatic breathing     5' CM           Ther Ex        Table slides 1x10 5\" ea flx/abd 1x10 5\" ea flx/abd      Supine cane AAROM         squeeze        Finger ext        Elbow flx/ext        4 way isometrics   2x10 ea 3\" 2x10 ea 3\" 1x15 ER 3\"   Pulleys  5' 5' 5' 5'    UBE     3/3 L1   SL ER 2x10 2x10 P! 2x10 2x10 2x10   SL flx 3x5 P! 3x5 3x5 P! 3x5 P! 3x5 P!   SL abd 2x10 2x10 2x10 P! 2x10 2x10   Wall slide    1x15 1x15           Ther Activity                        Gait Training                        Modalities                             "

## 2024-03-12 ENCOUNTER — APPOINTMENT (OUTPATIENT)
Dept: RADIOLOGY | Facility: AMBULARY SURGERY CENTER | Age: 74
End: 2024-03-12
Attending: STUDENT IN AN ORGANIZED HEALTH CARE EDUCATION/TRAINING PROGRAM
Payer: COMMERCIAL

## 2024-03-12 ENCOUNTER — OFFICE VISIT (OUTPATIENT)
Dept: OBGYN CLINIC | Facility: CLINIC | Age: 74
End: 2024-03-12
Payer: COMMERCIAL

## 2024-03-12 VITALS — BODY MASS INDEX: 29.73 KG/M2 | HEIGHT: 66 IN | WEIGHT: 184.97 LBS

## 2024-03-12 DIAGNOSIS — S42.294A OTHER CLOSED NONDISPLACED FRACTURE OF PROXIMAL END OF RIGHT HUMERUS, INITIAL ENCOUNTER: ICD-10-CM

## 2024-03-12 DIAGNOSIS — S42.294D OTHER CLOSED NONDISPLACED FRACTURE OF PROXIMAL END OF RIGHT HUMERUS WITH ROUTINE HEALING, SUBSEQUENT ENCOUNTER: Primary | ICD-10-CM

## 2024-03-12 PROCEDURE — 99213 OFFICE O/P EST LOW 20 MIN: CPT | Performed by: STUDENT IN AN ORGANIZED HEALTH CARE EDUCATION/TRAINING PROGRAM

## 2024-03-12 PROCEDURE — 73030 X-RAY EXAM OF SHOULDER: CPT
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
## 2024-03-13 ENCOUNTER — TELEPHONE (OUTPATIENT)
Dept: MAMMOGRAPHY | Facility: CLINIC | Age: 74
End: 2024-03-13

## 2024-03-13 ENCOUNTER — HOSPITAL ENCOUNTER (OUTPATIENT)
Dept: RADIOLOGY | Age: 74
Discharge: HOME | End: 2024-03-13
Payer: COMMERCIAL

## 2024-03-13 ENCOUNTER — OFFICE VISIT (OUTPATIENT)
Dept: PHYSICAL THERAPY | Facility: MEDICAL CENTER | Age: 74
End: 2024-03-13
Payer: COMMERCIAL

## 2024-03-13 DIAGNOSIS — S42.294A OTHER CLOSED NONDISPLACED FRACTURE OF PROXIMAL END OF RIGHT HUMERUS, INITIAL ENCOUNTER: Primary | ICD-10-CM

## 2024-03-13 DIAGNOSIS — M81.0 AGE-RELATED OSTEOPOROSIS WITHOUT CURRENT PATHOLOGICAL FRACTURE: ICD-10-CM

## 2024-03-13 PROCEDURE — 77080 DXA BONE DENSITY AXIAL: CPT

## 2024-03-13 PROCEDURE — 97110 THERAPEUTIC EXERCISES: CPT

## 2024-03-13 PROCEDURE — 97112 NEUROMUSCULAR REEDUCATION: CPT

## 2024-03-13 NOTE — PROGRESS NOTES
Progress Note     Today's date: 3/13/2024  Patient name: Claudia Gamboa  : 1950  MRN: [id number]  Referring provider: Shelton Angeles DO  Dx:   Encounter Diagnosis     ICD-10-CM    1. Other closed nondisplaced fracture of proximal end of right humerus, initial encounter  S42.294A           Start Time: 1230  Stop Time: 1315  Total time in clinic (min): 45 minutes  Eval/Re-Eval POC Expires Auth #/ Referral # Total Visits Start Date Expiration Date Extension Info Visits Limitation        [id number] 12 1/11 3/31                                                                                1 2 3 4 5 6   1/12  1/17 1/19 2/1  2/15 (RE)      7 8 9 10 11 12    2/23  2/28  3/6 3/8   3/13 (PN)     13 14 15 16 17 18                 19 20 21 22 23 24                 25 26 27 28 29 30                   Subjective: Patient reports feeling good upon arrival today      Objective: See treatment diary below  Impairment based goals  Patient will demonstrate improvements in L shoulder AROM consistent with current phase of tx protocol. - ongoing  Patient will improve L shoulder strength to 4/5 in all planes within 6 weeks. - met  Patient will improve L shoulder strength to 5/5 in all planes by time of d/c. - not met  Patient will improve L elbow to 4/5 in all planes within 6 weeks. - met  Patient will improve L elbow to 5/5 in all planes by time of d/c. - met     Functional based goals to be completed by time of d/c  Patient will be able to perform all ADLs and grooming activities with minimal c/o L UE sx. - met  Patient will improve FOTO to greater than predicted value. - not met  Patient will be independent with home exercise program. - not met  Patient will be able to manage symptoms independently. - not met       SENSATION    LEFT RIGHT   C2-C3 Intact Intact   C4 Intact Intact   C5 Intact Intact   C6 Intact Intact   C7 Intact Intact   C8 Intact Intact   T1 Intact Intact   T2 Intact Intact      REFLEXES    LEFT RIGHT    TRICEPS 2+ 2+   BICEPS 2+ 2+   BR 2+ 2+      POSTURAL FINDINGS  Head Position X Protracted   Neutral   Retracted   Scapular Position   Protracted X  Neutral   Retracted   Thoracic Spine  X Inc Kyphosis   Neutral                SHOULDER                    AROM PROM STRENGTH     RIGHT LEFT- WFL all RIGHT LEFT RIGHT LEFT   * P!   WFL    4+/5 P! 5/5    * P!   WFL    4+/5 P!  5/5   EXT               ER R mid finger to base of occiput        4/5 P!  5/5    IR  R mid finger to R PSIS        4+/5  5/5   U. TRAP         5/5 5/5    M. TRAP               L. TRAP               PROTRACTION               RHOMBOIDS                        ELBOW                    AROM- WFL all PROM STRENGTH - DNT     RIGHT LEFT RIGHT LEFT RIGHT LEFT   FLX          5/5 5/5    EXT          5/5  5/5   SUP               PRO                                       WRIST                    AROM PROM STRENGTH     RIGHT LEFT RIGHT LEFT RIGHT LEFT   FLX         5/5 5/5   EXT         5/5 5/5   RAD. DEV         5/5 5/5   ULN. DEV         5/5 5/5        Assessment: Tolerated treatment well. Re-evaluation was performed today and the patient has demonstrated improvements in R shoulder strength and ROM in all planes, functional mobility and activity tolerance. Patient still presents with deficits of poor functional ER/IR AROM, decreased elevation AROM, decreased shoulder and periscapular strength in all major mm groups and decreased activity tolerance, resulting in difficulties with ADLs and grooming activities, as well as cont c/o lateral upper arm pain.  Extensive time spent focusing on form and control with elevation interventions today 2/to cont difficulty with ecc control. Fatigue present at the end of the session. Patient would benefit from continued PT      Plan: Continue per plan of care.       Precautions:   Past Medical History:   Diagnosis Date    Acute cystitis without hematuria 08/10/2018    Anemia 8/13/2018    Aortic aneurysm,  abdominal (HCC)     noted on cat scan from 2/2020    Cataract     Closed left hip fracture (HCC) 08/10/2018    Colon polyp     Diabetes mellitus (HCC)     Hyperlipemia     Migraines     Multiple falls     Osteoporosis 02/22/2019    Shingles 7/2016    Urinary tract infection 5/2022     Allergies   Allergen Reactions    Zoledronic Acid GI Intolerance     Anorexia    Codeine GI Intolerance, Nausea Only and Vomiting       Access Code: [email or web address]  URL: [email or web address]  Date: 01/12/2024  Prepared by: Dallas Mathew    Exercises  - Supine Shoulder Flexion Extension AAROM with Dowel  - 1 x daily - 7 x weekly - 1 sets - 15 reps - 3 second hold  - Supine Shoulder External Rotation with Dowel  - 1 x daily - 7 x weekly - 1 sets - 15 reps - 3 second hold  - Sitting Scapular Squeezes  - 1 x daily - 7 x weekly - 3 sets - 10 reps - 3 second hold  - Seated Shoulder Scaption Slide at Table Top with Forearm in Neutral  - 1 x daily - 7 x weekly - 1 sets - 15 reps  - Towel Squeeze  - 1 x daily - 7 x weekly - 1 sets - 20 reps - 3 second hold  - Seated Upper Trapezius Stretch  - 1 x daily - 7 x weekly - 3 sets - 30 second hold    Access Code: [email or web address]  URL: [email or web address]  Date: 02/01/2024  Prepared by: Dallas Mathew    Exercises  - Standing Isometric Shoulder Internal Rotation at Doorway  - 1 x daily - 7 x weekly - 2 sets - 10 reps - 3 seconds hold  - Standing Isometric Shoulder External Rotation with Doorway  - 1 x daily - 7 x weekly - 2 sets - 10 reps - 3 seconds hold  - Standing Isometric Shoulder Flexion with Doorway - Arm Bent  - 1 x daily - 7 x weekly - 2 sets - 10 reps - 3 seconds hold  - Standing Isometric Shoulder Extension with Doorway - Arm Bent  - 1 x daily - 7 x weekly - 2 sets - 10 reps - 3 seconds hold  - Standing Shoulder Row with Anchored Resistance  - 1 x daily - 7 x weekly - 3 sets - 10 reps - 3 seconds hold  Access Code: [email or web address]  URL: [email or web address]  Date:  "02/28/2024  Prepared by: Dallas Mathew    Exercises  - Sleeper Stretch  - 1 x daily - 7 x weekly - 3 sets - 30 seconds hold    Manuals 2/21 2/23 2/28 3/6 3/8 3/13   R shoulder PROM P! D/c        R shoulder mobs   CM                        Neuro Re-Ed         HEP review         Pt education 10'     10'   Pendulums          Scap squeezes         Sup pro         Rows  3x10 blk tb 3x10 blk tb Resume NV 3x10 blk tb 3x10 blk tb 3x10 blk tb   LPD Held  3x10 gtb Resume NV 3x10 gtb 3x10 gtb 3x10 btb   Supine ER/IR MRE Elbow at side 2x10 ea  Elbow at side 2x10 ea  Resume NV Elbow at side 2x10 ea  Elbow at side 2x10 P! With ER    Supine alternating isometrics 3x30\" elbow at side 3x30\" elbow at side Resume NV      Diaphragmatic breathing     5' CM             Ther Ex         Table slides 1x10 5\" ea flx/abd 1x10 5\" ea flx/abd       Supine cane AAROM          squeeze         Finger ext         Elbow flx/ext         4 way isometrics   2x10 ea 3\" 2x10 ea 3\" 1x15 ER 3\"    Pulleys  5' 5' 5' 5'     UBE     3/3 L1 3/3 L1   SL ER 2x10 2x10 P! 2x10 2x10 2x10 2x10    SL flx 3x5 P! 3x5 3x5 P! 3x5 P! 3x5 P! 2x8   SL abd 2x10 2x10 2x10 P! 2x10 2x10 2x10   Wall slide    1x15 1x15 2x10            Ther Activity                           Gait Training                           Modalities                                "

## 2024-03-13 NOTE — TELEPHONE ENCOUNTER
3rd and final attempt to contact pt to schedule diagnostic breast imaging.  I left messages on 2/287, 3,7 and 3/13 with contact information and still no response from pt to schedule.

## 2024-03-15 ENCOUNTER — OFFICE VISIT (OUTPATIENT)
Dept: PHYSICAL THERAPY | Facility: MEDICAL CENTER | Age: 74
End: 2024-03-15
Payer: COMMERCIAL

## 2024-03-15 DIAGNOSIS — S42.294A OTHER CLOSED NONDISPLACED FRACTURE OF PROXIMAL END OF RIGHT HUMERUS, INITIAL ENCOUNTER: Primary | ICD-10-CM

## 2024-03-15 PROCEDURE — 97112 NEUROMUSCULAR REEDUCATION: CPT

## 2024-03-15 PROCEDURE — 97110 THERAPEUTIC EXERCISES: CPT

## 2024-03-15 NOTE — PROGRESS NOTES
Progress Note     Today's date: 3/15/2024  Patient name: Claudia Gamboa  : 1950  MRN: [id number]  Referring provider: Shelton Angeles DO  Dx:   Encounter Diagnosis     ICD-10-CM    1. Other closed nondisplaced fracture of proximal end of right humerus, initial encounter  S42.294A             Start Time: 1225  Stop Time: 1305  Total time in clinic (min): 40 minutes  Eval/Re-Eval POC Expires Auth #/ Referral # Total Visits Start Date Expiration Date Extension Info Visits Limitation        [id number] 12 1/11 3/31             #[id number]  11  3/16 6/30                                                              1 2 3 4 5 6   1/12  1/17 1/19 2/1  2/15 (RE)      7 8 9 10 11 12    2/23  2/28  3/6 3/8   3/13 (PN)  3/15   13 14 15 16 17 18                 19 20 21 22 23 24                 25 26 27 28 29 30                   Subjective: Patient reports feeling good upon arrival today      Objective: See treatment diary below      Assessment: Tolerated treatment well. Improvements in resistance noted with ER MRE today. She did well with all progressions of existing interventions. Patient would benefit from continued PT      Plan: Continue per plan of care.       Precautions:   Past Medical History:   Diagnosis Date    Acute cystitis without hematuria 08/10/2018    Anemia 2018    Aortic aneurysm, abdominal (HCC)     noted on cat scan from 2020    Cataract     Closed left hip fracture (HCC) 08/10/2018    Colon polyp     Diabetes mellitus (HCC)     Hyperlipemia     Migraines     Multiple falls     Osteoporosis 2019    Shingles 2016    Urinary tract infection 2022     Allergies   Allergen Reactions    Zoledronic Acid GI Intolerance     Anorexia    Codeine GI Intolerance, Nausea Only and Vomiting       Access Code: [email or web address]  URL: [email or web address]  Date: 2024  Prepared by: Dallas Mathew    Exercises  - Supine Shoulder Flexion Extension AAROM with Dowel  - 1 x daily - 7 x weekly -  1 sets - 15 reps - 3 second hold  - Supine Shoulder External Rotation with Dowel  - 1 x daily - 7 x weekly - 1 sets - 15 reps - 3 second hold  - Sitting Scapular Squeezes  - 1 x daily - 7 x weekly - 3 sets - 10 reps - 3 second hold  - Seated Shoulder Scaption Slide at Table Top with Forearm in Neutral  - 1 x daily - 7 x weekly - 1 sets - 15 reps  - Towel Squeeze  - 1 x daily - 7 x weekly - 1 sets - 20 reps - 3 second hold  - Seated Upper Trapezius Stretch  - 1 x daily - 7 x weekly - 3 sets - 30 second hold    Access Code: [email or web address]  URL: [email or web address]  Date: 02/01/2024  Prepared by: Dallas Mathew    Exercises  - Standing Isometric Shoulder Internal Rotation at Doorway  - 1 x daily - 7 x weekly - 2 sets - 10 reps - 3 seconds hold  - Standing Isometric Shoulder External Rotation with Doorway  - 1 x daily - 7 x weekly - 2 sets - 10 reps - 3 seconds hold  - Standing Isometric Shoulder Flexion with Doorway - Arm Bent  - 1 x daily - 7 x weekly - 2 sets - 10 reps - 3 seconds hold  - Standing Isometric Shoulder Extension with Doorway - Arm Bent  - 1 x daily - 7 x weekly - 2 sets - 10 reps - 3 seconds hold  - Standing Shoulder Row with Anchored Resistance  - 1 x daily - 7 x weekly - 3 sets - 10 reps - 3 seconds hold  Access Code: [email or web address]  URL: [email or web address]  Date: 02/28/2024  Prepared by: Dallas Mathew    Exercises  - Sleeper Stretch  - 1 x daily - 7 x weekly - 3 sets - 30 seconds hold    Manuals 2/21 2/23 2/28 3/6 3/8 3/13 3/15   R shoulder PROM P! D/c         R shoulder mobs   CM                           Neuro Re-Ed          HEP review          Pt education 10'     10'    Pendulums           Scap squeezes          Sup pro          Rows  3x10 blk tb 3x10 blk tb Resume NV 3x10 blk tb 3x10 blk tb 3x10 blk tb 3x10 blk tb   LPD Held  3x10 gtb Resume NV 3x10 gtb 3x10 gtb 3x10 btb 3x10 btb   Supine ER/IR MRE Elbow at side 2x10 ea  Elbow at side 2x10 ea  Resume NV Elbow at side 2x10 ea   "Elbow at side 2x10 P! With ER  Elbow at side 2x10 ea    Supine alternating isometrics 3x30\" elbow at side 3x30\" elbow at side Resume NV       Diaphragmatic breathing     5' CM               Ther Ex          Table slides 1x10 5\" ea flx/abd 1x10 5\" ea flx/abd        Supine cane AAROM           squeeze          Finger ext          Elbow flx/ext          4 way isometrics   2x10 ea 3\" 2x10 ea 3\" 1x15 ER 3\"     Pulleys  5' 5' 5' 5'      UBE     3/3 L1 3/3 L1 3/3 L1   SL ER 2x10 2x10 P! 2x10 2x10 2x10 2x10  2x10   SL flx 3x5 P! 3x5 3x5 P! 3x5 P! 3x5 P! 2x8 2x10   SL abd 2x10 2x10 2x10 P! 2x10 2x10 2x10 2x10   Wall slide    1x15 1x15 2x10 2x10   Tri pushdown       3x10 20#   Elbow flx       2x10 3#             Ther Activity                              Gait Training                              Modalities                                   "

## 2024-03-20 ENCOUNTER — OFFICE VISIT (OUTPATIENT)
Dept: PHYSICAL THERAPY | Facility: MEDICAL CENTER | Age: 74
End: 2024-03-20
Payer: COMMERCIAL

## 2024-03-20 DIAGNOSIS — S42.294A OTHER CLOSED NONDISPLACED FRACTURE OF PROXIMAL END OF RIGHT HUMERUS, INITIAL ENCOUNTER: Primary | ICD-10-CM

## 2024-03-20 DIAGNOSIS — I10 PRIMARY HYPERTENSION: ICD-10-CM

## 2024-03-20 DIAGNOSIS — M79.2 NEUROPATHIC PAIN: ICD-10-CM

## 2024-03-20 PROCEDURE — 97140 MANUAL THERAPY 1/> REGIONS: CPT

## 2024-03-20 PROCEDURE — 97110 THERAPEUTIC EXERCISES: CPT

## 2024-03-20 PROCEDURE — 97112 NEUROMUSCULAR REEDUCATION: CPT

## 2024-03-20 RX ORDER — GABAPENTIN 600 MG/1
TABLET ORAL
Qty: 90 TABLET | Refills: 1 | Status: SHIPPED | OUTPATIENT
Start: 2024-03-20

## 2024-03-20 RX ORDER — LOSARTAN POTASSIUM 25 MG/1
25 TABLET ORAL DAILY
Qty: 90 TABLET | Refills: 1 | Status: SHIPPED | OUTPATIENT
Start: 2024-03-20

## 2024-03-20 NOTE — PROGRESS NOTES
Progress Note     Today's date: 3/20/2024  Patient name: Claudia Gamboa  : 1950  MRN: [id number]  Referring provider: Shelton Angeles DO  Dx:   Encounter Diagnosis     ICD-10-CM    1. Other closed nondisplaced fracture of proximal end of right humerus, initial encounter  S42.294A             Start Time: 1210  Stop Time: 1305  Total time in clinic (min): 55 minutes  Eval/Re-Eval POC Expires Auth #/ Referral # Total Visits Start Date Expiration Date Extension Info Visits Limitation        [id number] 12 1/11 3/31             #[id number]  11  3/16 6/30                                                              1 2 3 4 5 6   1/12  1/17 1/19 2/1  2/15 (RE)      7 8 9 10 11 12    2/23  2/28  3/6 3/8   3/13 (PN)  3/15   13 14 15 16 17 18    3/20             19 20 21 22 23 24                 25 26 27 28 29 30                   Subjective: Patient reports feeling good upon arrival today      Objective: See treatment diary below    R shoulder PROM:  Flx: 135*  Abd: 110*  ER: Base of occiput  IR: iliac crest    Assessment: Tolerated treatment well. Reintroduced PROM and GHJ mobilizations with improved tolerance from previous tx sessions; patient able to tolerate stretching better with VC to promote diaphragmatic breathing. VC for this provided throughout the session, which are effective in improving tolerance to strengthening interventions and reducing sx. Did well with all progressions performed today. Patient would benefit from continued PT      Plan: Continue per plan of care.       Precautions:   Past Medical History:   Diagnosis Date    Acute cystitis without hematuria 08/10/2018    Anemia 2018    Aortic aneurysm, abdominal (HCC)     noted on cat scan from 2020    Cataract     Closed left hip fracture (HCC) 08/10/2018    Colon polyp     Diabetes mellitus (HCC)     Hyperlipemia     Migraines     Multiple falls     Osteoporosis 2019    Shingles 2016    Urinary tract infection 2022     Allergies    Allergen Reactions    Zoledronic Acid GI Intolerance     Anorexia    Codeine GI Intolerance, Nausea Only and Vomiting       Access Code: [email or web address]  URL: [email or web address]  Date: 01/12/2024  Prepared by: Dallas Mathew    Exercises  - Supine Shoulder Flexion Extension AAROM with Dowel  - 1 x daily - 7 x weekly - 1 sets - 15 reps - 3 second hold  - Supine Shoulder External Rotation with Dowel  - 1 x daily - 7 x weekly - 1 sets - 15 reps - 3 second hold  - Sitting Scapular Squeezes  - 1 x daily - 7 x weekly - 3 sets - 10 reps - 3 second hold  - Seated Shoulder Scaption Slide at Table Top with Forearm in Neutral  - 1 x daily - 7 x weekly - 1 sets - 15 reps  - Towel Squeeze  - 1 x daily - 7 x weekly - 1 sets - 20 reps - 3 second hold  - Seated Upper Trapezius Stretch  - 1 x daily - 7 x weekly - 3 sets - 30 second hold    Access Code: [email or web address]  URL: [email or web address]  Date: 02/01/2024  Prepared by: Dallas Mathew    Exercises  - Standing Isometric Shoulder Internal Rotation at Doorway  - 1 x daily - 7 x weekly - 2 sets - 10 reps - 3 seconds hold  - Standing Isometric Shoulder External Rotation with Doorway  - 1 x daily - 7 x weekly - 2 sets - 10 reps - 3 seconds hold  - Standing Isometric Shoulder Flexion with Doorway - Arm Bent  - 1 x daily - 7 x weekly - 2 sets - 10 reps - 3 seconds hold  - Standing Isometric Shoulder Extension with Doorway - Arm Bent  - 1 x daily - 7 x weekly - 2 sets - 10 reps - 3 seconds hold  - Standing Shoulder Row with Anchored Resistance  - 1 x daily - 7 x weekly - 3 sets - 10 reps - 3 seconds hold  Access Code: [email or web address]  URL: [email or web address]  Date: 02/28/2024  Prepared by: Dallas Mathew    Exercises  - Sleeper Stretch  - 1 x daily - 7 x weekly - 3 sets - 30 seconds hold    Manuals 3/8 3/13 3/15 3/20   R shoulder PROM    CM   R shoulder mobs    CM                 Neuro Re-Ed       HEP review       Pt education  10'     Pendulums        Scap  "squeezes       Sup pro       Rows  3x10 blk tb 3x10 blk tb 3x10 blk tb 3x10 blk tb   LPD 3x10 gtb 3x10 btb 3x10 btb 3x10 btb   Supine ER/IR MRE Elbow at side 2x10 P! With ER  Elbow at side 2x10 ea  Elbow at side 2x10   Supine alternating isometrics       Diaphragmatic breathing 5' CM             Ther Ex       Table slides       Seated cane abd AAROM    1x10 5\"    squeeze       Finger ext       Elbow flx/ext       4 way isometrics 1x15 ER 3\"      Pulleys        UBE 3/3 L1 3/3 L1 3/3 L1 3/3 L1   SL ER 2x10 2x10  2x10 3x10   SL flx 3x5 P! 2x8 2x10 3x8   SL abd 2x10 2x10 2x10 3x10   Wall slide 1x15 2x10 2x10 Abd 1x10 5\"   Tri pushdown   3x10 20#    Elbow flx   2x10 3#    ER/IR    2x10 ytb          Ther Activity                     Gait Training                     Modalities                          "

## 2024-03-22 ENCOUNTER — OFFICE VISIT (OUTPATIENT)
Dept: PHYSICAL THERAPY | Facility: MEDICAL CENTER | Age: 74
End: 2024-03-22
Payer: COMMERCIAL

## 2024-03-22 DIAGNOSIS — S42.294A OTHER CLOSED NONDISPLACED FRACTURE OF PROXIMAL END OF RIGHT HUMERUS, INITIAL ENCOUNTER: Primary | ICD-10-CM

## 2024-03-22 PROCEDURE — 97112 NEUROMUSCULAR REEDUCATION: CPT

## 2024-03-22 PROCEDURE — 97110 THERAPEUTIC EXERCISES: CPT

## 2024-03-22 NOTE — PROGRESS NOTES
Progress Note     Today's date: 3/22/2024  Patient name: Claudia Gamboa  : 1950  MRN: [id number]  Referring provider: Shelton Angeles DO  Dx:   Encounter Diagnosis     ICD-10-CM    1. Other closed nondisplaced fracture of proximal end of right humerus, initial encounter  S42.294A             Start Time: 1230  Stop Time: 1315  Total time in clinic (min): 45 minutes  Eval/Re-Eval POC Expires Auth #/ Referral # Total Visits Start Date Expiration Date Extension Info Visits Limitation        [id number] 12 1/11 3/31             #[id number]  11  3/16 6/30                                                              1 2 3 4 5 6   1/12  1/17 1/19 2/1  2/15 (RE)      7 8 9 10 11 12    2/23  2/28  3/6 3/8   3/13 (PN)  3/15   13 14 15 16 17 18    3/20  3/22           19 20 21 22 23 24                 25 26 27 28 29 30                   Subjective: Patient reports feeling good upon arrival today      Objective: See treatment diary below      Assessment: Tolerated treatment well. Asymptomatic with all progressions today. Patient would benefit from continued PT      Plan: Continue per plan of care.       Precautions:   Past Medical History:   Diagnosis Date    Acute cystitis without hematuria 08/10/2018    Anemia 2018    Aortic aneurysm, abdominal (HCC)     noted on cat scan from 2020    Cataract     Closed left hip fracture (HCC) 08/10/2018    Colon polyp     Diabetes mellitus (HCC)     Hyperlipemia     Migraines     Multiple falls     Osteoporosis 2019    Shingles 2016    Urinary tract infection 2022     Allergies   Allergen Reactions    Zoledronic Acid GI Intolerance     Anorexia    Codeine GI Intolerance, Nausea Only and Vomiting       Access Code: [email or web address]  URL: [email or web address]  Date: 2024  Prepared by: Dallas Mathew    Exercises  - Supine Shoulder Flexion Extension AAROM with Dowel  - 1 x daily - 7 x weekly - 1 sets - 15 reps - 3 second hold  - Supine Shoulder External  Rotation with Dowel  - 1 x daily - 7 x weekly - 1 sets - 15 reps - 3 second hold  - Sitting Scapular Squeezes  - 1 x daily - 7 x weekly - 3 sets - 10 reps - 3 second hold  - Seated Shoulder Scaption Slide at Table Top with Forearm in Neutral  - 1 x daily - 7 x weekly - 1 sets - 15 reps  - Towel Squeeze  - 1 x daily - 7 x weekly - 1 sets - 20 reps - 3 second hold  - Seated Upper Trapezius Stretch  - 1 x daily - 7 x weekly - 3 sets - 30 second hold    Access Code: [email or web address]  URL: [email or web address]  Date: 02/01/2024  Prepared by: Dallas Mathew    Exercises  - Standing Isometric Shoulder Internal Rotation at Doorway  - 1 x daily - 7 x weekly - 2 sets - 10 reps - 3 seconds hold  - Standing Isometric Shoulder External Rotation with Doorway  - 1 x daily - 7 x weekly - 2 sets - 10 reps - 3 seconds hold  - Standing Isometric Shoulder Flexion with Doorway - Arm Bent  - 1 x daily - 7 x weekly - 2 sets - 10 reps - 3 seconds hold  - Standing Isometric Shoulder Extension with Doorway - Arm Bent  - 1 x daily - 7 x weekly - 2 sets - 10 reps - 3 seconds hold  - Standing Shoulder Row with Anchored Resistance  - 1 x daily - 7 x weekly - 3 sets - 10 reps - 3 seconds hold  Access Code: [email or web address]  URL: [email or web address]  Date: 02/28/2024  Prepared by: Dallas Mathew    Exercises  - Sleeper Stretch  - 1 x daily - 7 x weekly - 3 sets - 30 seconds hold    Manuals 3/8 3/13 3/15 3/20 3/22   R shoulder PROM    CM CM   R shoulder mobs    CM CM                   Neuro Re-Ed        HEP review        Pt education  10'      Pendulums         Scap squeezes        Sup pro        Rows  3x10 blk tb 3x10 blk tb 3x10 blk tb 3x10 blk tb 3x10 blk tb   LPD 3x10 gtb 3x10 btb 3x10 btb 3x10 btb 3x10 btb   Supine ER/IR MRE Elbow at side 2x10 P! With ER  Elbow at side 2x10 ea  Elbow at side 2x10 Elbow at side 2x10   Supine protraction     2x10 cane   Diaphragmatic breathing 5' CM               Ther Ex        Table slides       "  Seated cane abd AAROM    1x10 5\"     squeeze        Finger ext        Elbow flx/ext        4 way isometrics 1x15 ER 3\"       Pulleys         UBE 3/3 L1 3/3 L1 3/3 L1 3/3 L1 3/3 L1   SL ER 2x10 2x10  2x10 3x10 3x10   SL flx 3x5 P! 2x8 2x10 3x8 3x10   SL abd 2x10 2x10 2x10 3x10 3x10   Wall slide 1x15 2x10 2x10 Abd 1x10 5\"    Tri pushdown   3x10 20#     Elbow flx   2x10 3#     ER/IR    2x10 ytb 2x10 ytb           Ther Activity                        Gait Training                        Modalities                             "

## 2024-03-27 ENCOUNTER — OFFICE VISIT (OUTPATIENT)
Dept: PHYSICAL THERAPY | Facility: MEDICAL CENTER | Age: 74
End: 2024-03-27
Payer: COMMERCIAL

## 2024-03-27 DIAGNOSIS — S42.294A OTHER CLOSED NONDISPLACED FRACTURE OF PROXIMAL END OF RIGHT HUMERUS, INITIAL ENCOUNTER: Primary | ICD-10-CM

## 2024-03-27 PROCEDURE — 97140 MANUAL THERAPY 1/> REGIONS: CPT

## 2024-03-27 PROCEDURE — 97110 THERAPEUTIC EXERCISES: CPT

## 2024-03-27 PROCEDURE — 97112 NEUROMUSCULAR REEDUCATION: CPT

## 2024-03-27 NOTE — PROGRESS NOTES
"Progress Note     Today's date: 3/27/2024  Patient name: Claudia Gamboa  : 1950  MRN: [id number]  Referring provider: Shelton Angeles DO  Dx:   Encounter Diagnosis     ICD-10-CM    1. Other closed nondisplaced fracture of proximal end of right humerus, initial encounter  S42.294A             Start Time: 1220  Stop Time: 1315  Total time in clinic (min): 55 minutes  Eval/Re-Eval POC Expires Auth #/ Referral # Total Visits Start Date Expiration Date Extension Info Visits Limitation        [id number] 12 1/11 3/31             #[id number]  11  3/16 6/30                                                              1 2 3 4 5 6   1/12  1/17 1/19 2/1  2/15 (RE)      7 8 9 10 11 12    2/23  2/28  3/6 3/8   3/13 (PN)  3/15   13 14 15 16 17 18    3/20  3/22 3/27          19 20 21 22 23 24                 25 26 27 28 29 30                   Subjective: Patient reports feeling good upon arrival today. She states she feels 90% better. \"Rolling in bed with certain arm movements\" is still her biggest complaint.      Objective: See treatment diary below      Assessment: Tolerated treatment well. R UT STM performed 2/to high TTP. Patient reports cont high posterior/lateral arm sx with certain movements throughout session, which are not able to be consistently reproduced. No TTP noted at triceps musculature and no sx are present with resisted elbow ext. High fatigue and high sx irritability is still present with interventions involving active and isometric ER. Patient sx description and location most closely aligns with infraspinatus pain referral pattern indicating this as a potential ongoing contributor. Will cont to progress as tolerated. Patient would benefit from continued PT      Plan: Continue per plan of care.       Precautions:   Past Medical History:   Diagnosis Date    Acute cystitis without hematuria 08/10/2018    Anemia 2018    Aortic aneurysm, abdominal (HCC)     noted on cat scan from 2020    Cataract  "    Closed left hip fracture (HCC) 08/10/2018    Colon polyp     Diabetes mellitus (HCC)     Hyperlipemia     Migraines     Multiple falls     Osteoporosis 02/22/2019    Shingles 7/2016    Urinary tract infection 5/2022     Allergies   Allergen Reactions    Zoledronic Acid GI Intolerance     Anorexia    Codeine GI Intolerance, Nausea Only and Vomiting       Access Code: [email or web address]  URL: [email or web address]  Date: 01/12/2024  Prepared by: Dallas Mathew    Exercises  - Supine Shoulder Flexion Extension AAROM with Dowel  - 1 x daily - 7 x weekly - 1 sets - 15 reps - 3 second hold  - Supine Shoulder External Rotation with Dowel  - 1 x daily - 7 x weekly - 1 sets - 15 reps - 3 second hold  - Sitting Scapular Squeezes  - 1 x daily - 7 x weekly - 3 sets - 10 reps - 3 second hold  - Seated Shoulder Scaption Slide at Table Top with Forearm in Neutral  - 1 x daily - 7 x weekly - 1 sets - 15 reps  - Towel Squeeze  - 1 x daily - 7 x weekly - 1 sets - 20 reps - 3 second hold  - Seated Upper Trapezius Stretch  - 1 x daily - 7 x weekly - 3 sets - 30 second hold    Access Code: [email or web address]  URL: [email or web address]  Date: 02/01/2024  Prepared by: Dallas Mathew    Exercises  - Standing Isometric Shoulder Internal Rotation at Doorway  - 1 x daily - 7 x weekly - 2 sets - 10 reps - 3 seconds hold  - Standing Isometric Shoulder External Rotation with Doorway  - 1 x daily - 7 x weekly - 2 sets - 10 reps - 3 seconds hold  - Standing Isometric Shoulder Flexion with Doorway - Arm Bent  - 1 x daily - 7 x weekly - 2 sets - 10 reps - 3 seconds hold  - Standing Isometric Shoulder Extension with Doorway - Arm Bent  - 1 x daily - 7 x weekly - 2 sets - 10 reps - 3 seconds hold  - Standing Shoulder Row with Anchored Resistance  - 1 x daily - 7 x weekly - 3 sets - 10 reps - 3 seconds hold  Access Code: [email or web address]  URL: [email or web address]  Date: 02/28/2024  Prepared by: Dallas Mathew    Exercises  - Sleeper Stretch   "- 1 x daily - 7 x weekly - 3 sets - 30 seconds hold    Manuals 3/8 3/13 3/15 3/20 3/22 3/27   R shoulder PROM    CM CM    R shoulder mobs    CM CM    R UT/LS STM      CM                     Neuro Re-Ed         HEP review         Pt education  10'    5'   Pendulums          Scap squeezes         Sup pro         Rows  3x10 blk tb 3x10 blk tb 3x10 blk tb 3x10 blk tb 3x10 blk tb    LPD 3x10 gtb 3x10 btb 3x10 btb 3x10 btb 3x10 btb 3x10 btb   Supine ER/IR MRE Elbow at side 2x10 P! With ER  Elbow at side 2x10 ea  Elbow at side 2x10 Elbow at side 2x10    Supine protraction     2x10 cane    Diaphragmatic breathing 5' CM                 Ther Ex         Table slides         Seated cane abd AAROM    1x10 5\"      squeeze         Finger ext         Elbow flx/ext         4 way isometrics 1x15 ER 3\"        Pulleys          UBE 3/3 L1 3/3 L1 3/3 L1 3/3 L1 3/3 L1 3/3 L1   SL ER 2x10 2x10  2x10 3x10 3x10 3x10   SL flx 3x5 P! 2x8 2x10 3x8 3x10 3x10   SL abd 2x10 2x10 2x10 3x10 3x10 3x10   Abd band at wrists      10x ytb P!   Wall slide 1x15 2x10 2x10 Abd 1x10 5\"     Tri pushdown   3x10 20#   2x15 ytb   Elbow flx   2x10 3#      ER/IR    2x10 ytb 2x10 ytb Held p!            Ther Activity                           Gait Training                           Modalities                                "

## 2024-03-29 ENCOUNTER — OFFICE VISIT (OUTPATIENT)
Dept: PHYSICAL THERAPY | Facility: MEDICAL CENTER | Age: 74
End: 2024-03-29
Payer: COMMERCIAL

## 2024-03-29 DIAGNOSIS — S42.294A OTHER CLOSED NONDISPLACED FRACTURE OF PROXIMAL END OF RIGHT HUMERUS, INITIAL ENCOUNTER: Primary | ICD-10-CM

## 2024-03-29 PROCEDURE — 97110 THERAPEUTIC EXERCISES: CPT

## 2024-03-29 NOTE — PROGRESS NOTES
"Progress Note     Today's date: 3/29/2024  Patient name: Claudia Gamboa  : 1950  MRN: [id number]  Referring provider: Shelton Anegles DO  Dx:   Encounter Diagnosis     ICD-10-CM    1. Other closed nondisplaced fracture of proximal end of right humerus, initial encounter  S42.294A             Start Time: 1220  Stop Time: 1305  Total time in clinic (min): 45 minutes  Eval/Re-Eval POC Expires Auth #/ Referral # Total Visits Start Date Expiration Date Extension Info Visits Limitation        [id number] 12 1/11 3/31             #[id number]  11  3/16 6/30                                                              1 2 3 4 5 6   1/12  1/17 1/19 2/1  2/15 (RE)      7 8 9 10 11 12    2/23  2/28  3/6 3/8   3/13 (PN)  3/15   13 14 15 16 17 18    3/20  3/22 3/27  3/29        19 20 21 22 23 24                 25 26 27 28 29 30                   Subjective: Patient denies any change in status upon arrival today.      Objective: See treatment diary below      Assessment: Tolerated treatment well. High fatigue and high sx irritability is still present with interventions involving active and isometric ER. Occasional \"catching\" during AROM noted, patient visibly emotional. Pain able to be relieved with rest, patient able to cont with session. No consistent sx noted, able to perform all progressions. Patient would benefit from continued PT      Plan: Continue per plan of care.  Cont at freq of 1x/week. Focus on ER strength/functional IR.     Precautions:   Past Medical History:   Diagnosis Date    Acute cystitis without hematuria 08/10/2018    Anemia 2018    Aortic aneurysm, abdominal (HCC)     noted on cat scan from 2020    Cataract     Closed left hip fracture (HCC) 08/10/2018    Colon polyp     Diabetes mellitus (HCC)     Hyperlipemia     Migraines     Multiple falls     Osteoporosis 2019    Shingles 2016    Urinary tract infection 2022     Allergies   Allergen Reactions    Zoledronic Acid GI " Intolerance     Anorexia    Codeine GI Intolerance, Nausea Only and Vomiting       Access Code: [email or web address]  URL: [email or web address]  Date: 01/12/2024  Prepared by: Dallas Mathew    Exercises  - Supine Shoulder Flexion Extension AAROM with Dowel  - 1 x daily - 7 x weekly - 1 sets - 15 reps - 3 second hold  - Supine Shoulder External Rotation with Dowel  - 1 x daily - 7 x weekly - 1 sets - 15 reps - 3 second hold  - Sitting Scapular Squeezes  - 1 x daily - 7 x weekly - 3 sets - 10 reps - 3 second hold  - Seated Shoulder Scaption Slide at Table Top with Forearm in Neutral  - 1 x daily - 7 x weekly - 1 sets - 15 reps  - Towel Squeeze  - 1 x daily - 7 x weekly - 1 sets - 20 reps - 3 second hold  - Seated Upper Trapezius Stretch  - 1 x daily - 7 x weekly - 3 sets - 30 second hold    Access Code: [email or web address]  URL: [email or web address]/  Date: 02/01/2024  Prepared by: Dallas Mathew    Exercises  - Standing Isometric Shoulder Internal Rotation at Doorway  - 1 x daily - 7 x weekly - 2 sets - 10 reps - 3 seconds hold  - Standing Isometric Shoulder External Rotation with Doorway  - 1 x daily - 7 x weekly - 2 sets - 10 reps - 3 seconds hold  - Standing Isometric Shoulder Flexion with Doorway - Arm Bent  - 1 x daily - 7 x weekly - 2 sets - 10 reps - 3 seconds hold  - Standing Isometric Shoulder Extension with Doorway - Arm Bent  - 1 x daily - 7 x weekly - 2 sets - 10 reps - 3 seconds hold  - Standing Shoulder Row with Anchored Resistance  - 1 x daily - 7 x weekly - 3 sets - 10 reps - 3 seconds hold  Access Code: [email or web address]  URL: [email or web address]/  Date: 02/28/2024  Prepared by: Dallas Mathew    Exercises  - Sleeper Stretch  - 1 x daily - 7 x weekly - 3 sets - 30 seconds hold    Manuals 3/8 3/13 3/15 3/20 3/22 3/27 3/29   R shoulder PROM    CM CM     R shoulder mobs    CM CM     R UT/LS STM      CM                        Neuro Re-Ed          HEP review          Pt education  10'    5'   "  Pendulums           Scap squeezes          Sup pro          Rows  3x10 blk tb 3x10 blk tb 3x10 blk tb 3x10 blk tb 3x10 blk tb     LPD 3x10 gtb 3x10 btb 3x10 btb 3x10 btb 3x10 btb 3x10 btb    Supine ER/IR MRE Elbow at side 2x10 P! With ER  Elbow at side 2x10 ea  Elbow at side 2x10 Elbow at side 2x10  Elbow at side 2x15   Supine protraction     2x10 cane     Diaphragmatic breathing 5' CM                   Ther Ex          Table slides          Seated cane abd AAROM    1x10 5\"       squeeze          Finger ext          Elbow flx/ext          4 way isometrics 1x15 ER 3\"         Pulleys           UBE 3/3 L1 3/3 L1 3/3 L1 3/3 L1 3/3 L1 3/3 L1 3/3 L2   SL ER 2x10 2x10  2x10 3x10 3x10 3x10 3x10   SL flx 3x5 P! 2x8 2x10 3x8 3x10 3x10 3x10   SL abd 2x10 2x10 2x10 3x10 3x10 3x10 3x10   Abd band at wrists      10x ytb P! 10x ytb   Sup flx       1x10 3\"   Tri pushdown   3x10 20#   2x15 ytb    Elbow flx   2x10 3#       ER/IR    2x10 ytb 2x10 ytb Held p! 2x10 ytb ER   ER walk out       2x10 ytb             Ther Activity                              Gait Training                              Modalities                                   "
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
## 2024-04-02 ENCOUNTER — OFFICE VISIT (OUTPATIENT)
Dept: OBGYN CLINIC | Facility: MEDICAL CENTER | Age: 74
End: 2024-04-02
Payer: COMMERCIAL

## 2024-04-02 VITALS
SYSTOLIC BLOOD PRESSURE: 132 MMHG | BODY MASS INDEX: 29.03 KG/M2 | DIASTOLIC BLOOD PRESSURE: 82 MMHG | WEIGHT: 185 LBS | HEIGHT: 67 IN

## 2024-04-02 DIAGNOSIS — R93.89 INCREASED ENDOMETRIAL STRIPE THICKNESS: ICD-10-CM

## 2024-04-02 DIAGNOSIS — N85.9 LESION OF ENDOMETRIUM: Primary | ICD-10-CM

## 2024-04-02 PROCEDURE — 58100 BIOPSY OF UTERUS LINING: CPT | Performed by: NURSE PRACTITIONER

## 2024-04-02 PROCEDURE — 99213 OFFICE O/P EST LOW 20 MIN: CPT | Performed by: NURSE PRACTITIONER

## 2024-04-02 PROCEDURE — 88305 TISSUE EXAM BY PATHOLOGIST: CPT | Performed by: PATHOLOGY

## 2024-04-05 ENCOUNTER — OFFICE VISIT (OUTPATIENT)
Dept: PHYSICAL THERAPY | Facility: MEDICAL CENTER | Age: 74
End: 2024-04-05
Payer: COMMERCIAL

## 2024-04-05 DIAGNOSIS — S42.294A OTHER CLOSED NONDISPLACED FRACTURE OF PROXIMAL END OF RIGHT HUMERUS, INITIAL ENCOUNTER: Primary | ICD-10-CM

## 2024-04-05 PROCEDURE — 97110 THERAPEUTIC EXERCISES: CPT

## 2024-04-05 NOTE — PROGRESS NOTES
Progress Note     Today's date: 2024  Patient name: Claudia Gamboa  : 1950  MRN: [id number]  Referring provider: Shelton Angeles DO  Dx:   Encounter Diagnosis     ICD-10-CM    1. Other closed nondisplaced fracture of proximal end of right humerus, initial encounter  S42.294A             Start Time: 1230  Stop Time: 1310  Total time in clinic (min): 40 minutes  Eval/Re-Eval POC Expires Auth #/ Referral # Total Visits Start Date Expiration Date Extension Info Visits Limitation        [id number] 12 1/11 3/31             #[id number]  11  3/16 6/30                                                              1 2 3 4 5 6   1/12  1/17 1/19 2/1  2/15 (RE)      7 8 9 10 11 12    2/23  2/28  3/6 3/8   3/13 (PN)  3/15   13 14 15 16 17 18    3/20  3/22 3/27  3/29   4/5     19 20 21 22 23 24                 25 26 27 28 29 30                   Subjective: Patient denies any change in status upon arrival today.      Objective: See treatment diary below      Assessment: Tolerated treatment well. Focused on strengthening again today with good tolerance. Patient would benefit from continued PT      Plan: Continue per plan of care.       Precautions:   Past Medical History:   Diagnosis Date    Acute cystitis without hematuria 08/10/2018    Anemia 2018    Aortic aneurysm, abdominal (HCC)     noted on cat scan from 2020    Cataract     Closed left hip fracture (HCC) 08/10/2018    Colon polyp     Diabetes mellitus (HCC)     Hyperlipemia     Migraines     Multiple falls     Osteoporosis 2019    Shingles 2016    Urinary tract infection 2022     Allergies   Allergen Reactions    Zoledronic Acid GI Intolerance     Anorexia    Codeine GI Intolerance, Nausea Only and Vomiting       Access Code: [email or web address]  URL: [email or web address]  Date: 2024  Prepared by: Dallas Mathew    Exercises  - Supine Shoulder Flexion Extension AAROM with Dowel  - 1 x daily - 7 x weekly - 1 sets - 15 reps - 3 second  hold  - Supine Shoulder External Rotation with Dowel  - 1 x daily - 7 x weekly - 1 sets - 15 reps - 3 second hold  - Sitting Scapular Squeezes  - 1 x daily - 7 x weekly - 3 sets - 10 reps - 3 second hold  - Seated Shoulder Scaption Slide at Table Top with Forearm in Neutral  - 1 x daily - 7 x weekly - 1 sets - 15 reps  - Towel Squeeze  - 1 x daily - 7 x weekly - 1 sets - 20 reps - 3 second hold  - Seated Upper Trapezius Stretch  - 1 x daily - 7 x weekly - 3 sets - 30 second hold    Access Code: [email or web address]  URL: [email or web address]  Date: 02/01/2024  Prepared by: Dallas Mathew    Exercises  - Standing Isometric Shoulder Internal Rotation at Doorway  - 1 x daily - 7 x weekly - 2 sets - 10 reps - 3 seconds hold  - Standing Isometric Shoulder External Rotation with Doorway  - 1 x daily - 7 x weekly - 2 sets - 10 reps - 3 seconds hold  - Standing Isometric Shoulder Flexion with Doorway - Arm Bent  - 1 x daily - 7 x weekly - 2 sets - 10 reps - 3 seconds hold  - Standing Isometric Shoulder Extension with Doorway - Arm Bent  - 1 x daily - 7 x weekly - 2 sets - 10 reps - 3 seconds hold  - Standing Shoulder Row with Anchored Resistance  - 1 x daily - 7 x weekly - 3 sets - 10 reps - 3 seconds hold  Access Code: [email or web address]  URL: [email or web address]  Date: 02/28/2024  Prepared by: Dallas Mathew    Exercises  - Sleeper Stretch  - 1 x daily - 7 x weekly - 3 sets - 30 seconds hold    Manuals 3/22 3/27 3/29 4/5   R shoulder PROM CM      R shoulder mobs CM      R UT/LS STM  CM                   Neuro Re-Ed       HEP review       Pt education  5'     Pendulums        Scap squeezes       Sup pro       Rows  3x10 blk tb      LPD 3x10 btb 3x10 btb     Supine ER/IR MRE Elbow at side 2x10  Elbow at side 2x15    Supine protraction 2x10 cane      Diaphragmatic breathing              Ther Ex       Table slides       Seated cane abd AAROM        squeeze       Finger ext       Elbow flx/ext       4 way isometrics      "  Pulleys        UBE 3/3 L1 3/3 L1 3/3 L2 3/3 L2   SL ER 3x10 3x10 3x10 3x10   SL flx 3x10 3x10 3x10 3x10   SL abd 3x10 3x10 3x10 3x10   Abd band at wrists  10x ytb P! 10x ytb 2x10 ytb   Sup flx   1x10 3\" 1x10 3\" 1#   Tri pushdown  2x15 ytb  2x15 ytb   Elbow flx       ER/IR 2x10 ytb Held p! 2x10 ytb ER 2x10 ytb ER   ER walk out   2x10 ytb 2x10 ytb   Supine tri ext    2x10 1#          Ther Activity                     Gait Training                     Modalities                          "

## 2024-04-08 DIAGNOSIS — Z79.4 TYPE 2 DIABETES MELLITUS WITH HYPERGLYCEMIA, WITH LONG-TERM CURRENT USE OF INSULIN (HCC): ICD-10-CM

## 2024-04-08 DIAGNOSIS — E11.65 TYPE 2 DIABETES MELLITUS WITH HYPERGLYCEMIA, WITH LONG-TERM CURRENT USE OF INSULIN (HCC): ICD-10-CM

## 2024-04-08 PROCEDURE — 88305 TISSUE EXAM BY PATHOLOGIST: CPT | Performed by: PATHOLOGY

## 2024-04-09 ENCOUNTER — TELEPHONE (OUTPATIENT)
Dept: OBGYN CLINIC | Facility: MEDICAL CENTER | Age: 74
End: 2024-04-09

## 2024-04-09 RX ORDER — INSULIN GLARGINE 300 U/ML
60 INJECTION, SOLUTION SUBCUTANEOUS
Qty: 30 ML | Refills: 0 | Status: SHIPPED | OUTPATIENT
Start: 2024-04-09

## 2024-04-12 ENCOUNTER — OFFICE VISIT (OUTPATIENT)
Dept: PHYSICAL THERAPY | Facility: MEDICAL CENTER | Age: 74
End: 2024-04-12
Payer: COMMERCIAL

## 2024-04-12 DIAGNOSIS — Z79.4 TYPE 2 DIABETES MELLITUS WITH HYPERGLYCEMIA, WITH LONG-TERM CURRENT USE OF INSULIN (HCC): ICD-10-CM

## 2024-04-12 DIAGNOSIS — M79.2 NEUROPATHIC PAIN: ICD-10-CM

## 2024-04-12 DIAGNOSIS — E11.65 TYPE 2 DIABETES MELLITUS WITH HYPERGLYCEMIA, WITH LONG-TERM CURRENT USE OF INSULIN (HCC): ICD-10-CM

## 2024-04-12 DIAGNOSIS — E78.1 HYPERTRIGLYCERIDEMIA: ICD-10-CM

## 2024-04-12 DIAGNOSIS — S42.294A OTHER CLOSED NONDISPLACED FRACTURE OF PROXIMAL END OF RIGHT HUMERUS, INITIAL ENCOUNTER: Primary | ICD-10-CM

## 2024-04-12 PROCEDURE — 97110 THERAPEUTIC EXERCISES: CPT

## 2024-04-12 NOTE — PROGRESS NOTES
Daily Note     Today's date: 2024  Patient name: Claudia Gamboa  : 1950  MRN: [id number]  Referring provider: Shelton Angeles DO  Dx:   Encounter Diagnosis     ICD-10-CM    1. Other closed nondisplaced fracture of proximal end of right humerus, initial encounter  S42.294A             Start Time: 1215  Stop Time: 1255  Total time in clinic (min): 40 minutes  Eval/Re-Eval POC Expires Auth #/ Referral # Total Visits Start Date Expiration Date Extension Info Visits Limitation        [id number] 12 1/11 3/31             #[id number]  11  3/16 6/30                                                              1 2 3 4 5 6   1/12  1/17 1/19 2/1  2/15 (RE)      7 8 9 10 11 12    2/23  2/28  3/6 3/8   3/13 (PN)  3/15   13 14 15 16 17 18    3/20  3/22 3/27  3/29   4/5  4/12   19 20 21 22 23 24    PN             25 26 27 28 29 30                   Subjective: Patient reports feeling good upon arrival today and denies any sx since LV.      Objective: See treatment diary below      Assessment: Tolerated treatment well. Asymptomatic with all progressions today. Patient would benefit from continued PT      Plan: Continue per plan of care.   PN NV.     Precautions:   Past Medical History:   Diagnosis Date    Acute cystitis without hematuria 08/10/2018    Anemia 2018    Aortic aneurysm, abdominal (HCC)     noted on cat scan from 2020    Cataract     Closed left hip fracture (HCC) 08/10/2018    Colon polyp     Diabetes mellitus (HCC)     Hyperlipemia     Migraines     Multiple falls     Osteoporosis 2019    Shingles 2016    Urinary tract infection 2022     Allergies   Allergen Reactions    Zoledronic Acid GI Intolerance     Anorexia    Codeine GI Intolerance, Nausea Only and Vomiting       Access Code: [email or web address]  URL: [email or web address]  Date: 2024  Prepared by: Dallas Mathew    Exercises  - Supine Shoulder Flexion Extension AAROM with Dowel  - 1 x daily - 7 x weekly - 1 sets - 15  reps - 3 second hold  - Supine Shoulder External Rotation with Dowel  - 1 x daily - 7 x weekly - 1 sets - 15 reps - 3 second hold  - Sitting Scapular Squeezes  - 1 x daily - 7 x weekly - 3 sets - 10 reps - 3 second hold  - Seated Shoulder Scaption Slide at Table Top with Forearm in Neutral  - 1 x daily - 7 x weekly - 1 sets - 15 reps  - Towel Squeeze  - 1 x daily - 7 x weekly - 1 sets - 20 reps - 3 second hold  - Seated Upper Trapezius Stretch  - 1 x daily - 7 x weekly - 3 sets - 30 second hold    Access Code: [email or web address]  URL: [email or web address]  Date: 02/01/2024  Prepared by: Dallas Mathew    Exercises  - Standing Isometric Shoulder Internal Rotation at Doorway  - 1 x daily - 7 x weekly - 2 sets - 10 reps - 3 seconds hold  - Standing Isometric Shoulder External Rotation with Doorway  - 1 x daily - 7 x weekly - 2 sets - 10 reps - 3 seconds hold  - Standing Isometric Shoulder Flexion with Doorway - Arm Bent  - 1 x daily - 7 x weekly - 2 sets - 10 reps - 3 seconds hold  - Standing Isometric Shoulder Extension with Doorway - Arm Bent  - 1 x daily - 7 x weekly - 2 sets - 10 reps - 3 seconds hold  - Standing Shoulder Row with Anchored Resistance  - 1 x daily - 7 x weekly - 3 sets - 10 reps - 3 seconds hold  Access Code: [email or web address]  URL: [email or web address]  Date: 02/28/2024  Prepared by: Dallas Mathew    Exercises  - Sleeper Stretch  - 1 x daily - 7 x weekly - 3 sets - 30 seconds hold    Manuals 3/22 3/27 3/29 4/5 4/12   R shoulder PROM CM       R shoulder mobs CM       R UT/LS STM  CM                      Neuro Re-Ed        HEP review        Pt education  5'      Pendulums         Scap squeezes        Sup pro        Rows  3x10 blk tb       LPD 3x10 btb 3x10 btb      Supine ER/IR MRE Elbow at side 2x10  Elbow at side 2x15     Supine protraction 2x10 cane       Diaphragmatic breathing                Ther Ex        Table slides        Seated cane abd AAROM         squeeze        Finger ext       "  Elbow flx/ext        4 way isometrics        Pulleys         UBE 3/3 L1 3/3 L1 3/3 L2 3/3 L2 3/3 L2   SL ER 3x10 3x10 3x10 3x10 3x10   SL flx 3x10 3x10 3x10 3x10 3x10   SL abd 3x10 3x10 3x10 3x10 3x10   Abd band at wrists  10x ytb P! 10x ytb 2x10 ytb 2x10 ytb   Sup flx   1x10 3\" 1x10 3\" 1# 2x10 3\" 1#   Tri pushdown  2x15 ytb  2x15 ytb 2x15 rtb   Elbow flx        ER/IR 2x10 ytb Held p! 2x10 ytb ER 2x10 ytb ER 2x10 ytb ER   ER walk out   2x10 ytb 2x10 ytb 2x10 rtb   Supine tri ext    2x10 1# 2x10 1#   Supine chest press     Dowel + 5# aw, 2x10           Ther Activity                        Gait Training                        Modalities                             "

## 2024-04-13 RX ORDER — INSULIN ASPART 100 [IU]/ML
INJECTION, SOLUTION INTRAVENOUS; SUBCUTANEOUS
Qty: 60 ML | Refills: 0 | Status: SHIPPED | OUTPATIENT
Start: 2024-04-13

## 2024-04-13 RX ORDER — GABAPENTIN 600 MG/1
600 TABLET ORAL 3 TIMES DAILY
Qty: 90 TABLET | Refills: 0 | Status: SHIPPED | OUTPATIENT
Start: 2024-04-13

## 2024-04-15 ENCOUNTER — TELEPHONE (OUTPATIENT)
Dept: OBGYN CLINIC | Facility: CLINIC | Age: 74
End: 2024-04-15

## 2024-04-15 NOTE — TELEPHONE ENCOUNTER
I am trying to get pt in with Dr. Meza sooner. She will call me back. He needs to see her for a pre op first and then have an endosee in office. Await call back

## 2024-04-15 NOTE — TELEPHONE ENCOUNTER
I have spoken to pt and to Dr. WALLS. Pt will come in to meet and discuss endosee and then come back for the endosee itself at a later date.

## 2024-04-15 NOTE — TELEPHONE ENCOUNTER
----- Message from CARRINGTON Griffin sent at 4/12/2024  1:50 PM EDT -----  I did not receive any notification back of when this patient is scheduled.  I did just check and she is not scheduled until July. Does Dr Meza or anyone else have anything sooner?

## 2024-04-16 ENCOUNTER — TELEPHONE (OUTPATIENT)
Dept: MAMMOGRAPHY | Facility: CLINIC | Age: 74
End: 2024-04-16

## 2024-04-16 RX ORDER — GEMFIBROZIL 600 MG/1
TABLET, FILM COATED ORAL
Qty: 180 TABLET | Refills: 0 | Status: SHIPPED | OUTPATIENT
Start: 2024-04-16

## 2024-04-19 DIAGNOSIS — E78.1 HYPERTRIGLYCERIDEMIA: ICD-10-CM

## 2024-04-19 RX ORDER — GEMFIBROZIL 600 MG/1
TABLET, FILM COATED ORAL
Qty: 180 TABLET | Refills: 0 | Status: CANCELLED | OUTPATIENT
Start: 2024-04-19

## 2024-04-21 DIAGNOSIS — Z79.4 TYPE 2 DIABETES MELLITUS WITH HYPERGLYCEMIA, WITH LONG-TERM CURRENT USE OF INSULIN (HCC): ICD-10-CM

## 2024-04-21 DIAGNOSIS — E11.65 TYPE 2 DIABETES MELLITUS WITH HYPERGLYCEMIA, WITH LONG-TERM CURRENT USE OF INSULIN (HCC): ICD-10-CM

## 2024-04-22 RX ORDER — INSULIN ASPART 100 [IU]/ML
INJECTION, SOLUTION INTRAVENOUS; SUBCUTANEOUS
Qty: 60 ML | Refills: 5 | Status: SHIPPED | OUTPATIENT
Start: 2024-04-22 | End: 2024-04-29

## 2024-04-24 ENCOUNTER — OFFICE VISIT (OUTPATIENT)
Dept: PHYSICAL THERAPY | Facility: MEDICAL CENTER | Age: 74
End: 2024-04-24
Payer: COMMERCIAL

## 2024-04-24 DIAGNOSIS — S42.294A OTHER CLOSED NONDISPLACED FRACTURE OF PROXIMAL END OF RIGHT HUMERUS, INITIAL ENCOUNTER: Primary | ICD-10-CM

## 2024-04-24 PROCEDURE — 97110 THERAPEUTIC EXERCISES: CPT

## 2024-04-24 NOTE — PROGRESS NOTES
Progress Note     Today's date: 2024  Patient name: Claudia Gamboa  : 1950  MRN: [id number]  Referring provider: Shelton Angeles DO  Dx:   Encounter Diagnosis     ICD-10-CM    1. Other closed nondisplaced fracture of proximal end of right humerus, initial encounter  S42.294A               Start Time: 1230  Stop Time: 1310  Total time in clinic (min): 40 minutes  Eval/Re-Eval POC Expires Auth #/ Referral # Total Visits Start Date Expiration Date Extension Info Visits Limitation        [id number] 12 1/11 3/31             #[id number]  11  3/16 6/30                                                              1 2 3 4 5 6   1/12  1/17 1/19 2/1  2/15 (RE)      7 8 9 10 11 12    2/23  2/28  3/6 3/8   3/13 (PN)  3/15   13 14 15 16 17 18    3/20  3/22 3/27  3/29   4/5  4/12   19 20 21 22 23 24    (PN)             25 26 27 28 29 30                   Subjective: Patient reports feeling good upon arrival today and denies any sx since LV.      Objective: See treatment diary below    Impairment based goals  Patient will demonstrate improvements in L shoulder AROM consistent with current phase of tx protocol. - ongoing  Patient will improve L shoulder strength to 4/5 in all planes within 6 weeks. - met  Patient will improve L shoulder strength to 5/5 in all planes by time of d/c. - not met  Patient will improve L elbow to 4/5 in all planes within 6 weeks. - met  Patient will improve L elbow to 5/5 in all planes by time of d/c. - met     Functional based goals to be completed by time of d/c  Patient will be able to perform all ADLs and grooming activities with minimal c/o L UE sx. - met  Patient will improve FOTO to greater than predicted value. - met  Patient will be independent with home exercise program. - partially met  Patient will be able to manage symptoms independently. - partially met       SENSATION    LEFT RIGHT   C2-C3 Intact Intact   C4 Intact Intact   C5 Intact Intact   C6 Intact Intact   C7  Intact Intact   C8 Intact Intact   T1 Intact Intact   T2 Intact Intact      REFLEXES    LEFT RIGHT   TRICEPS 2+ 2+   BICEPS 2+ 2+   BR 2+ 2+      POSTURAL FINDINGS  Head Position X Protracted   Neutral   Retracted   Scapular Position   Protracted X  Neutral   Retracted   Thoracic Spine  X Inc Kyphosis   Neutral                SHOULDER                    AROM PROM STRENGTH     RIGHT LEFT- WFL all RIGHT LEFT RIGHT LEFT   *   WFL    4+/5 5/5    *   WFL    4+/5  5/5   EXT               ER R mid finger to C4 SP       4+/5  5/5    IR  R mid finger to R L2        5/5  5/5   U. TRAP         5/5 5/5    M. TRAP               L. TRAP               PROTRACTION               RHOMBOIDS                        ELBOW                    AROM- WFL all PROM STRENGTH - DNT     RIGHT LEFT RIGHT LEFT RIGHT LEFT   FLX          5/5 5/5    EXT          5/5  5/5   SUP               PRO                                       WRIST                    AROM PROM STRENGTH     RIGHT LEFT RIGHT LEFT RIGHT LEFT   FLX         5/5 5/5   EXT         5/5 5/5   RAD. DEV         5/5 5/5   ULN. DEV         5/5 5/5       Assessment: Tolerated treatment well. Progress note was performed today and the patient has demonstrated improvements in R UE ROM, strength, functional mobility and activity tolerance, as well as subjective reports of reductions in pain and difficulty with ADLs. Patient still presents with deficits of mays R shoulder elevation AROM and decreased R shoulder flx/abd and ER strength, resulting in minor occasional pain with ADLs. HEP updated to reflect the findings of today's RE. See below. She did well with all progressions performed today. Patient would benefit from continued PT      Plan: Continue per plan of care.       Precautions:   Past Medical History:   Diagnosis Date    Acute cystitis without hematuria 08/10/2018    Anemia 8/13/2018    Aortic aneurysm, abdominal (HCC)     noted on cat scan from 2/2020    Cataract      "Closed left hip fracture (HCC) 08/10/2018    Colon polyp     Diabetes mellitus (HCC)     Hyperlipemia     Migraines     Multiple falls     Osteoporosis 02/22/2019    Shingles 7/2016    Urinary tract infection 5/2022     Allergies   Allergen Reactions    Zoledronic Acid GI Intolerance     Anorexia    Codeine GI Intolerance, Nausea Only and Vomiting     Access Code: [email or web address]  URL: [email or web address]  Date: 04/24/2024  Prepared by: Dallas Mathew    Exercises  - Standing Shoulder Horizontal Abduction with Resistance  - 3 x weekly - 2 sets - 10 reps  - Supine Shoulder Flexion Extension AAROM with Dowel  - 3 x weekly - 2 sets - 10 reps  - Shoulder External Rotation with Anchored Resistance  - 3 x weekly - 2 sets - 10 reps  - Shoulder Internal Rotation with Resistance  - 3 x weekly - 2 sets - 10 reps  - Sidelying Shoulder Abduction Palm Forward  - 3 x weekly - 2 sets - 10 reps  - Sidelying Shoulder Flexion 15 Degrees  - 3 x weekly - 2 sets - 10 reps  - Sidelying Shoulder External Rotation  - 3 x weekly - 2 sets - 10 reps      Manuals 3/22 3/27 3/29 4/5 4/12 4/24   R shoulder PROM CM        R shoulder mobs CM        R UT/LS STM  CM                         Neuro Re-Ed         HEP review         Pt education  5'       Pendulums          Scap squeezes         Sup pro         Rows  3x10 blk tb        LPD 3x10 btb 3x10 btb       Supine ER/IR MRE Elbow at side 2x10  Elbow at side 2x15      Supine protraction 2x10 cane        Diaphragmatic breathing                  Ther Ex         Pt education/HEP review      10'   Table slides         Seated cane abd AAROM          squeeze         Finger ext         Elbow flx/ext         4 way isometrics         Pulleys          UBE 3/3 L1 3/3 L1 3/3 L2 3/3 L2 3/3 L2    SL ER 3x10 3x10 3x10 3x10 3x10 3x10   SL flx 3x10 3x10 3x10 3x10 3x10 3x10   SL abd 3x10 3x10 3x10 3x10 3x10 3x10   Abd band at wrists  10x ytb P! 10x ytb 2x10 ytb 2x10 ytb 2x10 ytb   Sup flx   1x10 3\" 1x10 3\" " "1# 2x10 3\" 1# 2x10 3\" 1#   Sup cane flx      15x 5\"   Tri pushdown  2x15 ytb  2x15 ytb 2x15 rtb 2x15 gtb   Elbow flx         ER/IR 2x10 ytb Held p! 2x10 ytb ER 2x10 ytb ER 2x10 ytb ER 2x10 rtb ER   ER walk out   2x10 ytb 2x10 ytb 2x10 rtb    Supine tri ext    2x10 1# 2x10 1# 2x10 1#   Supine chest press     Dowel + 5# aw, 2x10 Dowel + 7# aw, 2x10            Ther Activity                           Gait Training                           Modalities                                "

## 2024-04-29 DIAGNOSIS — Z79.4 TYPE 2 DIABETES MELLITUS WITH HYPERGLYCEMIA, WITH LONG-TERM CURRENT USE OF INSULIN (HCC): Primary | ICD-10-CM

## 2024-04-29 DIAGNOSIS — E11.65 TYPE 2 DIABETES MELLITUS WITH HYPERGLYCEMIA, WITH LONG-TERM CURRENT USE OF INSULIN (HCC): Primary | ICD-10-CM

## 2024-04-29 DIAGNOSIS — Z79.4 TYPE 2 DIABETES MELLITUS WITH HYPERGLYCEMIA, WITH LONG-TERM CURRENT USE OF INSULIN (HCC): ICD-10-CM

## 2024-04-29 DIAGNOSIS — E78.1 HYPERTRIGLYCERIDEMIA: ICD-10-CM

## 2024-04-29 DIAGNOSIS — E11.65 TYPE 2 DIABETES MELLITUS WITH HYPERGLYCEMIA, WITH LONG-TERM CURRENT USE OF INSULIN (HCC): ICD-10-CM

## 2024-04-29 RX ORDER — INSULIN ASPART 100 [IU]/ML
INJECTION, SOLUTION INTRAVENOUS; SUBCUTANEOUS
Qty: 60 ML | Refills: 0 | Status: SHIPPED | OUTPATIENT
Start: 2024-04-29 | End: 2024-04-29 | Stop reason: SDUPTHER

## 2024-04-29 RX ORDER — GEMFIBROZIL 600 MG/1
TABLET, FILM COATED ORAL
Qty: 180 TABLET | Refills: 1 | Status: SHIPPED | OUTPATIENT
Start: 2024-04-29

## 2024-04-30 ENCOUNTER — TELEPHONE (OUTPATIENT)
Dept: ENDOCRINOLOGY | Facility: CLINIC | Age: 74
End: 2024-04-30

## 2024-04-30 RX ORDER — INSULIN ASPART 100 [IU]/ML
INJECTION, SOLUTION INTRAVENOUS; SUBCUTANEOUS
Qty: 60 ML | Refills: 0 | Status: SHIPPED | OUTPATIENT
Start: 2024-04-30

## 2024-04-30 NOTE — TELEPHONE ENCOUNTER
Regarding: FW: Medicine  Contact: [phone]  Sent Rx for both.  ----- Message -----  From: Doreen Pandey LPN  Sent: 4/24/2024   3:34 PM EDT  To: Irving Nielsen MD  Subject: FW: Medicine                                     Called patient to clarify message. Patient states that she needs script sent for Novolog pens. States that last script was sent for vials which was incorrect.   ----- Message -----  From: Meka Augustin  Sent: 4/24/2024   3:18 PM EDT  To: #  Subject: Medicine                                         ----- Message from Meka Augustin sent at 4/24/2024  3:18 PM EDT -----       ----- Message from Jayna Gamboa to Irving Nielsen MD sent at 4/22/2024 11:02 AM -----   On April 16 I reqested a refill of Gemfibrozil said approved never sent to [facility]. Sent another request  now saying denied I am almost out of medicine please resend to [facility]

## 2024-04-30 NOTE — TELEPHONE ENCOUNTER
Returned call to patient to inform her that script was sent. Patient states that she picked up medication today.

## 2024-05-01 ENCOUNTER — OFFICE VISIT (OUTPATIENT)
Dept: PHYSICAL THERAPY | Facility: MEDICAL CENTER | Age: 74
End: 2024-05-01
Payer: COMMERCIAL

## 2024-05-01 DIAGNOSIS — S42.294A OTHER CLOSED NONDISPLACED FRACTURE OF PROXIMAL END OF RIGHT HUMERUS, INITIAL ENCOUNTER: Primary | ICD-10-CM

## 2024-05-01 PROCEDURE — 97110 THERAPEUTIC EXERCISES: CPT

## 2024-05-01 NOTE — PROGRESS NOTES
Daily Note     Today's date: 2024  Patient name: Claudia Gamboa  : 1950  MRN: [id number]  Referring provider: Shelton Angeles DO  Dx:   Encounter Diagnosis     ICD-10-CM    1. Other closed nondisplaced fracture of proximal end of right humerus, initial encounter  S42.294A                 Start Time: 1220  Stop Time: 1300  Total time in clinic (min): 40 minutes  Eval/Re-Eval POC Expires Auth #/ Referral # Total Visits Start Date Expiration Date Extension Info Visits Limitation        [id number] 12 1/11 3/31             #[id number]  11  3/16 6/30                                                              1 2 3 4 5 6   1/12  1/17 1/19 2/1  2/15 (RE)      7 8 9 10 11 12    2/23  2/28  3/6 3/8   3/13 (PN)  3/15   13 14 15 16 17 18    3/20  3/22 3/27  3/29   4/5  4/12   19 20 21 22 23 24    (PN)             25 26 27 28 29 30                   Subjective: Patient reports feeling good upon arrival today and denies any sx since LV. She doing well with all HEP progressions.      Objective: See treatment diary below      Assessment: Tolerated treatment well. Asymptomatic with all progressions today. Patient reports compliance with and good understanding of HEP, reports being content with progress made throughout PT and is to trial home management of sx at this time. POC to remain open for 30 days and pt instructed to contact PT for change in status.     Plan: Patient to be d/c from PT.     Precautions:   Past Medical History:   Diagnosis Date    Acute cystitis without hematuria 08/10/2018    Anemia 2018    Aortic aneurysm, abdominal (HCC)     noted on cat scan from 2020    Cataract     Closed left hip fracture (HCC) 08/10/2018    Colon polyp     Diabetes mellitus (HCC)     Hyperlipemia     Migraines     Multiple falls     Osteoporosis 2019    Shingles 2016    Urinary tract infection 2022     Allergies   Allergen Reactions    Zoledronic Acid GI Intolerance     Anorexia    Codeine GI  "Intolerance, Nausea Only and Vomiting     Access Code: [email or web address]  URL: [email or web address]  Date: 04/24/2024  Prepared by: Dallas Mathew    Exercises  - Standing Shoulder Horizontal Abduction with Resistance  - 3 x weekly - 2 sets - 10 reps  - Supine Shoulder Flexion Extension AAROM with Dowel  - 3 x weekly - 2 sets - 10 reps  - Shoulder External Rotation with Anchored Resistance  - 3 x weekly - 2 sets - 10 reps  - Shoulder Internal Rotation with Resistance  - 3 x weekly - 2 sets - 10 reps  - Sidelying Shoulder Abduction Palm Forward  - 3 x weekly - 2 sets - 10 reps  - Sidelying Shoulder Flexion 15 Degrees  - 3 x weekly - 2 sets - 10 reps  - Sidelying Shoulder External Rotation  - 3 x weekly - 2 sets - 10 reps      Manuals 4/5 4/12 4/24 5/1   R shoulder PROM       R shoulder mobs       R UT/LS STM                     Neuro Re-Ed       HEP review       Pt education       Pendulums        Scap squeezes       Sup pro       Rows        LPD       Supine ER/IR MRE       Supine protraction       Diaphragmatic breathing              Ther Ex       Pt education/HEP review   10'    Table slides       Seated cane abd AAROM        squeeze       Finger ext       Elbow flx/ext       4 way isometrics       Pulleys        UBE 3/3 L2 3/3 L2  3/3 L2   SL ER 3x10 3x10 3x10 1x15 1#   SL flx 3x10 3x10 3x10 1x15 1#   SL abd 3x10 3x10 3x10 1x15 1#   Abd band at wrists 2x10 ytb 2x10 ytb 2x10 ytb 2x10 ytb + shoulder elevation   Sup flx 1x10 3\" 1# 2x10 3\" 1# 2x10 3\" 1# 2x10 3\" 2#   Sup cane flx   15x 5\"    Tri pushdown 2x15 ytb 2x15 rtb 2x15 gtb 2x15 btb   Elbow flx    2x15 4#   ER/IR 2x10 ytb ER 2x10 ytb ER 2x10 rtb ER 2x10 rtb ER/IR   ER walk out 2x10 ytb 2x10 rtb     Supine tri ext 2x10 1# 2x10 1# 2x10 1# 2x10 2#   Supine chest press  Dowel + 5# aw, 2x10 Dowel + 7# aw, 2x10 Dowel + 7# aw 2x10          Ther Activity                     Gait Training                     Modalities                          "

## 2024-05-07 ENCOUNTER — OFFICE VISIT (OUTPATIENT)
Dept: CARDIOLOGY CLINIC | Facility: MEDICAL CENTER | Age: 74
End: 2024-05-07
Payer: COMMERCIAL

## 2024-05-07 VITALS
OXYGEN SATURATION: 95 % | HEIGHT: 67 IN | SYSTOLIC BLOOD PRESSURE: 128 MMHG | BODY MASS INDEX: 29.51 KG/M2 | DIASTOLIC BLOOD PRESSURE: 80 MMHG | HEART RATE: 73 BPM | WEIGHT: 188 LBS

## 2024-05-07 DIAGNOSIS — I35.0 NONRHEUMATIC AORTIC VALVE STENOSIS: ICD-10-CM

## 2024-05-07 DIAGNOSIS — I35.0 MODERATE AORTIC STENOSIS: ICD-10-CM

## 2024-05-07 DIAGNOSIS — E11.65 TYPE 2 DIABETES MELLITUS WITH HYPERGLYCEMIA, WITH LONG-TERM CURRENT USE OF INSULIN (HCC): ICD-10-CM

## 2024-05-07 DIAGNOSIS — E78.1 HYPERTRIGLYCERIDEMIA: Primary | ICD-10-CM

## 2024-05-07 DIAGNOSIS — Z79.4 TYPE 2 DIABETES MELLITUS WITH HYPERGLYCEMIA, WITH LONG-TERM CURRENT USE OF INSULIN (HCC): ICD-10-CM

## 2024-05-07 DIAGNOSIS — E03.9 ACQUIRED HYPOTHYROIDISM: ICD-10-CM

## 2024-05-07 DIAGNOSIS — I10 PRIMARY HYPERTENSION: ICD-10-CM

## 2024-05-07 DIAGNOSIS — E78.2 MIXED HYPERLIPIDEMIA: ICD-10-CM

## 2024-05-07 PROCEDURE — G2211 COMPLEX E/M VISIT ADD ON: HCPCS | Performed by: INTERNAL MEDICINE

## 2024-05-07 PROCEDURE — 99205 OFFICE O/P NEW HI 60 MIN: CPT | Performed by: INTERNAL MEDICINE

## 2024-05-07 RX ORDER — LEVOTHYROXINE SODIUM 0.05 MG/1
TABLET ORAL
Qty: 90 TABLET | Refills: 1 | Status: SHIPPED | OUTPATIENT
Start: 2024-05-07

## 2024-05-07 RX ORDER — ICOSAPENT ETHYL 1 G/1
2 CAPSULE ORAL 2 TIMES DAILY
Qty: 360 CAPSULE | Refills: 3 | Status: SHIPPED | OUTPATIENT
Start: 2024-05-07

## 2024-05-07 NOTE — ASSESSMENT & PLAN NOTE
As high as 10,000 in the context of a very poor diet, and likely underlying familial hypertriglyceridemia.  Current triglycerides 371, she is on gemfibrozil and low quality over-the-counter fish oil.

## 2024-05-07 NOTE — ASSESSMENT & PLAN NOTE
, HDL 32, extensive coronary and vascular calcifications, lifelong very poor diet, and this is despite 80 mg of atorvastatin.

## 2024-05-07 NOTE — PROGRESS NOTES
[facility] Cardiology  Office Consultation  Claudia Gamboa 73 y.o. female MRN: [id number]        1. Hypertriglyceridemia  Assessment & Plan:  As high as 10,000 in the context of a very poor diet, and likely underlying familial hypertriglyceridemia.  Current triglycerides 371, she is on gemfibrozil and low quality over-the-counter fish oil.    Orders:  -     Icosapent Ethyl 1 g CAPS; Take 2 capsules (2 g total) by mouth 2 (two) times a day  -     CK  -     Lipid panel; Future; Expected date: 11/07/2024  -     CK; Future; Expected date: 11/07/2024    2. Moderate aortic stenosis  -     Ambulatory Referral to Cardiology    3. Mixed hyperlipidemia  Assessment & Plan:  , HDL 32, extensive coronary and vascular calcifications, lifelong very poor diet, and this is despite 80 mg of atorvastatin.      4. Type 2 diabetes mellitus with hyperglycemia, with long-term current use of insulin ([facility])  Assessment & Plan:    Lab Results   Component Value Date    HGBA1C 11.0 (H) 11/21/2023   Severely uncontrolled, very poor diet, on insulin, followed by endocrinology.      5. Primary hypertension  Assessment & Plan:  Well-controlled      6. Nonrheumatic aortic valve stenosis  Assessment & Plan:  Mild to moderate calcific aortic stenosis by recent echo 2024          Plan    I am a little bit concerned about the high-dose atorvastatin in combination with gemfibrozil, there is a high risk of myositis with that.  I will have her check a creatinine kinase today.  If elevated, we need gemfibrozil to be discontinued immediately and we will replace it with fenofibrate.  If any future issues with high-dose atorvastatin and fenofibrate, an option would be to lower the atorvastatin and add Zetia.  Current over-the-counter fish oil is ineffective and insufficient for her primary hypertriglyceridemia, I will have her discontinue this and replace it with Vascepa 2 capsules twice a day.  We discussed her extensive calcification of her aorta,  coronary arteries, all the results of her cholesterol and diet.  We have had an extensive discussion about dietary limitations especially snacking, sweets, desserts which should really be kept to an absolute minimum.  6-month follow-up with repeat cholesterol panel will be recommended  We also discussed her aortic valve stenosis which is mild to moderate, probably will not progress to severe for another 4 to 8 years.      Reason for Consult / Principal Problem: Abnormal echo    HPI: Claudia Gamboa is a 73 y.o. year old female with severe familial/diet driven hypertriglyceridemia (triglycerides as high as 10,000 presenting with pancreatitis over 20 years ago), mixed hyperlipidemia, self-described very poor diet with lifelong cupcakes and coke for breakfast, type 2 diabetes, hypertension presents for evaluation of mild to moderate aortic stenosis discovered on recent echocardiography.  I also took the liberty of discussing her recent CT chest abdomen and pelvis from her sepsis hospitalization back in January, revealing extensive calcification of her aorta, iliacs, coronary arteries, aortic valve, and have assured her that this is all a result of poorly treated cholesterol, largely a result of a very poor diet.  She is also on gemfibrozil and atorvastatin combination therapy, typically not advised.  She is on low quality over-the-counter fish oil.  Current triglycerides are 371, , and this is despite gemfibrozil 600 mg and atorvastatin 80 mg.  She denies myalgias.  She has no shortness of breath, chest pain, lightheadedness, syncope, palpitations.        Review of Systems   Constitutional: Negative.    HENT: Negative.     Eyes: Negative.    Respiratory: Negative.     Cardiovascular: Negative.    Gastrointestinal: Negative.    Endocrine: Negative.    Genitourinary: Negative.    Musculoskeletal: Negative.    Skin: Negative.    Neurological: Negative.    Hematological: Negative.    Psychiatric/Behavioral:  Negative.           Past Medical History:   Diagnosis Date   • Acute cystitis without hematuria 08/10/2018   • Anemia 2018   • Aortic aneurysm, abdominal (HCC)     noted on cat scan from 2020   • Cataract    • Closed left hip fracture (HCC) 08/10/2018   • Colon polyp    • Diabetes mellitus (HCC)    • Hyperlipemia    • Migraines    • Multiple falls    • Osteoporosis 2019   • Shingles 2016   • Urinary tract infection 2022     Past Surgical History:   Procedure Laterality Date   • BREAST CYST ASPIRATION Right    • CHOLECYSTECTOMY     • COLONOSCOPY     • ELBOW SURGERY     • FRACTURE SURGERY  ,2018   • GALLBLADDER SURGERY     • HIP SURGERY  08/10/2018   • NH COLONOSCOPY FLX DX W/COLLJ SPEC WHEN PFRMD N/A 2016    Procedure: COLONOSCOPY;  Surgeon: Juan Boykin III, MD;  Location: [facility];  Service: Gastroenterology   • NH OPTX FEM SHFT FX W/INSJ IMED IMPLT W/WO SCREW Left 08/10/2018    Procedure: Left Hip IM Long nail;  Surgeon: Olegario Lynch MD;  Location: [facility];  Service: Orthopedics   • TONSILLECTOMY       Social History     Substance and Sexual Activity   Alcohol Use Never     Social History     Substance and Sexual Activity   Drug Use Never     Social History     Tobacco Use   Smoking Status Former   • Current packs/day: 0.00   • Average packs/day: 2.0 packs/day for 30.0 years (60.0 ttl pk-yrs)   • Types: Cigarettes   • Start date:    • Quit date: 3/23/2018   • Years since quittin.1   Smokeless Tobacco Never   Tobacco Comments    Quit smoking around 2018     Family History   Problem Relation Age of Onset   • Diabetes type II Mother    • Osteoporosis Mother    • Thyroid disease unspecified Mother    • Diabetes Mother    • No Known Problems Father    • Hyperlipidemia Sister    • Hypertension Sister    • Diabetes Sister    • No Known Problems Daughter    • No Known Problems Daughter    • No Known Problems Maternal Grandmother    • Diabetes type II Maternal Grandfather     • Pancreatic cancer Paternal Grandmother    • Cancer Paternal Grandmother    • No Known Problems Paternal Grandfather    • Diabetes type II Maternal Aunt    • No Known Problems Maternal Aunt    • Diabetes type II Maternal Uncle    • No Known Problems Paternal Aunt    • No Known Problems Paternal Aunt    • No Known Problems Paternal Aunt    • Arthritis Family    • Pancreatic cancer Family    • Scoliosis Family    • Breast cancer Neg Hx        Allergies:  Allergies   Allergen Reactions   • Zoledronic Acid GI Intolerance     Anorexia   • Codeine GI Intolerance, Nausea Only and Vomiting       Medications:     Current Outpatient Medications:   •  Accu-Chek FastClix Lancets MISC, Use to check blood sugar 4 times a day, Disp: 100 each, Rfl: 3  •  aspirin (ECOTRIN LOW STRENGTH) 81 mg EC tablet, Take 81 mg by mouth daily, Disp: , Rfl:   •  atorvastatin (LIPITOR) 80 mg tablet, TAKE 1 TABLET AT BEDTIME, Disp: 90 tablet, Rfl: 3  •  DULoxetine (CYMBALTA) 20 mg capsule, TAKE 1 CAPSULE BY MOUTH EVERY DAY, Disp: 90 capsule, Rfl: 1  •  gabapentin (NEURONTIN) 600 MG tablet, Take 1 tablet (600 mg total) by mouth 3 (three) times a day, Disp: 90 tablet, Rfl: 0  •  gemfibrozil (LOPID) 600 mg tablet, Take one tablet by mouth twice a day, Disp: 180 tablet, Rfl: 1  •  glucose blood (Accu-Chek Guide) test strip, Checking sugars four times a day or as directed Dx: E11.65, E11.8, Disp: 300 strip, Rfl: 3  •  Icosapent Ethyl 1 g CAPS, Take 2 capsules (2 g total) by mouth 2 (two) times a day, Disp: 360 capsule, Rfl: 3  •  insulin glargine (Toujeo Max SoloStar) 300 units/mL CONCENTRATED U-300 injection pen (2-unit dial), Inject 60 Units under the skin daily at bedtime, Disp: 30 mL, Rfl: 0  •  Insulin Pen Needle (BD Pen Needle Montse U/F) 32G X 4 MM MISC, Use daily withToujeo, Disp: 200 each, Rfl: 5  •  levothyroxine 50 mcg tablet, TAKE 1 TABLET EVERY DAY, Disp: 90 tablet, Rfl: 3  •  losartan (COZAAR) 25 mg tablet, TAKE 1 TABLET (25 MG TOTAL) BY  MOUTH DAILY., Disp: 90 tablet, Rfl: 1  •  NovoLOG FlexPen 100 units/mL injection pen, USE 40 UNITS BEFORE EACH MEAL PLUS 3 UNITS FOR EVERY 50 ABOVE 150MG/DL MAX DAILY DOSE 200 UNITS, Normal, Disp: 60 mL, Rfl: 0  ?    Vitals:    05/07/24 1052   BP: 128/80   Pulse: 73   SpO2: 95%     Weight (last 2 days)     Date/Time Weight    05/07/24 1052 85.3 (188)        Physical Exam  Constitutional:       General: She is not in acute distress.     Appearance: She is well-developed. She is not diaphoretic.   HENT:      Head: Normocephalic and atraumatic.   Eyes:      General: No scleral icterus.     Conjunctiva/sclera: Conjunctivae normal.      Pupils: Pupils are equal, round, and reactive to light.   Neck:      Thyroid: No thyromegaly.      Vascular: No carotid bruit or JVD.      Trachea: No tracheal deviation.   Cardiovascular:      Rate and Rhythm: Normal rate and regular rhythm.      Heart sounds: Murmur heard.      No friction rub. No gallop.   Pulmonary:      Effort: Pulmonary effort is normal. No respiratory distress.      Breath sounds: Normal breath sounds. No wheezing or rales.   Abdominal:      General: Bowel sounds are normal. There is no distension.      Palpations: Abdomen is soft.      Tenderness: There is no abdominal tenderness.   Musculoskeletal:         General: No tenderness or deformity. Normal range of motion.      Cervical back: Normal range of motion and neck supple.      Right lower leg: No edema.      Left lower leg: No edema.   Skin:     General: Skin is warm and dry.      Findings: No erythema or rash.   Neurological:      Mental Status: She is alert and oriented to person, place, and time.      Cranial Nerves: No cranial nerve deficit.   Psychiatric:         Judgment: Judgment normal.           Laboratory Studies:    Laboratory studies personally reviewed    Cardiac testing:     EKG reviewed personally:   No results found for this visit on 05/07/24.    Echocardiogram:  3/24-personally reviewed-EF  "normal, grade 2 diastolic dysfunction, mild to moderate aortic stenosis    Stress test:      Holter:      Cardiac catheterization:      CT chest abdomen and pelvis:  1/24-personally reviewed-extensive coronary and aortic calcification from the arch to the iliacs.        Vincent Perez MD    Portions of the record may have been created with voice recognition software.  Occasional wrong word or \"sound a like\" substitutions may have occurred due to the inherent limitations of voice recognition software.  Read the chart carefully and recognize, using context, where substitutions have occurred.  "

## 2024-05-07 NOTE — ASSESSMENT & PLAN NOTE
Lab Results   Component Value Date    HGBA1C 11.0 (H) 11/21/2023   Severely uncontrolled, very poor diet, on insulin, followed by endocrinology.

## 2024-05-08 ENCOUNTER — APPOINTMENT (OUTPATIENT)
Dept: PHYSICAL THERAPY | Facility: MEDICAL CENTER | Age: 74
End: 2024-05-08
Payer: COMMERCIAL

## 2024-05-10 DIAGNOSIS — E11.42 DIABETIC POLYNEUROPATHY ASSOCIATED WITH TYPE 2 DIABETES MELLITUS (HCC): ICD-10-CM

## 2024-05-10 DIAGNOSIS — E11.65 TYPE 2 DIABETES MELLITUS WITH HYPERGLYCEMIA, WITH LONG-TERM CURRENT USE OF INSULIN (HCC): ICD-10-CM

## 2024-05-10 DIAGNOSIS — Z79.4 TYPE 2 DIABETES MELLITUS WITH HYPERGLYCEMIA, WITH LONG-TERM CURRENT USE OF INSULIN (HCC): ICD-10-CM

## 2024-05-10 RX ORDER — DULOXETIN HYDROCHLORIDE 20 MG/1
20 CAPSULE, DELAYED RELEASE ORAL DAILY
Qty: 90 CAPSULE | Refills: 0 | Status: SHIPPED | OUTPATIENT
Start: 2024-05-10

## 2024-05-15 ENCOUNTER — VBI (OUTPATIENT)
Dept: ADMINISTRATIVE | Facility: OTHER | Age: 74
End: 2024-05-15

## 2024-05-19 DIAGNOSIS — M79.2 NEUROPATHIC PAIN: ICD-10-CM

## 2024-05-20 RX ORDER — GABAPENTIN 600 MG/1
600 TABLET ORAL 3 TIMES DAILY
Qty: 90 TABLET | Refills: 0 | Status: SHIPPED | OUTPATIENT
Start: 2024-05-20

## 2024-06-10 ENCOUNTER — OFFICE VISIT (OUTPATIENT)
Dept: ENDOCRINOLOGY | Facility: CLINIC | Age: 74
End: 2024-06-10
Payer: COMMERCIAL

## 2024-06-10 ENCOUNTER — RA CDI HCC (OUTPATIENT)
Dept: OTHER | Facility: HOSPITAL | Age: 74
End: 2024-06-10

## 2024-06-10 VITALS
HEIGHT: 66 IN | TEMPERATURE: 97.5 F | SYSTOLIC BLOOD PRESSURE: 108 MMHG | WEIGHT: 186 LBS | DIASTOLIC BLOOD PRESSURE: 66 MMHG | HEART RATE: 65 BPM | OXYGEN SATURATION: 95 % | BODY MASS INDEX: 29.89 KG/M2

## 2024-06-10 DIAGNOSIS — E11.65 TYPE 2 DIABETES MELLITUS WITH HYPERGLYCEMIA, WITH LONG-TERM CURRENT USE OF INSULIN (HCC): Primary | ICD-10-CM

## 2024-06-10 DIAGNOSIS — E78.2 MIXED HYPERLIPIDEMIA: ICD-10-CM

## 2024-06-10 DIAGNOSIS — E03.9 ACQUIRED HYPOTHYROIDISM: ICD-10-CM

## 2024-06-10 DIAGNOSIS — M81.0 AGE RELATED OSTEOPOROSIS, UNSPECIFIED PATHOLOGICAL FRACTURE PRESENCE: ICD-10-CM

## 2024-06-10 DIAGNOSIS — Z79.4 TYPE 2 DIABETES MELLITUS WITH HYPERGLYCEMIA, WITH LONG-TERM CURRENT USE OF INSULIN (HCC): Primary | ICD-10-CM

## 2024-06-10 DIAGNOSIS — E55.9 VITAMIN D DEFICIENCY: ICD-10-CM

## 2024-06-10 PROBLEM — S22.42XA CLOSED FRACTURE OF MULTIPLE RIBS OF LEFT SIDE: Status: RESOLVED | Noted: 2023-12-05 | Resolved: 2024-06-10

## 2024-06-10 PROBLEM — S72.002A CLOSED FRACTURE OF LEFT HIP (HCC): Status: RESOLVED | Noted: 2018-08-10 | Resolved: 2024-06-10

## 2024-06-10 PROBLEM — S72.002A CLOSED LEFT HIP FRACTURE (HCC): Status: RESOLVED | Noted: 2018-08-10 | Resolved: 2024-06-10

## 2024-06-10 PROBLEM — Z01.818 PRE-OPERATIVE CLEARANCE: Status: RESOLVED | Noted: 2019-08-26 | Resolved: 2024-06-10

## 2024-06-10 PROBLEM — S42.201A CLOSED FRACTURE OF PROXIMAL END OF RIGHT HUMERUS: Status: RESOLVED | Noted: 2023-12-05 | Resolved: 2024-06-10

## 2024-06-10 PROBLEM — G89.11 ACUTE PAIN DUE TO TRAUMA: Status: RESOLVED | Noted: 2023-12-06 | Resolved: 2024-06-10

## 2024-06-10 LAB — SL AMB POCT HEMOGLOBIN AIC: 9.4 (ref ?–6.5)

## 2024-06-10 PROCEDURE — 83036 HEMOGLOBIN GLYCOSYLATED A1C: CPT | Performed by: INTERNAL MEDICINE

## 2024-06-10 PROCEDURE — 99215 OFFICE O/P EST HI 40 MIN: CPT | Performed by: INTERNAL MEDICINE

## 2024-06-10 PROCEDURE — 95250 CONT GLUC MNTR PHYS/QHP EQP: CPT | Performed by: INTERNAL MEDICINE

## 2024-06-10 RX ORDER — ERGOCALCIFEROL 1.25 MG/1
50000 CAPSULE ORAL DAILY
COMMUNITY

## 2024-06-10 NOTE — PROGRESS NOTES
Continous glucose monitoring henrique placement     Date/Time  6/10/2024 1:46 PM     Performed by  Doreen Pandey LPN   Authorized by  Irving Nielsen MD     Universal Protocol   Consent: Verbal consent obtained. Written consent obtained.  Consent given by: patient  Timeout called at: 6/10/2024 1:46 PM.  Patient understanding: patient states understanding of the procedure being performed  Patient identity confirmed: verbally with patient      Local anesthesia used: no     Anesthesia   Local anesthesia used: no     Sedation   Patient sedated: no        Specimen: no    Culture: no   Procedure Details   Procedure Notes: Henrique sensor placed on patients left arm  SN: [id number]  EXP: 8/31/24  Patient tolerance: patient tolerated the procedure well with no immediate complications

## 2024-06-10 NOTE — PROGRESS NOTES
"    Follow-up Patient Progress Note      CC: type 2 diabetes     History of Present Illness:   73yr female with type 2 diabetes 2002, HTN, HLD, hypertriglyceridemia pancreatitis 1999, hypothyroidism, Osteoporosis, vit D deficiency, DPN, AAA, Hx Lt hip fracture, Hx UTI's, bladder dysfunction and vitamin D deficiency. Last visit was 11/27/23.     Since last visit, weight is stable. She reports dietary indiscretions with snacks in between food and uses iced tea with aspartame.     CGM data review::  Device: urszula    Dates: 10/31/23-11/13/23                   Usage: 100 %             Av glu: 239 mg/dL             SD:  mg/dL      CV: 28.8  %     GMI: 9 %  TIR: 24 %                    TAR: 35+41 %             TBR: 0 %     Glycemic patters:  severe fasting and pp hyperglycemia  Hypoglycemia: No     Current meds: Levemir doesn't work for her. She could not tolerate ozempic. She declined metformin based on sister having liver failure that was attributed to metformin.  Novolog 40u tidac plus 1u/50 above 150mg/dL  Toujeo 60u qhs     Opthamology: yes  Podiatry: yes, 3 months ago.  vaccination: yes  Dental: no  Pancreatitis: yes in 1999 2\" hypertriglyceridemia.     Ace/ARB: losartan   Statin:lipitor 80  Thyroid issues: hypothyroidism on levothyroxine 50mcg qdaily     Osteoporosis: She lost 3 inches.  1/11/22 DXA: Tscores -3.1 LS, -2.3 RTH, -1.7 RFN. 10 yr FRAX score 2.5% hip and 16% major osteoporostic fracture. Had Reclast in 2019 and developed acute phase reaction.  3/13/24 DXA: Tscores -3.5 LS, -2.3 RTH, -1.9 RFN. 6.3% loss of BMD. 10 yr FRAX score 3.1% hip and 17% major osteoporostic fracture      Physical Exam:  Body mass index is 30.02 kg/m².  /66 (BP Location: Left arm, Patient Position: Sitting, Cuff Size: Large)   Pulse 65   Temp 97.5 °F (36.4 °C) (Tympanic)   Ht 5' 6\" (1.676 m)   Wt 84.4 kg (186 lb)   SpO2 95%   BMI 30.02 kg/m²    Vitals:    06/10/24 1221   Weight: 84.4 kg (186 lb)        Physical " Exam  Constitutional:       General: She is not in acute distress.     Appearance: She is well-developed.   HENT:      Head: Normocephalic and atraumatic.      Nose: Nose normal.   Eyes:      Conjunctiva/sclera: Conjunctivae normal.   Pulmonary:      Effort: Pulmonary effort is normal.   Abdominal:      General: There is no distension.   Musculoskeletal:      Cervical back: Normal range of motion and neck supple.   Skin:     Findings: No rash.      Comments: No icterus   Neurological:      Mental Status: She is alert and oriented to person, place, and time.         Labs:   Lab Results   Component Value Date    HGBA1C 11.0 (H) 11/21/2023       Lab Results   Component Value Date    BHC9HYSEJMUF 4.47 10/25/2016    TSH 2.25 11/21/2023       Lab Results   Component Value Date    CREATININE 0.57 (L) 01/27/2024    CREATININE 0.61 01/26/2024    CREATININE 0.58 (L) 01/25/2024    BUN 12 01/27/2024     (L) 10/25/2016    K 3.5 01/27/2024     01/27/2024    CO2 21 01/27/2024     GFR, Calculated   Date Value Ref Range Status   12/10/2018 76 >60 mL/min/1.73m2 Final     Comment:     mL/min per 1.73 square meters                                            Normal Function or Mild Renal    Disease (if clinically at risk):  >or=60  Moderately Decreased:                30-59  Severely Decreased:                  15-29  Renal Failure:                         <15                                            -American GFR: multiply reported GFR by 1.16    Please note that the eGFR is based on the CKD-EPI calculation, and is not intended to be used for drug dosing.                                            Note: Calculated GFR may not be an accurate indicator of renal function if the patient's renal function is not in a steady state.     eGFR   Date Value Ref Range Status   01/27/2024 93 ml/min/1.73sq m Final       Lab Results   Component Value Date    ALT 23 01/24/2024    AST 26 01/24/2024    ALKPHOS 100 01/24/2024     BILITOT 0.4 10/25/2016       Lab Results   Component Value Date    CHOLESTEROL 206 (H) 06/15/2023     Lab Results   Component Value Date    HDL 32 (L) 06/15/2023    HDL 25 (L) 10/25/2016     Lab Results   Component Value Date    TRIG 371 (H) 06/15/2023    TRIG 3177 (H) 10/25/2016     Lab Results   Component Value Date    NONHDLC 467 (H) 10/25/2016         Assessment/Plan:    1. Type 2 diabetes mellitus with hyperglycemia, with long-term current use of insulin (HCC)  Assessment & Plan:  She reports decent control but no recent data. Recommended a cgm pro.    Advised to continue following regimen pending data.  Novolog 40u tidac plus 1u/50 above 150mg/dL  Toujeo 60u qhs    Follow up in 2-3 months.    Lab Results   Component Value Date    HGBA1C 11.0 (H) 11/21/2023     Orders:  -     Continous glucose monitoring urszula placement; Future  -     Continous glucose monitoring urszula intrepretation; Future  -     Hemoglobin A1C; Future  -     Albumin / creatinine urine ratio; Future  -     Continous glucose monitoring urszula placement  -     POCT hemoglobin A1c  2. Acquired hypothyroidism  Assessment & Plan:  She seems clincially and biochemically euthyroid. Continue levothyroxine 50mcg and repeat labs prior to next visit. Goal TSH is <5uIU/mL.  Orders:  -     T4, free; Future  -     TSH, 3rd generation; Future  3. Age related osteoporosis, unspecified pathological fracture presence  Assessment & Plan:  Today we discussed all aspects of osteoporosis including pathophysiology, risk factors, complications, diet, calcium 1200mg/day, vitamin D supplementation to maintain goal >30ng/dL, lifestyle modifications, medical fitness training, goals of therapy, follow up needs and medications including Alendronate, zolendronate, denosumab, romosozumab, foreo and tymlos.  We agreed to start prolia.    She lost 3 inches.  1/11/22 DXA: Tscores -3.1 LS, -2.3 RTH, -1.7 RFN. 10 yr FRAX score 2.5% hip and 16% major osteoporostic fracture. Had  Reclast in 2019 and developed acute phase reaction.  3/13/24 DXA: Tscores -3.5 LS, -2.3 RTH, -1.9 RFN. 6.3% loss of BMD. 10 yr FRAX score 3.1% hip and 17% major osteoporostic fracture  4. Mixed hyperlipidemia  -     Lipid panel; Future  5. Vitamin D deficiency  -     Vitamin D 25 hydroxy; Future        I have spent a total time of 41 minutes on 06/10/24 in caring for this patient including greater than 50% of this time was spent in counseling/coordination of care as listed above.       Discussed with the patient and all questioned fully answered. She will contact me with concerns.    Irving Nielsen

## 2024-06-10 NOTE — ASSESSMENT & PLAN NOTE
She reports decent control but no recent data. Recommended a cgm pro.    Advised to continue following regimen pending data.  Novolog 40u tidac plus 1u/50 above 150mg/dL  Toujeo 60u qhs    Follow up in 2-3 months.    Lab Results   Component Value Date    HGBA1C 11.0 (H) 11/21/2023

## 2024-06-12 ENCOUNTER — TELEPHONE (OUTPATIENT)
Dept: ENDOCRINOLOGY | Facility: CLINIC | Age: 74
End: 2024-06-12

## 2024-06-12 ENCOUNTER — HOSPITAL ENCOUNTER (OUTPATIENT)
Dept: NON INVASIVE DIAGNOSTICS | Facility: CLINIC | Age: 74
Discharge: HOME | End: 2024-06-12
Payer: COMMERCIAL

## 2024-06-12 DIAGNOSIS — I71.40 ABDOMINAL AORTIC ANEURYSM (AAA) WITHOUT RUPTURE, UNSPECIFIED PART (HCC): ICD-10-CM

## 2024-06-12 DIAGNOSIS — I72.3 COMMON ILIAC ANEURYSM (HCC): ICD-10-CM

## 2024-06-12 DIAGNOSIS — I77.1 ILIAC ARTERY STENOSIS, LEFT (HCC): ICD-10-CM

## 2024-06-12 PROCEDURE — 93923 UPR/LXTR ART STDY 3+ LVLS: CPT

## 2024-06-12 PROCEDURE — 93978 VASCULAR STUDY: CPT | Performed by: SURGERY

## 2024-06-12 PROCEDURE — 93922 UPR/L XTREMITY ART 2 LEVELS: CPT | Performed by: SURGERY

## 2024-06-12 PROCEDURE — 93978 VASCULAR STUDY: CPT

## 2024-06-12 NOTE — TELEPHONE ENCOUNTER
Summary of benefits scanned in to chart. Prior authorization completed via cover my meds key [id number]. Determination pending.

## 2024-06-17 ENCOUNTER — TELEPHONE (OUTPATIENT)
Dept: ENDOCRINOLOGY | Facility: CLINIC | Age: 74
End: 2024-06-17

## 2024-06-17 NOTE — TELEPHONE ENCOUNTER
----- Message from Natalie LIMA sent at 6/10/2024  1:36 PM EDT -----  Regarding: Prolica  We do not do prior authorizations for any injectable in office or infusion center  ----- Message -----  From: Irving Nielsen MD  Sent: 6/10/2024   1:02 PM EDT  To: #    Please submit for PA for prolia. She had side effects from reclast.

## 2024-06-17 NOTE — TELEPHONE ENCOUNTER
Prior authorization approved per cover my meds. Approval letter scanned in media. Called patient and lvm to schedule nurse visit for injection.

## 2024-06-19 DIAGNOSIS — M79.2 NEUROPATHIC PAIN: ICD-10-CM

## 2024-06-19 RX ORDER — GABAPENTIN 600 MG/1
600 TABLET ORAL 3 TIMES DAILY
Qty: 90 TABLET | Refills: 0 | Status: SHIPPED | OUTPATIENT
Start: 2024-06-19

## 2024-06-23 DIAGNOSIS — E11.65 TYPE 2 DIABETES MELLITUS WITH HYPERGLYCEMIA, WITH LONG-TERM CURRENT USE OF INSULIN (HCC): ICD-10-CM

## 2024-06-23 DIAGNOSIS — Z79.4 TYPE 2 DIABETES MELLITUS WITH HYPERGLYCEMIA, WITH LONG-TERM CURRENT USE OF INSULIN (HCC): ICD-10-CM

## 2024-06-24 ENCOUNTER — TRANSCRIBE ORDERS (OUTPATIENT)
Dept: VASCULAR SURGERY | Facility: CLINIC | Age: 74
End: 2024-06-24

## 2024-06-24 DIAGNOSIS — I71.40 ABDOMINAL AORTIC ANEURYSM (AAA) WITHOUT RUPTURE, UNSPECIFIED PART (HCC): Primary | ICD-10-CM

## 2024-06-24 RX ORDER — INSULIN ASPART 100 [IU]/ML
INJECTION, SOLUTION INTRAVENOUS; SUBCUTANEOUS
Qty: 60 ML | Refills: 1 | Status: SHIPPED | OUTPATIENT
Start: 2024-06-24

## 2024-06-27 ENCOUNTER — CLINICAL SUPPORT (OUTPATIENT)
Dept: ENDOCRINOLOGY | Facility: CLINIC | Age: 74
End: 2024-06-27
Payer: COMMERCIAL

## 2024-06-27 ENCOUNTER — TELEPHONE (OUTPATIENT)
Dept: ENDOCRINOLOGY | Facility: CLINIC | Age: 74
End: 2024-06-27

## 2024-06-27 DIAGNOSIS — E11.65 TYPE 2 DIABETES MELLITUS WITH HYPERGLYCEMIA, WITH LONG-TERM CURRENT USE OF INSULIN (HCC): ICD-10-CM

## 2024-06-27 DIAGNOSIS — Z79.4 TYPE 2 DIABETES MELLITUS WITH HYPERGLYCEMIA, WITH LONG-TERM CURRENT USE OF INSULIN (HCC): ICD-10-CM

## 2024-06-27 PROCEDURE — 95251 CONT GLUC MNTR ANALYSIS I&R: CPT | Performed by: INTERNAL MEDICINE

## 2024-06-27 NOTE — ASSESSMENT & PLAN NOTE
She seems clincially and biochemically euthyroid. Continue levothyroxine 50mcg and repeat labs prior to next visit. Goal TSH is <5uIU/mL.

## 2024-06-27 NOTE — TELEPHONE ENCOUNTER
Patient came in to office with urszula pro self removed at home. Eight days of data recorded. Report uploaded in media.

## 2024-06-27 NOTE — TELEPHONE ENCOUNTER
----- Message from Irving Nielsen MD sent at 6/27/2024  9:33 AM EDT -----  This patuient nwas scheduled to come back for cgm pro removal 6/24/24

## 2024-06-27 NOTE — PROGRESS NOTES
Continous glucose monitoring urszula intrepretation     Date/Time  6/27/2024 11:29 AM     Performed by  Doreen Pandey LPN   Authorized by  Irving Nielsen MD     Universal Protocol   Consent: Verbal consent obtained. Written consent obtained.  Consent given by: patient  Timeout called at: 6/27/2024 11:29 AM.  Patient understanding: patient states understanding of the procedure being performed  Patient identity confirmed: verbally with patient      Local anesthesia used: no     Anesthesia   Local anesthesia used: no     Sedation   Patient sedated: no        Specimen: no    Culture: no   Procedure Details   Procedure Notes: Patient came in office with sensor self removed at home  Patient tolerance: patient tolerated the procedure well with no immediate complications

## 2024-06-27 NOTE — ASSESSMENT & PLAN NOTE
Today we discussed all aspects of osteoporosis including pathophysiology, risk factors, complications, diet, calcium 1200mg/day, vitamin D supplementation to maintain goal >30ng/dL, lifestyle modifications, medical fitness training, goals of therapy, follow up needs and medications including Alendronate, zolendronate, denosumab, romosozumab, foreo and tymlos.  We agreed to start prolia.    She lost 3 inches.  1/11/22 DXA: Tscores -3.1 LS, -2.3 RTH, -1.7 RFN. 10 yr FRAX score 2.5% hip and 16% major osteoporostic fracture. Had Reclast in 2019 and developed acute phase reaction.  3/13/24 DXA: Tscores -3.5 LS, -2.3 RTH, -1.9 RFN. 6.3% loss of BMD. 10 yr FRAX score 3.1% hip and 17% major osteoporostic fracture

## 2024-06-27 NOTE — TELEPHONE ENCOUNTER
Called patient regarding missed appt for urszula pro removal. Patient states that she cx appt because she just moved to a new home and was not able to come in. Asked patient if she could bring the sensor in, and she states that she will try.

## 2024-07-03 ENCOUNTER — TREATMENT (OUTPATIENT)
Dept: OTHER | Facility: HOSPITAL | Age: 74
End: 2024-07-03
Payer: COMMERCIAL

## 2024-07-03 DIAGNOSIS — E11.65 TYPE 2 DIABETES MELLITUS WITH HYPERGLYCEMIA, WITH LONG-TERM CURRENT USE OF INSULIN (HCC): Primary | ICD-10-CM

## 2024-07-03 DIAGNOSIS — Z79.4 TYPE 2 DIABETES MELLITUS WITH HYPERGLYCEMIA, WITH LONG-TERM CURRENT USE OF INSULIN (HCC): Primary | ICD-10-CM

## 2024-07-03 PROCEDURE — 95251 CONT GLUC MNTR ANALYSIS I&R: CPT | Performed by: INTERNAL MEDICINE

## 2024-07-03 NOTE — PROGRESS NOTES
CGM data review::  Device: urszula Dates: 6/11-6/18/24 Usage:100  %  Av glu:153  mg/dL  SD:  mg/dL CV:  36.4 % GMI: 7.0 %  TIR: 75 %  TAR: 18+6 %  TBR: 1  %    Glycemic patters:  some after meal spike but overall good control. Suggest reduce carb in diet each meal. Continue current regimen.  Hypoglycemia: No

## 2024-07-18 DIAGNOSIS — E78.1 HYPERTRIGLYCERIDEMIA: ICD-10-CM

## 2024-07-18 NOTE — TELEPHONE ENCOUNTER
!!!NOT A DUPLICATE!!! - !!!PATIENT IS SWITCHING PHARMACIES!!!    Reason for call:   [x] Refill   [] Prior Auth  [] Other:     Office:   [] PCP/Provider -   [x] Specialty/Provider - [facility] / Vincent Perez MD     Medication:       Does the patient have enough for 3 days?   [x] Yes   [] No - Send as HP to POD

## 2024-07-19 RX ORDER — ICOSAPENT ETHYL 1 G/1
2 CAPSULE ORAL 2 TIMES DAILY
Qty: 360 CAPSULE | Refills: 1 | Status: SHIPPED | OUTPATIENT
Start: 2024-07-19

## 2024-07-23 DIAGNOSIS — M79.2 NEUROPATHIC PAIN: ICD-10-CM

## 2024-07-24 RX ORDER — GABAPENTIN 600 MG/1
600 TABLET ORAL 3 TIMES DAILY
Qty: 90 TABLET | Refills: 5 | Status: SHIPPED | OUTPATIENT
Start: 2024-07-24

## 2024-07-25 ENCOUNTER — VBI (OUTPATIENT)
Dept: ADMINISTRATIVE | Facility: OTHER | Age: 74
End: 2024-07-25

## 2024-07-25 NOTE — TELEPHONE ENCOUNTER
07/25/24 8:52 AM     Chart reviewed for Hemoglobin A1c ; nothing is submitted to the patient's insurance at this time.     Bud Bishop MA   [facility]

## 2024-08-22 ENCOUNTER — OFFICE VISIT (OUTPATIENT)
Dept: FAMILY MEDICINE CLINIC | Facility: MEDICAL CENTER | Age: 74
End: 2024-08-22
Payer: COMMERCIAL

## 2024-08-22 VITALS
HEART RATE: 73 BPM | DIASTOLIC BLOOD PRESSURE: 72 MMHG | WEIGHT: 188.2 LBS | HEIGHT: 66 IN | OXYGEN SATURATION: 95 % | RESPIRATION RATE: 15 BRPM | TEMPERATURE: 98 F | SYSTOLIC BLOOD PRESSURE: 122 MMHG | BODY MASS INDEX: 30.25 KG/M2

## 2024-08-22 DIAGNOSIS — I10 PRIMARY HYPERTENSION: ICD-10-CM

## 2024-08-22 DIAGNOSIS — Z00.00 MEDICARE ANNUAL WELLNESS VISIT, SUBSEQUENT: Primary | ICD-10-CM

## 2024-08-22 PROCEDURE — G0439 PPPS, SUBSEQ VISIT: HCPCS | Performed by: STUDENT IN AN ORGANIZED HEALTH CARE EDUCATION/TRAINING PROGRAM
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
## 2024-08-22 NOTE — PATIENT INSTRUCTIONS
Medicare Preventive Visit Patient Instructions  Thank you for completing your Welcome to Medicare Visit or Medicare Annual Wellness Visit today. Your next wellness visit will be due in one year (8/23/2025).  The screening/preventive services that you may require over the next 5-10 years are detailed below. Some tests may not apply to you based off risk factors and/or age. Screening tests ordered at today's visit but not completed yet may show as past due. Also, please note that scanned in results may not display below.  Preventive Screenings:  Service Recommendations Previous Testing/Comments   Colorectal Cancer Screening  * Colonoscopy    * Fecal Occult Blood Test (FOBT)/Fecal Immunochemical Test (FIT)  * Fecal DNA/Cologuard Test  * Flexible Sigmoidoscopy Age: 45-75 years old   Colonoscopy: every 10 years (may be performed more frequently if at higher risk)  OR  FOBT/FIT: every 1 year  OR  Cologuard: every 3 years  OR  Sigmoidoscopy: every 5 years  Screening may be recommended earlier than age 45 if at higher risk for colorectal cancer. Also, an individualized decision between you and your healthcare provider will decide whether screening between the ages of 76-85 would be appropriate. Colonoscopy: 12/13/2021  FOBT/FIT: Not on file  Cologuard: Not on file  Sigmoidoscopy: Not on file    Screening Current     Breast Cancer Screening Age: 40+ years old  Frequency: every 1-2 years  Not required if history of left and right mastectomy Mammogram: 01/13/2023    Screening Current   Cervical Cancer Screening Between the ages of 21-29, pap smear recommended once every 3 years.   Between the ages of 30-65, can perform pap smear with HPV co-testing every 5 years.   Recommendations may differ for women with a history of total hysterectomy, cervical cancer, or abnormal pap smears in past. Pap Smear: 12/10/2020    Screening Not Indicated   Hepatitis C Screening Once for adults born between 1945 and 1965  More frequently in  patients at high risk for Hepatitis C Hep C Antibody: 06/15/2023    Screening Current   Diabetes Screening 1-2 times per year if you're at risk for diabetes or have pre-diabetes Fasting glucose: 336 mg/dL (12/6/2023)  A1C: 9.4 (6/10/2024)  Screening Not Indicated  History Diabetes   Cholesterol Screening Once every 5 years if you don't have a lipid disorder. May order more often based on risk factors. Lipid panel: 06/15/2023    Screening Not Indicated  History Lipid Disorder     Other Preventive Screenings Covered by Medicare:  Abdominal Aortic Aneurysm (AAA) Screening: covered once if your at risk. You're considered to be at risk if you have a family history of AAA.  Lung Cancer Screening: covers low dose CT scan once per year if you meet all of the following conditions: (1) Age 55-77; (2) No signs or symptoms of lung cancer; (3) Current smoker or have quit smoking within the last 15 years; (4) You have a tobacco smoking history of at least 20 pack years (packs per day multiplied by number of years you smoked); (5) You get a written order from a healthcare provider.  Glaucoma Screening: covered annually if you're considered high risk: (1) You have diabetes OR (2) Family history of glaucoma OR (3)  aged 50 and older OR (4)  American aged 65 and older  Osteoporosis Screening: covered every 2 years if you meet one of the following conditions: (1) You're estrogen deficient and at risk for osteoporosis based off medical history and other findings; (2) Have a vertebral abnormality; (3) On glucocorticoid therapy for more than 3 months; (4) Have primary hyperparathyroidism; (5) On osteoporosis medications and need to assess response to drug therapy.   Last bone density test (DXA Scan): 03/13/2024.  HIV Screening: covered annually if you're between the age of 15-65. Also covered annually if you are younger than 15 and older than 65 with risk factors for HIV infection. For pregnant patients, it is  covered up to 3 times per pregnancy.    Immunizations:  Immunization Recommendations   Influenza Vaccine Annual influenza vaccination during flu season is recommended for all persons aged >= 6 months who do not have contraindications   Pneumococcal Vaccine   * Pneumococcal conjugate vaccine = PCV13 (Prevnar 13), PCV15 (Vaxneuvance), PCV20 (Prevnar 20)  * Pneumococcal polysaccharide vaccine = PPSV23 (Pneumovax) Adults 19-63 yo with certain risk factors or if 65+ yo  If never received any pneumonia vaccine: recommend Prevnar 20 (PCV20)  Give PCV20 if previously received 1 dose of PCV13 or PPSV23   Hepatitis B Vaccine 3 dose series if at intermediate or high risk (ex: diabetes, end stage renal disease, liver disease)   Respiratory syncytial virus (RSV) Vaccine - COVERED BY MEDICARE PART D  * RSVPreF3 (Arexvy) CDC recommends that adults 60 years of age and older may receive a single dose of RSV vaccine using shared clinical decision-making (SCDM)   Tetanus (Td) Vaccine - COST NOT COVERED BY MEDICARE PART B Following completion of primary series, a booster dose should be given every 10 years to maintain immunity against tetanus. Td may also be given as tetanus wound prophylaxis.   Tdap Vaccine - COST NOT COVERED BY MEDICARE PART B Recommended at least once for all adults. For pregnant patients, recommended with each pregnancy.   Shingles Vaccine (Shingrix) - COST NOT COVERED BY MEDICARE PART B  2 shot series recommended in those 19 years and older who have or will have weakened immune systems or those 50 years and older     Health Maintenance Due:      Topic Date Due   • Breast Cancer Screening: Mammogram  01/13/2025   • Lung Cancer Screening  01/25/2025   • Colorectal Cancer Screening  12/12/2026   • DXA SCAN  03/13/2029   • Hepatitis C Screening  Completed     Immunizations Due:      Topic Date Due   • Hepatitis A Vaccine (1 of 2 - Risk 2-dose series) Never done   • COVID-19 Vaccine (4 - 2023-24 season) 09/01/2023   •  Influenza Vaccine (1) 09/01/2024     Advance Directives   What are advance directives?  Advance directives are legal documents that state your wishes and plans for medical care. These plans are made ahead of time in case you lose your ability to make decisions for yourself. Advance directives can apply to any medical decision, such as the treatments you want, and if you want to donate organs.   What are the types of advance directives?  There are many types of advance directives, and each state has rules about how to use them. You may choose a combination of any of the following:  Living will:  This is a written record of the treatment you want. You can also choose which treatments you do not want, which to limit, and which to stop at a certain time. This includes surgery, medicine, IV fluid, and tube feedings.   Durable power of  for healthcare (DPAHC):  This is a written record that states who you want to make healthcare choices for you when you are unable to make them for yourself. This person, called a proxy, is usually a family member or a friend. You may choose more than 1 proxy.  Do not resuscitate (DNR) order:  A DNR order is used in case your heart stops beating or you stop breathing. It is a request not to have certain forms of treatment, such as CPR. A DNR order may be included in other types of advance directives.  Medical directive:  This covers the care that you want if you are in a coma, near death, or unable to make decisions for yourself. You can list the treatments you want for each condition. Treatment may include pain medicine, surgery, blood transfusions, dialysis, IV or tube feedings, and a ventilator (breathing machine).  Values history:  This document has questions about your views, beliefs, and how you feel and think about life. This information can help others choose the care that you would choose.  Why are advance directives important?  An advance directive helps you control your  care. Although spoken wishes may be used, it is better to have your wishes written down. Spoken wishes can be misunderstood, or not followed. Treatments may be given even if you do not want them. An advance directive may make it easier for your family to make difficult choices about your care.   Weight Management   Why it is important to manage your weight:  Being overweight increases your risk of health conditions such as heart disease, high blood pressure, type 2 diabetes, and certain types of cancer. It can also increase your risk for osteoarthritis, sleep apnea, and other respiratory problems. Aim for a slow, steady weight loss. Even a small amount of weight loss can lower your risk of health problems.  How to lose weight safely:  A safe and healthy way to lose weight is to eat fewer calories and get regular exercise. You can lose up about 1 pound a week by decreasing the number of calories you eat by 500 calories each day.   Healthy meal plan for weight management:  A healthy meal plan includes a variety of foods, contains fewer calories, and helps you stay healthy. A healthy meal plan includes the following:  Eat whole-grain foods more often.  A healthy meal plan should contain fiber. Fiber is the part of grains, fruits, and vegetables that is not broken down by your body. Whole-grain foods are healthy and provide extra fiber in your diet. Some examples of whole-grain foods are whole-wheat breads and pastas, oatmeal, brown rice, and bulgur.  Eat a variety of vegetables every day.  Include dark, leafy greens such as spinach, kale, kristie greens, and mustard greens. Eat yellow and orange vegetables such as carrots, sweet potatoes, and winter squash.   Eat a variety of fruits every day.  Choose fresh or canned fruit (canned in its own juice or light syrup) instead of juice. Fruit juice has very little or no fiber.  Eat low-fat dairy foods.  Drink fat-free (skim) milk or 1% milk. Eat fat-free yogurt and low-fat  cottage cheese. Try low-fat cheeses such as mozzarella and other reduced-fat cheeses.  Choose meat and other protein foods that are low in fat.  Choose beans or other legumes such as split peas or lentils. Choose fish, skinless poultry (chicken or turkey), or lean cuts of red meat (beef or pork). Before you cook meat or poultry, cut off any visible fat.   Use less fat and oil.  Try baking foods instead of frying them. Add less fat, such as margarine, sour cream, regular salad dressing and mayonnaise to foods. Eat fewer high-fat foods. Some examples of high-fat foods include french fries, doughnuts, ice cream, and cakes.  Eat fewer sweets.  Limit foods and drinks that are high in sugar. This includes candy, cookies, regular soda, and sweetened drinks.  Exercise:  Exercise at least 30 minutes per day on most days of the week. Some examples of exercise include walking, biking, dancing, and swimming. You can also fit in more physical activity by taking the stairs instead of the elevator or parking farther away from stores. Ask your healthcare provider about the best exercise plan for you.      © Copyright [employer] 2018 Information is for End User's use only and may not be sold, redistributed or otherwise used for commercial purposes. All illustrations and images included in CareNotes® are the copyrighted property of [employer] or [employer]

## 2024-08-22 NOTE — PROGRESS NOTES
Diabetic Foot Exam    Patient's shoes and socks removed.    Right Foot/Ankle   Right Foot Inspection  Skin Exam: skin normal and skin intact. No dry skin, no warmth, no callus, no erythema, no maceration, no abnormal color, no pre-ulcer, no ulcer and no callus.     Toe Exam: ROM and strength within normal limits.     Sensory   Monofilament testing: diminished    Vascular  Capillary refills: < 3 seconds  The right DP pulse is 2+. The right PT pulse is 2+.     Left Foot/Ankle  Left Foot Inspection  Skin Exam: skin normal and skin intact. No dry skin, no warmth, no erythema, no maceration, normal color, no pre-ulcer, no ulcer and no callus.     Toe Exam: ROM and strength within normal limits.     Sensory   Monofilament testing: diminished    Vascular  Capillary refills: < 3 seconds  The left DP pulse is 2+. The left PT pulse is 2+.     Assign Risk Category  No deformity present  Loss of protective sensation  No weak pulses  Risk: 1

## 2024-08-22 NOTE — ASSESSMENT & PLAN NOTE
Immunizations reviewed by  Informed about new RSV vaccine and update with COVID-19 vaccine  We will plan for influenza vaccine fall 2024  Immunizations otherwise up-to-date

## 2024-09-02 DIAGNOSIS — Z79.4 TYPE 2 DIABETES MELLITUS WITH HYPERGLYCEMIA, WITH LONG-TERM CURRENT USE OF INSULIN (HCC): ICD-10-CM

## 2024-09-02 DIAGNOSIS — E11.65 TYPE 2 DIABETES MELLITUS WITH HYPERGLYCEMIA, WITH LONG-TERM CURRENT USE OF INSULIN (HCC): ICD-10-CM

## 2024-09-03 RX ORDER — INSULIN GLARGINE 300 U/ML
60 INJECTION, SOLUTION SUBCUTANEOUS
Qty: 18 ML | Refills: 1 | Status: SHIPPED | OUTPATIENT
Start: 2024-09-03

## 2024-09-04 DIAGNOSIS — E78.1 HYPERTRIGLYCERIDEMIA: ICD-10-CM

## 2024-09-06 RX ORDER — GEMFIBROZIL 600 MG/1
TABLET, FILM COATED ORAL
Qty: 180 TABLET | Refills: 0 | Status: SHIPPED | OUTPATIENT
Start: 2024-09-06

## 2024-09-12 LAB — CK SERPL-CCNC: 66 U/L (ref 29–143)

## 2024-09-17 ENCOUNTER — OFFICE VISIT (OUTPATIENT)
Dept: CARDIOLOGY CLINIC | Facility: CLINIC | Age: 74
End: 2024-09-17
Payer: COMMERCIAL

## 2024-09-17 VITALS
WEIGHT: 192.6 LBS | HEIGHT: 66 IN | BODY MASS INDEX: 30.95 KG/M2 | OXYGEN SATURATION: 95 % | HEART RATE: 69 BPM | DIASTOLIC BLOOD PRESSURE: 62 MMHG | SYSTOLIC BLOOD PRESSURE: 110 MMHG

## 2024-09-17 DIAGNOSIS — I71.40 ABDOMINAL AORTIC ANEURYSM (AAA) WITHOUT RUPTURE, UNSPECIFIED PART (HCC): ICD-10-CM

## 2024-09-17 DIAGNOSIS — E78.2 MIXED HYPERLIPIDEMIA: ICD-10-CM

## 2024-09-17 DIAGNOSIS — Z87.19 H/O ACUTE PANCREATITIS: ICD-10-CM

## 2024-09-17 DIAGNOSIS — I73.9 PVD (PERIPHERAL VASCULAR DISEASE) (HCC): ICD-10-CM

## 2024-09-17 DIAGNOSIS — I10 PRIMARY HYPERTENSION: ICD-10-CM

## 2024-09-17 DIAGNOSIS — I35.0 NONRHEUMATIC AORTIC VALVE STENOSIS: Primary | ICD-10-CM

## 2024-09-17 DIAGNOSIS — Z79.4 TYPE 2 DIABETES MELLITUS WITH HYPERGLYCEMIA, WITH LONG-TERM CURRENT USE OF INSULIN (HCC): ICD-10-CM

## 2024-09-17 DIAGNOSIS — E11.65 TYPE 2 DIABETES MELLITUS WITH HYPERGLYCEMIA, WITH LONG-TERM CURRENT USE OF INSULIN (HCC): ICD-10-CM

## 2024-09-17 PROCEDURE — 99214 OFFICE O/P EST MOD 30 MIN: CPT | Performed by: NURSE PRACTITIONER

## 2024-09-17 RX ORDER — FENOFIBRATE 145 MG/1
145 TABLET, COATED ORAL DAILY
Qty: 90 TABLET | Refills: 3 | Status: SHIPPED | OUTPATIENT
Start: 2024-09-17

## 2024-09-17 RX ORDER — ROSUVASTATIN CALCIUM 40 MG/1
40 TABLET, COATED ORAL DAILY
Qty: 90 TABLET | Refills: 3 | Status: SHIPPED | OUTPATIENT
Start: 2024-09-17

## 2024-09-17 NOTE — ASSESSMENT & PLAN NOTE
Lab Results   Component Value Date    HGBA1C 9.4 (A) 06/10/2024      Latest Reference Range & Units 05/22/23 00:00 09/27/23 13:48 11/21/23 10:37 06/10/24 14:30   Hemoglobin A1C 6.5  11.2 (E) 12.0 ! 11.0 (H) 9.4 !   On insulin per primary care or endocrinology

## 2024-09-17 NOTE — ASSESSMENT & PLAN NOTE
1/4/2021 non-.  She gets her labs drawn at [facility]  Now on atorvastatin 80 mg daily, Vascepa 2 mg twice daily.  Unfortunately she is still on gemfibrozil.  This is contraindicated with atorvastatin  9/17/2024: Stop atorvastatin.  Stop gemfibrozil.    Start rosuvastatin 40 mg daily.  Continue icosapent ethyl 2 g twice daily.  Start fenofibrate 145 mg daily  Refer to a dietitian.  Multiple handouts about lifestyle recommended  Repeat fasting lipids with a direct LDL 4 to 12 weeks

## 2024-09-17 NOTE — PROGRESS NOTES
Cardiology   Follow Up   Office Visit Note  Claudia Gamboa   74 y.o.   female   MRN: [id number]  [facility]  [address]  [address]  [place] [address] [phone]    PCP: Roberto Quintero DO  Cardiologist: Dr. Perez          Summary of Plan:  Provided multiple handouts about lifestyle management of hypertriglyceridemia including diet  Refer to dietitian regarding HTG, DM  Stop atorvastatin when finished her current prescription.  This should be just a few weeks.  Start rosuvastatin 40 mg daily  Stop gemfibrozil.  It is contraindicated with atorvastatin as well as rosuvastatin.  Start fenofibrate 145 mg daily  Fasting lipid profile with direct LDL beginning of November.  We may need to request labs that she gets these drawn at [facility]  Follow-up with me 2-3 months  Follow up with Dr Perez 6 months  Colon Ca screenin2021 , up to date      Assessment & Plan  Nonrheumatic aortic valve stenosis  VIANNEY 1.26 cm² by echo 3/1/2024.  Continue monitor with serial echocardiograms  Primary hypertension  /62  On losartan 25 mg daily  Mixed hyperlipidemia  2021 non-.  She gets her labs drawn at [facility]  Now on atorvastatin 80 mg daily, Vascepa 2 mg twice daily.  Unfortunately she is still on gemfibrozil.  This is contraindicated with atorvastatin  2024: Stop atorvastatin.  Stop gemfibrozil.    Start rosuvastatin 40 mg daily.  Continue icosapent ethyl 2 g twice daily.  Start fenofibrate 145 mg daily  Refer to a dietitian.  Multiple handouts about lifestyle recommended  Repeat fasting lipids with a direct LDL 4 to 12 weeks  Type 2 diabetes mellitus with hyperglycemia, with long-term current use of insulin ([facility])    Lab Results   Component Value Date    HGBA1C 9.4 (A) 06/10/2024      Latest Reference Range & Units 23 00:00 23 13:48 23 10:37 06/10/24 14:30   Hemoglobin A1C 6.5  11.2 (E) 12.0 ! 11.0 (H) 9.4 !   On insulin per primary care or  endocrinology    PVD (peripheral vascular disease) (HCC)    Following with vascular  6/12/2024 vascular abdominal ultrasound  diffuse disease throughout the abdominal aorta without a focal stenosis and is diffusely ectatic throughout, 2.7 cm in its greatest diameter, (prior: 3.1 cm).  Diffuse disease throughout the right common iliac and bilateral external iliac arteries without a focal stenosis and are normal in caliber.  There is >75% stenosis of the left common iliac artery and is ectatic in caliber, 1.8 cm, (prior: 2.4 cm).  Findings suggestive of >70% stenosis of the celiac artery and >60% stenosis ofthe right renal artery.The superior mesenteric, inferior mesenteric and left renal arteries arepatent.  H/O acute pancreatitis  She reports she had acute pancreatitis in 1999 was hospitalized at [facility].  Cardiac testing  TTE 3/1/24.  EF 60%.  Wall motion normal.  Grade 2 DD.  Mild to moderate aortic stenosis.The aortic valve peak velocity is 2.5 m/s. The aortic valve area is 1.26 cm2. The stroke volume index is 34.20 ml/m2. The aortic valve velocity is increased due to stenosis but lower than expected due to the presence of decreased flow.  Mild TR.  Trivial pericardial effusion posterior to the heart                   HPI  Claudia Gamboa is a 74 y.o.year old female with mixed dyslipidemia, diabetes mellitus with long-term use of insulin complicated by neuropathy, hypertension, aortic stenosis.  She follows with Dr. Perez.  She was last seen in the office 5/7/2024    Follow-up    5/7/24 OV Dr Perez  Per his note:  Plan  I am a little bit concerned about the high-dose atorvastatin in combination with gemfibrozil, there is a high risk of myositis with that.  I will have her check a creatinine kinase today.  If elevated, we need gemfibrozil to be discontinued immediately and we will replace it with fenofibrate.  If any future issues with high-dose atorvastatin and fenofibrate, an option would be to  lower the atorvastatin and add Zetia.  Current over-the-counter fish oil is ineffective and insufficient for her primary hypertriglyceridemia, I will have her discontinue this and replace it with Vascepa 2 capsules twice a day.  We discussed her extensive calcification of her aorta, coronary arteries, all the results of her cholesterol and diet.  We have had an extensive discussion about dietary limitations especially snacking, sweets, desserts which should really be kept to an absolute minimum.  6-month follow-up with repeat cholesterol panel will be recommended  We also discussed her aortic valve stenosis which is mild to moderate, probably will not progress to severe for another 4 to 8 years.      9/17/24  Cardiology follow up  She is with her   She has made significant dietary changes however there are still some room for improvement  She did not yet go to a dietitian  She obtains labs at [facility].  She reports she did not have any fasting lipids done recently  Currently for her hypertriglyceridemia she is on atorvastatin 80 mg daily, gemfibrozil 600 twice daily, Vascepa 2 g twice daily  She walks 5 laps around where she lives, about 1 mile 5 days a week.  She goes to bin on the other 2 days.  With this activity she denies chest pain, shortness of breath lightheadedness or dizziness  Her blood pressure is excellent  She appears euvolemic  Today I will make changes for her lipids: Will switch from atorvastatin 80 to rosuvastatin 40; will stop Lopid as this is contraindicated with atorvastatin or rosuvastatin.  In its place I will start fenofibrate 145 mg daily  Repeat lipids with a direct LDL the beginning of November  I encouraged weight loss, regular exercise, will refer to dietitian          Review of Systems   Constitutional: Negative for chills.   Cardiovascular:  Negative for chest pain, claudication, cyanosis, dyspnea on exertion, irregular heartbeat, leg swelling, near-syncope, orthopnea,  palpitations, paroxysmal nocturnal dyspnea and syncope.   Respiratory:  Negative for cough and shortness of breath.    Gastrointestinal:  Negative for heartburn and nausea.   Neurological:  Negative for dizziness, focal weakness, headaches, light-headedness and weakness.   All other systems reviewed and are negative.            Assessment  Diagnoses and all orders for this visit:    Nonrheumatic aortic valve stenosis    Primary hypertension    Mixed hyperlipidemia  -     rosuvastatin (CRESTOR) 40 MG tablet; Take 1 tablet (40 mg total) by mouth daily  -     fenofibrate (TRICOR) 145 mg tablet; Take 1 tablet (145 mg total) by mouth daily  -     Lipid panel; Future  -     LDL cholesterol, direct; Future    Type 2 diabetes mellitus with hyperglycemia, with long-term current use of insulin (HCC)  -     Ambulatory referral to Nutrition Services; Future    PVD (peripheral vascular disease) ([facility])    H/O acute pancreatitis    Abdominal aortic aneurysm (AAA) without rupture, unspecified part (HCC)        Past Medical History:   Diagnosis Date    Acute cystitis without hematuria 08/10/2018    Anemia 8/13/2018    Aortic aneurysm, abdominal (HCC)     noted on cat scan from 2/2020    Cataract     Closed left hip fracture (HCC) 08/10/2018    Colon polyp     Diabetes mellitus (HCC)     Hyperlipemia     Migraines     Multiple falls     Osteoporosis 02/22/2019    Shingles 7/2016    Urinary tract infection 5/2022       Past Surgical History:   Procedure Laterality Date    BREAST CYST ASPIRATION Right 1993    CHOLECYSTECTOMY      COLONOSCOPY      ELBOW SURGERY      FRACTURE SURGERY  ,4/2018    GALLBLADDER SURGERY      HIP SURGERY  08/10/2018    UT COLONOSCOPY FLX DX W/COLLJ SPEC WHEN PFRMD N/A 12/14/2016    Procedure: COLONOSCOPY;  Surgeon: Juan Boykin III, MD;  Location: [facility];  Service: Gastroenterology    UT OPTX FEM SHFT FX W/INSJ IMED IMPLT W/WO SCREW Left 08/10/2018    Procedure: Left Hip IM Long nail;  Surgeon: Olegario  MD Cris;  Location: [facility];  Service: Orthopedics    TONSILLECTOMY             Allergies  Allergies   Allergen Reactions    Zoledronic Acid GI Intolerance     Anorexia    Codeine GI Intolerance, Nausea Only and Vomiting         Medications    Current Outpatient Medications:     Accu-Chek FastClix Lancets MISC, Use to check blood sugar 4 times a day, Disp: 100 each, Rfl: 3    aspirin (ECOTRIN LOW STRENGTH) 81 mg EC tablet, Take 81 mg by mouth daily, Disp: , Rfl:     fenofibrate (TRICOR) 145 mg tablet, Take 1 tablet (145 mg total) by mouth daily, Disp: 90 tablet, Rfl: 3    gabapentin (NEURONTIN) 600 MG tablet, TAKE 1 TABLET BY MOUTH THREE TIMES A DAY, Disp: 90 tablet, Rfl: 5    glucose blood (Accu-Chek Guide) test strip, Checking sugars four times a day or as directed Dx: E11.65, E11.8, Disp: 300 strip, Rfl: 3    Icosapent Ethyl 1 g CAPS, Take 2 capsules (2 g total) by mouth 2 (two) times a day, Disp: 360 capsule, Rfl: 1    insulin aspart (NovoLOG FlexPen) 100 UNIT/ML injection pen, USE 40 UNITS BEFORE EACH MEAL PLUS 3 UNITS FOR EVERY 50 ABOVE 150MG/DL MAX DAILY DOSE 200 UNITS, NORMAL, Disp: 60 mL, Rfl: 1    insulin glargine (Toujeo Max SoloStar) 300 units/mL CONCENTRATED U-300 injection pen (2-unit dial), INJECT 60 UNITS UNDER THE SKIN DAILY AT BEDTIME, Disp: 18 mL, Rfl: 1    Insulin Pen Needle (BD Pen Needle Montse U/F) 32G X 4 MM MISC, Use daily withToujeo, Disp: 200 each, Rfl: 5    levothyroxine 50 mcg tablet, TAKE 1 TABLET EVERY DAY, Disp: 90 tablet, Rfl: 1    losartan (COZAAR) 25 mg tablet, TAKE 1 TABLET (25 MG TOTAL) BY MOUTH DAILY., Disp: 90 tablet, Rfl: 1    rosuvastatin (CRESTOR) 40 MG tablet, Take 1 tablet (40 mg total) by mouth daily, Disp: 90 tablet, Rfl: 3    ergocalciferol (VITAMIN D2) 50,000 units, Take 50,000 Units by mouth in the morning, Disp: , Rfl:       Social History     Socioeconomic History    Marital status: /Civil Union     Spouse name: Not on file    Number of children: 2     Years of education: 12    Highest education level: Not on file   Occupational History    Not on file   Tobacco Use    Smoking status: Former     Current packs/day: 0.00     Average packs/day: 2.0 packs/day for 30.0 years (60.0 ttl pk-yrs)     Types: Cigarettes     Start date:      Quit date: 3/23/2018     Years since quittin.4    Smokeless tobacco: Never    Tobacco comments:     Quit smoking around    Vaping Use    Vaping status: Never Used   Substance and Sexual Activity    Alcohol use: Never    Drug use: Never    Sexual activity: Not Currently     Partners: Male     Birth control/protection: Surgical   Other Topics Concern    Not on file   Social History Narrative    high school graduate     Social Determinants of Health     Financial Resource Strain: Low Risk  (2023)    Overall Financial Resource Strain (CARDIA)     Difficulty of Paying Living Expenses: Not hard at all   Food Insecurity: No Food Insecurity (2024)    Hunger Vital Sign     Worried About Running Out of Food in the Last Year: Never true     Ran Out of Food in the Last Year: Never true   Transportation Needs: No Transportation Needs (2024)    PRAPARE - Transportation     Lack of Transportation (Medical): No     Lack of Transportation (Non-Medical): No   Physical Activity: Not on file   Stress: Not on file   Social Connections: Not on file   Intimate Partner Violence: Not on file   Housing Stability: Low Risk  (2024)    Housing Stability Vital Sign     Unable to Pay for Housing in the Last Year: No     Number of Times Moved in the Last Year: 1     Homeless in the Last Year: No       Family History   Problem Relation Age of Onset    Diabetes type II Mother     Osteoporosis Mother     Thyroid disease unspecified Mother     Diabetes Mother     No Known Problems Father     Hyperlipidemia Sister     Hypertension Sister     Diabetes Sister     No Known Problems Daughter     No Known Problems Daughter     No Known Problems  "Maternal Grandmother     Diabetes type II Maternal Grandfather     Pancreatic cancer Paternal Grandmother     Cancer Paternal Grandmother     No Known Problems Paternal Grandfather     Diabetes type II Maternal Aunt     No Known Problems Maternal Aunt     Diabetes type II Maternal Uncle     No Known Problems Paternal Aunt     No Known Problems Paternal Aunt     No Known Problems Paternal Aunt     Arthritis Family     Pancreatic cancer Family     Scoliosis Family     Breast cancer Neg Hx        Physical Exam  Vitals and nursing note reviewed.   Constitutional:       General: She is not in acute distress.     Appearance: She is not diaphoretic.   HENT:      Head: Normocephalic and atraumatic.   Eyes:      Conjunctiva/sclera: Conjunctivae normal.   Cardiovascular:      Rate and Rhythm: Normal rate and regular rhythm.      Pulses: Intact distal pulses.      Heart sounds: Normal heart sounds.   Pulmonary:      Effort: Pulmonary effort is normal.      Breath sounds: Normal breath sounds.   Abdominal:      General: Bowel sounds are normal.      Palpations: Abdomen is soft.   Musculoskeletal:         General: Normal range of motion.      Cervical back: Normal range of motion and neck supple.   Skin:     General: Skin is warm and dry.   Neurological:      Mental Status: She is alert and oriented to person, place, and time.         Vitals: Blood pressure 110/62, pulse 69, height 5' 6\" (1.676 m), weight 87.4 kg (192 lb 9.6 oz), SpO2 95%, not currently breastfeeding.   Wt Readings from Last 3 Encounters:   09/17/24 87.4 kg (192 lb 9.6 oz)   08/22/24 85.4 kg (188 lb 3.2 oz)   06/10/24 84.4 kg (186 lb)           Labs & Results:  Lab Results   Component Value Date    WBC 6.50 01/26/2024    HGB 11.2 (L) 01/26/2024    HCT 34.4 (L) 01/26/2024    MCV 93 01/26/2024     01/26/2024     No results found for: \"BNP\"  No components found for: \"CHEM\"  Creatine Kinase, Total   Date Value Ref Range Status   09/11/2024 66 29 - 143 U/L " Final     No results found for this or any previous visit.    No results found for this or any previous visit.    No valid procedures specified.  No results found for this or any previous visit.                This note was completed in part utilizing [employer] direct voice recognition software.   Grammatical errors, random word insertion, spelling mistakes, and incomplete sentences may be an occasional consequence of the system secondary to software limitations, ambient noise and hardware issues. At the time of dictation, efforts were made to edit, clarify and /or correct errors.  Please read the chart carefully and recognize, using context, where substitutions have occurred.  If you have any questions or concerns about the context, text or information contained within the body of this dictation, please contact myself, the provider, for further clarification

## 2024-09-17 NOTE — LETTER
2024     Roberto Quintero DO  [address] [place] [address]  [address]  [facility] [address]    Patient: Claudia Gamboa   YOB: 1950   Date of Visit: 2024       Dear Dr. Quintero:    Thank you for referring Claudia Gamboa to me for evaluation. Below are my notes for this consultation.    If you have questions, please do not hesitate to call me. I look forward to following your patient along with you.         Sincerely,        CARRINGTON Gibbs        CC: No Recipients    CARRINGTON Gibbs  2024  5:06 PM  Sign when Signing Visit  Cardiology   Follow Up   Office Visit Note  Claudia Gamboa   74 y.o.   female   MRN: [id number]  [facility]  [address] [place] [address]  [address]  [facility] [address]  [phone]    PCP: Roberto Quintero DO  Cardiologist: Dr. Perez          Summary of Plan:  Provided multiple handouts about lifestyle management of hypertriglyceridemia including diet  Refer to dietitian regarding HTG, DM  Stop atorvastatin when finished her current prescription.  This should be just a few weeks.  Start rosuvastatin 40 mg daily  Stop gemfibrozil.  It is contraindicated with atorvastatin as well as rosuvastatin.  Start fenofibrate 145 mg daily  Fasting lipid profile with direct LDL beginning of November.  We may need to request labs that she gets these drawn at [facility]  Follow-up with me 2-3 months  Follow up with Dr Perez 6 months  Colon Ca screenin2021 , up to date      Assessment & Plan  Nonrheumatic aortic valve stenosis  VIANNEY 1.26 cm² by echo 3/1/2024.  Continue monitor with serial echocardiograms  Primary hypertension  /62  On losartan 25 mg daily  Mixed hyperlipidemia  2021 non-.  She gets her labs drawn at [facility]  Now on atorvastatin 80 mg daily, Vascepa 2 mg twice daily.  Unfortunately she is still on gemfibrozil.  This is contraindicated with atorvastatin  2024: Stop atorvastatin.  Stop gemfibrozil.     Start rosuvastatin 40 mg daily.  Continue icosapent ethyl 2 g twice daily.  Start fenofibrate 145 mg daily  Refer to a dietitian.  Multiple handouts about lifestyle recommended  Repeat fasting lipids with a direct LDL 4 to 12 weeks  Type 2 diabetes mellitus with hyperglycemia, with long-term current use of insulin ([facility])    Lab Results   Component Value Date    HGBA1C 9.4 (A) 06/10/2024      Latest Reference Range & Units 05/22/23 00:00 09/27/23 13:48 11/21/23 10:37 06/10/24 14:30   Hemoglobin A1C 6.5  11.2 (E) 12.0 ! 11.0 (H) 9.4 !   On insulin per primary care or endocrinology    PVD (peripheral vascular disease) ([facility])    Following with vascular  6/12/2024 vascular abdominal ultrasound  diffuse disease throughout the abdominal aorta without a focal stenosis and is diffusely ectatic throughout, 2.7 cm in its greatest diameter, (prior: 3.1 cm).  Diffuse disease throughout the right common iliac and bilateral external iliac arteries without a focal stenosis and are normal in caliber.  There is >75% stenosis of the left common iliac artery and is ectatic in caliber, 1.8 cm, (prior: 2.4 cm).  Findings suggestive of >70% stenosis of the celiac artery and >60% stenosis ofthe right renal artery.The superior mesenteric, inferior mesenteric and left renal arteries arepatent.  H/O acute pancreatitis  She reports she had acute pancreatitis in 1999 was hospitalized at [facility].  Cardiac testing  TTE 3/1/24.  EF 60%.  Wall motion normal.  Grade 2 DD.  Mild to moderate aortic stenosis.The aortic valve peak velocity is 2.5 m/s. The aortic valve area is 1.26 cm2. The stroke volume index is 34.20 ml/m2. The aortic valve velocity is increased due to stenosis but lower than expected due to the presence of decreased flow.  Mild TR.  Trivial pericardial effusion posterior to the heart                   HPI  Claudia Gamboa is a 74 y.o.year old female with mixed dyslipidemia, diabetes mellitus with long-term use of insulin  complicated by neuropathy, hypertension, aortic stenosis.  She follows with Dr. Perez.  She was last seen in the office 5/7/2024    Follow-up    5/7/24 OV Dr Perez  Per his note:  Plan  I am a little bit concerned about the high-dose atorvastatin in combination with gemfibrozil, there is a high risk of myositis with that.  I will have her check a creatinine kinase today.  If elevated, we need gemfibrozil to be discontinued immediately and we will replace it with fenofibrate.  If any future issues with high-dose atorvastatin and fenofibrate, an option would be to lower the atorvastatin and add Zetia.  Current over-the-counter fish oil is ineffective and insufficient for her primary hypertriglyceridemia, I will have her discontinue this and replace it with Vascepa 2 capsules twice a day.  We discussed her extensive calcification of her aorta, coronary arteries, all the results of her cholesterol and diet.  We have had an extensive discussion about dietary limitations especially snacking, sweets, desserts which should really be kept to an absolute minimum.  6-month follow-up with repeat cholesterol panel will be recommended  We also discussed her aortic valve stenosis which is mild to moderate, probably will not progress to severe for another 4 to 8 years.      9/17/24  Cardiology follow up  She is with her   She has made significant dietary changes however there are still some room for improvement  She did not yet go to a dietitian  She obtains labs at [facility].  She reports she did not have any fasting lipids done recently  Currently for her hypertriglyceridemia she is on atorvastatin 80 mg daily, gemfibrozil 600 twice daily, Vascepa 2 g twice daily  She walks 5 laps around where she lives, about 1 mile 5 days a week.  She goes to bin on the other 2 days.  With this activity she denies chest pain, shortness of breath lightheadedness or dizziness  Her blood pressure is excellent  She appears euvolemic  Today I  will make changes for her lipids: Will switch from atorvastatin 80 to rosuvastatin 40; will stop Lopid as this is contraindicated with atorvastatin or rosuvastatin.  In its place I will start fenofibrate 145 mg daily  Repeat lipids with a direct LDL the beginning of November  I encouraged weight loss, regular exercise, will refer to dietitian          Review of Systems   Constitutional: Negative for chills.   Cardiovascular:  Negative for chest pain, claudication, cyanosis, dyspnea on exertion, irregular heartbeat, leg swelling, near-syncope, orthopnea, palpitations, paroxysmal nocturnal dyspnea and syncope.   Respiratory:  Negative for cough and shortness of breath.    Gastrointestinal:  Negative for heartburn and nausea.   Neurological:  Negative for dizziness, focal weakness, headaches, light-headedness and weakness.   All other systems reviewed and are negative.            Assessment  Diagnoses and all orders for this visit:    Nonrheumatic aortic valve stenosis    Primary hypertension    Mixed hyperlipidemia  -     rosuvastatin (CRESTOR) 40 MG tablet; Take 1 tablet (40 mg total) by mouth daily  -     fenofibrate (TRICOR) 145 mg tablet; Take 1 tablet (145 mg total) by mouth daily  -     Lipid panel; Future  -     LDL cholesterol, direct; Future    Type 2 diabetes mellitus with hyperglycemia, with long-term current use of insulin (HCC)  -     Ambulatory referral to Nutrition Services; Future    PVD (peripheral vascular disease) ([facility])    H/O acute pancreatitis    Abdominal aortic aneurysm (AAA) without rupture, unspecified part ([facility])        Past Medical History:   Diagnosis Date   • Acute cystitis without hematuria 08/10/2018   • Anemia 8/13/2018   • Aortic aneurysm, abdominal (HCC)     noted on cat scan from 2/2020   • Cataract    • Closed left hip fracture (HCC) 08/10/2018   • Colon polyp    • Diabetes mellitus (HCC)    • Hyperlipemia    • Migraines    • Multiple falls    • Osteoporosis 02/22/2019   • Shingles  7/2016   • Urinary tract infection 5/2022       Past Surgical History:   Procedure Laterality Date   • BREAST CYST ASPIRATION Right 1993   • CHOLECYSTECTOMY     • COLONOSCOPY     • ELBOW SURGERY     • FRACTURE SURGERY  ,4/2018   • GALLBLADDER SURGERY     • HIP SURGERY  08/10/2018   • TX COLONOSCOPY FLX DX W/COLLJ SPEC WHEN PFRMD N/A 12/14/2016    Procedure: COLONOSCOPY;  Surgeon: Juan Boykin III, MD;  Location: [facility];  Service: Gastroenterology   • TX OPTX FEM SHFT FX W/INSJ IMED IMPLT W/WO SCREW Left 08/10/2018    Procedure: Left Hip IM Long nail;  Surgeon: Olegario Lynch MD;  Location: [facility];  Service: Orthopedics   • TONSILLECTOMY             Allergies  Allergies   Allergen Reactions   • Zoledronic Acid GI Intolerance     Anorexia   • Codeine GI Intolerance, Nausea Only and Vomiting         Medications    Current Outpatient Medications:   •  Accu-Chek FastClix Lancets MISC, Use to check blood sugar 4 times a day, Disp: 100 each, Rfl: 3  •  aspirin (ECOTRIN LOW STRENGTH) 81 mg EC tablet, Take 81 mg by mouth daily, Disp: , Rfl:   •  fenofibrate (TRICOR) 145 mg tablet, Take 1 tablet (145 mg total) by mouth daily, Disp: 90 tablet, Rfl: 3  •  gabapentin (NEURONTIN) 600 MG tablet, TAKE 1 TABLET BY MOUTH THREE TIMES A DAY, Disp: 90 tablet, Rfl: 5  •  glucose blood (Accu-Chek Guide) test strip, Checking sugars four times a day or as directed Dx: E11.65, E11.8, Disp: 300 strip, Rfl: 3  •  Icosapent Ethyl 1 g CAPS, Take 2 capsules (2 g total) by mouth 2 (two) times a day, Disp: 360 capsule, Rfl: 1  •  insulin aspart (NovoLOG FlexPen) 100 UNIT/ML injection pen, USE 40 UNITS BEFORE EACH MEAL PLUS 3 UNITS FOR EVERY 50 ABOVE 150MG/DL MAX DAILY DOSE 200 UNITS, NORMAL, Disp: 60 mL, Rfl: 1  •  insulin glargine (Toujeo Max SoloStar) 300 units/mL CONCENTRATED U-300 injection pen (2-unit dial), INJECT 60 UNITS UNDER THE SKIN DAILY AT BEDTIME, Disp: 18 mL, Rfl: 1  •  Insulin Pen Needle (BD Pen Needle Montse U/F) 32G X 4  MM MISC, Use daily withToujeo, Disp: 200 each, Rfl: 5  •  levothyroxine 50 mcg tablet, TAKE 1 TABLET EVERY DAY, Disp: 90 tablet, Rfl: 1  •  losartan (COZAAR) 25 mg tablet, TAKE 1 TABLET (25 MG TOTAL) BY MOUTH DAILY., Disp: 90 tablet, Rfl: 1  •  rosuvastatin (CRESTOR) 40 MG tablet, Take 1 tablet (40 mg total) by mouth daily, Disp: 90 tablet, Rfl: 3  •  ergocalciferol (VITAMIN D2) 50,000 units, Take 50,000 Units by mouth in the morning, Disp: , Rfl:       Social History     Socioeconomic History   • Marital status: /Civil Union     Spouse name: Not on file   • Number of children: 2   • Years of education: 12   • Highest education level: Not on file   Occupational History   • Not on file   Tobacco Use   • Smoking status: Former     Current packs/day: 0.00     Average packs/day: 2.0 packs/day for 30.0 years (60.0 ttl pk-yrs)     Types: Cigarettes     Start date:      Quit date: 3/23/2018     Years since quittin.4   • Smokeless tobacco: Never   • Tobacco comments:     Quit smoking around    Vaping Use   • Vaping status: Never Used   Substance and Sexual Activity   • Alcohol use: Never   • Drug use: Never   • Sexual activity: Not Currently     Partners: Male     Birth control/protection: Surgical   Other Topics Concern   • Not on file   Social History Narrative    high school graduate     Social Determinants of Health     Financial Resource Strain: Low Risk  (2023)    Overall Financial Resource Strain (CARDIA)    • Difficulty of Paying Living Expenses: Not hard at all   Food Insecurity: No Food Insecurity (2024)    Hunger Vital Sign    • Worried About Running Out of Food in the Last Year: Never true    • Ran Out of Food in the Last Year: Never true   Transportation Needs: No Transportation Needs (2024)    PRAPARE - Transportation    • Lack of Transportation (Medical): No    • Lack of Transportation (Non-Medical): No   Physical Activity: Not on file   Stress: Not on file   Social  Connections: Not on file   Intimate Partner Violence: Not on file   Housing Stability: Low Risk  (8/22/2024)    Housing Stability Vital Sign    • Unable to Pay for Housing in the Last Year: No    • Number of Times Moved in the Last Year: 1    • Homeless in the Last Year: No       Family History   Problem Relation Age of Onset   • Diabetes type II Mother    • Osteoporosis Mother    • Thyroid disease unspecified Mother    • Diabetes Mother    • No Known Problems Father    • Hyperlipidemia Sister    • Hypertension Sister    • Diabetes Sister    • No Known Problems Daughter    • No Known Problems Daughter    • No Known Problems Maternal Grandmother    • Diabetes type II Maternal Grandfather    • Pancreatic cancer Paternal Grandmother    • Cancer Paternal Grandmother    • No Known Problems Paternal Grandfather    • Diabetes type II Maternal Aunt    • No Known Problems Maternal Aunt    • Diabetes type II Maternal Uncle    • No Known Problems Paternal Aunt    • No Known Problems Paternal Aunt    • No Known Problems Paternal Aunt    • Arthritis Family    • Pancreatic cancer Family    • Scoliosis Family    • Breast cancer Neg Hx        Physical Exam  Vitals and nursing note reviewed.   Constitutional:       General: She is not in acute distress.     Appearance: She is not diaphoretic.   HENT:      Head: Normocephalic and atraumatic.   Eyes:      Conjunctiva/sclera: Conjunctivae normal.   Cardiovascular:      Rate and Rhythm: Normal rate and regular rhythm.      Pulses: Intact distal pulses.      Heart sounds: Normal heart sounds.   Pulmonary:      Effort: Pulmonary effort is normal.      Breath sounds: Normal breath sounds.   Abdominal:      General: Bowel sounds are normal.      Palpations: Abdomen is soft.   Musculoskeletal:         General: Normal range of motion.      Cervical back: Normal range of motion and neck supple.   Skin:     General: Skin is warm and dry.   Neurological:      Mental Status: She is alert and  "oriented to person, place, and time.         Vitals: Blood pressure 110/62, pulse 69, height 5' 6\" (1.676 m), weight 87.4 kg (192 lb 9.6 oz), SpO2 95%, not currently breastfeeding.   Wt Readings from Last 3 Encounters:   09/17/24 87.4 kg (192 lb 9.6 oz)   08/22/24 85.4 kg (188 lb 3.2 oz)   06/10/24 84.4 kg (186 lb)           Labs & Results:  Lab Results   Component Value Date    WBC 6.50 01/26/2024    HGB 11.2 (L) 01/26/2024    HCT 34.4 (L) 01/26/2024    MCV 93 01/26/2024     01/26/2024     No results found for: \"BNP\"  No components found for: \"CHEM\"  Creatine Kinase, Total   Date Value Ref Range Status   09/11/2024 66 29 - 143 U/L Final     No results found for this or any previous visit.    No results found for this or any previous visit.    No valid procedures specified.  No results found for this or any previous visit.                This note was completed in part utilizing [email or web address] direct voice recognition software.   Grammatical errors, random word insertion, spelling mistakes, and incomplete sentences may be an occasional consequence of the system secondary to software limitations, ambient noise and hardware issues. At the time of dictation, efforts were made to edit, clarify and /or correct errors.  Please read the chart carefully and recognize, using context, where substitutions have occurred.  If you have any questions or concerns about the context, text or information contained within the body of this dictation, please contact myself, the provider, for further clarification      "

## 2024-09-17 NOTE — ASSESSMENT & PLAN NOTE
She reports she had acute pancreatitis in 1999 was hospitalized at [facility].

## 2024-09-21 PROBLEM — Z00.00 MEDICARE ANNUAL WELLNESS VISIT, SUBSEQUENT: Status: RESOLVED | Noted: 2023-06-07 | Resolved: 2024-09-21

## 2024-10-01 DIAGNOSIS — Z79.4 TYPE 2 DIABETES MELLITUS WITH HYPERGLYCEMIA, WITH LONG-TERM CURRENT USE OF INSULIN (HCC): ICD-10-CM

## 2024-10-01 DIAGNOSIS — E11.65 TYPE 2 DIABETES MELLITUS WITH HYPERGLYCEMIA, WITH LONG-TERM CURRENT USE OF INSULIN (HCC): ICD-10-CM

## 2024-10-02 DIAGNOSIS — Z79.4 TYPE 2 DIABETES MELLITUS WITH HYPERGLYCEMIA, WITH LONG-TERM CURRENT USE OF INSULIN (HCC): ICD-10-CM

## 2024-10-02 DIAGNOSIS — E11.65 TYPE 2 DIABETES MELLITUS WITH HYPERGLYCEMIA, WITH LONG-TERM CURRENT USE OF INSULIN (HCC): ICD-10-CM

## 2024-10-02 RX ORDER — BLOOD SUGAR DIAGNOSTIC
STRIP MISCELLANEOUS
Qty: 400 STRIP | Refills: 1 | Status: SHIPPED | OUTPATIENT
Start: 2024-10-02 | End: 2024-10-09 | Stop reason: SDUPTHER

## 2024-10-02 RX ORDER — INSULIN ASPART 100 [IU]/ML
INJECTION, SOLUTION INTRAVENOUS; SUBCUTANEOUS
Qty: 60 ML | Refills: 1 | Status: SHIPPED | OUTPATIENT
Start: 2024-10-02

## 2024-10-07 LAB
LEFT EYE DIABETIC RETINOPATHY: POSITIVE
RIGHT EYE DIABETIC RETINOPATHY: POSITIVE

## 2024-10-09 DIAGNOSIS — Z79.4 TYPE 2 DIABETES MELLITUS WITH HYPERGLYCEMIA, WITH LONG-TERM CURRENT USE OF INSULIN (HCC): ICD-10-CM

## 2024-10-09 DIAGNOSIS — E11.65 TYPE 2 DIABETES MELLITUS WITH HYPERGLYCEMIA, WITH LONG-TERM CURRENT USE OF INSULIN (HCC): ICD-10-CM

## 2024-10-09 RX ORDER — BLOOD SUGAR DIAGNOSTIC
STRIP MISCELLANEOUS
Qty: 400 STRIP | Refills: 1 | Status: SHIPPED | OUTPATIENT
Start: 2024-10-09

## 2024-10-15 ENCOUNTER — IMMUNIZATIONS (OUTPATIENT)
Dept: FAMILY MEDICINE CLINIC | Facility: MEDICAL CENTER | Age: 74
End: 2024-10-15
Payer: COMMERCIAL

## 2024-10-15 DIAGNOSIS — Z23 ENCOUNTER FOR IMMUNIZATION: Primary | ICD-10-CM

## 2024-10-15 PROCEDURE — 90662 IIV NO PRSV INCREASED AG IM: CPT

## 2024-10-15 PROCEDURE — G0008 ADMIN INFLUENZA VIRUS VAC: HCPCS

## 2024-10-22 LAB
25(OH)D3 SERPL-MCNC: 46 NG/ML (ref 30–100)
ALBUMIN/CREAT UR: 658 MG/G CREAT
CHOLEST SERPL-MCNC: 179 MG/DL
CHOLEST/HDLC SERPL: 9 (CALC)
CREAT UR-MCNC: 59 MG/DL (ref 20–275)
HBA1C MFR BLD: 9.7 % OF TOTAL HGB
HDLC SERPL-MCNC: 20 MG/DL
MICROALBUMIN UR-MCNC: 38.8 MG/DL
NONHDLC SERPL-MCNC: 159 MG/DL (CALC)
T4 FREE SERPL-MCNC: 1.2 NG/DL (ref 0.8–1.8)
TRIGL SERPL-MCNC: 473 MG/DL
TSH SERPL-ACNC: 4.12 MIU/L (ref 0.4–4.5)

## 2024-10-25 ENCOUNTER — HOSPITAL ENCOUNTER (OUTPATIENT)
Dept: ULTRASOUND IMAGING | Facility: CLINIC | Age: 74
End: 2024-10-25
Payer: COMMERCIAL

## 2024-10-25 ENCOUNTER — HOSPITAL ENCOUNTER (OUTPATIENT)
Dept: MAMMOGRAPHY | Facility: CLINIC | Age: 74
End: 2024-10-25
Payer: COMMERCIAL

## 2024-10-25 DIAGNOSIS — R92.8 MAMMOGRAM ABNORMAL: ICD-10-CM

## 2024-10-25 DIAGNOSIS — I10 PRIMARY HYPERTENSION: ICD-10-CM

## 2024-10-25 PROCEDURE — G0279 TOMOSYNTHESIS, MAMMO: HCPCS

## 2024-10-25 PROCEDURE — 76642 ULTRASOUND BREAST LIMITED: CPT

## 2024-10-25 PROCEDURE — 77066 DX MAMMO INCL CAD BI: CPT

## 2024-10-25 RX ORDER — LOSARTAN POTASSIUM 25 MG/1
25 TABLET ORAL DAILY
Qty: 90 TABLET | Refills: 1 | Status: SHIPPED | OUTPATIENT
Start: 2024-10-25

## 2024-10-25 NOTE — PROGRESS NOTES
Met with patient, pt spouse and Dr. Grant regarding recommendation for;    __X___ RIGHT ______LEFT      ___X__Ultrasound guided  ______Stereotactic breast biopsy.      __X___Verbalized understanding.    Reviewed clip placement with patient, pt states understanding: Yes: ___X_ No: ____  Comments:    Blood thinners:  No: _____ Yes: __X____ What:   aspirin 81mg       Biopsy teaching sheet given:  Yes: ___X___ No: ________    Pt given contact information and adv to call with any questions/needs    Patient advised to arrive at 1030 for a 1100 appointment

## 2024-10-28 ENCOUNTER — OFFICE VISIT (OUTPATIENT)
Dept: ENDOCRINOLOGY | Facility: CLINIC | Age: 74
End: 2024-10-28
Payer: COMMERCIAL

## 2024-10-28 VITALS
OXYGEN SATURATION: 96 % | HEART RATE: 78 BPM | TEMPERATURE: 97.5 F | BODY MASS INDEX: 31.02 KG/M2 | DIASTOLIC BLOOD PRESSURE: 64 MMHG | WEIGHT: 193 LBS | SYSTOLIC BLOOD PRESSURE: 110 MMHG | HEIGHT: 66 IN

## 2024-10-28 DIAGNOSIS — Z79.4 TYPE 2 DIABETES MELLITUS WITH HYPERGLYCEMIA, WITH LONG-TERM CURRENT USE OF INSULIN (HCC): Primary | ICD-10-CM

## 2024-10-28 DIAGNOSIS — E11.65 TYPE 2 DIABETES MELLITUS WITH HYPERGLYCEMIA, WITH LONG-TERM CURRENT USE OF INSULIN (HCC): Primary | ICD-10-CM

## 2024-10-28 DIAGNOSIS — E78.2 MIXED HYPERLIPIDEMIA: ICD-10-CM

## 2024-10-28 DIAGNOSIS — E03.9 ACQUIRED HYPOTHYROIDISM: ICD-10-CM

## 2024-10-28 DIAGNOSIS — M81.0 AGE RELATED OSTEOPOROSIS, UNSPECIFIED PATHOLOGICAL FRACTURE PRESENCE: ICD-10-CM

## 2024-10-28 DIAGNOSIS — E55.9 VITAMIN D DEFICIENCY: ICD-10-CM

## 2024-10-28 PROCEDURE — 99214 OFFICE O/P EST MOD 30 MIN: CPT | Performed by: INTERNAL MEDICINE

## 2024-10-28 PROCEDURE — 95250 CONT GLUC MNTR PHYS/QHP EQP: CPT | Performed by: INTERNAL MEDICINE

## 2024-10-28 RX ORDER — LEVOTHYROXINE SODIUM 50 UG/1
50 TABLET ORAL DAILY
Qty: 90 TABLET | Refills: 1 | Status: SHIPPED | OUTPATIENT
Start: 2024-10-28

## 2024-10-28 RX ORDER — PEN NEEDLE, DIABETIC 32GX 5/32"
NEEDLE, DISPOSABLE MISCELLANEOUS
Qty: 200 EACH | Refills: 5 | Status: SHIPPED | OUTPATIENT
Start: 2024-10-28

## 2024-10-28 RX ORDER — INSULIN ASPART 100 [IU]/ML
INJECTION, SOLUTION INTRAVENOUS; SUBCUTANEOUS
Qty: 60 ML | Refills: 1 | Status: SHIPPED | OUTPATIENT
Start: 2024-10-28

## 2024-10-28 RX ORDER — INSULIN GLARGINE 300 U/ML
40 INJECTION, SOLUTION SUBCUTANEOUS EVERY 12 HOURS SCHEDULED
Qty: 30 ML | Refills: 1 | Status: SHIPPED | OUTPATIENT
Start: 2024-10-28

## 2024-10-28 NOTE — PROGRESS NOTES
Continous glucose monitoring lazaro placement     Date/Time  10/28/2024 11:20 AM     Performed by  Joann Proctor MA   Authorized by  Irving Nielsen MD     Star Protocol   procedure performed by consultantConsent: Verbal consent obtained. Written consent obtained.  Risks and benefits: risks, benefits and alternatives were discussed  Consent given by: patient  Timeout called at: 10/28/2024 1:59 PM.  Patient understanding: patient states understanding of the procedure being performed  Patient consent: the patient's understanding of the procedure matches consent given  Procedure consent: procedure consent matches procedure scheduled  Patient identity confirmed: verbally with patient      Local anesthesia used: no     Anesthesia   Local anesthesia used: no     Sedation   Patient sedated: no       Procedure Details   Procedure Notes: LAZARO PRO PLACED ON LEFT ARM. PATIENT TOLERATED WELL.    SN: [id number]  LOT: [id number]  EXP: 04/30/2025  Patient tolerance: patient tolerated the procedure well with no immediate complications

## 2024-10-28 NOTE — ASSESSMENT & PLAN NOTE
She reports lack of control mostly associated with dietary indiscretion and lack of exercise. She cannot afford cgm due to 20% copay.    Advised to continue following regimen.  Novolog 40u tidac plus 3u/50 above 150mg/dL  Toujeo 40u Q12H    She will get medicaid after which she may afford a GLP1 agonist and a cgm.  Will use cgm pro next visit.  Follow up in 3 months.    Lab Results   Component Value Date    HGBA1C 9.7 (H) 10/21/2024      Calm

## 2024-10-28 NOTE — PROGRESS NOTES
"    Follow-up Patient Progress Note      CC: type 2 diabetes     History of Present Illness:   73yr female with type 2 diabetes 2002, HTN, HLD, hypertriglyceridemia pancreatitis 1999, hypothyroidism, Osteoporosis, vit D deficiency, DPN, AAA, Hx Lt hip fracture, Hx UTI's, bladder dysfunction and vitamin D deficiency. Last visit was 6/10/24.     Since last visit, weight is up 7 lbs. She reports dietary indiscretions with snacks in between food and uses iced tea with aspartame.     CGM data review::  Device: urszula    Dates: 10/31/23-11/13/23                   Usage: 100 %             Av glu: 239 mg/dL             SD:  mg/dL      CV: 28.8  %     GMI: 9 %  TIR: 24 %                    TAR: 35+41 %             TBR: 0 %     Glycemic patters:  severe fasting and pp hyperglycemia  Hypoglycemia: No     Current meds: Levemir doesn't work for her. She could not tolerate ozempic. She declined metformin based on sister having liver failure that was attributed to metformin.  Novolog 40u tidac plus 3u/50 above 150mg/dL  Toujeo 68u qhs     Opthamology: yes  Podiatry: yes, 3 months ago.  vaccination: yes  Dental: no  Pancreatitis: yes in 1999 2\" hypertriglyceridemia.     Ace/ARB: losartan   Statin:lipitor 80  Thyroid issues: hypothyroidism on levothyroxine 50mcg qdaily     Osteoporosis: She lost 3 inches.  1/11/22 DXA: Tscores -3.1 LS, -2.3 RTH, -1.7 RFN. 10 yr FRAX score 2.5% hip and 16% major osteoporostic fracture. Had Reclast in 2019 and developed acute phase reaction.  3/13/24 DXA: Tscores -3.5 LS, -2.3 RTH, -1.9 RFN. 6.3% loss of BMD. 10 yr FRAX score 3.1% hip and 17% major osteoporostic fracture. She did not start prolia yet.      Physical Exam:  Body mass index is 31.15 kg/m².  /64 (BP Location: Left arm, Patient Position: Sitting, Cuff Size: Standard)   Pulse 78   Temp 97.5 °F (36.4 °C) (Temporal)   Ht 5' 6\" (1.676 m)   Wt 87.5 kg (193 lb)   SpO2 96%   BMI 31.15 kg/m²    Vitals:    10/28/24 1110   Weight: 87.5 kg " (193 lb)        Physical Exam  Constitutional:       General: She is not in acute distress.     Appearance: She is well-developed.   HENT:      Head: Normocephalic and atraumatic.      Nose: Nose normal.   Eyes:      Conjunctiva/sclera: Conjunctivae normal.   Pulmonary:      Effort: Pulmonary effort is normal.   Abdominal:      General: There is no distension.   Musculoskeletal:      Cervical back: Normal range of motion and neck supple.   Skin:     Findings: No rash.      Comments: No icterus   Neurological:      Mental Status: She is alert and oriented to person, place, and time.         Labs:   Lab Results   Component Value Date    HGBA1C 9.7 (H) 10/21/2024       Lab Results   Component Value Date    UNU0THCHLNJZ 4.47 10/25/2016    TSH 4.12 10/21/2024       Lab Results   Component Value Date    CREATININE 0.57 (L) 01/27/2024    CREATININE 0.61 01/26/2024    CREATININE 0.58 (L) 01/25/2024    BUN 12 01/27/2024     (L) 10/25/2016    K 3.5 01/27/2024     01/27/2024    CO2 21 01/27/2024     GFR, Calculated   Date Value Ref Range Status   12/10/2018 76 >60 mL/min/1.73m2 Final     Comment:     mL/min per 1.73 square meters                                            Normal Function or Mild Renal    Disease (if clinically at risk):  >or=60  Moderately Decreased:                30-59  Severely Decreased:                  15-29  Renal Failure:                         <15                                            -American GFR: multiply reported GFR by 1.16    Please note that the eGFR is based on the CKD-EPI calculation, and is not intended to be used for drug dosing.                                            Note: Calculated GFR may not be an accurate indicator of renal function if the patient's renal function is not in a steady state.     eGFR   Date Value Ref Range Status   01/27/2024 93 ml/min/1.73sq m Final       Lab Results   Component Value Date    ALT 23 01/24/2024    AST 26 01/24/2024     ALKPHOS 100 01/24/2024    BILITOT 0.4 10/25/2016       Lab Results   Component Value Date    CHOLESTEROL 179 10/21/2024    CHOLESTEROL 206 (H) 06/15/2023     Lab Results   Component Value Date    HDL 20 (L) 10/21/2024    HDL 32 (L) 06/15/2023    HDL 25 (L) 10/25/2016     Lab Results   Component Value Date    TRIG 473 (H) 10/21/2024    TRIG 371 (H) 06/15/2023    TRIG 3177 (H) 10/25/2016     Lab Results   Component Value Date    NONHDLC 467 (H) 10/25/2016         Assessment/Plan:    1. Type 2 diabetes mellitus with hyperglycemia, with long-term current use of insulin (HCC)  Assessment & Plan:  She reports lack of control mostly associated with dietary indiscretion and lack of exercise. She cannot afford cgm due to 20% copay.    Advised to continue following regimen.  Novolog 40u tidac plus 3u/50 above 150mg/dL  Toujeo 40u Q12H    She will get medicaid after which she may afford a GLP1 agonist and a cgm.  Will use cgm pro next visit.  Follow up in 3 months.    Lab Results   Component Value Date    HGBA1C 9.7 (H) 10/21/2024     Orders:  -     Hemoglobin A1C; Future  -     Continous glucose monitoring urszula placement; Future  -     Continous glucose monitoring urszula intrepretation; Future  -     insulin aspart (NovoLOG FlexPen) 100 UNIT/ML injection pen; USE 40 UNITS BEFORE EACH MEAL PLUS 3 UNITS FOR EVERY 50 ABOVE 150MG/DL MAX DAILY DOSE 200 UNITS  -     insulin glargine (Toujeo Max SoloStar) 300 units/mL CONCENTRATED U-300 injection pen (2-unit dial); Inject 40 Units under the skin every 12 (twelve) hours  -     Insulin Pen Needle (BD Pen Needle Montse U/F) 32G X 4 MM MISC; Use daily withToujeo  2. Acquired hypothyroidism  Assessment & Plan:  She seems clincially and biochemically euthyroid.   Continue levothyroxine 50mcg and repeat labs prior to next visit. Goal TSH is <5uIU/mL.  Orders:  -     T4, free; Future  -     TSH, 3rd generation; Future  -     levothyroxine 50 mcg tablet; Take 1 tablet (50 mcg total) by mouth  daily  3. Mixed hyperlipidemia  Assessment & Plan:  Continue statin as she has a high ASCVD risk score.  4. Age related osteoporosis, unspecified pathological fracture presence  Assessment & Plan:  Her prolia was approved but she did not receive first dose yet. Will get started.on Prolia.    She lost 3 inches.  1/11/22 DXA: Tscores -3.1 LS, -2.3 RTH, -1.7 RFN. 10 yr FRAX score 2.5% hip and 16% major osteoporostic fracture. Had Reclast in 2019 and developed acute phase reaction.  3/13/24 DXA: Tscores -3.5 LS, -2.3 RTH, -1.9 RFN. 6.3% loss of BMD. 10 yr FRAX score 3.1% hip and 17% major osteoporostic fracture  5. Vitamin D deficiency  Assessment & Plan:  Take vit D3 2000IU daily OTC.        I have spent a total time of 30 minutes on 10/28/24 in caring for this patient including greater than 50% of this time was spent in counseling/coordination of care as listed above.       Discussed with the patient and all questioned fully answered. She will contact me with concerns.    Irving Nielsen

## 2024-10-28 NOTE — ASSESSMENT & PLAN NOTE
Her prolia was approved but she did not receive first dose yet. Will get started.on Prolia.    She lost 3 inches.  1/11/22 DXA: Tscores -3.1 LS, -2.3 RTH, -1.7 RFN. 10 yr FRAX score 2.5% hip and 16% major osteoporostic fracture. Had Reclast in 2019 and developed acute phase reaction.  3/13/24 DXA: Tscores -3.5 LS, -2.3 RTH, -1.9 RFN. 6.3% loss of BMD. 10 yr FRAX score 3.1% hip and 17% major osteoporostic fracture

## 2024-11-01 DIAGNOSIS — R92.8 ABNORMAL MAMMOGRAM: Primary | ICD-10-CM

## 2024-11-06 ENCOUNTER — VBI (OUTPATIENT)
Dept: ADMINISTRATIVE | Facility: OTHER | Age: 74
End: 2024-11-06

## 2024-11-06 NOTE — TELEPHONE ENCOUNTER
11/06/24 8:02 AM     Chart reviewed for Hemoglobin A1c ; nothing is submitted to the patient's insurance at this time.     Bud Bishop MA   [facility]

## 2024-11-09 ENCOUNTER — HOSPITAL ENCOUNTER (OUTPATIENT)
Dept: MAMMOGRAPHY | Facility: CLINIC | Age: 74
Discharge: HOME | End: 2024-11-09
Payer: COMMERCIAL

## 2024-11-09 ENCOUNTER — HOSPITAL ENCOUNTER (OUTPATIENT)
Dept: ULTRASOUND IMAGING | Facility: CLINIC | Age: 74
Discharge: HOME | End: 2024-11-09
Payer: COMMERCIAL

## 2024-11-09 VITALS — DIASTOLIC BLOOD PRESSURE: 87 MMHG | HEART RATE: 62 BPM | SYSTOLIC BLOOD PRESSURE: 164 MMHG

## 2024-11-09 DIAGNOSIS — R92.8 ABNORMAL MAMMOGRAM: ICD-10-CM

## 2024-11-09 PROCEDURE — 88305 TISSUE EXAM BY PATHOLOGIST: CPT | Performed by: PATHOLOGY

## 2024-11-09 PROCEDURE — 88341 IMHCHEM/IMCYTCHM EA ADD ANTB: CPT | Performed by: PATHOLOGY

## 2024-11-09 PROCEDURE — 19083 BX BREAST 1ST LESION US IMAG: CPT

## 2024-11-09 PROCEDURE — A4648 IMPLANTABLE TISSUE MARKER: HCPCS

## 2024-11-09 PROCEDURE — 88342 IMHCHEM/IMCYTCHM 1ST ANTB: CPT | Performed by: PATHOLOGY

## 2024-11-09 RX ORDER — LIDOCAINE HYDROCHLORIDE 10 MG/ML
5 INJECTION, SOLUTION EPIDURAL; INFILTRATION; INTRACAUDAL; PERINEURAL ONCE
Status: COMPLETED | OUTPATIENT
Start: 2024-11-09 | End: 2024-11-09

## 2024-11-09 RX ADMIN — LIDOCAINE HYDROCHLORIDE 5 ML: 10 INJECTION, SOLUTION EPIDURAL; INFILTRATION; INTRACAUDAL; PERINEURAL at 10:50

## 2024-11-09 NOTE — PROGRESS NOTES
Patient arrived via:    ___x__ambulatory    _____wheelchair    _____stretcher      Domestic violence screen    __x____negative______positive    Breast Implants:    _______yes ____x____no

## 2024-11-09 NOTE — PROGRESS NOTES
Procedure type:    __x___ultrasound guided _____stereotactic    Breast:    _____Left __x___Right    Location: 4 o'clock anaid    Needle: 12 gauge molly    # of passes: 3    Clip: PARMJIT     Performed by: Dr. Wil Grant    Pressure held for 5 minutes by: Karly Eckert Strips:    __x___yes _____no    Band aid:    ___x__yes_____no    Tape and guaze:    _____yes ___x__no    Tolerated procedure:    ___x__yes _____no

## 2024-11-09 NOTE — PROGRESS NOTES
Ice pack given:    __X___yes _____no    Discharge instructions reviewed and given to patient:    __X___yes _____no    Discharged via:    __X___amulatory    _____wheelchair    _____stretcher    Biopsy site clean and dry with no bleeding on discharge:    __X___yes ____no  Patient made aware of potential bloody nipple discharge.  Patient would like results over the phone.  Ok to leave a message.

## 2024-11-11 ENCOUNTER — OFFICE VISIT (OUTPATIENT)
Dept: ENDOCRINOLOGY | Facility: CLINIC | Age: 74
End: 2024-11-11
Payer: COMMERCIAL

## 2024-11-11 VITALS
HEIGHT: 66 IN | TEMPERATURE: 97.6 F | OXYGEN SATURATION: 95 % | WEIGHT: 191 LBS | BODY MASS INDEX: 30.7 KG/M2 | HEART RATE: 64 BPM

## 2024-11-11 DIAGNOSIS — M81.0 AGE RELATED OSTEOPOROSIS, UNSPECIFIED PATHOLOGICAL FRACTURE PRESENCE: Primary | ICD-10-CM

## 2024-11-11 DIAGNOSIS — E11.65 TYPE 2 DIABETES MELLITUS WITH HYPERGLYCEMIA, WITH LONG-TERM CURRENT USE OF INSULIN (HCC): ICD-10-CM

## 2024-11-11 DIAGNOSIS — Z79.4 TYPE 2 DIABETES MELLITUS WITH HYPERGLYCEMIA, WITH LONG-TERM CURRENT USE OF INSULIN (HCC): ICD-10-CM

## 2024-11-11 PROCEDURE — 95251 CONT GLUC MNTR ANALYSIS I&R: CPT | Performed by: INTERNAL MEDICINE

## 2024-11-11 PROCEDURE — 96372 THER/PROPH/DIAG INJ SC/IM: CPT | Performed by: INTERNAL MEDICINE

## 2024-11-11 NOTE — PROGRESS NOTES
Post procedure call completed    Bleeding: _____yes __X___no, pt denies    Pain: _____yes ___X___no, pt denies    Redness/Swelling: ______yes ___X___no, pt denies, used ice pack as directed    Band aid removed: __X___yes _____no     Steri-Strips intact: ___X___yes _____no (discussed with patient to remove steri strips on 11/12 if they have not come off on their own)    Pt with no questions at this time, adv will call when results available, adv to call with any questions or concerns, has name/# for contact

## 2024-11-11 NOTE — PROGRESS NOTES
Continous glucose monitoring henrique intrepretation     Date/Time  11/11/2024 2:25 PM     Performed by  Joann Proctor MA   Authorized by  Irving Nielsen MD     [place] Protocol   procedure performed by consultantConsent: Verbal consent obtained. Written consent obtained.  Risks and benefits: risks, benefits and alternatives were discussed  Consent given by: patient  Timeout called at: 11/11/2024 2:25 PM.  Patient understanding: patient states understanding of the procedure being performed  Patient consent: the patient's understanding of the procedure matches consent given  Procedure consent: procedure consent matches procedure scheduled  Patient identity confirmed: verbally with patient      Local anesthesia used: no     Anesthesia   Local anesthesia used: no     Sedation   Patient sedated: no       Procedure Details   Procedure Notes: Henrique pro fell from patient's left arm after having it for 12 days. Patient tolerated well.  Patient tolerance: patient tolerated the procedure well with no immediate complications

## 2024-11-12 NOTE — PROGRESS NOTES
"Assessment/Plan:    Claudia Gamboa came into the [facility] Endocrinology Office today 11/12/24 to receive prolia injection.      Verbal consent obtained.  Consent given by: patient    patient states patient has been medically healthy with no underlining concerns/complications.      Claudia Gamboa presents with na symptoms today.       All insturctions were reviewed with the patient.    If the patient should have any questions/concerns, advised patient to contacted [facility].       Subjective:     History provided by: patient    Patient ID: Claudia Gamboa is a 74 y.o. female      Objective:    Vitals:    11/11/24 1133   Pulse: 64   Temp: 97.6 °F (36.4 °C)   TempSrc: Temporal   SpO2: 95%   Weight: 86.6 kg (191 lb)   Height: 5' 6\" (1.676 m)       Patient tolerated the injection well without any complications.  Injection site/s left arm.  Medication was provided by office.    Patient signed consent form yes   Patient signed ABN form yes (If no patient is not a medicare patient).   Patient waited 15 minutes after injection yes (This only applies to patient's receiving first time injection).       Last Visit: 10/28/2024  Next visit:2/13/2025     "

## 2024-11-13 ENCOUNTER — TELEPHONE (OUTPATIENT)
Dept: MAMMOGRAPHY | Facility: CLINIC | Age: 74
End: 2024-11-13

## 2024-11-13 PROCEDURE — 88341 IMHCHEM/IMCYTCHM EA ADD ANTB: CPT | Performed by: PATHOLOGY

## 2024-11-13 PROCEDURE — 88305 TISSUE EXAM BY PATHOLOGIST: CPT | Performed by: PATHOLOGY

## 2024-11-13 PROCEDURE — 88342 IMHCHEM/IMCYTCHM 1ST ANTB: CPT | Performed by: PATHOLOGY

## 2024-11-13 NOTE — TELEPHONE ENCOUNTER
I called patient, gave her biopsy results, advised her of radiologist recommendation for dx imaging in 1 year, I assisted pt with scheduling appt, pt had no further questions, has name and number for any future needs.

## 2024-12-01 PROBLEM — I73.9 PVD (PERIPHERAL VASCULAR DISEASE) (HCC): Status: ACTIVE | Noted: 2024-12-01

## 2024-12-14 DIAGNOSIS — E78.1 HYPERTRIGLYCERIDEMIA: ICD-10-CM

## 2024-12-16 RX ORDER — ICOSAPENT ETHYL 1000 MG/1
2 CAPSULE ORAL 2 TIMES DAILY
Qty: 360 CAPSULE | Refills: 3 | Status: SHIPPED | OUTPATIENT
Start: 2024-12-16

## 2024-12-17 ENCOUNTER — HOSPITAL ENCOUNTER (OUTPATIENT)
Dept: NON INVASIVE DIAGNOSTICS | Facility: CLINIC | Age: 74
Discharge: HOME | End: 2024-12-17
Payer: COMMERCIAL

## 2024-12-17 DIAGNOSIS — I71.40 ABDOMINAL AORTIC ANEURYSM (AAA) WITHOUT RUPTURE, UNSPECIFIED PART (HCC): ICD-10-CM

## 2024-12-17 PROCEDURE — 93978 VASCULAR STUDY: CPT

## 2024-12-17 PROCEDURE — 93978 VASCULAR STUDY: CPT | Performed by: SURGERY

## 2024-12-18 ENCOUNTER — RESULTS FOLLOW-UP (OUTPATIENT)
Dept: VASCULAR SURGERY | Facility: CLINIC | Age: 74
End: 2024-12-18

## 2024-12-27 ENCOUNTER — APPOINTMENT (OUTPATIENT)
Dept: RADIOLOGY | Facility: MEDICAL CENTER | Age: 74
End: 2024-12-27
Payer: COMMERCIAL

## 2024-12-27 ENCOUNTER — OFFICE VISIT (OUTPATIENT)
Dept: FAMILY MEDICINE CLINIC | Facility: MEDICAL CENTER | Age: 74
End: 2024-12-27
Payer: COMMERCIAL

## 2024-12-27 VITALS
BODY MASS INDEX: 31.4 KG/M2 | TEMPERATURE: 97.7 F | RESPIRATION RATE: 18 BRPM | HEART RATE: 74 BPM | WEIGHT: 195.4 LBS | DIASTOLIC BLOOD PRESSURE: 60 MMHG | SYSTOLIC BLOOD PRESSURE: 120 MMHG | OXYGEN SATURATION: 95 % | HEIGHT: 66 IN

## 2024-12-27 DIAGNOSIS — I99.9 VASCULAR DISEASE: ICD-10-CM

## 2024-12-27 DIAGNOSIS — F17.211 NICOTINE DEPENDENCE, CIGARETTES, IN REMISSION: ICD-10-CM

## 2024-12-27 DIAGNOSIS — Z09 FOLLOW-UP EXAM, 3-6 MONTHS SINCE PREVIOUS EXAM: Primary | ICD-10-CM

## 2024-12-27 DIAGNOSIS — I71.40 ABDOMINAL AORTIC ANEURYSM (AAA) WITHOUT RUPTURE, UNSPECIFIED PART (HCC): ICD-10-CM

## 2024-12-27 DIAGNOSIS — I71.21 ANEURYSM OF ASCENDING AORTA WITHOUT RUPTURE (HCC): ICD-10-CM

## 2024-12-27 DIAGNOSIS — I10 PRIMARY HYPERTENSION: ICD-10-CM

## 2024-12-27 DIAGNOSIS — I35.0 NONRHEUMATIC AORTIC VALVE STENOSIS: ICD-10-CM

## 2024-12-27 DIAGNOSIS — G62.9 NEUROPATHY: ICD-10-CM

## 2024-12-27 DIAGNOSIS — I51.89 DIASTOLIC DYSFUNCTION: ICD-10-CM

## 2024-12-27 DIAGNOSIS — M79.671 RIGHT FOOT PAIN: ICD-10-CM

## 2024-12-27 DIAGNOSIS — J84.10 PULMONARY FIBROSIS (HCC): ICD-10-CM

## 2024-12-27 DIAGNOSIS — Z79.4 TYPE 2 DIABETES MELLITUS WITH HYPERGLYCEMIA, WITH LONG-TERM CURRENT USE OF INSULIN (HCC): ICD-10-CM

## 2024-12-27 DIAGNOSIS — E03.9 ACQUIRED HYPOTHYROIDISM: ICD-10-CM

## 2024-12-27 DIAGNOSIS — E78.2 MIXED HYPERLIPIDEMIA: ICD-10-CM

## 2024-12-27 DIAGNOSIS — E11.65 TYPE 2 DIABETES MELLITUS WITH HYPERGLYCEMIA, WITH LONG-TERM CURRENT USE OF INSULIN (HCC): ICD-10-CM

## 2024-12-27 PROCEDURE — 99214 OFFICE O/P EST MOD 30 MIN: CPT | Performed by: STUDENT IN AN ORGANIZED HEALTH CARE EDUCATION/TRAINING PROGRAM

## 2024-12-27 PROCEDURE — 73630 X-RAY EXAM OF FOOT: CPT

## 2024-12-27 PROCEDURE — G2211 COMPLEX E/M VISIT ADD ON: HCPCS | Performed by: STUDENT IN AN ORGANIZED HEALTH CARE EDUCATION/TRAINING PROGRAM

## 2024-12-27 NOTE — PROGRESS NOTES
[facility]  Roberto Quintero, DO, 24     Claudia Gamboa MRN: [id number] : 1950 Age: 74 y.o.     Assessment/Plan       1. Follow-up exam, 3-6 months since previous exam (Primary)    -Return when AWV due in 2025 and sooner as needed    2. Primary hypertension    -Well-controlled with current regimen  -Continue losartan 25 mg p.o. daily  - f/u BMP      3. Diastolic dysfunction    -Hypertension well-controlled    4. Nonrheumatic aortic valve stenosis    -Following with cardiology    5. Mixed hyperlipidemia    -Regimen adjusted by cardiology, currently on Crestor 40 mg p.o. nightly, fenofibrate 145 mg p.o. daily, Vascepa 4 g p.o. daily    6. Type 2 diabetes mellitus with hyperglycemia, with long-term current use of insulin (HCC)    -Managed by endocrinology  -Daily ARB  -Daily statin    7. Neuropathy    -Gabapentin 600 mg p.o. 3 times daily    8. Acquired hypothyroidism    -Managed by endocrinology  -Levothyroxine 50 mcg p.o. every morning    9. Abdominal aortic aneurysm (AAA) without rupture, unspecified part (HCC)    -Underwent recent imaging, has follow-up appointment scheduled with vascular surgery 2025    10. Pulmonary fibrosis (HCC)    - Ambulatory Referral to Pulmonology; Future  - CT chest wo contrast; Future    11. Nicotine dependence, cigarettes, in remission    - CT chest wo contrast; Future    12. Vascular disease    -Daily statin and daily aspirin    13. Aneurysm of ascending aorta without rupture (HCC)    - CT chest wo contrast; Future    14. Right foot pain    - XR foot 3+ vw right; Future  - Ambulatory Referral to Podiatry; Future      Claudia Gamboa acknowledged understanding of treatment plan, all questions answered.    Subjective      Claudia Gamboa is a 74 y.o. female who presents for medical follow-up.  She presents today with her .  Patient does mention pain lateral aspect of right foot that radiates to her toe.    The  following portions of the patient's history were reviewed and updated as appropriate: allergies, current medications, past family history, past medical history, past social history, past surgical history and problem list.     Past Medical History:   Diagnosis Date    Acute cystitis without hematuria 08/10/2018    Anemia 8/13/2018    Aortic aneurysm, abdominal (HCC)     noted on cat scan from 2/2020    Cataract     Closed left hip fracture (HCC) 08/10/2018    Colon polyp     Diabetes mellitus (HCC)     Hyperlipemia     Migraines     Multiple falls     Osteoporosis 02/22/2019    Shingles 7/2016    Urinary tract infection 5/2022       Allergies   Allergen Reactions    Zoledronic Acid GI Intolerance     Anorexia    Codeine GI Intolerance, Nausea Only and Vomiting       Past Surgical History:   Procedure Laterality Date    BREAST CYST ASPIRATION Right 1993    CHOLECYSTECTOMY      COLONOSCOPY      ELBOW SURGERY      FRACTURE SURGERY  ,4/2018    GALLBLADDER SURGERY      HIP SURGERY  08/10/2018    FL COLONOSCOPY FLX DX W/COLLJ SPEC WHEN PFRMD N/A 12/14/2016    Procedure: COLONOSCOPY;  Surgeon: Juan Boykin III, MD;  Location: [facility];  Service: Gastroenterology    FL OPTX FEM SHFT FX W/INSJ IMED IMPLT W/WO SCREW Left 08/10/2018    Procedure: Left Hip IM Long nail;  Surgeon: Olegario Lynch MD;  Location: [facility];  Service: Orthopedics    TONSILLECTOMY      US GUIDED BREAST BIOPSY RIGHT COMPLETE Right 11/9/2024       Family History   Problem Relation Age of Onset    Diabetes type II Mother     Osteoporosis Mother     Thyroid disease unspecified Mother     Diabetes Mother     No Known Problems Father     Hyperlipidemia Sister     Hypertension Sister     Diabetes Sister     No Known Problems Daughter     No Known Problems Daughter     No Known Problems Maternal Grandmother     Diabetes type II Maternal Grandfather     Pancreatic cancer Paternal Grandmother     Cancer Paternal Grandmother     No Known Problems Paternal  Grandfather     Diabetes type II Maternal Aunt     No Known Problems Maternal Aunt     Diabetes type II Maternal Uncle     No Known Problems Paternal Aunt     No Known Problems Paternal Aunt     No Known Problems Paternal Aunt     Arthritis Family     Pancreatic cancer Family     Scoliosis Family     Breast cancer Neg Hx        Social History     Socioeconomic History    Marital status: /Civil Union     Spouse name: None    Number of children: 2    Years of education: 12    Highest education level: None   Occupational History    None   Tobacco Use    Smoking status: Former     Current packs/day: 0.00     Average packs/day: 2.0 packs/day for 30.0 years (60.0 ttl pk-yrs)     Types: Cigarettes     Start date:      Quit date: 3/23/2018     Years since quittin.7    Smokeless tobacco: Never    Tobacco comments:     Quit smoking around    Vaping Use    Vaping status: Never Used   Substance and Sexual Activity    Alcohol use: Never    Drug use: Never    Sexual activity: Not Currently     Partners: Male     Birth control/protection: Surgical   Other Topics Concern    None   Social History Narrative    high school graduate     Social Drivers of Health     Financial Resource Strain: Low Risk  (2023)    Overall Financial Resource Strain (CARDIA)     Difficulty of Paying Living Expenses: Not hard at all   Food Insecurity: No Food Insecurity (2024)    Hunger Vital Sign     Worried About Running Out of Food in the Last Year: Never true     Ran Out of Food in the Last Year: Never true   Transportation Needs: No Transportation Needs (2024)    PRAPARE - Transportation     Lack of Transportation (Medical): No     Lack of Transportation (Non-Medical): No   Physical Activity: Not on file   Stress: Not on file   Social Connections: Not on file   Intimate Partner Violence: Not on file   Housing Stability: Low Risk  (2024)    Housing Stability Vital Sign     Unable to Pay for Housing in the Last  "Year: No     Number of Times Moved in the Last Year: 1     Homeless in the Last Year: No       Current Outpatient Medications   Medication Sig Dispense Refill    Accu-Chek FastClix Lancets MISC Use to check blood sugar 4 times a day 100 each 3    aspirin (ECOTRIN LOW STRENGTH) 81 mg EC tablet Take 81 mg by mouth daily      fenofibrate (TRICOR) 145 mg tablet Take 1 tablet (145 mg total) by mouth daily 90 tablet 3    gabapentin (NEURONTIN) 600 MG tablet TAKE 1 TABLET BY MOUTH THREE TIMES A DAY 90 tablet 5    glucose blood (Accu-Chek Guide) test strip Checking sugars four times a day or as directed Dx: E11.65, E11.8 400 strip 1    insulin aspart (NovoLOG FlexPen) 100 UNIT/ML injection pen USE 40 UNITS BEFORE EACH MEAL PLUS 3 UNITS FOR EVERY 50 ABOVE 150MG/DL MAX DAILY DOSE 200 UNITS 60 mL 1    insulin glargine (Toujeo Max SoloStar) 300 units/mL CONCENTRATED U-300 injection pen (2-unit dial) Inject 40 Units under the skin every 12 (twelve) hours 30 mL 1    Insulin Pen Needle (BD Pen Needle Montse U/F) 32G X 4 MM MISC Use daily withToujeo 200 each 5    levothyroxine 50 mcg tablet Take 1 tablet (50 mcg total) by mouth daily 90 tablet 1    losartan (COZAAR) 25 mg tablet TAKE 1 TABLET (25 MG TOTAL) BY MOUTH DAILY. 90 tablet 1    rosuvastatin (CRESTOR) 40 MG tablet Take 1 tablet (40 mg total) by mouth daily 90 tablet 3    Vascepa 1 g CAPS TAKE 2 CAPSULES TWICE DAILY 360 capsule 3    ergocalciferol (VITAMIN D2) 50,000 units Take 50,000 Units by mouth in the morning (Patient not taking: Reported on 10/28/2024)       No current facility-administered medications for this visit.       Review of Systems     As noted in HPI    Objective      /60 (BP Location: Left arm, Patient Position: Sitting, Cuff Size: Standard)   Pulse 74   Temp 97.7 °F (36.5 °C) (Temporal)   Resp 18   Ht 5' 6\" (1.676 m)   Wt 88.6 kg (195 lb 6.4 oz)   SpO2 95%   BMI 31.54 kg/m²     Physical Exam  Vitals reviewed.   Constitutional:       General: She " "is not in acute distress.     Appearance: Normal appearance.   HENT:      Head: Normocephalic and atraumatic.   Eyes:      Conjunctiva/sclera: Conjunctivae normal.   Cardiovascular:      Rate and Rhythm: Normal rate and regular rhythm.      Heart sounds: Murmur heard.   Pulmonary:      Effort: Pulmonary effort is normal. No respiratory distress.      Breath sounds: Normal breath sounds. No wheezing or rales.   Abdominal:      General: Bowel sounds are normal.      Palpations: Abdomen is soft.      Tenderness: There is no abdominal tenderness.   Musculoskeletal:      Right lower leg: No edema.      Left lower leg: No edema.        Feet:    Feet:      Comments: Localized swelling and tenderness lateral aspect of right foot, no erythema, no tactile warmth  Neurological:      Mental Status: She is alert and oriented to person, place, and time.   Psychiatric:         Mood and Affect: Mood normal.         Behavior: Behavior normal.         Thought Content: Thought content normal.             Some portions of this record may have been generated with voice recognition software. There may be translation, syntax, or grammatical errors. Occasional wrong word or \"sound-a-like\" substitutions may have occurred due to the inherent limitations of the voice recognition software. Read the chart carefully and recognize, using context, where substations may have occurred. If you have any questions, please contact the dictating provider for clarification or correction, as needed.  "

## 2024-12-27 NOTE — PROGRESS NOTES
Diabetic Foot Exam    Patient's shoes and socks removed.    Right Foot/Ankle   Right Foot Inspection  Skin Exam: skin normal, skin intact and dry skin. No warmth, no callus, no erythema, no maceration, no abnormal color, no pre-ulcer, no ulcer and no callus.     Toe Exam: ROM and strength within normal limits.     Sensory   Vibration: intact  Proprioception: intact  Monofilament testing: diminished    Vascular  Capillary refills: < 3 seconds  The right DP pulse is 2+. The right PT pulse is 2+.     Left Foot/Ankle  Left Foot Inspection  Skin Exam: skin normal, skin intact and dry skin. No warmth, no erythema, no maceration, normal color, no pre-ulcer, no ulcer and no callus.     Toe Exam: ROM and strength within normal limits.     Sensory   Vibration: intact  Proprioception: intact  Monofilament testing: diminished    Vascular  Capillary refills: < 3 seconds  The left DP pulse is 2+. The left PT pulse is 2+.     Assign Risk Category  No deformity present  Loss of protective sensation  No weak pulses  Risk: 1

## 2025-01-03 ENCOUNTER — RESULTS FOLLOW-UP (OUTPATIENT)
Dept: FAMILY MEDICINE CLINIC | Facility: MEDICAL CENTER | Age: 75
End: 2025-01-03

## 2025-01-18 DIAGNOSIS — M79.2 NEUROPATHIC PAIN: ICD-10-CM

## 2025-01-20 RX ORDER — GABAPENTIN 600 MG/1
600 TABLET ORAL 3 TIMES DAILY
Qty: 90 TABLET | Refills: 5 | Status: SHIPPED | OUTPATIENT
Start: 2025-01-20

## 2025-01-27 ENCOUNTER — HOSPITAL ENCOUNTER (OUTPATIENT)
Dept: RADIOLOGY | Facility: MEDICAL CENTER | Age: 75
Discharge: HOME | End: 2025-01-27
Payer: COMMERCIAL

## 2025-01-27 DIAGNOSIS — I71.21 ANEURYSM OF ASCENDING AORTA WITHOUT RUPTURE (HCC): ICD-10-CM

## 2025-01-27 DIAGNOSIS — J84.10 PULMONARY FIBROSIS (HCC): ICD-10-CM

## 2025-01-27 DIAGNOSIS — F17.211 NICOTINE DEPENDENCE, CIGARETTES, IN REMISSION: ICD-10-CM

## 2025-01-27 PROCEDURE — 71250 CT THORAX DX C-: CPT

## 2025-01-30 DIAGNOSIS — M79.2 NEUROPATHIC PAIN: ICD-10-CM

## 2025-01-30 RX ORDER — GABAPENTIN 600 MG/1
600 TABLET ORAL 3 TIMES DAILY
Qty: 90 TABLET | Refills: 5 | Status: SHIPPED | OUTPATIENT
Start: 2025-01-30

## 2025-01-31 DIAGNOSIS — E78.2 MIXED HYPERLIPIDEMIA: ICD-10-CM

## 2025-02-03 DIAGNOSIS — Z79.4 TYPE 2 DIABETES MELLITUS WITH HYPERGLYCEMIA, WITH LONG-TERM CURRENT USE OF INSULIN (HCC): ICD-10-CM

## 2025-02-03 DIAGNOSIS — E11.65 TYPE 2 DIABETES MELLITUS WITH HYPERGLYCEMIA, WITH LONG-TERM CURRENT USE OF INSULIN (HCC): ICD-10-CM

## 2025-02-03 DIAGNOSIS — I10 PRIMARY HYPERTENSION: ICD-10-CM

## 2025-02-03 DIAGNOSIS — Z79.4 TYPE 2 DIABETES MELLITUS WITH HYPERGLYCEMIA, WITH LONG-TERM CURRENT USE OF INSULIN (HCC): Primary | ICD-10-CM

## 2025-02-03 DIAGNOSIS — E11.65 TYPE 2 DIABETES MELLITUS WITH HYPERGLYCEMIA, WITH LONG-TERM CURRENT USE OF INSULIN (HCC): Primary | ICD-10-CM

## 2025-02-03 RX ORDER — ROSUVASTATIN CALCIUM 40 MG/1
40 TABLET, COATED ORAL DAILY
Qty: 90 TABLET | Refills: 3 | Status: SHIPPED | OUTPATIENT
Start: 2025-02-03

## 2025-02-03 RX ORDER — FENOFIBRATE 145 MG/1
145 TABLET, COATED ORAL DAILY
Qty: 90 TABLET | Refills: 3 | Status: SHIPPED | OUTPATIENT
Start: 2025-02-03

## 2025-02-03 NOTE — TELEPHONE ENCOUNTER
Pt overdue for lipid panel- spoke with pt and notified her of active labs that need to be obtained. Pt stated she will be getting blood work done soon.

## 2025-02-04 ENCOUNTER — OFFICE VISIT (OUTPATIENT)
Dept: VASCULAR SURGERY | Facility: CLINIC | Age: 75
End: 2025-02-04
Payer: COMMERCIAL

## 2025-02-04 VITALS
HEART RATE: 70 BPM | DIASTOLIC BLOOD PRESSURE: 92 MMHG | OXYGEN SATURATION: 95 % | HEIGHT: 66 IN | RESPIRATION RATE: 18 BRPM | SYSTOLIC BLOOD PRESSURE: 162 MMHG | WEIGHT: 194 LBS | BODY MASS INDEX: 31.18 KG/M2

## 2025-02-04 DIAGNOSIS — I71.40 ABDOMINAL AORTIC ANEURYSM (AAA) WITHOUT RUPTURE, UNSPECIFIED PART (HCC): ICD-10-CM

## 2025-02-04 DIAGNOSIS — I77.1 ILIAC ARTERY STENOSIS, LEFT (HCC): Primary | ICD-10-CM

## 2025-02-04 PROCEDURE — 99213 OFFICE O/P EST LOW 20 MIN: CPT | Performed by: NURSE PRACTITIONER

## 2025-02-04 RX ORDER — INSULIN ASPART 100 [IU]/ML
INJECTION, SOLUTION INTRAVENOUS; SUBCUTANEOUS
Qty: 60 ML | Refills: 0 | Status: SHIPPED | OUTPATIENT
Start: 2025-02-04 | End: 2025-02-11

## 2025-02-04 RX ORDER — BLOOD SUGAR DIAGNOSTIC
STRIP MISCELLANEOUS
Qty: 400 STRIP | Refills: 1 | Status: SHIPPED | OUTPATIENT
Start: 2025-02-04

## 2025-02-04 RX ORDER — LOSARTAN POTASSIUM 25 MG/1
25 TABLET ORAL DAILY
Qty: 90 TABLET | Refills: 1 | Status: SHIPPED | OUTPATIENT
Start: 2025-02-04

## 2025-02-04 NOTE — PROGRESS NOTES
"Name: Claudia Gamboa      : 1950      MRN: [id number]  Encounter Provider: CARRINGTON Vásquez  Encounter Date: 2025   Encounter department: [facility] [place]  :  Assessment & Plan  Abdominal aortic aneurysm (AAA) without rupture, unspecified part (HCC)  75yo female former smoker, with uncontrolled  DM II w/ neuropathy, obesity, HTN, HLD, hypothyroid, osteoporosis, ascending aotic aneurysm (4cm), AAA, and L iliac artery stenosis presents to review imaging and [facility].     -Presents without complaints.  Denies abdominal, or back pain.  Patient does not endorse left hip pain however states this is secondary to \"hip fracture and rods\".  She describes as pain after walking that resolves after 10 seconds of rest.  Patient does have left CHARLEEN stenosis.  -Denies rest pain.  No wounds  -Palpable DP/PT pulses bilaterally.  -Patient reports improved neuropathy.  A1c improved to 9.7 from 11.0.  -Family history of aneurysmal disease (patient father, mortality)    AOIL 2024  Evidence of diffuse disease throughout the abdominal aorta without a focal  stenosis. The abdominal aorta appears ectatic in the distal segment, measuring 2.5 cm in its greatest diameter, (prior: 2.7 cm). Unable to reproduce previous measurement of the proximal infrarenal aorta.  Diffuse disease throughout the right common iliac and bilateral external iliac arteries without a focal stenosis and are normal in caliber.  There is >75% stenosis of the left common iliac artery and is ectatic in  caliber, 1.6 cm, (prior: 1.8 cm).  Findings suggestive of >70% stenosis of the celiac artery and >60%  stenosis of the right renal artery. The superior mesenteric and left renal arteries are patent.    Plan:  Abdominal iliac duplex with ABIs- Q year surveillance with return office visit to follow  Encourage daily dedicated walking  BP control  Blood sugar control  Continue daily aspirin and statin  Call return to office with new or worsening " "symptoms, questions or concerns  Orders:    VAS abdominal aorta/iliacs; with INDIRA's; Future    Iliac artery stenosis, left (HCC)  -Patient does complain of left hip pain however reports secondary to left hip fracture and \"rods\"  -She does not consider this to be lifestyle limiting.  -No rest pain or wounds  -Easily palpable left DP/PT    Plan  -Plan as above q. yearly imaging  -Continue aspirin and statin  Orders:    VAS abdominal aorta/iliacs; with INDIRA's; Future      Chief Complaint   Patient presents with    Aneurysm     AOIL 12/17/24. Denies appetite change, abd pain, or back pain. Pt denies claudication, rest pain, or non-healing wounds.        History of Present Illness   HPI  Claudia Gamboa is a 74 y.o. female who presents to review imaging and [facility].    See assessment and plan.    History obtained from: patient    Review of Systems   Constitutional:  Negative for appetite change.   Gastrointestinal:  Negative for abdominal distention, abdominal pain, diarrhea, nausea and vomiting.   Musculoskeletal:  Positive for arthralgias.   Skin: Negative.      Medical History Reviewed by provider this encounter:  Tobacco  Allergies  Meds  Problems  Med Hx  Surg Hx  Fam Hx     .  Past Medical History   Past Medical History:   Diagnosis Date    Acute cystitis without hematuria 08/10/2018    Anemia 8/13/2018    Aortic aneurysm, abdominal (HCC)     noted on cat scan from 2/2020    Cataract     Closed left hip fracture (HCC) 08/10/2018    Colon polyp     Diabetes mellitus (HCC)     Hyperlipemia     Migraines     Multiple falls     Osteoporosis 02/22/2019    Shingles 7/2016    Urinary tract infection 5/2022     Past Surgical History:   Procedure Laterality Date    BREAST CYST ASPIRATION Right 1993    CHOLECYSTECTOMY      COLONOSCOPY      ELBOW SURGERY      FRACTURE SURGERY  ,4/2018    GALLBLADDER SURGERY      HIP SURGERY  08/10/2018    NC COLONOSCOPY FLX DX W/COLLJ SPEC WHEN PFRMD N/A 12/14/2016    Procedure: " COLONOSCOPY;  Surgeon: Juan Boykin III, MD;  Location: [facility];  Service: Gastroenterology    MS OPTX FEM SHFT FX W/INSJ IMED IMPLT W/WO SCREW Left 08/10/2018    Procedure: Left Hip IM Long nail;  Surgeon: Olegario Lynch MD;  [facility];  Service: Orthopedics    TONSILLECTOMY      US GUIDED BREAST BIOPSY RIGHT COMPLETE Right 11/9/2024     Family History   Problem Relation Age of Onset    Diabetes type II Mother     Osteoporosis Mother     Thyroid disease unspecified Mother     Diabetes Mother     No Known Problems Father     Hyperlipidemia Sister     Hypertension Sister     Diabetes Sister     No Known Problems Daughter     No Known Problems Daughter     No Known Problems Maternal Grandmother     Diabetes type II Maternal Grandfather     Pancreatic cancer Paternal Grandmother     Cancer Paternal Grandmother     No Known Problems Paternal Grandfather     Diabetes type II Maternal Aunt     No Known Problems Maternal Aunt     Diabetes type II Maternal Uncle     No Known Problems Paternal Aunt     No Known Problems Paternal Aunt     No Known Problems Paternal Aunt     Arthritis Family     Pancreatic cancer Family     Scoliosis Family     Breast cancer Neg Hx       reports that she quit smoking about 6 years ago. Her smoking use included cigarettes. She started smoking about 37 years ago. She has a 60 pack-year smoking history. She has never used smokeless tobacco. She reports that she does not drink alcohol and does not use drugs.  Current Outpatient Medications on File Prior to Visit   Medication Sig Dispense Refill    aspirin (ECOTRIN LOW STRENGTH) 81 mg EC tablet Take 81 mg by mouth daily      fenofibrate (TRICOR) 145 mg tablet Take 1 tablet (145 mg total) by mouth daily 90 tablet 3    gabapentin (NEURONTIN) 600 MG tablet Take 1 tablet (600 mg total) by mouth 3 (three) times a day 90 tablet 5    insulin glargine (Toujeo Max SoloStar) 300 units/mL CONCENTRATED U-300 injection pen (2-unit dial) Inject  40 Units under the skin every 12 (twelve) hours 30 mL 1    levothyroxine 50 mcg tablet Take 1 tablet (50 mcg total) by mouth daily 90 tablet 1    losartan (COZAAR) 25 mg tablet Take 1 tablet (25 mg total) by mouth daily 90 tablet 1    rosuvastatin (CRESTOR) 40 MG tablet Take 1 tablet (40 mg total) by mouth daily 90 tablet 3    Vascepa 1 g CAPS TAKE 2 CAPSULES TWICE DAILY 360 capsule 3    [DISCONTINUED] insulin aspart (NovoLOG FlexPen) 100 UNIT/ML injection pen USE 40 UNITS BEFORE EACH MEAL PLUS 3 UNITS FOR EVERY 50 ABOVE 150MG/DL MAX DAILY DOSE 200 UNITS 60 mL 1    Accu-Chek FastClix Lancets MISC Use to check blood sugar 4 times a day 100 each 3    ergocalciferol (VITAMIN D2) 50,000 units Take 50,000 Units by mouth in the morning (Patient not taking: Reported on 10/28/2024)      glucose blood (Accu-Chek Guide Test) test strip TEST BLOOD SUGAR FOUR TIMES DAILY OR AS DIRECTED 400 strip 1    insulin aspart (NovoLOG FlexPen) 100 UNIT/ML injection pen USE 40 UNITS BEFORE EACH MEAL PLUS 3 UNITS FOR EVERY 50 ABOVE 150MG/DL MAX DAILY DOSE 200 UNITS 60 mL 0    Insulin Pen Needle (BD Pen Needle Montse U/F) 32G X 4 MM MISC Use daily withToujeo 200 each 5    [DISCONTINUED] glucose blood (Accu-Chek Guide) test strip Checking sugars four times a day or as directed Dx: E11.65, E11.8 400 strip 1     No current facility-administered medications on file prior to visit.     Allergies   Allergen Reactions    Zoledronic Acid GI Intolerance     Anorexia    Codeine GI Intolerance, Nausea Only and Vomiting      Current Outpatient Medications on File Prior to Visit   Medication Sig Dispense Refill    aspirin (ECOTRIN LOW STRENGTH) 81 mg EC tablet Take 81 mg by mouth daily      fenofibrate (TRICOR) 145 mg tablet Take 1 tablet (145 mg total) by mouth daily 90 tablet 3    gabapentin (NEURONTIN) 600 MG tablet Take 1 tablet (600 mg total) by mouth 3 (three) times a day 90 tablet 5    insulin glargine (Toujeo Max SoloStar) 300 units/mL CONCENTRATED  U-300 injection pen (2-unit dial) Inject 40 Units under the skin every 12 (twelve) hours 30 mL 1    levothyroxine 50 mcg tablet Take 1 tablet (50 mcg total) by mouth daily 90 tablet 1    losartan (COZAAR) 25 mg tablet Take 1 tablet (25 mg total) by mouth daily 90 tablet 1    rosuvastatin (CRESTOR) 40 MG tablet Take 1 tablet (40 mg total) by mouth daily 90 tablet 3    Vascepa 1 g CAPS TAKE 2 CAPSULES TWICE DAILY 360 capsule 3    [DISCONTINUED] insulin aspart (NovoLOG FlexPen) 100 UNIT/ML injection pen USE 40 UNITS BEFORE EACH MEAL PLUS 3 UNITS FOR EVERY 50 ABOVE 150MG/DL MAX DAILY DOSE 200 UNITS 60 mL 1    Accu-Chek FastClix Lancets MISC Use to check blood sugar 4 times a day 100 each 3    ergocalciferol (VITAMIN D2) 50,000 units Take 50,000 Units by mouth in the morning (Patient not taking: Reported on 10/28/2024)      glucose blood (Accu-Chek Guide Test) test strip TEST BLOOD SUGAR FOUR TIMES DAILY OR AS DIRECTED 400 strip 1    insulin aspart (NovoLOG FlexPen) 100 UNIT/ML injection pen USE 40 UNITS BEFORE EACH MEAL PLUS 3 UNITS FOR EVERY 50 ABOVE 150MG/DL MAX DAILY DOSE 200 UNITS 60 mL 0    Insulin Pen Needle (BD Pen Needle Montse U/F) 32G X 4 MM MISC Use daily withToujeo 200 each 5    [DISCONTINUED] glucose blood (Accu-Chek Guide) test strip Checking sugars four times a day or as directed Dx: E11.65, E11.8 400 strip 1     No current facility-administered medications on file prior to visit.      Social History     Tobacco Use    Smoking status: Former     Current packs/day: 0.00     Average packs/day: 2.0 packs/day for 30.0 years (60.0 ttl pk-yrs)     Types: Cigarettes     Start date:      Quit date: 3/23/2018     Years since quittin.8    Smokeless tobacco: Never    Tobacco comments:     Quit smoking around 2018   Vaping Use    Vaping status: Never Used   Substance and Sexual Activity    Alcohol use: Never    Drug use: Never    Sexual activity: Not Currently     Partners: Male     Birth control/protection:  "Surgical        Objective   /92 (BP Location: Left arm, Patient Position: Sitting)   Pulse 70   Resp 18   Ht 5' 6\" (1.676 m)   Wt 88 kg (194 lb)   SpO2 95%   BMI 31.31 kg/m²      Physical Exam  Vitals reviewed.   Constitutional:       General: She is not in acute distress.     Appearance: She is obese.   HENT:      Head: Normocephalic and atraumatic.      Mouth/Throat:      Comments: Poor dentition   Cardiovascular:      Rate and Rhythm: Normal rate.      Pulses:           Radial pulses are 2+ on the right side and 2+ on the left side.        Dorsalis pedis pulses are 2+ on the right side and 2+ on the left side.        Posterior tibial pulses are 2+ on the right side and 2+ on the left side.      Heart sounds: Murmur heard.   Pulmonary:      Effort: Pulmonary effort is normal. No respiratory distress.   Abdominal:      General: There is no distension.      Palpations: Abdomen is soft.      Tenderness: There is no abdominal tenderness.   Musculoskeletal:         General: Normal range of motion.      Right lower leg: No edema.      Left lower leg: No edema.   Skin:     General: Skin is warm.   Neurological:      Mental Status: She is alert and oriented to person, place, and time.      Sensory: No sensory deficit.      Motor: No weakness.   Psychiatric:         Behavior: Behavior normal.         Administrative Statements   I have spent a total time of 22 minutes in caring for this patient on the day of the visit/encounter including Diagnostic results, Risks and benefits of tx options, Instructions for management, Patient and family education, Risk factor reductions, Impressions, Documenting in the medical record, Reviewing / ordering tests, medicine, procedures  , and Obtaining or reviewing history  .   "

## 2025-02-04 NOTE — ASSESSMENT & PLAN NOTE
"75yo female former smoker, with uncontrolled  DM II w/ neuropathy, obesity, HTN, HLD, hypothyroid, osteoporosis, ascending aotic aneurysm (4cm), AAA, and L iliac artery stenosis presents to review imaging and [facility].     -Presents without complaints.  Denies abdominal, or back pain.  Patient does not endorse left hip pain however states this is secondary to \"hip fracture and rods\".  She describes as pain after walking that resolves after 10 seconds of rest.  Patient does have left CHARLEEN stenosis.  -Denies rest pain.  No wounds  -Palpable DP/PT pulses bilaterally.  -Patient reports improved neuropathy.  A1c improved to 9.7 from 11.0.  -Family history of aneurysmal disease (patient father, mortality)    AOIL 12/17/2024  Evidence of diffuse disease throughout the abdominal aorta without a focal  stenosis. The abdominal aorta appears ectatic in the distal segment, measuring 2.5 cm in its greatest diameter, (prior: 2.7 cm). Unable to reproduce previous measurement of the proximal infrarenal aorta.  Diffuse disease throughout the right common iliac and bilateral external iliac arteries without a focal stenosis and are normal in caliber.  There is >75% stenosis of the left common iliac artery and is ectatic in  caliber, 1.6 cm, (prior: 1.8 cm).  Findings suggestive of >70% stenosis of the celiac artery and >60%  stenosis of the right renal artery. The superior mesenteric and left renal arteries are patent.    Plan:  Abdominal iliac duplex with ABIs- Q year surveillance with return office visit to follow  Encourage daily dedicated walking  BP control  Blood sugar control  Continue daily aspirin and statin  Call return to office with new or worsening symptoms, questions or concerns  Orders:    VAS abdominal aorta/iliacs; with INDIRA's; Future    "

## 2025-02-04 NOTE — PATIENT INSTRUCTIONS
Abdominal iliac duplex with ABIs 1 year surveillance with return office visit to follow  Encourage daily dedicated walking  BP control  Blood sugar control  Continue daily aspirin and statin  Call return to office with new or worsening symptoms, questions or concerns

## 2025-02-06 DIAGNOSIS — E03.9 ACQUIRED HYPOTHYROIDISM: ICD-10-CM

## 2025-02-06 DIAGNOSIS — E11.65 TYPE 2 DIABETES MELLITUS WITH HYPERGLYCEMIA, WITH LONG-TERM CURRENT USE OF INSULIN (HCC): ICD-10-CM

## 2025-02-06 DIAGNOSIS — Z79.4 TYPE 2 DIABETES MELLITUS WITH HYPERGLYCEMIA, WITH LONG-TERM CURRENT USE OF INSULIN (HCC): ICD-10-CM

## 2025-02-06 RX ORDER — LEVOTHYROXINE SODIUM 50 UG/1
50 TABLET ORAL DAILY
Qty: 90 TABLET | Refills: 1 | Status: SHIPPED | OUTPATIENT
Start: 2025-02-06

## 2025-02-06 RX ORDER — PEN NEEDLE, DIABETIC 32GX 5/32"
NEEDLE, DISPOSABLE MISCELLANEOUS
Qty: 200 EACH | Refills: 2 | Status: SHIPPED | OUTPATIENT
Start: 2025-02-06

## 2025-02-06 RX ORDER — BLOOD-GLUCOSE METER
EACH MISCELLANEOUS
Qty: 1 KIT | Refills: 1 | Status: SHIPPED | OUTPATIENT
Start: 2025-02-06

## 2025-02-06 NOTE — TELEPHONE ENCOUNTER
Reason for call:   [x] Refill   [] Prior Auth  [] Other:     Office:   [] PCP/Provider -   [x] Specialty/Provider - Endo    Medication: Insulin Pen Needle    Dose/Frequency: 32G X 4MM    Quantity: 200 each    Pharmacy:   [facility] [address] [facility] [phone]     Does the patient have enough for 3 days?   [] Yes   [] No - Send as HP to POD      Reason for call:   [x] Refill   [] Prior Auth  [] Other:     Office:   [] PCP/Provider -   [x] Specialty/Provider - Endo    Medication: Levothyroxine    Dose/Frequency: 50 mcg    Quantity: 90 tablets    Pharmacy:   [address] [phone]     Brian as Reviewed    Does the patient have enough for 3 days?   [] Yes   [] No - Send as HP to POD

## 2025-02-11 DIAGNOSIS — E11.65 TYPE 2 DIABETES MELLITUS WITH HYPERGLYCEMIA, WITH LONG-TERM CURRENT USE OF INSULIN (HCC): ICD-10-CM

## 2025-02-11 DIAGNOSIS — Z79.4 TYPE 2 DIABETES MELLITUS WITH HYPERGLYCEMIA, WITH LONG-TERM CURRENT USE OF INSULIN (HCC): ICD-10-CM

## 2025-02-11 RX ORDER — INSULIN ASPART 100 [IU]/ML
INJECTION, SOLUTION INTRAVENOUS; SUBCUTANEOUS
Qty: 60 ML | Refills: 1 | Status: SHIPPED | OUTPATIENT
Start: 2025-02-11

## 2025-02-13 ENCOUNTER — OFFICE VISIT (OUTPATIENT)
Dept: PODIATRY | Facility: CLINIC | Age: 75
End: 2025-02-13
Payer: COMMERCIAL

## 2025-02-13 VITALS — OXYGEN SATURATION: 99 % | WEIGHT: 194 LBS | HEIGHT: 66 IN | HEART RATE: 62 BPM | BODY MASS INDEX: 31.18 KG/M2

## 2025-02-13 DIAGNOSIS — M67.479 GANGLION CYST OF FOOT: Primary | ICD-10-CM

## 2025-02-13 DIAGNOSIS — M79.671 RIGHT FOOT PAIN: ICD-10-CM

## 2025-02-13 PROCEDURE — 99213 OFFICE O/P EST LOW 20 MIN: CPT | Performed by: PODIATRIST

## 2025-02-13 NOTE — PROGRESS NOTES
Assessment/Plan:     The patient's clinical examination today significant for palpable soft tissue mass to the dorsal lateral aspect of the right midfoot.  Is similar but smaller lesion is also noted on the left.  There are some tenderness associated lesion on the left foot.  Moderate tenderness palpation to the tissue mass on the right.  There is no pigmentation associated lesion.  She does note some occasional burning and tingling sensations associated with it that shoot across her foot suggesting potential local nerve impingement for the soft tissue mass.    Recent x-rays of the patient's right foot were personally reviewed.  There are no signs of fracture or dislocation.  There is no acute osseous pathology noted.    The patient's clinical symptomatology is most consistent with a painful ganglion cyst on the right and asymptomatic ganglion cyst on the left.  Treatment options of the lesion include aspiration versus surgical excision.  As the patient does note chronic pain from the lesion, I would likely lean toward surgical excision.  To confirm the diagnosis however I would like dedicated imaging of the soft tissue.  The patient does note a costly co-pay associate with an MRI.  I will order an ultrasound study of the right foot to rule in or out ganglion cyst of the right midfoot.    Recommend follow-up in 2 to 3 weeks to review her ultrasound study results.     Diagnoses and all orders for this visit:    Ganglion cyst of foot  -     US MSK limited; Future    Right foot pain  -     Ambulatory Referral to Podiatry  -      MSK limited; Future          Subjective:     Patient ID: Claudia Gamboa is a 74 y.o. female.    The patient presents today for her initial consultation with [facility] podiatry with a chief complaint of a painful soft tissue mass to the dorsal aspect of her right foot that has been present for many years.  She feels that the lesion has gotten larger and is becoming more painful.  She  notes a similar lesion on the left but she has no discomfort at all on the left side.  She cannot recall any history of injury or trauma to either foot.      PAST MEDICAL HISTORY:  Past Medical History:   Diagnosis Date    Acute cystitis without hematuria 08/10/2018    Anemia 8/13/2018    Aortic aneurysm, abdominal (HCC)     noted on cat scan from 2/2020    Cataract     Closed left hip fracture (HCC) 08/10/2018    Colon polyp     Diabetes mellitus (HCC)     Hyperlipemia     Migraines     Multiple falls     Osteoporosis 02/22/2019    Shingles 7/2016    Urinary tract infection 5/2022       PAST SURGICAL HISTORY:  Past Surgical History:   Procedure Laterality Date    BREAST CYST ASPIRATION Right 1993    CHOLECYSTECTOMY      COLONOSCOPY      ELBOW SURGERY      FRACTURE SURGERY  ,4/2018    GALLBLADDER SURGERY      HIP SURGERY  08/10/2018    NM COLONOSCOPY FLX DX W/COLLJ SPEC WHEN PFRMD N/A 12/14/2016    Procedure: COLONOSCOPY;  Surgeon: Juan Boykin III, MD;  Location: [facility];  Service: Gastroenterology    NM OPTX FEM SHFT FX W/INSJ IMED IMPLT W/WO SCREW Left 08/10/2018    Procedure: Left Hip IM Long nail;  Surgeon: Olegario Lynch MD;  [facility];  Service: Orthopedics    TONSILLECTOMY      US GUIDED BREAST BIOPSY RIGHT COMPLETE Right 11/9/2024        ALLERGIES:  Zoledronic acid and Codeine    MEDICATIONS:  Current Outpatient Medications   Medication Sig Dispense Refill    Accu-Chek FastClix Lancets MISC Use to check blood sugar 4 times a day 100 each 3    aspirin (ECOTRIN LOW STRENGTH) 81 mg EC tablet Take 81 mg by mouth daily      Blood Glucose Monitoring Suppl (Accu-Chek Guide) w/Device KIT USE AS DIRECTED 1 kit 1    fenofibrate (TRICOR) 145 mg tablet Take 1 tablet (145 mg total) by mouth daily 90 tablet 3    gabapentin (NEURONTIN) 600 MG tablet Take 1 tablet (600 mg total) by mouth 3 (three) times a day 90 tablet 5    glucose blood (Accu-Chek Guide Test) test strip TEST BLOOD SUGAR FOUR TIMES DAILY  OR AS DIRECTED 400 strip 1    insulin aspart (NovoLOG FlexPen) 100 UNIT/ML injection pen USE 40 UNITS BEFORE EACH MEAL PLUS 3 UNITS FOR EVERY 50 ABOVE 150MG/DL MAX DAILY DOSE 200 UNITS 60 mL 1    insulin glargine (Toujeo Max SoloStar) 300 units/mL CONCENTRATED U-300 injection pen (2-unit dial) Inject 40 Units under the skin every 12 (twelve) hours 30 mL 1    Insulin Pen Needle (BD Pen Needle Montse U/F) 32G X 4 MM MISC Use daily withToujeo 200 each 2    levothyroxine 50 mcg tablet Take 1 tablet (50 mcg total) by mouth daily 90 tablet 1    losartan (COZAAR) 25 mg tablet Take 1 tablet (25 mg total) by mouth daily 90 tablet 1    rosuvastatin (CRESTOR) 40 MG tablet Take 1 tablet (40 mg total) by mouth daily 90 tablet 3    Vascepa 1 g CAPS TAKE 2 CAPSULES TWICE DAILY 360 capsule 3    ergocalciferol (VITAMIN D2) 50,000 units Take 50,000 Units by mouth in the morning (Patient not taking: Reported on 10/28/2024)       No current facility-administered medications for this visit.       SOCIAL HISTORY:  Social History     Socioeconomic History    Marital status: /Civil Union     Spouse name: None    Number of children: 2    Years of education: 12    Highest education level: None   Occupational History    None   Tobacco Use    Smoking status: Former     Current packs/day: 0.00     Average packs/day: 2.0 packs/day for 30.0 years (60.0 ttl pk-yrs)     Types: Cigarettes     Start date:      Quit date: 3/23/2018     Years since quittin.9    Smokeless tobacco: Never    Tobacco comments:     Quit smoking around    Vaping Use    Vaping status: Never Used   Substance and Sexual Activity    Alcohol use: Never    Drug use: Never    Sexual activity: Not Currently     Partners: Male     Birth control/protection: Surgical   Other Topics Concern    None   Social History Narrative    high school graduate     Social Drivers of Health     Financial Resource Strain: Low Risk  (2023)    Overall Financial Resource Strain  (CARDIA)     Difficulty of Paying Living Expenses: Not hard at all   Food Insecurity: No Food Insecurity (12/20/2024)    Hunger Vital Sign     Worried About Running Out of Food in the Last Year: Never true     Ran Out of Food in the Last Year: Never true   Transportation Needs: No Transportation Needs (12/20/2024)    PRAPARE - Transportation     Lack of Transportation (Medical): No     Lack of Transportation (Non-Medical): No   Physical Activity: Not on file   Stress: Not on file   Social Connections: Not on file   Intimate Partner Violence: Not on file   Housing Stability: Low Risk  (12/20/2024)    Housing Stability Vital Sign     Unable to Pay for Housing in the Last Year: No     Number of Times Moved in the Last Year: 1     Homeless in the Last Year: No        Review of Systems   Constitutional: Negative.    HENT: Negative.     Eyes: Negative.    Respiratory: Negative.     Cardiovascular: Negative.    Endocrine: Negative.    Musculoskeletal: Negative.    Neurological: Negative.    Hematological: Negative.    Psychiatric/Behavioral: Negative.           Objective:     Physical Exam  Constitutional:       Appearance: Normal appearance.   HENT:      Head: Normocephalic and atraumatic.      Nose: Nose normal.   Cardiovascular:      Pulses:           Dorsalis pedis pulses are 2+ on the right side.        Posterior tibial pulses are 2+ on the right side.   Pulmonary:      Effort: Pulmonary effort is normal.   Musculoskeletal:        Feet:    Feet:      Right foot:      Skin integrity: Skin integrity normal.      Comments: The patient's clinical examination today significant for palpable soft tissue mass to the dorsal lateral aspect of the right midfoot.  Is similar but smaller lesion is also noted on the left.  There are some tenderness associated lesion on the left foot.  Moderate tenderness palpation to the tissue mass on the right.  There is no pigmentation associated lesion.  She does note some occasional burning  and tingling sensations associated with it that shoot across her foot suggesting potential local nerve impingement for the soft tissue mass.  Skin:     General: Skin is warm.      Capillary Refill: Capillary refill takes less than 2 seconds.   Neurological:      General: No focal deficit present.      Mental Status: She is alert and oriented to person, place, and time.   Psychiatric:         Mood and Affect: Mood normal.         Behavior: Behavior normal.         Thought Content: Thought content normal.

## 2025-02-24 ENCOUNTER — HOSPITAL ENCOUNTER (OUTPATIENT)
Dept: RADIOLOGY | Facility: MEDICAL CENTER | Age: 75
Discharge: HOME | End: 2025-02-24
Payer: COMMERCIAL

## 2025-02-24 DIAGNOSIS — M79.671 RIGHT FOOT PAIN: ICD-10-CM

## 2025-02-24 DIAGNOSIS — M67.479 GANGLION CYST OF FOOT: ICD-10-CM

## 2025-02-24 PROCEDURE — 76882 US LMTD JT/FCL EVL NVASC XTR: CPT

## 2025-02-28 ENCOUNTER — TELEPHONE (OUTPATIENT)
Dept: ENDOCRINOLOGY | Facility: CLINIC | Age: 75
End: 2025-02-28

## 2025-03-01 DIAGNOSIS — E11.65 TYPE 2 DIABETES MELLITUS WITH HYPERGLYCEMIA, WITH LONG-TERM CURRENT USE OF INSULIN (HCC): ICD-10-CM

## 2025-03-01 DIAGNOSIS — Z79.4 TYPE 2 DIABETES MELLITUS WITH HYPERGLYCEMIA, WITH LONG-TERM CURRENT USE OF INSULIN (HCC): ICD-10-CM

## 2025-03-01 RX ORDER — INSULIN ASPART 100 [IU]/ML
INJECTION, SOLUTION INTRAVENOUS; SUBCUTANEOUS
Qty: 60 ML | Refills: 1 | Status: SHIPPED | OUTPATIENT
Start: 2025-03-01

## 2025-03-24 DIAGNOSIS — I10 PRIMARY HYPERTENSION: Primary | ICD-10-CM

## 2025-03-24 DIAGNOSIS — I10 PRIMARY HYPERTENSION: ICD-10-CM

## 2025-03-24 DIAGNOSIS — Z79.4 TYPE 2 DIABETES MELLITUS WITH HYPERGLYCEMIA, WITH LONG-TERM CURRENT USE OF INSULIN (HCC): ICD-10-CM

## 2025-03-24 DIAGNOSIS — E11.65 TYPE 2 DIABETES MELLITUS WITH HYPERGLYCEMIA, WITH LONG-TERM CURRENT USE OF INSULIN (HCC): ICD-10-CM

## 2025-03-24 RX ORDER — LOSARTAN POTASSIUM 25 MG/1
25 TABLET ORAL DAILY
Qty: 90 TABLET | Refills: 1 | Status: SHIPPED | OUTPATIENT
Start: 2025-03-24

## 2025-03-24 NOTE — TELEPHONE ENCOUNTER
S/w pt aware of Dr. Quintero's msg.  Pt uses [facility] [place] and asked that order be mailed.

## 2025-03-25 RX ORDER — INSULIN ASPART 100 [IU]/ML
INJECTION, SOLUTION INTRAVENOUS; SUBCUTANEOUS
Qty: 120 ML | Refills: 5 | Status: SHIPPED | OUTPATIENT
Start: 2025-03-25

## 2025-03-26 ENCOUNTER — OFFICE VISIT (OUTPATIENT)
Dept: PULMONOLOGY | Facility: CLINIC | Age: 75
End: 2025-03-26
Payer: COMMERCIAL

## 2025-03-26 VITALS
OXYGEN SATURATION: 95 % | TEMPERATURE: 97.6 F | HEART RATE: 67 BPM | DIASTOLIC BLOOD PRESSURE: 90 MMHG | BODY MASS INDEX: 31.47 KG/M2 | WEIGHT: 195 LBS | SYSTOLIC BLOOD PRESSURE: 140 MMHG

## 2025-03-26 DIAGNOSIS — J84.10 PULMONARY FIBROSIS (HCC): ICD-10-CM

## 2025-03-26 PROCEDURE — 99204 OFFICE O/P NEW MOD 45 MIN: CPT | Performed by: INTERNAL MEDICINE

## 2025-03-26 PROCEDURE — 94010 BREATHING CAPACITY TEST: CPT | Performed by: INTERNAL MEDICINE

## 2025-03-26 PROCEDURE — G2211 COMPLEX E/M VISIT ADD ON: HCPCS | Performed by: INTERNAL MEDICINE

## 2025-03-26 NOTE — ASSESSMENT & PLAN NOTE
Lindsay and I reviewed her CAT scans going back 18 months.  She does have pulmonary fibrosis but has not progressed over that time course.  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  I like to get baseline PFTs and some lab work to rule out autoimmune disease.  Will repeat a CAT scan in July to determine if antifibrotic therapy is warranted.  She is currently doing well denies any complaints.    Orders:    Ambulatory Referral to Pulmonology    POCT spirometry    RHEUMATOID FACTOR; Future    Angiotensin converting enzyme; Future    Sjogren's Antibodies; Future    Anti-DNA antibody, double-stranded; Future    Complement, total; Future    Sedimentation rate, automated; Future    C-reactive protein; Future    CAMPOS Screen w/Reflex Cascade; Future    Complete PFT without post bronchodilator; Future    CT chest high resolution; Future

## 2025-03-26 NOTE — PROGRESS NOTES
Name: Claudia Gamboa      : 1950      MRN: [id number]  Encounter Provider: Mary Avila DO  Encounter Date: 3/26/2025   Encounter department: [facility]  :  Assessment & Plan  Pulmonary fibrosis (HCC)  Lindsay and I reviewed her CAT scans going back 18 months.  She does have pulmonary fibrosis but has not progressed over that time course.  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  I like to get baseline PFTs and some lab work to rule out autoimmune disease.  Will repeat a CAT scan in July to determine if antifibrotic therapy is warranted.  She is currently doing well denies any complaints.    Orders:    Ambulatory Referral to Pulmonology    POCT spirometry    RHEUMATOID FACTOR; Future    Angiotensin converting enzyme; Future    Sjogren's Antibodies; Future    Anti-DNA antibody, double-stranded; Future    Complement, total; Future    Sedimentation rate, automated; Future    C-reactive protein; Future    CAMPOS Screen w/Reflex Cascade; Future    Complete PFT without post bronchodilator; Future    CT chest high resolution; Future        History of Present Illness   primary symptoms  Pertinent negatives include no chest pain, coughing, fever, headaches, myalgias or sore throat.     Claudia Gamboa is a 74 y.o. female who presents for evaluation of an abnormal scan.  She does have a small lung nodule has been stable for 18 months and what appears to be some pulmonary fibrosis on her CAT scan.  She was a lifelong heavy smoker 2 packs/day but quit 8 years ago with the help of Chantix.  She denies any shortness of breath infection walks 2 miles a day.  She has a cough sometimes in the evenings but no wheeze frequent bronchitis and no pneumonia history aside from 1 severe pneumonia she has a history of dogs but currently no pets.  He  denies any symptoms of autoimmune disease no rashes or arthralgias.  History obtained from: patient    Review of Systems   Constitutional: Negative.  Negative for appetite change, fever and unexpected weight change.   HENT: Negative.  Negative for ear pain, postnasal drip, rhinorrhea, sneezing, sore throat and trouble swallowing.    Eyes: Negative.    Respiratory: Negative.  Negative for cough, shortness of breath and wheezing.    Cardiovascular: Negative.  Negative for chest pain and leg swelling.   Gastrointestinal: Negative.    Endocrine: Negative.    Genitourinary: Negative.    Musculoskeletal: Negative.  Negative for myalgias.   Allergic/Immunologic: Negative.    Neurological: Negative.  Negative for headaches.   Hematological: Negative.      Medical History Reviewed by provider this encounter:     .     Objective   /90 (BP Location: Left arm, Patient Position: Sitting, Cuff Size: Standard)   Pulse 67   Temp 97.6 °F (36.4 °C) (Temporal)   Wt 88.5 kg (195 lb)   SpO2 95%   BMI 31.47 kg/m²      Physical Exam  Constitutional:       Appearance: She is well-developed.   HENT:      Head: Normocephalic and atraumatic.   Eyes:      Pupils: Pupils are equal, round, and reactive to light.   Cardiovascular:      Rate and Rhythm: Normal rate and regular rhythm.      Heart sounds: No murmur heard.  Pulmonary:      Effort: Pulmonary effort is normal. No respiratory distress.      Breath sounds: Normal breath sounds. No wheezing or rales.   Abdominal:      Palpations: Abdomen is soft.   Musculoskeletal:      Cervical back: Normal range of motion and neck supple.   Skin:     General: Skin is warm and dry.   Neurological:      Mental Status: She is alert and oriented to person, place, and time.         Administrative Statements   I have spent a total time of 50 minutes in caring for this patient on the day of the visit/encounter including Diagnostic results, Counseling / Coordination of care, Documenting in the  medical record, Reviewing/placing orders in the medical record (including tests, medications, and/or procedures), and Obtaining or reviewing history  .

## 2025-03-28 ENCOUNTER — TELEPHONE (OUTPATIENT)
Dept: ENDOCRINOLOGY | Facility: CLINIC | Age: 75
End: 2025-03-28

## 2025-03-28 NOTE — TELEPHONE ENCOUNTER
Please send Rx for     BD Montse 2nd Gen Pen Needle  Novolog FlexPen U-100 Insulin     To [facility]

## 2025-04-01 ENCOUNTER — TELEPHONE (OUTPATIENT)
Age: 75
End: 2025-04-01

## 2025-04-01 DIAGNOSIS — Z79.4 TYPE 2 DIABETES MELLITUS WITH HYPERGLYCEMIA, WITH LONG-TERM CURRENT USE OF INSULIN (HCC): ICD-10-CM

## 2025-04-01 DIAGNOSIS — E11.65 TYPE 2 DIABETES MELLITUS WITH HYPERGLYCEMIA, WITH LONG-TERM CURRENT USE OF INSULIN (HCC): ICD-10-CM

## 2025-04-01 RX ORDER — INSULIN GLARGINE 300 U/ML
40 INJECTION, SOLUTION SUBCUTANEOUS EVERY 12 HOURS SCHEDULED
Qty: 30 ML | Refills: 1 | Status: SHIPPED | OUTPATIENT
Start: 2025-04-01

## 2025-04-01 RX ORDER — INSULIN ASPART 100 [IU]/ML
INJECTION, SOLUTION INTRAVENOUS; SUBCUTANEOUS
Qty: 120 ML | Refills: 5 | Status: SHIPPED | OUTPATIENT
Start: 2025-04-01

## 2025-04-01 RX ORDER — PEN NEEDLE, DIABETIC 32GX 5/32"
NEEDLE, DISPOSABLE MISCELLANEOUS
Qty: 200 EACH | Refills: 2 | Status: SHIPPED | OUTPATIENT
Start: 2025-04-01

## 2025-04-01 NOTE — TELEPHONE ENCOUNTER
Received call from [facility] to request completing Physcian Certification Form. Provider must check one of the boxes on Level of Care on Page 1. Please check, initial and date and return fax to same number.

## 2025-04-07 ENCOUNTER — OFFICE VISIT (OUTPATIENT)
Dept: PODIATRY | Facility: CLINIC | Age: 75
End: 2025-04-07
Payer: COMMERCIAL

## 2025-04-07 VITALS — BODY MASS INDEX: 31.5 KG/M2 | WEIGHT: 196 LBS | HEART RATE: 74 BPM | HEIGHT: 66 IN | OXYGEN SATURATION: 95 %

## 2025-04-07 DIAGNOSIS — M79.671 RIGHT FOOT PAIN: Primary | ICD-10-CM

## 2025-04-07 DIAGNOSIS — E11.65 TYPE 2 DIABETES MELLITUS WITH HYPERGLYCEMIA, WITH LONG-TERM CURRENT USE OF INSULIN (HCC): ICD-10-CM

## 2025-04-07 DIAGNOSIS — D17.23 LIPOMA OF RIGHT LOWER EXTREMITY: ICD-10-CM

## 2025-04-07 DIAGNOSIS — Z79.4 TYPE 2 DIABETES MELLITUS WITH HYPERGLYCEMIA, WITH LONG-TERM CURRENT USE OF INSULIN (HCC): ICD-10-CM

## 2025-04-07 DIAGNOSIS — E11.42 DIABETIC POLYNEUROPATHY ASSOCIATED WITH TYPE 2 DIABETES MELLITUS (HCC): ICD-10-CM

## 2025-04-07 PROCEDURE — 99213 OFFICE O/P EST LOW 20 MIN: CPT | Performed by: PODIATRIST

## 2025-04-07 NOTE — PROGRESS NOTES
Podiatry Clinic Visit  Claudia Gamboa 74 y.o. female MRN: [id number]  Encounter: [id number]    Assessment & Plan        Diagnoses and all orders for this visit:    Right foot pain    Type 2 diabetes mellitus with hyperglycemia, with long-term current use of insulin (HCC)    Diabetic polyneuropathy associated with type 2 diabetes mellitus (HCC)    Lipoma of right lower extremity           Plan:  Patient was examined, evaluated, and treated with all questions and concerns addressed.  Reviewed right foot US results: mass at lateral aspect of foot consistent with lipoma  Patient presents with right lateral foot mass, symptoms of peripheral neuropathy especially in her right hallux.  We can consider uptitrating her gabapentin dose at next visit if peripheral neuropathy symptoms worsen or fail to improve.  Recommend strict blood sugar control.  In regards to the lipoma the lateral aspect of her right foot we will continue to treat conservatively with monitoring.  Patient is not a good surgical candidate at this time due to her peripheral vascular disease and high A1c.  Can reconsider surgery in the future if symptoms worsen or fail to improve and if patient obtains adequate blood sugar control  Patient was re-appointed for 6 months    - Dr. Goodwin  was available/present for entirety of patient encounter and present for all procedures.    History of Present Illness     HPI: Claudia Gamboa is a 74 y.o. female who presents for f/u right lateral foot mass, peripheral neuropathy.  Patient did have her ultrasound completed which showed a lipoma at the lateral aspect of her right foot.  Patient also has peripheral neuropathy symptoms.  She is on gabapentin 600 Mg 3 times a day.  The patient has no further podiatric complaints at this time.    Review of Systems   Constitutional: Negative.    HENT: Negative.    Eyes: Negative.    Respiratory: Negative.    Cardiovascular: Negative.    Gastrointestinal: Negative.     Musculoskeletal: Negative  Skin: Right foot lipoma  Neurological: Peripheral neuropathy      Historical Information   Past Medical History:   Diagnosis Date    Acute cystitis without hematuria 08/10/2018    Anemia 2018    Aortic aneurysm, abdominal (HCC)     noted on cat scan from 2020    Cataract     Closed left hip fracture (HCC) 08/10/2018    Colon polyp     Diabetes mellitus (HCC)     Hyperlipemia     Migraines     Multiple falls     Osteoporosis 2019    Shingles 2016    Urinary tract infection 2022     Past Surgical History:   Procedure Laterality Date    BREAST CYST ASPIRATION Right     CHOLECYSTECTOMY      COLONOSCOPY      ELBOW SURGERY      FRACTURE SURGERY  ,2018    GALLBLADDER SURGERY      HIP SURGERY  08/10/2018    DC COLONOSCOPY FLX DX W/COLLJ SPEC WHEN PFRMD N/A 2016    Procedure: COLONOSCOPY;  Surgeon: Juan Boykin III, MD;  Location: [facility];  Service: Gastroenterology    DC OPTX FEM SHFT FX W/INSJ IMED IMPLT W/WO SCREW Left 08/10/2018    Procedure: Left Hip IM Long nail;  Surgeon: Olegario Lynch MD;  Location: [facility];  Service: Orthopedics    TONSILLECTOMY      US GUIDED BREAST BIOPSY RIGHT COMPLETE Right 2024     Social History   Social History     Substance and Sexual Activity   Alcohol Use Never     Social History     Substance and Sexual Activity   Drug Use Never     Social History     Tobacco Use   Smoking Status Former    Current packs/day: 0.00    Average packs/day: 2.0 packs/day for 30.0 years (60.0 ttl pk-yrs)    Types: Cigarettes    Start date:     Quit date: 3/23/2018    Years since quittin.0   Smokeless Tobacco Never   Tobacco Comments    Quit smoking around      Family History:   Family History   Problem Relation Age of Onset    Diabetes type II Mother     Osteoporosis Mother     Thyroid disease unspecified Mother     Diabetes Mother     No Known Problems Father     Hyperlipidemia Sister     Hypertension Sister     Diabetes  "Sister     No Known Problems Daughter     No Known Problems Daughter     No Known Problems Maternal Grandmother     Diabetes type II Maternal Grandfather     Pancreatic cancer Paternal Grandmother     Cancer Paternal Grandmother     No Known Problems Paternal Grandfather     Diabetes type II Maternal Aunt     No Known Problems Maternal Aunt     Diabetes type II Maternal Uncle     No Known Problems Paternal Aunt     No Known Problems Paternal Aunt     No Known Problems Paternal Aunt     Arthritis Family     Pancreatic cancer Family     Scoliosis Family     Breast cancer Neg Hx        Meds/Allergies   Not in a hospital admission.  Allergies   Allergen Reactions    Zoledronic Acid GI Intolerance     Anorexia    Codeine GI Intolerance, Nausea Only and Vomiting       Objective     Current Vitals:   Pulse: 74 (04/07/25 0925)  Height: 5' 6\" (167.6 cm) (04/07/25 0925)  Weight - Scale: 88.9 kg (196 lb) (04/07/25 0925)  SpO2: 95 % (04/07/25 0925)        Pulse 74   Ht 5' 6\" (1.676 m)   Wt 88.9 kg (196 lb)   SpO2 95%   BMI 31.64 kg/m²       Lower Extremity Exam:    Foot Exam    Musculoskeletal:  MMT is 5/5 to all compartments of the LE B/L, +1/4 edema B/L, mild Pain on  palpation of right lateral foot mass, right hallux, Equinus is present. No pain with passive ROM of pedal joints.  There is a palpable freely movable flesh-colored soft tissue mass at the lateral aspect of the right foot     Vascular:   DP pulses are weak bilaterally and PT pulses are weak bilaterally., CFT< 3sec to all digits. Pedal hair is Absent. Skin temperature warm to warm B/L.     Dermatological:  No open Lesions. Skin of the LE is normal texture, temperature, turgor B/L. Interdigital maceration is not present.        Neurologic:  Gross sensation is intact. Monofilament sensation is Intact. Sharp sensation is present.  Patient reports some burning/numbness in her right hallux                 "

## 2025-05-17 LAB
25(OH)D3 SERPL-MCNC: 47 NG/ML (ref 30–100)
ALBUMIN/CREAT UR: 421 MG/G CREAT
CHOLEST SERPL-MCNC: 188 MG/DL
CHOLEST/HDLC SERPL: 10.4 (CALC)
CREAT UR-MCNC: 80 MG/DL (ref 20–275)
HBA1C MFR BLD: 9.2 %
HDLC SERPL-MCNC: 18 MG/DL
MICROALBUMIN UR-MCNC: 33.7 MG/DL
NONHDLC SERPL-MCNC: 170 MG/DL (CALC)
T4 FREE SERPL-MCNC: 1.4 NG/DL (ref 0.8–1.8)
TRIGL SERPL-MCNC: 430 MG/DL
TSH SERPL-ACNC: 2.19 MIU/L (ref 0.4–4.5)

## 2025-05-22 ENCOUNTER — OFFICE VISIT (OUTPATIENT)
Dept: ENDOCRINOLOGY | Facility: CLINIC | Age: 75
End: 2025-05-22

## 2025-05-22 VITALS
WEIGHT: 193 LBS | TEMPERATURE: 98.8 F | HEART RATE: 75 BPM | HEIGHT: 66 IN | BODY MASS INDEX: 31.02 KG/M2 | OXYGEN SATURATION: 92 % | SYSTOLIC BLOOD PRESSURE: 138 MMHG | DIASTOLIC BLOOD PRESSURE: 78 MMHG

## 2025-05-22 DIAGNOSIS — M81.0 AGE RELATED OSTEOPOROSIS, UNSPECIFIED PATHOLOGICAL FRACTURE PRESENCE: ICD-10-CM

## 2025-05-22 DIAGNOSIS — E78.2 MIXED HYPERLIPIDEMIA: ICD-10-CM

## 2025-05-22 DIAGNOSIS — Z79.4 TYPE 2 DIABETES MELLITUS WITH HYPERGLYCEMIA, WITH LONG-TERM CURRENT USE OF INSULIN (HCC): Primary | ICD-10-CM

## 2025-05-22 DIAGNOSIS — I10 PRIMARY HYPERTENSION: ICD-10-CM

## 2025-05-22 DIAGNOSIS — E11.65 TYPE 2 DIABETES MELLITUS WITH HYPERGLYCEMIA, WITH LONG-TERM CURRENT USE OF INSULIN (HCC): Primary | ICD-10-CM

## 2025-05-22 DIAGNOSIS — E11.42 DIABETIC POLYNEUROPATHY ASSOCIATED WITH TYPE 2 DIABETES MELLITUS (HCC): ICD-10-CM

## 2025-05-22 DIAGNOSIS — E03.9 ACQUIRED HYPOTHYROIDISM: ICD-10-CM

## 2025-05-22 NOTE — ASSESSMENT & PLAN NOTE
Continue 145 mg of fenofibrate daily and 40 mg of Crestor nightly.  Fasting lipid panel.  Orders:    Lipid Panel with Direct LDL reflex; Future

## 2025-05-22 NOTE — PROGRESS NOTES
Name: Claudia Gamboa      : 1950      MRN: [id number]  Encounter Provider: CARRINGTON Hennessy  Encounter Date: 2025   Encounter department: [facility]  :  Assessment & Plan  Type 2 diabetes mellitus with hyperglycemia, with long-term current use of insulin (HCC)  Poorly controlled with elevated HgA1C. Unfortunately, CGM use was cost prohibitive. Patient reports that she is making significant improvements to her diet and has noted better blood sugar control in the last few weeks. She does admit to bolusing late for meals. Reviewed the importance of/rationale for injecting novolog 15 minutes prior to starting her meals. Continue Toujeo 40 units twice daily and novolog 40 units TID plus sliding scale +3 for every 50 mg/dL > 150 mg/dL. Continue with improved diet. Instructed to increase physical activity. Counseled on appropriate hypoglycemia surveillance and treatment. Patient knows to notify me with persistent hyperglycemia or episodes of hypoglycemia.  F/U in 3 months.   Lab Results   Component Value Date    HGBA1C 9.2 (H) 2025       Orders:    Lipid Panel with Direct LDL reflex; Future    Hemoglobin A1C; Future    Albumin / creatinine urine ratio; Future    Basic metabolic panel; Future    Diabetic polyneuropathy associated with type 2 diabetes mellitus (HCC)  Reports significant improvement in neuropathy symptoms since blood sugars have been better controlled. Reviewed the importance of not walking barefoot and performing nightly foot exams.   Lab Results   Component Value Date    HGBA1C 9.2 (H) 2025            Age related osteoporosis, unspecified pathological fracture presence  Patient received one injection of Prolia and today reports that it was cost prohibitive. However, she is in the process of receiving new insurance. She is to notify the office when transition takes place at which time we will re-run the cost through new  insurance. Should it remain cost prohibitive, recommend once yearly Reclast infusions. (This is listed as an intolerance with GI upset- could consider slower infusion rate.)       Primary hypertension  /78, which is higher than patient's baseline. Continue to monitor.  Continue 25 mg of losartan daily.       Mixed hyperlipidemia  Continue 145 mg of fenofibrate daily and 40 mg of Crestor nightly.  Fasting lipid panel.  Orders:    Lipid Panel with Direct LDL reflex; Future    Acquired hypothyroidism  Stable. Continue 50 mcg of levo thyroxine daily. Repeat TFT prior to follow up.   Orders:    TSH, 3rd generation; Future    T4, free; Future      Assessment & Plan        History of Present Illness   History of Present Illness    Claudia Gamboa is a 74 y.o. female with type 2 diabetes with long-term use of insulin since 2002 and hypothyroidism presenting to the office today for follow-up.    Past medical history significant for pancreatitis (1999), osteoporosis-with history of left hip fracture, vitamin D deficiency, diabetic peripheral neuropathy, AAA, recurrent UTIs, bladder dysfunction, and vitamin D deficiency.    Patient was last evaluated by Dr. Gia MD on 10/28/2024.    At that time, insulin regimen was continued.  CGM was cost prohibitive as well as GLP-1 therapy. She notes that Ozempic also caused significant GI upset.      Hemoglobin A1C   Latest Ref Rng <5.7 %   10/21/2024 9.7 (H)    5/16/2025 9.2 (H)       Legend:  (H) High      Current regimen:    Toujeo 40 units twice daily  NovoLog 40 units 3 times daily prior to meals +3 units for every 50 mg/dL above 150 mg/dL    Reports this morning 154, before lunch 167  Checking 5x daily    Reports it hasn't gone over 170 mg/dL for several days    Reports significant improvement in neuropathy  Cut down sweets significantly    Lowest BG was 94 mg/dL- pt felt fine    For osteoporosis, she received one injection- now cost prohibitive    For  "hypothyroidism, she is taking 50 mcg of levothyroxine daily.  Thyroid function is stable with a TSH of 2.19.     TSH, POC   Latest Ref Rng 0.40 - 4.50 mIU/L   5/16/2025 2.19          For osteoporosis, she is receiving twice yearly Prolia injections.  Next DEXA scan is due in March 2026.      Review of Systems   Constitutional:  Negative for activity change, appetite change, fatigue and unexpected weight change.   HENT:  Negative for dental problem, sore throat, trouble swallowing and voice change.    Eyes:  Negative for visual disturbance.   Respiratory:  Negative for cough, chest tightness and shortness of breath.    Cardiovascular:  Negative for chest pain, palpitations and leg swelling.   Gastrointestinal:  Negative for constipation, diarrhea, nausea and vomiting.   Endocrine: Negative for cold intolerance, heat intolerance, polydipsia, polyphagia and polyuria.   Genitourinary:  Negative for frequency.   Musculoskeletal:  Negative for arthralgias, back pain, gait problem and myalgias.   Skin:  Negative for wound.   Allergic/Immunologic: Positive for environmental allergies. Negative for food allergies.   Neurological:  Negative for dizziness, weakness, light-headedness, numbness and headaches.   Psychiatric/Behavioral:  Negative for decreased concentration, dysphoric mood and sleep disturbance. The patient is not nervous/anxious.           Objective   /78 (BP Location: Left arm, Patient Position: Sitting, Cuff Size: Standard)   Pulse 75   Temp 98.8 °F (37.1 °C) (Tympanic)   Ht 5' 6\" (1.676 m)   Wt 87.5 kg (193 lb)   SpO2 92%   BMI 31.15 kg/m²      Physical Exam  Vitals reviewed.   Constitutional:       General: She is not in acute distress.     Appearance: She is well-developed. She is obese. She is not ill-appearing.   HENT:      Head: Normocephalic and atraumatic.     Eyes:      Conjunctiva/sclera: Conjunctivae normal.       Cardiovascular:      Rate and Rhythm: Normal rate and regular rhythm.      " Pulses: Normal pulses.      Heart sounds: No murmur heard.     No friction rub.   Pulmonary:      Effort: Pulmonary effort is normal. No respiratory distress.      Breath sounds: Normal breath sounds.   Abdominal:      Palpations: Abdomen is soft.      Tenderness: There is no abdominal tenderness.     Musculoskeletal:         General: No swelling.      Cervical back: Normal range of motion and neck supple.      Right lower leg: No edema.      Left lower leg: No edema.     Skin:     General: Skin is warm and dry.      Capillary Refill: Capillary refill takes less than 2 seconds.     Neurological:      Mental Status: She is alert.     Psychiatric:         Mood and Affect: Mood normal.       Physical Exam      Results

## 2025-05-22 NOTE — ASSESSMENT & PLAN NOTE
Poorly controlled with elevated HgA1C. Unfortunately, CGM use was cost prohibitive. Patient reports that she is making significant improvements to her diet and has noted better blood sugar control in the last few weeks. She does admit to bolusing late for meals. Reviewed the importance of/rationale for injecting novolog 15 minutes prior to starting her meals. Continue Toujeo 40 units twice daily and novolog 40 units TID plus sliding scale +3 for every 50 mg/dL > 150 mg/dL. Continue with improved diet. Instructed to increase physical activity. Counseled on appropriate hypoglycemia surveillance and treatment. Patient knows to notify me with persistent hyperglycemia or episodes of hypoglycemia.  F/U in 3 months.   Lab Results   Component Value Date    HGBA1C 9.2 (H) 05/16/2025       Orders:    Lipid Panel with Direct LDL reflex; Future    Hemoglobin A1C; Future    Albumin / creatinine urine ratio; Future    Basic metabolic panel; Future

## 2025-05-22 NOTE — ASSESSMENT & PLAN NOTE
Patient received one injection of Prolia and today reports that it was cost prohibitive. However, she is in the process of receiving new insurance. She is to notify the office when transition takes place at which time we will re-run the cost through new insurance. Should it remain cost prohibitive, recommend once yearly Reclast infusions. (This is listed as an intolerance with GI upset- could consider slower infusion rate.)

## 2025-05-22 NOTE — ASSESSMENT & PLAN NOTE
Stable. Continue 50 mcg of levo thyroxine daily. Repeat TFT prior to follow up.   Orders:    TSH, 3rd generation; Future    T4, free; Future

## 2025-05-22 NOTE — ASSESSMENT & PLAN NOTE
Reports significant improvement in neuropathy symptoms since blood sugars have been better controlled. Reviewed the importance of not walking barefoot and performing nightly foot exams.   Lab Results   Component Value Date    HGBA1C 9.2 (H) 05/16/2025

## 2025-05-22 NOTE — ASSESSMENT & PLAN NOTE
/78, which is higher than patient's baseline. Continue to monitor.  Continue 25 mg of losartan daily.

## 2025-05-28 ENCOUNTER — VBI (OUTPATIENT)
Dept: ADMINISTRATIVE | Facility: OTHER | Age: 75
End: 2025-05-28

## 2025-05-28 NOTE — TELEPHONE ENCOUNTER
05/28/25 2:10 PM     DM A1c outreach is not required, patient was called within the last 3 months.  Patient was seen on May 19th    Thank you.  Lilian Webster MA  [facility]

## 2025-06-26 ENCOUNTER — HOSPITAL ENCOUNTER (OUTPATIENT)
Dept: CT IMAGING | Facility: HOSPITAL | Age: 75
Discharge: HOME | End: 2025-06-26
Attending: INTERNAL MEDICINE
Payer: COMMERCIAL

## 2025-06-26 ENCOUNTER — HOSPITAL ENCOUNTER (OUTPATIENT)
Dept: PULMONOLOGY | Facility: HOSPITAL | Age: 75
End: 2025-06-26
Attending: INTERNAL MEDICINE
Payer: COMMERCIAL

## 2025-06-26 DIAGNOSIS — J84.10 PULMONARY FIBROSIS (HCC): ICD-10-CM

## 2025-06-26 PROCEDURE — 94010 BREATHING CAPACITY TEST: CPT

## 2025-06-26 PROCEDURE — 94729 DIFFUSING CAPACITY: CPT | Performed by: INTERNAL MEDICINE

## 2025-06-26 PROCEDURE — 94726 PLETHYSMOGRAPHY LUNG VOLUMES: CPT | Performed by: INTERNAL MEDICINE

## 2025-06-26 PROCEDURE — 94726 PLETHYSMOGRAPHY LUNG VOLUMES: CPT

## 2025-06-26 PROCEDURE — 94010 BREATHING CAPACITY TEST: CPT | Performed by: INTERNAL MEDICINE

## 2025-06-26 PROCEDURE — 94760 N-INVAS EAR/PLS OXIMETRY 1: CPT

## 2025-06-26 PROCEDURE — 94729 DIFFUSING CAPACITY: CPT

## 2025-06-26 PROCEDURE — 71250 CT THORAX DX C-: CPT

## 2025-06-30 ENCOUNTER — TELEPHONE (OUTPATIENT)
Dept: PULMONOLOGY | Facility: CLINIC | Age: 75
End: 2025-06-30

## 2025-06-30 ENCOUNTER — TELEPHONE (OUTPATIENT)
Age: 75
End: 2025-06-30

## 2025-06-30 NOTE — TELEPHONE ENCOUNTER
called patient back letting her know dr salgado has some availability on [facility] and [place]. told her to call the office to  her appt

## 2025-06-30 NOTE — TELEPHONE ENCOUNTER
Patient is looking for an appointment with Dr. Avila to discuss her PFT and CT chest results. I do not see that Dr. Avila has anything available. Please advise.

## 2025-07-17 DIAGNOSIS — Z79.4 TYPE 2 DIABETES MELLITUS WITH HYPERGLYCEMIA, WITH LONG-TERM CURRENT USE OF INSULIN (HCC): ICD-10-CM

## 2025-07-17 DIAGNOSIS — E11.65 TYPE 2 DIABETES MELLITUS WITH HYPERGLYCEMIA, WITH LONG-TERM CURRENT USE OF INSULIN (HCC): ICD-10-CM

## 2025-07-17 RX ORDER — BLOOD SUGAR DIAGNOSTIC
STRIP MISCELLANEOUS
Qty: 400 STRIP | Refills: 1 | Status: SHIPPED | OUTPATIENT
Start: 2025-07-17

## 2025-08-01 ENCOUNTER — VBI (OUTPATIENT)
Dept: ADMINISTRATIVE | Facility: OTHER | Age: 75
End: 2025-08-01

## 2025-08-07 DIAGNOSIS — M79.2 NEUROPATHIC PAIN: ICD-10-CM

## 2025-08-08 RX ORDER — GABAPENTIN 600 MG/1
600 TABLET ORAL 3 TIMES DAILY
Qty: 270 TABLET | Refills: 1 | Status: SHIPPED | OUTPATIENT
Start: 2025-08-08

## 2025-08-14 ENCOUNTER — OFFICE VISIT (OUTPATIENT)
Dept: CARDIOLOGY CLINIC | Facility: CLINIC | Age: 75
End: 2025-08-14
Payer: COMMERCIAL

## 2025-08-22 LAB
BUN SERPL-MCNC: 23 MG/DL (ref 7–25)
BUN/CREAT SERPL: ABNORMAL (CALC) (ref 6–22)
CALCIUM SERPL-MCNC: 10.2 MG/DL (ref 8.6–10.4)
CHLORIDE SERPL-SCNC: 104 MMOL/L (ref 98–110)
CO2 SERPL-SCNC: 24 MMOL/L (ref 20–32)
CREAT SERPL-MCNC: 0.71 MG/DL (ref 0.6–1)
GFR/BSA.PRED SERPLBLD CYS-BASED-ARV: 90 ML/MIN/1.73M2
GLUCOSE SERPL-MCNC: 153 MG/DL (ref 65–99)
POTASSIUM SERPL-SCNC: 4 MMOL/L (ref 3.5–5.3)
SODIUM SERPL-SCNC: 138 MMOL/L (ref 135–146)

## (undated) DEVICE — 6617 IOBAN II PATIENT ISOLATION DRAPE 5/BX,4BX/CS: Brand: STERI-DRAPE™ IOBAN™ 2

## (undated) DEVICE — ARTHROSCOPY FLOOR MAT

## (undated) DEVICE — CHLORAPREP HI-LITE 26ML ORANGE

## (undated) DEVICE — GLOVE SRG BIOGEL 7.5

## (undated) DEVICE — SPONGE SCRUB 4 PCT CHLORHEXIDINE

## (undated) DEVICE — SUT VICRYL 2-0 CT-1 27 IN J259H

## (undated) DEVICE — DRESSING MEPILEX AG BORDER 4 X 4 IN

## (undated) DEVICE — LIGHT HANDLE COVER SLEEVE DISP BLUE STELLAR

## (undated) DEVICE — 2.5MM REAMING ROD WITH BALL TIP/950MM-STERILE

## (undated) DEVICE — INTENDED FOR TISSUE SEPARATION, AND OTHER PROCEDURES THAT REQUIRE A SHARP SURGICAL BLADE TO PUNCTURE OR CUT.: Brand: BARD-PARKER SAFETY BLADES SIZE 10, STERILE

## (undated) DEVICE — SUT VICRYL 0 CT-1 27 IN J260H

## (undated) DEVICE — PAD CAST 4 IN COTTON NON STERILE

## (undated) DEVICE — PAD CAST 6 IN COTTON NON STERILE

## (undated) DEVICE — THE SIMPULSE SOLO SYSTEM WITH ULTREX RETRACTABLE SPLASH SHIELD TIP: Brand: SIMPULSE SOLO

## (undated) DEVICE — STERILE ORIF HIP PACK: Brand: CARDINAL HEALTH

## (undated) DEVICE — PROXIMATE SKIN STAPLERS (35 WIDE) CONTAINS 35 STAINLESS STEEL STAPLES (FIXED HEAD): Brand: PROXIMATE